# Patient Record
Sex: MALE | Race: WHITE | NOT HISPANIC OR LATINO | Employment: FULL TIME | URBAN - METROPOLITAN AREA
[De-identification: names, ages, dates, MRNs, and addresses within clinical notes are randomized per-mention and may not be internally consistent; named-entity substitution may affect disease eponyms.]

---

## 2020-09-17 ENCOUNTER — TELEPHONE (OUTPATIENT)
Dept: INTERNAL MEDICINE | Facility: CLINIC | Age: 50
End: 2020-09-17

## 2020-09-17 DIAGNOSIS — F31.9 BIPOLAR AFFECTIVE DISORDER, REMISSION STATUS UNSPECIFIED: Primary | ICD-10-CM

## 2020-09-29 ENCOUNTER — OFFICE VISIT (OUTPATIENT)
Dept: PSYCHIATRY | Facility: CLINIC | Age: 50
End: 2020-09-29
Payer: COMMERCIAL

## 2020-09-29 DIAGNOSIS — F31.70 BIPOLAR DISORDER IN FULL REMISSION, MOST RECENT EPISODE UNSPECIFIED TYPE: Primary | ICD-10-CM

## 2020-09-29 PROCEDURE — 90792 PR PSYCHIATRIC DIAGNOSTIC EVALUATION W/MEDICAL SERVICES: ICD-10-PCS | Mod: 95,,, | Performed by: PSYCHIATRY & NEUROLOGY

## 2020-09-29 PROCEDURE — 90792 PSYCH DIAG EVAL W/MED SRVCS: CPT | Mod: 95,,, | Performed by: PSYCHIATRY & NEUROLOGY
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
## 2020-09-30 ENCOUNTER — OFFICE VISIT (OUTPATIENT)
Dept: INTERNAL MEDICINE | Facility: CLINIC | Age: 50
End: 2020-09-30
Payer: COMMERCIAL

## 2020-09-30 VITALS
OXYGEN SATURATION: 97 % | DIASTOLIC BLOOD PRESSURE: 78 MMHG | TEMPERATURE: 96 F | SYSTOLIC BLOOD PRESSURE: 122 MMHG | WEIGHT: 245.56 LBS | HEART RATE: 103 BPM

## 2020-09-30 DIAGNOSIS — Z23 NEED FOR INFLUENZA VACCINATION: ICD-10-CM

## 2020-09-30 DIAGNOSIS — Z79.4 CONTROLLED TYPE 2 DIABETES MELLITUS WITH COMPLICATION, WITH LONG-TERM CURRENT USE OF INSULIN: ICD-10-CM

## 2020-09-30 DIAGNOSIS — E11.8 CONTROLLED TYPE 2 DIABETES MELLITUS WITH COMPLICATION, WITH LONG-TERM CURRENT USE OF INSULIN: ICD-10-CM

## 2020-09-30 DIAGNOSIS — Z23 NEED FOR DIPHTHERIA-TETANUS-PERTUSSIS (TDAP) VACCINE: ICD-10-CM

## 2020-09-30 DIAGNOSIS — F31.9 BIPOLAR AFFECTIVE DISORDER, REMISSION STATUS UNSPECIFIED: ICD-10-CM

## 2020-09-30 DIAGNOSIS — Z00.00 ROUTINE GENERAL MEDICAL EXAMINATION AT A HEALTH CARE FACILITY: Primary | ICD-10-CM

## 2020-09-30 DIAGNOSIS — L03.011 PARONYCHIA OF FINGER OF RIGHT HAND: ICD-10-CM

## 2020-09-30 PROCEDURE — 99213 PR OFFICE/OUTPT VISIT, EST, LEVL III, 20-29 MIN: ICD-10-PCS | Mod: 25,S$GLB,, | Performed by: INTERNAL MEDICINE

## 2020-09-30 PROCEDURE — 99999 PR PBB SHADOW E&M-EST. PATIENT-LVL V: CPT | Mod: PBBFAC,,, | Performed by: INTERNAL MEDICINE

## 2020-09-30 PROCEDURE — 90686 FLU VACCINE (QUAD) GREATER THAN OR EQUAL TO 3YO PRESERVATIVE FREE IM: ICD-10-PCS | Mod: S$GLB,,, | Performed by: INTERNAL MEDICINE

## 2020-09-30 PROCEDURE — 90686 IIV4 VACC NO PRSV 0.5 ML IM: CPT | Mod: S$GLB,,, | Performed by: INTERNAL MEDICINE

## 2020-09-30 PROCEDURE — 90472 IMMUNIZATION ADMIN EACH ADD: CPT | Mod: S$GLB,,, | Performed by: INTERNAL MEDICINE

## 2020-09-30 PROCEDURE — 90471 FLU VACCINE (QUAD) GREATER THAN OR EQUAL TO 3YO PRESERVATIVE FREE IM: ICD-10-PCS | Mod: S$GLB,,, | Performed by: INTERNAL MEDICINE

## 2020-09-30 PROCEDURE — 90715 TDAP VACCINE 7 YRS/> IM: CPT | Mod: S$GLB,,, | Performed by: INTERNAL MEDICINE

## 2020-09-30 PROCEDURE — 99386 PREV VISIT NEW AGE 40-64: CPT | Mod: 25,S$GLB,, | Performed by: INTERNAL MEDICINE

## 2020-09-30 PROCEDURE — 90472 TDAP VACCINE GREATER THAN OR EQUAL TO 7YO IM: ICD-10-PCS | Mod: S$GLB,,, | Performed by: INTERNAL MEDICINE

## 2020-09-30 PROCEDURE — 90715 TDAP VACCINE GREATER THAN OR EQUAL TO 7YO IM: ICD-10-PCS | Mod: S$GLB,,, | Performed by: INTERNAL MEDICINE

## 2020-09-30 PROCEDURE — 90471 IMMUNIZATION ADMIN: CPT | Mod: S$GLB,,, | Performed by: INTERNAL MEDICINE

## 2020-09-30 PROCEDURE — 99999 PR PBB SHADOW E&M-EST. PATIENT-LVL V: ICD-10-PCS | Mod: PBBFAC,,, | Performed by: INTERNAL MEDICINE

## 2020-09-30 PROCEDURE — 99386 PR PREVENTIVE VISIT,NEW,40-64: ICD-10-PCS | Mod: 25,S$GLB,, | Performed by: INTERNAL MEDICINE

## 2020-09-30 PROCEDURE — 99213 OFFICE O/P EST LOW 20 MIN: CPT | Mod: 25,S$GLB,, | Performed by: INTERNAL MEDICINE

## 2020-09-30 RX ORDER — VENLAFAXINE HYDROCHLORIDE 75 MG/1
CAPSULE, EXTENDED RELEASE ORAL
COMMUNITY
Start: 2020-08-24 | End: 2020-09-30

## 2020-09-30 RX ORDER — QUETIAPINE FUMARATE 100 MG/1
TABLET, FILM COATED ORAL
COMMUNITY
Start: 2014-04-01 | End: 2020-09-30 | Stop reason: SDUPTHER

## 2020-09-30 RX ORDER — INSULIN LISPRO 100 [IU]/ML
INJECTION, SOLUTION INTRAVENOUS; SUBCUTANEOUS
COMMUNITY
Start: 2020-08-24 | End: 2021-03-15 | Stop reason: SDUPTHER

## 2020-09-30 RX ORDER — INSULIN GLARGINE 100 [IU]/ML
INJECTION, SOLUTION SUBCUTANEOUS
COMMUNITY
Start: 2018-11-27 | End: 2020-09-30 | Stop reason: SDUPTHER

## 2020-09-30 RX ORDER — INSULIN LISPRO 100 [IU]/ML
INJECTION, SOLUTION INTRAVENOUS; SUBCUTANEOUS
COMMUNITY
Start: 2018-12-10 | End: 2021-03-15 | Stop reason: SDUPTHER

## 2020-09-30 RX ORDER — VENLAFAXINE HYDROCHLORIDE 75 MG/1
CAPSULE, EXTENDED RELEASE ORAL
COMMUNITY
Start: 2014-04-01 | End: 2020-10-12 | Stop reason: SDUPTHER

## 2020-09-30 RX ORDER — METFORMIN HYDROCHLORIDE 500 MG/1
TABLET, EXTENDED RELEASE ORAL
COMMUNITY
Start: 2000-01-01 | End: 2020-09-30 | Stop reason: SDUPTHER

## 2020-09-30 RX ORDER — QUETIAPINE FUMARATE 100 MG/1
TABLET, FILM COATED ORAL
COMMUNITY
Start: 2020-09-20 | End: 2020-10-12 | Stop reason: SDUPTHER

## 2020-09-30 RX ORDER — INSULIN GLARGINE 100 [IU]/ML
30 INJECTION, SOLUTION SUBCUTANEOUS NIGHTLY
Qty: 9 ML | Refills: 1 | Status: SHIPPED | OUTPATIENT
Start: 2020-09-30 | End: 2020-12-27

## 2020-09-30 RX ORDER — LAMOTRIGINE 200 MG/1
TABLET ORAL
COMMUNITY
Start: 2020-09-16 | End: 2020-10-12 | Stop reason: SDUPTHER

## 2020-09-30 RX ORDER — LITHIUM CARBONATE 300 MG/1
TABLET, FILM COATED, EXTENDED RELEASE ORAL
COMMUNITY
Start: 2020-09-08 | End: 2020-11-30

## 2020-09-30 RX ORDER — INSULIN GLARGINE 100 [IU]/ML
INJECTION, SOLUTION SUBCUTANEOUS
COMMUNITY
Start: 2020-08-24 | End: 2020-09-30

## 2020-09-30 RX ORDER — ATORVASTATIN CALCIUM 40 MG/1
TABLET, FILM COATED ORAL
COMMUNITY
Start: 2000-01-01 | End: 2020-09-30 | Stop reason: SDUPTHER

## 2020-09-30 RX ORDER — DOXYCYCLINE HYCLATE 100 MG
100 TABLET ORAL EVERY 12 HOURS
Qty: 14 TABLET | Refills: 0 | Status: SHIPPED | OUTPATIENT
Start: 2020-09-30 | End: 2020-10-07

## 2020-09-30 RX ORDER — LAMOTRIGINE 200 MG/1
TABLET ORAL
COMMUNITY
Start: 2000-01-01 | End: 2020-09-30 | Stop reason: SDUPTHER

## 2020-09-30 RX ORDER — METFORMIN HYDROCHLORIDE 500 MG/1
TABLET, EXTENDED RELEASE ORAL
COMMUNITY
Start: 2020-08-24 | End: 2021-01-14

## 2020-09-30 RX ORDER — ATORVASTATIN CALCIUM 40 MG/1
TABLET, FILM COATED ORAL
COMMUNITY
Start: 2020-08-24 | End: 2020-10-12 | Stop reason: SDUPTHER

## 2020-09-30 NOTE — PATIENT INSTRUCTIONS
Paronychia of the Finger or Toe  Paronychia is an infection near a fingernail or toenail. It usually occurs when an opening in the cuticle or an ingrown toenail lets bacteria under the skin.  The infection will need to be drained if pus is present. If the infection has been caught early, you may need only antibiotic treatment. Healing will take about 1 to 2 weeks.  Home care  Follow these guidelines when caring for yourself at home:  · Clean and soak the toe or finger. Do this 2 times a day for the first 3 days. To do so:  ¨ Soak your foot or hand in a tub of warm water for 5 minutes. Or hold your toe or finger under a faucet of warm running water for 5 minutes.  ¨ Clean any crust away with soap and water using a cotton swab.  ¨ Put antibiotic ointment on the infected area.  · Change the dressing daily or any time it gets dirty.  · If you were given antibiotics, take them as directed until they are all gone.  · If your infection is on a toe, wear comfortable shoes with a lot of toe room. You can also wear open-toed sandals while your toe heals.  · You may use over-the-counter medicine (acetaminophen or ibuprofen to help with pain, unless another medicine was prescribed. If you have chronic liver or kidney disease, talk with your healthcare provider before using these medicines. Also talk with your provider if you've had a stomach ulcer or GI (gastrointestinal) bleeding.  Prevention  The following can prevent paronychia:  · Avoid cutting or playing with your cuticles at home.  · Don't bite your nails.  · Don't suck on your thumbs or fingers.  Follow-up care  Follow up with your healthcare provider, or as advised.  When to seek medical advice  Call your healthcare provider right away if any of these occur:  · Redness, pain, or swelling of the finger or toe gets worse  · Red streaks in the skin leading away from the wound  · Pus or fluid draining from the nail area  · Fever of 100.4ºF (38ºC) or higher, or as directed  by your provider  Date Last Reviewed: 8/1/2016  © 1425-5976 [employer]. [address] [place] [address], [place] [address]. All rights reserved. This information is not intended as a substitute for professional medical care. Always follow your healthcare professional's instructions.

## 2020-09-30 NOTE — PROGRESS NOTES
Subjective:      Patient ID: Patrick Sanz is a 50 y.o. male.    Chief Complaint: Establish Care    Hand Pain   Incident onset: Three days. There was no injury mechanism. Pain location: Right 4th finger. The quality of the pain is described as aching. The pain does not radiate. The pain is moderate. The pain has been improving since the incident. Pertinent negatives include no chest pain. The symptoms are aggravated by palpation. He has tried nothing (Spontaneous draining) for the symptoms. The treatment provided moderate relief.    Physical Exam  Constitutional:       General: He is not in acute distress.     Appearance: He is well-developed.   HENT:      Head: Normocephalic and atraumatic.   Eyes:      Pupils: Pupils are equal, round, and reactive to light.   Neck:      Musculoskeletal: Neck supple.      Thyroid: No thyromegaly.   Cardiovascular:      Rate and Rhythm: Normal rate and regular rhythm.   Pulmonary:      Breath sounds: Normal breath sounds. No wheezing or rales.   Abdominal:      General: Bowel sounds are normal.      Palpations: Abdomen is soft.      Tenderness: There is no abdominal tenderness.   Musculoskeletal: Normal range of motion.        Hands:    Lymphadenopathy:      Cervical: No cervical adenopathy.   Skin:     General: Skin is warm and dry.   Neurological:      Mental Status: He is alert and oriented to person, place, and time.   Psychiatric:         Behavior: Behavior normal.       51 yo with There is no problem list on file for this patient.    Past Medical History:   Diagnosis Date    Anxiety     Bipolar disorder     Depression     Diabetes mellitus, type 2        Here today for annual prev exam.  Compliant with meds without significant side effects. Energy and appetite are good.       Has tried only metformin and insulin for dm. On insulin at least 2 years.     120 - 210 home glucose.     3 days  Of pain  To medial aspec of right 4th finger. Drained some puss and blood.     Past  Surgical History:   Procedure Laterality Date    HERNIA REPAIR       Social History     Socioeconomic History    Marital status:      Spouse name: Not on file    Number of children: Not on file    Years of education: Not on file    Highest education level: Not on file   Occupational History    Not on file   Social Needs    Financial resource strain: Not on file    Food insecurity     Worry: Not on file     Inability: Not on file    Transportation needs     Medical: Not on file     Non-medical: Not on file   Tobacco Use    Smoking status: Former Smoker    Smokeless tobacco: Former User   Substance and Sexual Activity    Alcohol use: Not Currently    Drug use: Never    Sexual activity: Yes   Lifestyle    Physical activity     Days per week: Not on file     Minutes per session: Not on file    Stress: Not on file   Relationships    Social connections     Talks on phone: Not on file     Gets together: Not on file     Attends Rastafarian service: Not on file     Active member of club or organization: Not on file     Attends meetings of clubs or organizations: Not on file     Relationship status: Not on file   Other Topics Concern    Not on file   Social History Narrative    Not on file         family history includes Diabetes in his mother; Hypertension in his father.    No f/h of colon or prostate cancer  Review of Systems   Constitutional: Negative for chills and fever.   HENT: Negative for ear pain and sore throat.    Respiratory: Negative for cough, shortness of breath and wheezing.    Cardiovascular: Negative for chest pain and palpitations.   Gastrointestinal: Negative for abdominal pain, blood in stool, diarrhea, nausea and vomiting.   Genitourinary: Negative for dysuria and hematuria.   Skin: Negative for rash.   Neurological: Negative for seizures and syncope.     Objective:   /78 (BP Location: Left arm, Patient Position: Sitting, BP Method: Large (Manual))   Pulse 103   Temp 96.4  °F (35.8 °C) (Tympanic)   Wt 111.4 kg (245 lb 9.5 oz)   SpO2 97%     Physical Exam  Constitutional:       General: He is not in acute distress.     Appearance: He is well-developed.   HENT:      Head: Normocephalic and atraumatic.   Eyes:      Pupils: Pupils are equal, round, and reactive to light.   Neck:      Musculoskeletal: Neck supple.      Thyroid: No thyromegaly.   Cardiovascular:      Rate and Rhythm: Normal rate and regular rhythm.   Pulmonary:      Breath sounds: Normal breath sounds. No wheezing or rales.   Abdominal:      General: Bowel sounds are normal.      Palpations: Abdomen is soft.      Tenderness: There is no abdominal tenderness.   Musculoskeletal: Normal range of motion.        Hands:    Lymphadenopathy:      Cervical: No cervical adenopathy.   Skin:     General: Skin is warm and dry.   Neurological:      Mental Status: He is alert and oriented to person, place, and time.   Psychiatric:         Behavior: Behavior normal.         Assessment:     1. Routine general medical examination at a health care facility    2. Bipolar affective disorder, remission status unspecified    3. Need for influenza vaccination    4. Need for diphtheria-tetanus-pertussis (Tdap) vaccine    5. Controlled type 2 diabetes mellitus with complication, with long-term current use of insulin    6. Paronychia of finger of right hand      Plan:   Routine general medical examination at a health care facility    Heart healthy diet and regular exercise.  Health maintenance reviewed with patient  -     Comprehensive metabolic panel; Future; Expected date: 09/30/2020  -     CBC auto differential; Future; Expected date: 09/30/2020  -     TSH; Future; Expected date: 09/30/2020  -     Lipid Panel; Future; Expected date: 09/30/2020  -     PSA, Screening; Future; Expected date: 09/30/2020  -     Hemoglobin A1C; Future; Expected date: 09/30/2020  -     HIV 1/2 Ag/Ab (4th Gen); Future; Expected date: 09/30/2020  -     Hepatitis C  Antibody; Future; Expected date: 09/30/2020    Bipolar affective disorder, remission status unspecified    Need for influenza vaccination  -     Influenza - Quadrivalent *Preferred* (6 months+) (PF)    Need for diphtheria-tetanus-pertussis (Tdap) vaccine  -     (In Office Administered) Tdap Vaccine    Controlled type 2 diabetes mellitus with complication, with long-term current use of insulin  -     Hemoglobin A1C; Future; Expected date: 09/30/2020  -     Microalbumin/creatinine urine ratio; Future; Expected date: 09/30/2020  -     Ambulatory referral/consult to Optometry; Future; Expected date: 10/07/2020  -     Ambulatory referral/consult to Diabetes Education; Future; Expected date: 10/07/2020  -     insulin (BASAGLAR KWIKPEN U-100 INSULIN) glargine 100 units/mL (3mL) SubQ pen; Inject 30 Units into the skin every evening.  Dispense: 9 mL; Refill: 1    Paronychia of finger of right hand  -     doxycycline (VIBRA-TABS) 100 MG tablet; Take 1 tablet (100 mg total) by mouth every 12 (twelve) hours. for 7 days  Dispense: 14 tablet; Refill: 0        Lab Frequency Next Occurrence   Ambulatory referral/consult to Psychiatry Once 09/24/2020       Problem List Items Addressed This Visit     None      Visit Diagnoses     Routine general medical examination at a health care facility    -  Primary    Relevant Orders    Comprehensive metabolic panel    CBC auto differential    TSH    Lipid Panel    PSA, Screening    Hemoglobin A1C    HIV 1/2 Ag/Ab (4th Gen)    Hepatitis C Antibody    Bipolar affective disorder, remission status unspecified        Need for influenza vaccination        Relevant Orders    Influenza - Quadrivalent *Preferred* (6 months+) (PF) (Completed)    Need for diphtheria-tetanus-pertussis (Tdap) vaccine        Relevant Orders    (In Office Administered) Tdap Vaccine (Completed)    Controlled type 2 diabetes mellitus with complication, with long-term current use of insulin        Relevant Medications     metFORMIN (GLUCOPHAGE-XR) 500 MG ER 24hr tablet    HUMALOG KWIKPEN INSULIN 100 unit/mL pen    insulin lispro (HUMALOG KWIKPEN INSULIN) 100 unit/mL pen    insulin (BASAGLAR KWIKPEN U-100 INSULIN) glargine 100 units/mL (3mL) SubQ pen    Other Relevant Orders    Hemoglobin A1C    Microalbumin/creatinine urine ratio    Ambulatory referral/consult to Optometry    Ambulatory referral/consult to Diabetes Education    Paronychia of finger of right hand        Relevant Medications    doxycycline (VIBRA-TABS) 100 MG tablet          Follow up in about 1 week (around 10/7/2020), or if symptoms worsen or fail to improve.

## 2020-10-03 ENCOUNTER — LAB VISIT (OUTPATIENT)
Dept: LAB | Facility: HOSPITAL | Age: 50
End: 2020-10-03
Attending: INTERNAL MEDICINE
Payer: COMMERCIAL

## 2020-10-03 DIAGNOSIS — Z79.4 CONTROLLED TYPE 2 DIABETES MELLITUS WITH COMPLICATION, WITH LONG-TERM CURRENT USE OF INSULIN: ICD-10-CM

## 2020-10-03 DIAGNOSIS — E11.8 CONTROLLED TYPE 2 DIABETES MELLITUS WITH COMPLICATION, WITH LONG-TERM CURRENT USE OF INSULIN: ICD-10-CM

## 2020-10-03 DIAGNOSIS — Z00.00 ROUTINE GENERAL MEDICAL EXAMINATION AT A HEALTH CARE FACILITY: ICD-10-CM

## 2020-10-03 PROBLEM — F31.9 BIPOLAR DISORDER: Status: ACTIVE | Noted: 2020-10-03

## 2020-10-03 LAB
ALBUMIN SERPL BCP-MCNC: 4.1 G/DL (ref 3.5–5.2)
ALBUMIN/CREAT UR: 11.9 UG/MG (ref 0–30)
ALP SERPL-CCNC: 112 U/L (ref 55–135)
ALT SERPL W/O P-5'-P-CCNC: 29 U/L (ref 10–44)
ANION GAP SERPL CALC-SCNC: 12 MMOL/L (ref 8–16)
AST SERPL-CCNC: 18 U/L (ref 10–40)
BASOPHILS # BLD AUTO: 0.01 K/UL (ref 0–0.2)
BASOPHILS NFR BLD: 0.1 % (ref 0–1.9)
BILIRUB SERPL-MCNC: 0.4 MG/DL (ref 0.1–1)
BUN SERPL-MCNC: 13 MG/DL (ref 6–20)
CALCIUM SERPL-MCNC: 8.7 MG/DL (ref 8.7–10.5)
CHLORIDE SERPL-SCNC: 106 MMOL/L (ref 95–110)
CHOLEST SERPL-MCNC: 125 MG/DL (ref 120–199)
CHOLEST/HDLC SERPL: 4.3 {RATIO} (ref 2–5)
CO2 SERPL-SCNC: 21 MMOL/L (ref 23–29)
COMPLEXED PSA SERPL-MCNC: 16.1 NG/ML (ref 0–4)
CREAT SERPL-MCNC: 0.9 MG/DL (ref 0.5–1.4)
CREAT UR-MCNC: 320 MG/DL (ref 23–375)
DIFFERENTIAL METHOD: ABNORMAL
EOSINOPHIL # BLD AUTO: 0.1 K/UL (ref 0–0.5)
EOSINOPHIL NFR BLD: 1 % (ref 0–8)
ERYTHROCYTE [DISTWIDTH] IN BLOOD BY AUTOMATED COUNT: 12.6 % (ref 11.5–14.5)
EST. GFR  (AFRICAN AMERICAN): >60 ML/MIN/1.73 M^2
EST. GFR  (NON AFRICAN AMERICAN): >60 ML/MIN/1.73 M^2
ESTIMATED AVG GLUCOSE: 212 MG/DL (ref 68–131)
ESTIMATED AVG GLUCOSE: 214 MG/DL (ref 68–131)
GLUCOSE SERPL-MCNC: 205 MG/DL (ref 70–110)
HBA1C MFR BLD HPLC: 9 % (ref 4–5.6)
HBA1C MFR BLD HPLC: 9.1 % (ref 4–5.6)
HCT VFR BLD AUTO: 46.7 % (ref 40–54)
HDLC SERPL-MCNC: 29 MG/DL (ref 40–75)
HDLC SERPL: 23.2 % (ref 20–50)
HGB BLD-MCNC: 14.8 G/DL (ref 14–18)
IMM GRANULOCYTES # BLD AUTO: 0.02 K/UL (ref 0–0.04)
IMM GRANULOCYTES NFR BLD AUTO: 0.2 % (ref 0–0.5)
LDLC SERPL CALC-MCNC: 61.6 MG/DL (ref 63–159)
LYMPHOCYTES # BLD AUTO: 2.4 K/UL (ref 1–4.8)
LYMPHOCYTES NFR BLD: 28.3 % (ref 18–48)
MCH RBC QN AUTO: 28.9 PG (ref 27–31)
MCHC RBC AUTO-ENTMCNC: 31.7 G/DL (ref 32–36)
MCV RBC AUTO: 91 FL (ref 82–98)
MICROALBUMIN UR DL<=1MG/L-MCNC: 38 UG/ML
MONOCYTES # BLD AUTO: 0.6 K/UL (ref 0.3–1)
MONOCYTES NFR BLD: 7.1 % (ref 4–15)
NEUTROPHILS # BLD AUTO: 5.3 K/UL (ref 1.8–7.7)
NEUTROPHILS NFR BLD: 63.3 % (ref 38–73)
NONHDLC SERPL-MCNC: 96 MG/DL
NRBC BLD-RTO: 0 /100 WBC
PLATELET # BLD AUTO: 274 K/UL (ref 150–350)
PMV BLD AUTO: 10 FL (ref 9.2–12.9)
POTASSIUM SERPL-SCNC: 4.3 MMOL/L (ref 3.5–5.1)
PROT SERPL-MCNC: 6.9 G/DL (ref 6–8.4)
RBC # BLD AUTO: 5.12 M/UL (ref 4.6–6.2)
SODIUM SERPL-SCNC: 139 MMOL/L (ref 136–145)
TRIGL SERPL-MCNC: 172 MG/DL (ref 30–150)
TSH SERPL DL<=0.005 MIU/L-ACNC: 0.94 UIU/ML (ref 0.4–4)
WBC # BLD AUTO: 8.41 K/UL (ref 3.9–12.7)

## 2020-10-03 PROCEDURE — 85025 COMPLETE CBC W/AUTO DIFF WBC: CPT

## 2020-10-03 PROCEDURE — 80053 COMPREHEN METABOLIC PANEL: CPT

## 2020-10-03 PROCEDURE — 84153 ASSAY OF PSA TOTAL: CPT

## 2020-10-03 PROCEDURE — 80061 LIPID PANEL: CPT

## 2020-10-03 PROCEDURE — 82043 UR ALBUMIN QUANTITATIVE: CPT

## 2020-10-03 PROCEDURE — 86703 HIV-1/HIV-2 1 RESULT ANTBDY: CPT

## 2020-10-03 PROCEDURE — 84443 ASSAY THYROID STIM HORMONE: CPT

## 2020-10-03 PROCEDURE — 36415 COLL VENOUS BLD VENIPUNCTURE: CPT

## 2020-10-03 PROCEDURE — 83036 HEMOGLOBIN GLYCOSYLATED A1C: CPT

## 2020-10-03 PROCEDURE — 86803 HEPATITIS C AB TEST: CPT

## 2020-10-07 ENCOUNTER — OFFICE VISIT (OUTPATIENT)
Dept: INTERNAL MEDICINE | Facility: CLINIC | Age: 50
End: 2020-10-07
Payer: COMMERCIAL

## 2020-10-07 ENCOUNTER — LAB VISIT (OUTPATIENT)
Dept: LAB | Facility: HOSPITAL | Age: 50
End: 2020-10-07
Attending: INTERNAL MEDICINE
Payer: COMMERCIAL

## 2020-10-07 VITALS
WEIGHT: 248.25 LBS | TEMPERATURE: 97 F | OXYGEN SATURATION: 96 % | DIASTOLIC BLOOD PRESSURE: 86 MMHG | HEART RATE: 91 BPM | SYSTOLIC BLOOD PRESSURE: 122 MMHG

## 2020-10-07 DIAGNOSIS — R97.20 ELEVATED PSA: ICD-10-CM

## 2020-10-07 DIAGNOSIS — E11.8 CONTROLLED TYPE 2 DIABETES MELLITUS WITH COMPLICATION, WITH LONG-TERM CURRENT USE OF INSULIN: Primary | ICD-10-CM

## 2020-10-07 DIAGNOSIS — Z79.4 CONTROLLED TYPE 2 DIABETES MELLITUS WITH COMPLICATION, WITH LONG-TERM CURRENT USE OF INSULIN: Primary | ICD-10-CM

## 2020-10-07 LAB
HCV AB SERPL QL IA: NEGATIVE
HIV 1+2 AB+HIV1 P24 AG SERPL QL IA: NEGATIVE

## 2020-10-07 PROCEDURE — 84153 ASSAY OF PSA TOTAL: CPT

## 2020-10-07 PROCEDURE — 99999 PR PBB SHADOW E&M-EST. PATIENT-LVL V: CPT | Mod: PBBFAC,,, | Performed by: INTERNAL MEDICINE

## 2020-10-07 PROCEDURE — 3052F PR MOST RECENT HEMOGLOBIN A1C LEVEL 8.0 - < 9.0%: ICD-10-PCS | Mod: CPTII,S$GLB,, | Performed by: INTERNAL MEDICINE

## 2020-10-07 PROCEDURE — 99213 OFFICE O/P EST LOW 20 MIN: CPT | Mod: S$GLB,,, | Performed by: INTERNAL MEDICINE

## 2020-10-07 PROCEDURE — 36415 COLL VENOUS BLD VENIPUNCTURE: CPT

## 2020-10-07 PROCEDURE — 3052F HG A1C>EQUAL 8.0%<EQUAL 9.0%: CPT | Mod: CPTII,S$GLB,, | Performed by: INTERNAL MEDICINE

## 2020-10-07 PROCEDURE — 99999 PR PBB SHADOW E&M-EST. PATIENT-LVL V: ICD-10-PCS | Mod: PBBFAC,,, | Performed by: INTERNAL MEDICINE

## 2020-10-07 PROCEDURE — 99213 PR OFFICE/OUTPT VISIT, EST, LEVL III, 20-29 MIN: ICD-10-PCS | Mod: S$GLB,,, | Performed by: INTERNAL MEDICINE

## 2020-10-07 NOTE — PATIENT INSTRUCTIONS
Medications similar to trulicity are victoza and ozempic.     Monitor glucose upon awakening and another time later in the day.     If any glucose below 85 then decrease basaglar to 20 units and notify Dr. Figueroa.

## 2020-10-07 NOTE — PROGRESS NOTES
Subjective:      Patient ID: Patrick Sanz is a 50 y.o. male.    Chief Complaint: Follow-up    HPI   49 yo with   Patient Active Problem List   Diagnosis    Controlled type 2 diabetes mellitus with complication, with long-term current use of insulin    Bipolar disorder     Past Medical History:   Diagnosis Date    Anxiety     Bipolar disorder     Depression     Diabetes mellitus, type 2      Here today for management of dm and elevated psa.  Compliant with meds without significant side effects.     Review of Systems   Constitutional: Negative for chills and fever.   HENT: Negative for ear pain and sore throat.    Respiratory: Negative for cough.    Cardiovascular: Negative for chest pain.   Gastrointestinal: Negative for abdominal pain and blood in stool.   Genitourinary: Negative for dysuria and hematuria.   Neurological: Negative for seizures and syncope.     Objective:   /86 (BP Location: Right arm, Patient Position: Sitting, BP Method: Large (Manual))   Pulse 91   Temp 97.3 °F (36.3 °C) (Tympanic)   Wt 112.6 kg (248 lb 3.8 oz)   SpO2 96%     Physical Exam  Constitutional:       General: He is not in acute distress.     Appearance: He is well-developed.   Cardiovascular:      Rate and Rhythm: Normal rate.   Pulmonary:      Effort: Pulmonary effort is normal.      Breath sounds: Normal breath sounds.   Skin:     General: Skin is warm and dry.   Psychiatric:         Behavior: Behavior normal.         Lab Visit on 10/07/2020   Component Date Value Ref Range Status    PSA, SCREEN 10/07/2020 15.7* 0.00 - 4.00 ng/mL Final    Comment: PSA Expected levels:  Hormonal Therapy: <0.05 ng/ml  Prostatectomy: <0.01 ng/ml  Radiation Therapy: <1.00 ng/ml     Lab Visit on 10/03/2020   Component Date Value Ref Range Status    Sodium 10/03/2020 139  136 - 145 mmol/L Final    Potassium 10/03/2020 4.3  3.5 - 5.1 mmol/L Final    Chloride 10/03/2020 106  95 - 110 mmol/L Final    CO2 10/03/2020 21* 23 - 29 mmol/L  Final    Glucose 10/03/2020 205* 70 - 110 mg/dL Final    BUN, Bld 10/03/2020 13  6 - 20 mg/dL Final    Creatinine 10/03/2020 0.9  0.5 - 1.4 mg/dL Final    Calcium 10/03/2020 8.7  8.7 - 10.5 mg/dL Final    Total Protein 10/03/2020 6.9  6.0 - 8.4 g/dL Final    Albumin 10/03/2020 4.1  3.5 - 5.2 g/dL Final    Total Bilirubin 10/03/2020 0.4  0.1 - 1.0 mg/dL Final    Comment: For infants and newborns, interpretation of results should be based  on gestational age, weight and in agreement with clinical  observations.  Premature Infant recommended reference ranges:  Up to 24 hours.............<8.0 mg/dL  Up to 48 hours............<12.0 mg/dL  3-5 days..................<15.0 mg/dL  6-29 days.................<15.0 mg/dL      Alkaline Phosphatase 10/03/2020 112  55 - 135 U/L Final    AST 10/03/2020 18  10 - 40 U/L Final    ALT 10/03/2020 29  10 - 44 U/L Final    Anion Gap 10/03/2020 12  8 - 16 mmol/L Final    eGFR if African American 10/03/2020 >60.0  >60 mL/min/1.73 m^2 Final    eGFR if non African American 10/03/2020 >60.0  >60 mL/min/1.73 m^2 Final    Comment: Calculation used to obtain the estimated glomerular filtration  rate (eGFR) is the CKD-EPI equation.       WBC 10/03/2020 8.41  3.90 - 12.70 K/uL Final    RBC 10/03/2020 5.12  4.60 - 6.20 M/uL Final    Hemoglobin 10/03/2020 14.8  14.0 - 18.0 g/dL Final    Hematocrit 10/03/2020 46.7  40.0 - 54.0 % Final    Mean Corpuscular Volume 10/03/2020 91  82 - 98 fL Final    Mean Corpuscular Hemoglobin 10/03/2020 28.9  27.0 - 31.0 pg Final    Mean Corpuscular Hemoglobin Conc 10/03/2020 31.7* 32.0 - 36.0 g/dL Final    RDW 10/03/2020 12.6  11.5 - 14.5 % Final    Platelets 10/03/2020 274  150 - 350 K/uL Final    MPV 10/03/2020 10.0  9.2 - 12.9 fL Final    Immature Granulocytes 10/03/2020 0.2  0.0 - 0.5 % Final    Gran # (ANC) 10/03/2020 5.3  1.8 - 7.7 K/uL Final    Immature Grans (Abs) 10/03/2020 0.02  0.00 - 0.04 K/uL Final    Comment: Mild elevation in  immature granulocytes is non specific and   can be seen in a variety of conditions including stress response,   acute inflammation, trauma and pregnancy. Correlation with other   laboratory and clinical findings is essential.      Lymph # 10/03/2020 2.4  1.0 - 4.8 K/uL Final    Mono # 10/03/2020 0.6  0.3 - 1.0 K/uL Final    Eos # 10/03/2020 0.1  0.0 - 0.5 K/uL Final    Baso # 10/03/2020 0.01  0.00 - 0.20 K/uL Final    nRBC 10/03/2020 0  0 /100 WBC Final    Gran% 10/03/2020 63.3  38.0 - 73.0 % Final    Lymph% 10/03/2020 28.3  18.0 - 48.0 % Final    Mono% 10/03/2020 7.1  4.0 - 15.0 % Final    Eosinophil% 10/03/2020 1.0  0.0 - 8.0 % Final    Basophil% 10/03/2020 0.1  0.0 - 1.9 % Final    Differential Method 10/03/2020 Automated   Final    TSH 10/03/2020 0.943  0.400 - 4.000 uIU/mL Final    Cholesterol 10/03/2020 125  120 - 199 mg/dL Final    Comment: The National Cholesterol Education Program (NCEP) has set the  following guidelines (reference ranges) for Cholesterol:  Optimal.....................<200 mg/dL  Borderline High.............200-239 mg/dL  High........................> or = 240 mg/dL      Triglycerides 10/03/2020 172* 30 - 150 mg/dL Final    Comment: The National Cholesterol Education Program (NCEP) has set the  following guidelines (reference values) for triglycerides:  Normal......................<150 mg/dL  Borderline High.............150-199 mg/dL  High........................200-499 mg/dL      HDL 10/03/2020 29* 40 - 75 mg/dL Final    Comment: The National Cholesterol Education Program (NCEP) has set the  following guidelines (reference values) for HDL Cholesterol:  Low...............<40 mg/dL  Optimal...........>60 mg/dL      LDL Cholesterol 10/03/2020 61.6* 63.0 - 159.0 mg/dL Final    Comment: The National Cholesterol Education Program (NCEP) has set the  following guidelines (reference values) for LDL Cholesterol:  Optimal.......................<130 mg/dL  Borderline  High...............130-159 mg/dL  High..........................160-189 mg/dL  Very High.....................>190 mg/dL      Hdl/Cholesterol Ratio 10/03/2020 23.2  20.0 - 50.0 % Final    Total Cholesterol/HDL Ratio 10/03/2020 4.3  2.0 - 5.0 Final    Non-HDL Cholesterol 10/03/2020 96  mg/dL Final    Comment: Risk category and Non-HDL cholesterol goals:  Coronary heart disease (CHD)or equivalent (10-year risk of CHD >20%):  Non-HDL cholesterol goal     <130 mg/dL  Two or more CHD risk factors and 10-year risk of CHD <= 20%:  Non-HDL cholesterol goal     <160 mg/dL  0 to 1 CHD risk factor:  Non-HDL cholesterol goal     <190 mg/dL      PSA, SCREEN 10/03/2020 16.1* 0.00 - 4.00 ng/mL Final    Comment: PSA Expected levels:  Hormonal Therapy: <0.05 ng/ml  Prostatectomy: <0.01 ng/ml  Radiation Therapy: <1.00 ng/ml      Hemoglobin A1C 10/03/2020 9.0* 4.0 - 5.6 % Final    Comment: ADA Screening Guidelines:  5.7-6.4%  Consistent with prediabetes  >or=6.5%  Consistent with diabetes  High levels of fetal hemoglobin interfere with the HbA1C  assay. Heterozygous hemoglobin variants (HbS, HgC, etc)do  not significantly interfere with this assay.   However, presence of multiple variants may affect accuracy.      Estimated Avg Glucose 10/03/2020 212* 68 - 131 mg/dL Final    HIV 1/2 Ag/Ab 10/03/2020 Negative  Negative Final    Hepatitis C Ab 10/03/2020 Negative  Negative Final    Hemoglobin A1C 10/03/2020 9.1* 4.0 - 5.6 % Final    Comment: ADA Screening Guidelines:  5.7-6.4%  Consistent with prediabetes  >or=6.5%  Consistent with diabetes  High levels of fetal hemoglobin interfere with the HbA1C  assay. Heterozygous hemoglobin variants (HbS, HgC, etc)do  not significantly interfere with this assay.   However, presence of multiple variants may affect accuracy.      Estimated Avg Glucose 10/03/2020 214* 68 - 131 mg/dL Final   Lab Visit on 10/03/2020   Component Date Value Ref Range Status    Microalbum.,U,Random 10/03/2020  38.0  ug/mL Final    Creatinine, Random Ur 10/03/2020 320.0  23.0 - 375.0 mg/dL Final    Comment: The random urine reference ranges provided were established   for 24 hour urine collections.  No reference ranges exist for  random urine specimens.  Correlate clinically.      Microalb Creat Ratio 10/03/2020 11.9  0.0 - 30.0 ug/mg Final       Assessment:     1. Controlled type 2 diabetes mellitus with complication, with long-term current use of insulin    2. Elevated PSA      Plan:   Controlled type 2 diabetes mellitus with complication, with long-term current use of insulin  Not at goal add trulicity.   -     dulaglutide (TRULICITY) 0.75 mg/0.5 mL pen injector; Inject 0.75 mg into the skin every 7 days.  Dispense: 6 mL; Refill: 1  -     Ambulatory referral/consult to Diabetes Education; Future; Expected date: 10/14/2020  -     Hemoglobin A1C; Future; Expected date: 12/07/2020    Elevated PSA  -     PSA, Screening; Future; Expected date: 10/07/2020  -     Ambulatory referral/consult to Urology; Future; Expected date: 10/14/2020        Lab Frequency Next Occurrence   Ambulatory referral/consult to Psychiatry Once 09/24/2020   Ambulatory referral/consult to Optometry Once 10/07/2020   Ambulatory referral/consult to Diabetes Education Once 10/07/2020       Problem List Items Addressed This Visit        Endocrine    Controlled type 2 diabetes mellitus with complication, with long-term current use of insulin - Primary    Relevant Medications    dulaglutide (TRULICITY) 0.75 mg/0.5 mL pen injector    Other Relevant Orders    Ambulatory referral/consult to Diabetes Education    Hemoglobin A1C      Other Visit Diagnoses     Elevated PSA        Relevant Orders    PSA, Screening (Completed)    Ambulatory referral/consult to Urology          Follow up in about 2 weeks (around 10/21/2020), or if symptoms worsen or fail to improve.

## 2020-10-08 LAB — COMPLEXED PSA SERPL-MCNC: 15.7 NG/ML (ref 0–4)

## 2020-10-11 ENCOUNTER — PATIENT MESSAGE (OUTPATIENT)
Dept: INTERNAL MEDICINE | Facility: CLINIC | Age: 50
End: 2020-10-11

## 2020-10-11 ENCOUNTER — PATIENT MESSAGE (OUTPATIENT)
Dept: PSYCHIATRY | Facility: CLINIC | Age: 50
End: 2020-10-11

## 2020-10-12 ENCOUNTER — PATIENT MESSAGE (OUTPATIENT)
Dept: PSYCHIATRY | Facility: CLINIC | Age: 50
End: 2020-10-12

## 2020-10-12 ENCOUNTER — PATIENT MESSAGE (OUTPATIENT)
Dept: INTERNAL MEDICINE | Facility: CLINIC | Age: 50
End: 2020-10-12

## 2020-10-12 DIAGNOSIS — E11.8 CONTROLLED TYPE 2 DIABETES MELLITUS WITH COMPLICATION, WITH LONG-TERM CURRENT USE OF INSULIN: ICD-10-CM

## 2020-10-12 DIAGNOSIS — Z79.4 CONTROLLED TYPE 2 DIABETES MELLITUS WITH COMPLICATION, WITH LONG-TERM CURRENT USE OF INSULIN: ICD-10-CM

## 2020-10-12 RX ORDER — VENLAFAXINE HYDROCHLORIDE 75 MG/1
75 CAPSULE, EXTENDED RELEASE ORAL DAILY
Qty: 30 CAPSULE | Refills: 2 | Status: SHIPPED | OUTPATIENT
Start: 2020-10-12 | End: 2020-11-30 | Stop reason: SDUPTHER

## 2020-10-12 RX ORDER — ATORVASTATIN CALCIUM 40 MG/1
TABLET, FILM COATED ORAL
Qty: 90 TABLET | Refills: 1 | Status: SHIPPED | OUTPATIENT
Start: 2020-10-12 | End: 2021-05-13 | Stop reason: SDUPTHER

## 2020-10-12 RX ORDER — LAMOTRIGINE 200 MG/1
300 TABLET ORAL DAILY
Qty: 45 TABLET | Refills: 2 | Status: SHIPPED | OUTPATIENT
Start: 2020-10-12 | End: 2020-11-30 | Stop reason: SDUPTHER

## 2020-10-12 RX ORDER — QUETIAPINE FUMARATE 100 MG/1
100 TABLET, FILM COATED ORAL NIGHTLY
Qty: 30 TABLET | Refills: 2 | Status: SHIPPED | OUTPATIENT
Start: 2020-10-12 | End: 2020-11-30 | Stop reason: SDUPTHER

## 2020-10-20 ENCOUNTER — TELEPHONE (OUTPATIENT)
Dept: OPHTHALMOLOGY | Facility: CLINIC | Age: 50
End: 2020-10-20

## 2020-10-20 NOTE — TELEPHONE ENCOUNTER
Attempted to call pt. to let him know his appointment was moved from 4:30 to 8AM on 10/28/20 due to  being in surgery that afternoon... no V/M option

## 2020-10-21 ENCOUNTER — OFFICE VISIT (OUTPATIENT)
Dept: INTERNAL MEDICINE | Facility: CLINIC | Age: 50
End: 2020-10-21
Payer: COMMERCIAL

## 2020-10-21 VITALS
WEIGHT: 250 LBS | TEMPERATURE: 98 F | SYSTOLIC BLOOD PRESSURE: 122 MMHG | OXYGEN SATURATION: 98 % | HEART RATE: 85 BPM | DIASTOLIC BLOOD PRESSURE: 76 MMHG

## 2020-10-21 DIAGNOSIS — S99.921A INJURY OF TOE ON RIGHT FOOT, INITIAL ENCOUNTER: ICD-10-CM

## 2020-10-21 DIAGNOSIS — E11.65 UNCONTROLLED TYPE 2 DIABETES MELLITUS WITH HYPERGLYCEMIA: ICD-10-CM

## 2020-10-21 DIAGNOSIS — E11.8 CONTROLLED TYPE 2 DIABETES MELLITUS WITH COMPLICATION, WITH LONG-TERM CURRENT USE OF INSULIN: Primary | ICD-10-CM

## 2020-10-21 DIAGNOSIS — Z79.4 CONTROLLED TYPE 2 DIABETES MELLITUS WITH COMPLICATION, WITH LONG-TERM CURRENT USE OF INSULIN: Primary | ICD-10-CM

## 2020-10-21 DIAGNOSIS — M79.674 PAIN OF TOE OF RIGHT FOOT: ICD-10-CM

## 2020-10-21 PROCEDURE — 99214 PR OFFICE/OUTPT VISIT, EST, LEVL IV, 30-39 MIN: ICD-10-PCS | Mod: S$GLB,,, | Performed by: INTERNAL MEDICINE

## 2020-10-21 PROCEDURE — 99999 PR PBB SHADOW E&M-EST. PATIENT-LVL V: ICD-10-PCS | Mod: PBBFAC,,, | Performed by: INTERNAL MEDICINE

## 2020-10-21 PROCEDURE — 99999 PR PBB SHADOW E&M-EST. PATIENT-LVL V: CPT | Mod: PBBFAC,,, | Performed by: INTERNAL MEDICINE

## 2020-10-21 PROCEDURE — 99214 OFFICE O/P EST MOD 30 MIN: CPT | Mod: S$GLB,,, | Performed by: INTERNAL MEDICINE

## 2020-10-21 PROCEDURE — 3052F HG A1C>EQUAL 8.0%<EQUAL 9.0%: CPT | Mod: CPTII,S$GLB,, | Performed by: INTERNAL MEDICINE

## 2020-10-21 PROCEDURE — 3052F PR MOST RECENT HEMOGLOBIN A1C LEVEL 8.0 - < 9.0%: ICD-10-PCS | Mod: CPTII,S$GLB,, | Performed by: INTERNAL MEDICINE

## 2020-10-21 RX ORDER — QUETIAPINE FUMARATE 100 MG/1
100 TABLET, FILM COATED ORAL NIGHTLY
Qty: 30 TABLET | Refills: 2 | OUTPATIENT
Start: 2020-10-21 | End: 2021-10-21

## 2020-10-21 RX ORDER — EMPAGLIFLOZIN 25 MG/1
25 TABLET, FILM COATED ORAL DAILY
Qty: 90 TABLET | Refills: 0 | Status: SHIPPED | OUTPATIENT
Start: 2020-10-21 | End: 2021-01-02

## 2020-10-21 NOTE — PROGRESS NOTES
Subjective:      Patient ID: Patrick Sanz is a 50 y.o. male.    Chief Complaint: Follow-up    Toe Pain   The incident occurred 2 days ago. The incident occurred at home. Injury mechanism: accidentally kicked a table. Pain location: right 2nd toe. The pain is moderate. The pain has been constant since onset. Associated symptoms include a loss of motion. Pertinent negatives include no inability to bear weight, loss of sensation or numbness. He reports no foreign bodies present. The symptoms are aggravated by palpation. He has tried nothing for the symptoms. The treatment provided no relief.        49 yo with   Patient Active Problem List   Diagnosis    Uncontrolled type 2 diabetes mellitus with hyperglycemia    Bipolar disorder     Past Medical History:   Diagnosis Date    Anxiety     Bipolar disorder     Depression     Diabetes mellitus, type 2      Here today for f/u on dm. Home glucose 167- 214. Did not get trulicity due to cost. Also c/o toe pain.     Review of Systems   Constitutional: Negative for chills and fever.   HENT: Negative for ear pain and sore throat.    Respiratory: Negative for cough, shortness of breath and wheezing.    Cardiovascular: Negative for chest pain and palpitations.   Gastrointestinal: Negative for abdominal pain and blood in stool.   Genitourinary: Negative for dysuria and hematuria.   Neurological: Negative for seizures, syncope and numbness.     Objective:   /76 (BP Location: Left arm, Patient Position: Sitting, BP Method: Large (Manual))   Pulse 85   Temp 97.9 °F (36.6 °C) (Tympanic)   Wt 113.4 kg (250 lb)   SpO2 98%     Physical Exam  Constitutional:       General: He is awake. He is not in acute distress.     Appearance: Normal appearance. He is well-developed.   HENT:      Head: Normocephalic and atraumatic.   Eyes:      Conjunctiva/sclera: Conjunctivae normal.   Neck:      Musculoskeletal: Normal range of motion.   Cardiovascular:      Rate and Rhythm: Normal rate.    Pulmonary:      Effort: Pulmonary effort is normal.      Breath sounds: Normal breath sounds.   Skin:     General: Skin is warm and dry.   Neurological:      Mental Status: He is alert and oriented to person, place, and time. Mental status is at baseline.   Psychiatric:         Mood and Affect: Mood normal.         Behavior: Behavior normal. Behavior is cooperative.         Thought Content: Thought content normal.         Judgment: Judgment normal.     bruising and ttp and abrasion to middle phalanges of right 2nd toe.  Neurovascularly intact.  Also tenderness over PIP.    Assessment:     1. Controlled type 2 diabetes mellitus with complication, with long-term current use of insulin    2. Uncontrolled type 2 diabetes mellitus with hyperglycemia    3. Pain of toe of right foot    4. Injury of toe on right foot, initial encounter      Plan:   Controlled type 2 diabetes mellitus with complication, with long-term current use of insulin  -     empagliflozin (JARDIANCE) 25 mg tablet; Take 1 tablet (25 mg total) by mouth once daily.  Dispense: 90 tablet; Refill: 0  -     Ambulatory referral/consult to Diabetic Advanced Practice Providers (Medical Management); Future; Expected date: 10/28/2020    Uncontrolled type 2 diabetes mellitus with hyperglycemia    Pain of toe of right foot  -     Cancel: X-Ray Toe 2 or More Views Right; Future; Expected date: 10/21/2020    Injury of toe on right foot, initial encounter  -     Cancel: X-Ray Toe 2 or More Views Right; Future; Expected date: 10/21/2020  -     X-Ray Toe 2 or More Views Right; Future; Expected date: 10/21/2020        Lab Frequency Next Occurrence   Ambulatory referral/consult to Psychiatry Once 09/24/2020   Ambulatory referral/consult to Optometry Once 10/07/2020   Ambulatory referral/consult to Diabetes Education Once 10/07/2020   Ambulatory referral/consult to Diabetes Education Once 10/14/2020   Hemoglobin A1C Once 12/07/2020   Ambulatory referral/consult to Urology  Once 10/14/2020       Problem List Items Addressed This Visit        Endocrine    Uncontrolled type 2 diabetes mellitus with hyperglycemia - Primary    Relevant Medications    empagliflozin (JARDIANCE) 25 mg tablet      Other Visit Diagnoses     Pain of toe of right foot        Injury of toe on right foot, initial encounter        Relevant Orders    X-Ray Toe 2 or More Views Right          Follow up in about 3 months (around 1/21/2021), or if symptoms worsen or fail to improve.

## 2020-10-21 NOTE — PATIENT INSTRUCTIONS
Tylenol 500 to 1000 mg every 8 hours as needed for pain.     Medications similar to jardiance are farxiga and invokana.      Monitor glucose upon awakening and another time later in the day.      If any glucose below 85 then decrease basaglar to 20 units and notify Dr. Figueroa.   Closed Toe Fracture  Your toe is broken (fractured). This causes local pain, swelling, and sometimes bruising. This injury usually takes about 4 to 6 weeks to heal, but can sometimes take longer. Toe injuries are often treated by taping the injured toe to the next one (buddy taping). This protects the injured toe and holds it in position.     If the toenail has been severely injured, it may fall off in 1 to 2 weeks. It takes up to 12 months for a new toenail to grow back.  Home care  Follow these guidelines when caring for yourself at home:  · You may be given a cast shoe to wear to keep your toe from moving. If not, you can use a sandal or any shoe that doesnt put pressure on the injured toe until the swelling and pain go away. If using a sandal, be careful not to strike your foot against anything. Another injury could make the fracture worse. If you were given crutches, dont put full weight on the injured foot until you can do so without pain, or as directed by your healthcare provider.  · Keep your foot elevated to reduce pain and swelling. When sleeping, put a pillow under the injured leg. When sitting, support the injured leg so it is above your waist. This is very important during the first 2 days (48 hours).  · Put an ice pack on the injured area. Do this for 20 minutes every 1 to 2 hours the first day for pain relief. You can make an ice pack by wrapping a plastic bag of ice cubes in a thin towel. As the ice melts, be careful that any cloth or paper tape doesnt get wet. Continue using the ice pack 3 to 4 times a day for the next 2 days. Then use the ice pack as needed to ease pain and swelling.  · If buddy tape was used and it  becomes wet or dirty, change it. You may replace it with paper, plastic, or cloth tape. Cloth tape and paper tapes must be kept dry.  · You may use acetaminophen or ibuprofen to control pain, unless another pain medicine was prescribed. If you have chronic liver or kidney disease, talk with your healthcare provider before using these medicines. Also talk with your provider if youve had a stomach ulcer or gastrointestinal bleeding.  · You may return to sports or physical education activities after 4 weeks when you can run without pain, or as directed by your healthcare provider.  Follow-up care  Follow up with your healthcare provider in 1 week, or as advised. This is to make sure the bone is healing the way it should.  X-rays may be taken. You will be told of any new findings that may affect your care.  When to seek medical advice  Call your healthcare provider right away if any of these occur:  · Pain or swelling gets worse  · The cast/splint cracks  · The cast and padding get wet and stays wet more than 24 hours  · Bad odor from the cast/splint or wound fluid stains the cast  · Tightness or pressure under the cast/splint gets worse  · Toe becomes cold, blue, numb, or tingly  · You cant move the toe  · Signs of infection: fever, redness, warmth, swelling, or drainage from the wound or cast  · Fever of 100.4ºF (38ºC) or higher, or as directed by your healthcare provider  Date Last Reviewed: 2/1/2017  © 0140-6456 [employer]. [address] [place] [address]. All rights reserved. This information is not intended as a substitute for professional medical care. Always follow your healthcare professional's instructions.        Closed Toe Fracture  Your toe is broken (fractured). This causes local pain, swelling, and sometimes bruising. This injury usually takes about 4 to 6 weeks to heal, but can sometimes take longer. Toe injuries are often treated by taping the injured toe to the next one  (buddy taping). This protects the injured toe and holds it in position.     If the toenail has been severely injured, it may fall off in 1 to 2 weeks. It takes up to 12 months for a new toenail to grow back.  Home care  Follow these guidelines when caring for yourself at home:  · You may be given a cast shoe to wear to keep your toe from moving. If not, you can use a sandal or any shoe that doesnt put pressure on the injured toe until the swelling and pain go away. If using a sandal, be careful not to strike your foot against anything. Another injury could make the fracture worse. If you were given crutches, dont put full weight on the injured foot until you can do so without pain, or as directed by your healthcare provider.  · Keep your foot elevated to reduce pain and swelling. When sleeping, put a pillow under the injured leg. When sitting, support the injured leg so it is above your waist. This is very important during the first 2 days (48 hours).  · Put an ice pack on the injured area. Do this for 20 minutes every 1 to 2 hours the first day for pain relief. You can make an ice pack by wrapping a plastic bag of ice cubes in a thin towel. As the ice melts, be careful that any cloth or paper tape doesnt get wet. Continue using the ice pack 3 to 4 times a day for the next 2 days. Then use the ice pack as needed to ease pain and swelling.  · If buddy tape was used and it becomes wet or dirty, change it. You may replace it with paper, plastic, or cloth tape. Cloth tape and paper tapes must be kept dry.  · You may use acetaminophen or ibuprofen to control pain, unless another pain medicine was prescribed. If you have chronic liver or kidney disease, talk with your healthcare provider before using these medicines. Also talk with your provider if youve had a stomach ulcer or gastrointestinal bleeding.  · You may return to sports or physical education activities after 4 weeks when you can run without pain, or as  directed by your healthcare provider.  Follow-up care  Follow up with your healthcare provider in 1 week, or as advised. This is to make sure the bone is healing the way it should.  X-rays may be taken. You will be told of any new findings that may affect your care.  When to seek medical advice  Call your healthcare provider right away if any of these occur:  · Pain or swelling gets worse  · The cast/splint cracks  · The cast and padding get wet and stays wet more than 24 hours  · Bad odor from the cast/splint or wound fluid stains the cast  · Tightness or pressure under the cast/splint gets worse  · Toe becomes cold, blue, numb, or tingly  · You cant move the toe  · Signs of infection: fever, redness, warmth, swelling, or drainage from the wound or cast  · Fever of 100.4ºF (38ºC) or higher, or as directed by your healthcare provider  Date Last Reviewed: 2/1/2017  © 2684-2023 [employer]. [address] [place] [address], [place] [address]. All rights reserved. This information is not intended as a substitute for professional medical care. Always follow your healthcare professional's instructions.

## 2020-10-22 ENCOUNTER — TELEPHONE (OUTPATIENT)
Dept: DIABETES | Facility: CLINIC | Age: 50
End: 2020-10-22

## 2020-10-22 ENCOUNTER — PATIENT MESSAGE (OUTPATIENT)
Dept: PSYCHIATRY | Facility: CLINIC | Age: 50
End: 2020-10-22

## 2020-10-22 NOTE — TELEPHONE ENCOUNTER
Attempted to contact pt to schedule an appt with dm management. No answer/mailbox full. Appt scheduled for pt to view via portal.

## 2020-10-23 ENCOUNTER — PATIENT OUTREACH (OUTPATIENT)
Dept: ADMINISTRATIVE | Facility: OTHER | Age: 50
End: 2020-10-23

## 2020-10-23 NOTE — PROGRESS NOTES
Health Maintenance Due   Topic Date Due    Foot Exam  03/12/1982     Chart was reviewed for overdue Proactive [facility] Encounters ([facility]) topics (CRS, Breast Cancer Screening, Eye exam)  Health Maintenance has been updated.

## 2020-10-30 ENCOUNTER — OFFICE VISIT (OUTPATIENT)
Dept: UROLOGY | Facility: CLINIC | Age: 50
End: 2020-10-30
Payer: COMMERCIAL

## 2020-10-30 VITALS
HEART RATE: 102 BPM | WEIGHT: 248.25 LBS | DIASTOLIC BLOOD PRESSURE: 72 MMHG | BODY MASS INDEX: 37.62 KG/M2 | SYSTOLIC BLOOD PRESSURE: 138 MMHG | TEMPERATURE: 97 F | HEIGHT: 68 IN

## 2020-10-30 DIAGNOSIS — R97.20 ELEVATED PSA: ICD-10-CM

## 2020-10-30 PROCEDURE — 3008F PR BODY MASS INDEX (BMI) DOCUMENTED: ICD-10-PCS | Mod: CPTII,S$GLB,, | Performed by: UROLOGY

## 2020-10-30 PROCEDURE — 99999 PR PBB SHADOW E&M-EST. PATIENT-LVL IV: CPT | Mod: PBBFAC,,, | Performed by: UROLOGY

## 2020-10-30 PROCEDURE — 99999 PR PBB SHADOW E&M-EST. PATIENT-LVL IV: ICD-10-PCS | Mod: PBBFAC,,, | Performed by: UROLOGY

## 2020-10-30 PROCEDURE — 99203 PR OFFICE/OUTPT VISIT, NEW, LEVL III, 30-44 MIN: ICD-10-PCS | Mod: S$GLB,,, | Performed by: UROLOGY

## 2020-10-30 PROCEDURE — 3008F BODY MASS INDEX DOCD: CPT | Mod: CPTII,S$GLB,, | Performed by: UROLOGY

## 2020-10-30 PROCEDURE — 99203 OFFICE O/P NEW LOW 30 MIN: CPT | Mod: S$GLB,,, | Performed by: UROLOGY

## 2020-10-30 NOTE — LETTER
October 30, 2020      Edilberto Figueroa MD  [address] [facility]  [place] [address]           [facility]  [address] [facility] [place] [address]  Phone: [phone]  Fax: [phone]          Patient: Patrick Sanz   MR Number: [id number]   YOB: 1970   Date of Visit: 10/30/2020       Dear Dr. Edilberto Figueroa:    Thank you for referring Patrick Sanz to me for evaluation. Attached you will find relevant portions of my assessment and plan of care.    If you have questions, please do not hesitate to call me. I look forward to following Patrick Sanz along with you.    Sincerely,    Obinna Hernandez MD    Enclosure  CC:  No Recipients    If you would like to receive this communication electronically, please contact [email or web address] or [phone] to request more information on [employer] Link access.    For providers and/or their staff who would like to refer a patient to [facility], please contact us through our one-stop-shop provider referral line, [facility], at [phone].    If you feel you have received this communication in error or would no longer like to receive these types of communications, please e-mail [email or web address]

## 2020-10-30 NOTE — PROGRESS NOTES
Chief Complaint:  Elevated PSA    HPI:   Patrick Sanz is a 50 y.o. male that presents today as a referral from Dr. Figueroa for elevated PSA.  Patient had a PSA drawn a as part of a a yearly physical, this resulted at 16.1.  Dr. Figueroa then obtained a repeat PSA 4 days later which resulted at 15.7.  Patient states that he has no family history of prostate cancer.  He voids with a good stream and empties his bladder well.  He has no erectile dysfunction.  Denies gross hematuria.      PMH:  Past Medical History:   Diagnosis Date    Anxiety     Bipolar disorder     Depression     Diabetes mellitus, type 2        PSH:  Past Surgical History:   Procedure Laterality Date    HERNIA REPAIR         Family History:  Family History   Problem Relation Age of Onset    Diabetes Mother     Hypertension Father        Social History:  Social History     Tobacco Use    Smoking status: Former Smoker    Smokeless tobacco: Former User   Substance Use Topics    Alcohol use: Not Currently    Drug use: Never        Review of Systems:  General: No fever, chills  Skin: No rashes  Chest:  Denies cough and sputum production  Heart: Denies chest pain  Resp: Denies dyspnea  Abdomen: Denies diarrhea, abdominal pain, hematemesis, or blood in stool.  Musculoskeletal: No joint stiffness or swelling. Denies back pain.  : see HPI  Neuro: no dizziness or weakness    Allergies:  Aripiprazole, Bupropion hcl, and Penicillins    Medications:    Current Outpatient Medications:     atorvastatin (LIPITOR) 40 MG tablet, TK 1 T PO D, Disp: 90 tablet, Rfl: 1    empagliflozin (JARDIANCE) 25 mg tablet, Take 1 tablet (25 mg total) by mouth once daily., Disp: 90 tablet, Rfl: 0    HUMALOG KWIKPEN INSULIN 100 unit/mL pen, INJETC 10 UNITS SC TID BEFORE MEALS, Disp: , Rfl:     insulin (BASAGLAR KWIKPEN U-100 INSULIN) glargine 100 units/mL (3mL) SubQ pen, Inject 30 Units into the skin every evening., Disp: 9 mL, Rfl: 1    lamoTRIgine (LAMICTAL) 200 MG  tablet, Take 1.5 tablets (300 mg total) by mouth once daily., Disp: 45 tablet, Rfl: 2    metFORMIN (GLUCOPHAGE-XR) 500 MG ER 24hr tablet, TK 2 TS PO BID WC, Disp: , Rfl:     QUEtiapine (SEROQUEL) 100 MG Tab, Take 1 tablet (100 mg total) by mouth every evening., Disp: 30 tablet, Rfl: 2    venlafaxine (EFFEXOR-XR) 75 MG 24 hr capsule, Take 1 capsule (75 mg total) by mouth once daily., Disp: 30 capsule, Rfl: 2    insulin lispro (HUMALOG KWIKPEN INSULIN) 100 unit/mL pen, , Disp: , Rfl:     lithium (LITHOBID) 300 MG CR tablet, TK 1 T PO  BID, Disp: , Rfl:     Physical Exam:  Vitals:    10/30/20 1611   BP: 138/72   Pulse: 102   Temp: 97.2 °F (36.2 °C)     General: awake, alert, cooperative  Head: NC/AT  Ears: external ears normal  Eyes: sclera normal  Lungs: normal inspiration, NAD  Heart: well-perfused  Abdomen: Soft, NT, ND  : Normal circ'd phallus, meatus normal in size and position, BL testicles palpable, no masses, nontender, no abnormalities of epididymi  KATY: Normal rectal tone, no hemorrhoids. Prostate smooth and normal, no nodules 30 gm. Perineum and anus normal.  Skin: The skin is warm and dry.  Ext: No c/c/e.    RADIOLOGY:  No recent relevant imaging available for review.    LABS:  I personally reviewed the following lab values:  Lab Results   Component Value Date    WBC 8.41 10/03/2020    HGB 14.8 10/03/2020    HCT 46.7 10/03/2020     10/03/2020     10/03/2020    K 4.3 10/03/2020     10/03/2020    CREATININE 0.9 10/03/2020    BUN 13 10/03/2020    CO2 21 (L) 10/03/2020    TSH 0.943 10/03/2020    PSA 15.7 (H) 10/07/2020    HGBA1C 9.0 (H) 10/03/2020    CHOL 125 10/03/2020    TRIG 172 (H) 10/03/2020    HDL 29 (L) 10/03/2020    ALT 29 10/03/2020    AST 18 10/03/2020       Assessment/Plan:   Patrick Sanz is a 50 y.o. male with elevated PSA. I discussed the significance and etiology of elevated PSA noting that prostate cancer is the most concerning reason for elevation of PSA.  I explained  that the only way of diagnosing prostate cancer is with a prostate biopsy.  I explained that one elevated PSA value should be confirmed with a confirmatory value, within an appropriate time frame (usually 4-6 weeks) prior to moving on to biopsy.  Patient understands and agrees.    - obtain repeat PSA in 3 weeks, f/u in 4 weeks for review    Thank you for allowing me the opportunity to participate in this patient's care.     Obinna Hernandez MD  Urology

## 2020-11-30 ENCOUNTER — OFFICE VISIT (OUTPATIENT)
Dept: PSYCHIATRY | Facility: CLINIC | Age: 50
End: 2020-11-30
Payer: COMMERCIAL

## 2020-11-30 DIAGNOSIS — F39 MOOD DISORDER: Primary | ICD-10-CM

## 2020-11-30 PROCEDURE — 99214 OFFICE O/P EST MOD 30 MIN: CPT | Mod: 95,,, | Performed by: PSYCHIATRY & NEUROLOGY

## 2020-11-30 PROCEDURE — 99214 PR OFFICE/OUTPT VISIT, EST, LEVL IV, 30-39 MIN: ICD-10-PCS | Mod: 95,,, | Performed by: PSYCHIATRY & NEUROLOGY

## 2020-11-30 RX ORDER — LAMOTRIGINE 200 MG/1
300 TABLET ORAL DAILY
Qty: 45 TABLET | Refills: 2 | Status: SHIPPED | OUTPATIENT
Start: 2020-11-30 | End: 2021-02-10 | Stop reason: SDUPTHER

## 2020-11-30 RX ORDER — QUETIAPINE FUMARATE 100 MG/1
100 TABLET, FILM COATED ORAL NIGHTLY
Qty: 30 TABLET | Refills: 1 | Status: SHIPPED | OUTPATIENT
Start: 2020-11-30 | End: 2021-01-23 | Stop reason: SDUPTHER

## 2020-11-30 RX ORDER — VENLAFAXINE HYDROCHLORIDE 75 MG/1
75 CAPSULE, EXTENDED RELEASE ORAL DAILY
Qty: 30 CAPSULE | Refills: 1 | Status: SHIPPED | OUTPATIENT
Start: 2020-11-30 | End: 2021-02-10 | Stop reason: SDUPTHER
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
## 2021-01-14 ENCOUNTER — OFFICE VISIT (OUTPATIENT)
Dept: DIABETES | Facility: CLINIC | Age: 51
End: 2021-01-14
Payer: COMMERCIAL

## 2021-01-14 ENCOUNTER — PATIENT OUTREACH (OUTPATIENT)
Dept: ADMINISTRATIVE | Facility: OTHER | Age: 51
End: 2021-01-14

## 2021-01-14 ENCOUNTER — PATIENT MESSAGE (OUTPATIENT)
Dept: DIABETES | Facility: CLINIC | Age: 51
End: 2021-01-14

## 2021-01-14 VITALS
HEART RATE: 84 BPM | DIASTOLIC BLOOD PRESSURE: 81 MMHG | WEIGHT: 247.81 LBS | BODY MASS INDEX: 37.68 KG/M2 | SYSTOLIC BLOOD PRESSURE: 122 MMHG

## 2021-01-14 DIAGNOSIS — E66.9 OBESITY (BMI 30-39.9): ICD-10-CM

## 2021-01-14 DIAGNOSIS — E11.65 UNCONTROLLED TYPE 2 DIABETES MELLITUS WITH HYPERGLYCEMIA: Primary | ICD-10-CM

## 2021-01-14 LAB — GLUCOSE SERPL-MCNC: 174 MG/DL (ref 70–110)

## 2021-01-14 PROCEDURE — 3052F PR MOST RECENT HEMOGLOBIN A1C LEVEL 8.0 - < 9.0%: ICD-10-PCS | Mod: CPTII,S$GLB,, | Performed by: PHYSICIAN ASSISTANT

## 2021-01-14 PROCEDURE — 99999 PR PBB SHADOW E&M-EST. PATIENT-LVL IV: ICD-10-PCS | Mod: PBBFAC,,, | Performed by: PHYSICIAN ASSISTANT

## 2021-01-14 PROCEDURE — 99214 PR OFFICE/OUTPT VISIT, EST, LEVL IV, 30-39 MIN: ICD-10-PCS | Mod: S$GLB,,, | Performed by: PHYSICIAN ASSISTANT

## 2021-01-14 PROCEDURE — 3052F HG A1C>EQUAL 8.0%<EQUAL 9.0%: CPT | Mod: CPTII,S$GLB,, | Performed by: PHYSICIAN ASSISTANT

## 2021-01-14 PROCEDURE — 82962 GLUCOSE BLOOD TEST: CPT | Mod: S$GLB,,, | Performed by: PHYSICIAN ASSISTANT

## 2021-01-14 PROCEDURE — 1126F PR PAIN SEVERITY QUANTIFIED, NO PAIN PRESENT: ICD-10-PCS | Mod: S$GLB,,, | Performed by: PHYSICIAN ASSISTANT

## 2021-01-14 PROCEDURE — 82962 POCT GLUCOSE, HAND-HELD DEVICE: ICD-10-PCS | Mod: S$GLB,,, | Performed by: PHYSICIAN ASSISTANT

## 2021-01-14 PROCEDURE — 1126F AMNT PAIN NOTED NONE PRSNT: CPT | Mod: S$GLB,,, | Performed by: PHYSICIAN ASSISTANT

## 2021-01-14 PROCEDURE — 99999 PR PBB SHADOW E&M-EST. PATIENT-LVL IV: CPT | Mod: PBBFAC,,, | Performed by: PHYSICIAN ASSISTANT

## 2021-01-14 PROCEDURE — 99214 OFFICE O/P EST MOD 30 MIN: CPT | Mod: S$GLB,,, | Performed by: PHYSICIAN ASSISTANT

## 2021-01-14 PROCEDURE — 3008F BODY MASS INDEX DOCD: CPT | Mod: CPTII,S$GLB,, | Performed by: PHYSICIAN ASSISTANT

## 2021-01-14 PROCEDURE — 3008F PR BODY MASS INDEX (BMI) DOCUMENTED: ICD-10-PCS | Mod: CPTII,S$GLB,, | Performed by: PHYSICIAN ASSISTANT

## 2021-01-14 RX ORDER — BLOOD-GLUCOSE SENSOR
1 EACH MISCELLANEOUS
Qty: 3 EACH | Refills: 11 | Status: SHIPPED | OUTPATIENT
Start: 2021-01-14 | End: 2022-10-28 | Stop reason: SDUPTHER

## 2021-01-14 RX ORDER — BLOOD-GLUCOSE TRANSMITTER
1 EACH MISCELLANEOUS DAILY
Qty: 1 EACH | Refills: 3 | Status: SHIPPED | OUTPATIENT
Start: 2021-01-14 | End: 2022-10-28 | Stop reason: SDUPTHER

## 2021-01-14 RX ORDER — METFORMIN HYDROCHLORIDE 1000 MG/1
1000 TABLET, FILM COATED, EXTENDED RELEASE ORAL 2 TIMES DAILY WITH MEALS
Qty: 60 TABLET | Refills: 11 | Status: SHIPPED | OUTPATIENT
Start: 2021-01-14 | End: 2021-12-08

## 2021-01-23 ENCOUNTER — PATIENT MESSAGE (OUTPATIENT)
Dept: PSYCHIATRY | Facility: CLINIC | Age: 51
End: 2021-01-23

## 2021-01-25 ENCOUNTER — TELEPHONE (OUTPATIENT)
Dept: DIABETES | Facility: CLINIC | Age: 51
End: 2021-01-25

## 2021-01-25 ENCOUNTER — PATIENT MESSAGE (OUTPATIENT)
Dept: DIABETES | Facility: CLINIC | Age: 51
End: 2021-01-25

## 2021-01-25 ENCOUNTER — CLINICAL SUPPORT (OUTPATIENT)
Dept: DIABETES | Facility: CLINIC | Age: 51
End: 2021-01-25
Payer: COMMERCIAL

## 2021-01-25 DIAGNOSIS — E11.65 UNCONTROLLED TYPE 2 DIABETES MELLITUS WITH HYPERGLYCEMIA: Primary | ICD-10-CM

## 2021-01-25 RX ORDER — QUETIAPINE FUMARATE 100 MG/1
100 TABLET, FILM COATED ORAL NIGHTLY
Qty: 30 TABLET | Refills: 1 | Status: SHIPPED | OUTPATIENT
Start: 2021-01-25 | End: 2021-03-18 | Stop reason: SDUPTHER

## 2021-01-25 RX ORDER — QUETIAPINE FUMARATE 100 MG/1
100 TABLET, FILM COATED ORAL NIGHTLY
Qty: 30 TABLET | Refills: 1 | OUTPATIENT
Start: 2021-01-25 | End: 2022-01-25

## 2021-01-26 ENCOUNTER — LAB VISIT (OUTPATIENT)
Dept: LAB | Facility: HOSPITAL | Age: 51
End: 2021-01-26
Attending: PHYSICIAN ASSISTANT
Payer: COMMERCIAL

## 2021-01-26 DIAGNOSIS — E11.65 UNCONTROLLED TYPE 2 DIABETES MELLITUS WITH HYPERGLYCEMIA: Primary | ICD-10-CM

## 2021-01-26 DIAGNOSIS — E11.65 UNCONTROLLED TYPE 2 DIABETES MELLITUS WITH HYPERGLYCEMIA: ICD-10-CM

## 2021-01-26 PROCEDURE — 36415 COLL VENOUS BLD VENIPUNCTURE: CPT

## 2021-01-26 PROCEDURE — 83036 HEMOGLOBIN GLYCOSYLATED A1C: CPT

## 2021-01-26 RX ORDER — INSULIN GLARGINE 100 [IU]/ML
30 INJECTION, SOLUTION SUBCUTANEOUS DAILY
Qty: 9 ML | Refills: 11 | Status: SHIPPED | OUTPATIENT
Start: 2021-01-26 | End: 2021-01-27 | Stop reason: SDUPTHER

## 2021-01-27 DIAGNOSIS — E11.65 UNCONTROLLED TYPE 2 DIABETES MELLITUS WITH HYPERGLYCEMIA: ICD-10-CM

## 2021-01-27 LAB
ESTIMATED AVG GLUCOSE: 192 MG/DL (ref 68–131)
HBA1C MFR BLD HPLC: 8.3 % (ref 4–5.6)

## 2021-01-27 RX ORDER — INSULIN GLARGINE 100 [IU]/ML
30 INJECTION, SOLUTION SUBCUTANEOUS DAILY
Qty: 9 ML | Refills: 11 | Status: SHIPPED | OUTPATIENT
Start: 2021-01-27 | End: 2021-11-21 | Stop reason: SDUPTHER

## 2021-01-29 ENCOUNTER — OFFICE VISIT (OUTPATIENT)
Dept: OPHTHALMOLOGY | Facility: CLINIC | Age: 51
End: 2021-01-29
Payer: COMMERCIAL

## 2021-01-29 DIAGNOSIS — E11.9 TYPE 2 DIABETES MELLITUS WITHOUT RETINOPATHY: Primary | ICD-10-CM

## 2021-01-29 DIAGNOSIS — H52.13 MYOPIA WITH ASTIGMATISM AND PRESBYOPIA, BILATERAL: ICD-10-CM

## 2021-01-29 DIAGNOSIS — H52.4 MYOPIA WITH ASTIGMATISM AND PRESBYOPIA, BILATERAL: ICD-10-CM

## 2021-01-29 DIAGNOSIS — H52.203 MYOPIA WITH ASTIGMATISM AND PRESBYOPIA, BILATERAL: ICD-10-CM

## 2021-01-29 DIAGNOSIS — D31.31 CHOROIDAL NEVUS OF RIGHT EYE: ICD-10-CM

## 2021-01-29 PROCEDURE — 92250 FUNDUS PHOTOGRAPHY W/I&R: CPT | Mod: S$GLB,,, | Performed by: OPTOMETRIST

## 2021-01-29 PROCEDURE — 92015 DETERMINE REFRACTIVE STATE: CPT | Mod: S$GLB,,, | Performed by: OPTOMETRIST

## 2021-01-29 PROCEDURE — 92250 COLOR FUNDUS PHOTOGRAPHY - OU - BOTH EYES: ICD-10-PCS | Mod: S$GLB,,, | Performed by: OPTOMETRIST

## 2021-01-29 PROCEDURE — 99999 PR PBB SHADOW E&M-EST. PATIENT-LVL III: CPT | Mod: PBBFAC,,, | Performed by: OPTOMETRIST

## 2021-01-29 PROCEDURE — 92015 PR REFRACTION: ICD-10-PCS | Mod: S$GLB,,, | Performed by: OPTOMETRIST

## 2021-01-29 PROCEDURE — 92004 COMPRE OPH EXAM NEW PT 1/>: CPT | Mod: S$GLB,,, | Performed by: OPTOMETRIST

## 2021-01-29 PROCEDURE — 92004 PR EYE EXAM, NEW PATIENT,COMPREHESV: ICD-10-PCS | Mod: S$GLB,,, | Performed by: OPTOMETRIST

## 2021-01-29 PROCEDURE — 99999 PR PBB SHADOW E&M-EST. PATIENT-LVL III: ICD-10-PCS | Mod: PBBFAC,,, | Performed by: OPTOMETRIST

## 2021-02-02 ENCOUNTER — TELEPHONE (OUTPATIENT)
Dept: DIABETES | Facility: CLINIC | Age: 51
End: 2021-02-02

## 2021-02-02 DIAGNOSIS — E11.65 UNCONTROLLED TYPE 2 DIABETES MELLITUS WITH HYPERGLYCEMIA: Primary | ICD-10-CM

## 2021-02-10 ENCOUNTER — PATIENT MESSAGE (OUTPATIENT)
Dept: PSYCHIATRY | Facility: CLINIC | Age: 51
End: 2021-02-10

## 2021-02-10 RX ORDER — LAMOTRIGINE 200 MG/1
300 TABLET ORAL DAILY
Qty: 45 TABLET | Refills: 1 | Status: SHIPPED | OUTPATIENT
Start: 2021-02-10 | End: 2021-04-11 | Stop reason: SDUPTHER

## 2021-02-10 RX ORDER — VENLAFAXINE HYDROCHLORIDE 75 MG/1
75 CAPSULE, EXTENDED RELEASE ORAL DAILY
Qty: 30 CAPSULE | Refills: 1 | Status: SHIPPED | OUTPATIENT
Start: 2021-02-10 | End: 2021-04-13 | Stop reason: SDUPTHER

## 2021-02-11 ENCOUNTER — CLINICAL SUPPORT (OUTPATIENT)
Dept: DIABETES | Facility: CLINIC | Age: 51
End: 2021-02-11
Payer: COMMERCIAL

## 2021-02-11 VITALS — HEIGHT: 68 IN | WEIGHT: 244.06 LBS | BODY MASS INDEX: 36.99 KG/M2

## 2021-02-11 DIAGNOSIS — E11.65 UNCONTROLLED TYPE 2 DIABETES MELLITUS WITH HYPERGLYCEMIA: ICD-10-CM

## 2021-02-11 PROCEDURE — G0108 PR DIAB MANAGE TRN  PER INDIV: ICD-10-PCS | Mod: S$GLB,,, | Performed by: DIETITIAN, REGISTERED

## 2021-02-11 PROCEDURE — 99999 PR PBB SHADOW E&M-EST. PATIENT-LVL III: ICD-10-PCS | Mod: PBBFAC,,, | Performed by: DIETITIAN, REGISTERED

## 2021-02-11 PROCEDURE — 99999 PR PBB SHADOW E&M-EST. PATIENT-LVL III: CPT | Mod: PBBFAC,,, | Performed by: DIETITIAN, REGISTERED

## 2021-02-11 PROCEDURE — G0108 DIAB MANAGE TRN  PER INDIV: HCPCS | Mod: S$GLB,,, | Performed by: DIETITIAN, REGISTERED

## 2021-02-25 ENCOUNTER — PATIENT MESSAGE (OUTPATIENT)
Dept: UROLOGY | Facility: CLINIC | Age: 51
End: 2021-02-25

## 2021-02-25 ENCOUNTER — PATIENT MESSAGE (OUTPATIENT)
Dept: DIABETES | Facility: CLINIC | Age: 51
End: 2021-02-25

## 2021-02-26 ENCOUNTER — TELEPHONE (OUTPATIENT)
Dept: UROLOGY | Facility: CLINIC | Age: 51
End: 2021-02-26

## 2021-02-26 DIAGNOSIS — R97.20 ELEVATED PSA: Primary | ICD-10-CM

## 2021-03-04 ENCOUNTER — CLINICAL SUPPORT (OUTPATIENT)
Dept: DIABETES | Facility: CLINIC | Age: 51
End: 2021-03-04
Payer: COMMERCIAL

## 2021-03-04 VITALS — BODY MASS INDEX: 36.36 KG/M2 | HEIGHT: 68 IN | WEIGHT: 239.88 LBS

## 2021-03-04 DIAGNOSIS — E11.65 UNCONTROLLED TYPE 2 DIABETES MELLITUS WITH HYPERGLYCEMIA: ICD-10-CM

## 2021-03-04 PROCEDURE — G0108 PR DIAB MANAGE TRN  PER INDIV: ICD-10-PCS | Mod: S$GLB,,, | Performed by: DIETITIAN, REGISTERED

## 2021-03-04 PROCEDURE — G0108 DIAB MANAGE TRN  PER INDIV: HCPCS | Mod: S$GLB,,, | Performed by: DIETITIAN, REGISTERED

## 2021-03-04 PROCEDURE — 99999 PR PBB SHADOW E&M-EST. PATIENT-LVL I: CPT | Mod: PBBFAC,,, | Performed by: DIETITIAN, REGISTERED

## 2021-03-04 PROCEDURE — 99999 PR PBB SHADOW E&M-EST. PATIENT-LVL I: ICD-10-PCS | Mod: PBBFAC,,, | Performed by: DIETITIAN, REGISTERED

## 2021-03-08 ENCOUNTER — PATIENT OUTREACH (OUTPATIENT)
Dept: ADMINISTRATIVE | Facility: HOSPITAL | Age: 51
End: 2021-03-08

## 2021-03-15 ENCOUNTER — LAB VISIT (OUTPATIENT)
Dept: LAB | Facility: HOSPITAL | Age: 51
End: 2021-03-15
Attending: UROLOGY
Payer: COMMERCIAL

## 2021-03-15 ENCOUNTER — PATIENT MESSAGE (OUTPATIENT)
Dept: DIABETES | Facility: CLINIC | Age: 51
End: 2021-03-15

## 2021-03-15 DIAGNOSIS — R97.20 ELEVATED PSA: ICD-10-CM

## 2021-03-15 PROCEDURE — 36415 COLL VENOUS BLD VENIPUNCTURE: CPT | Performed by: UROLOGY

## 2021-03-15 PROCEDURE — 84153 ASSAY OF PSA TOTAL: CPT | Performed by: UROLOGY

## 2021-03-15 RX ORDER — INSULIN LISPRO 100 [IU]/ML
12 INJECTION, SOLUTION INTRAVENOUS; SUBCUTANEOUS
Qty: 12 ML | Refills: 11 | Status: SHIPPED | OUTPATIENT
Start: 2021-03-15 | End: 2021-11-21 | Stop reason: SDUPTHER

## 2021-03-16 ENCOUNTER — TELEPHONE (OUTPATIENT)
Dept: UROLOGY | Facility: CLINIC | Age: 51
End: 2021-03-16

## 2021-03-16 ENCOUNTER — OFFICE VISIT (OUTPATIENT)
Dept: UROLOGY | Facility: CLINIC | Age: 51
End: 2021-03-16
Payer: COMMERCIAL

## 2021-03-16 VITALS
DIASTOLIC BLOOD PRESSURE: 80 MMHG | SYSTOLIC BLOOD PRESSURE: 124 MMHG | BODY MASS INDEX: 36.37 KG/M2 | WEIGHT: 239.19 LBS

## 2021-03-16 DIAGNOSIS — R97.20 ELEVATED PSA: Primary | ICD-10-CM

## 2021-03-16 LAB — COMPLEXED PSA SERPL-MCNC: 17.6 NG/ML (ref 0–4)

## 2021-03-16 PROCEDURE — 99999 PR PBB SHADOW E&M-EST. PATIENT-LVL III: ICD-10-PCS | Mod: PBBFAC,,, | Performed by: UROLOGY

## 2021-03-16 PROCEDURE — 99213 PR OFFICE/OUTPT VISIT, EST, LEVL III, 20-29 MIN: ICD-10-PCS | Mod: S$GLB,,, | Performed by: UROLOGY

## 2021-03-16 PROCEDURE — 3008F PR BODY MASS INDEX (BMI) DOCUMENTED: ICD-10-PCS | Mod: CPTII,S$GLB,, | Performed by: UROLOGY

## 2021-03-16 PROCEDURE — 3008F BODY MASS INDEX DOCD: CPT | Mod: CPTII,S$GLB,, | Performed by: UROLOGY

## 2021-03-16 PROCEDURE — 1126F PR PAIN SEVERITY QUANTIFIED, NO PAIN PRESENT: ICD-10-PCS | Mod: S$GLB,,, | Performed by: UROLOGY

## 2021-03-16 PROCEDURE — 1126F AMNT PAIN NOTED NONE PRSNT: CPT | Mod: S$GLB,,, | Performed by: UROLOGY

## 2021-03-16 PROCEDURE — 99999 PR PBB SHADOW E&M-EST. PATIENT-LVL III: CPT | Mod: PBBFAC,,, | Performed by: UROLOGY

## 2021-03-16 PROCEDURE — 99213 OFFICE O/P EST LOW 20 MIN: CPT | Mod: S$GLB,,, | Performed by: UROLOGY

## 2021-03-18 ENCOUNTER — PATIENT MESSAGE (OUTPATIENT)
Dept: DIABETES | Facility: CLINIC | Age: 51
End: 2021-03-18

## 2021-03-18 RX ORDER — QUETIAPINE FUMARATE 100 MG/1
100 TABLET, FILM COATED ORAL NIGHTLY
Qty: 30 TABLET | Refills: 1 | Status: SHIPPED | OUTPATIENT
Start: 2021-03-18 | End: 2021-05-13 | Stop reason: SDUPTHER

## 2021-03-19 ENCOUNTER — PATIENT MESSAGE (OUTPATIENT)
Dept: UROLOGY | Facility: CLINIC | Age: 51
End: 2021-03-19

## 2021-03-22 ENCOUNTER — PATIENT MESSAGE (OUTPATIENT)
Dept: DIABETES | Facility: CLINIC | Age: 51
End: 2021-03-22

## 2021-04-05 ENCOUNTER — TELEPHONE (OUTPATIENT)
Dept: UROLOGY | Facility: CLINIC | Age: 51
End: 2021-04-05

## 2021-04-05 DIAGNOSIS — R97.20 ELEVATED PSA: Primary | ICD-10-CM

## 2021-04-06 ENCOUNTER — TELEPHONE (OUTPATIENT)
Dept: UROLOGY | Facility: CLINIC | Age: 51
End: 2021-04-06

## 2021-04-07 ENCOUNTER — LAB VISIT (OUTPATIENT)
Dept: LAB | Facility: HOSPITAL | Age: 51
End: 2021-04-07
Attending: UROLOGY
Payer: COMMERCIAL

## 2021-04-07 DIAGNOSIS — R97.20 ELEVATED PSA: ICD-10-CM

## 2021-04-07 LAB
CREAT SERPL-MCNC: 1 MG/DL (ref 0.5–1.4)
EST. GFR  (AFRICAN AMERICAN): >60 ML/MIN/1.73 M^2
EST. GFR  (NON AFRICAN AMERICAN): >60 ML/MIN/1.73 M^2

## 2021-04-07 PROCEDURE — 36415 COLL VENOUS BLD VENIPUNCTURE: CPT | Performed by: UROLOGY

## 2021-04-07 PROCEDURE — 82565 ASSAY OF CREATININE: CPT | Performed by: UROLOGY

## 2021-04-09 ENCOUNTER — HOSPITAL ENCOUNTER (OUTPATIENT)
Dept: RADIOLOGY | Facility: HOSPITAL | Age: 51
Discharge: HOME | End: 2021-04-09
Attending: UROLOGY
Payer: COMMERCIAL

## 2021-04-09 DIAGNOSIS — R97.20 ELEVATED PSA: ICD-10-CM

## 2021-04-09 PROCEDURE — 72197 MRI PROSTATE W W/O CONTRAST: ICD-10-PCS | Mod: 26,,, | Performed by: RADIOLOGY

## 2021-04-09 PROCEDURE — 72197 MRI PELVIS W/O & W/DYE: CPT | Mod: TC

## 2021-04-09 PROCEDURE — 25500020 PHARM REV CODE 255: Performed by: UROLOGY

## 2021-04-09 PROCEDURE — 72197 MRI PELVIS W/O & W/DYE: CPT | Mod: 26,,, | Performed by: RADIOLOGY

## 2021-04-09 PROCEDURE — A9585 GADOBUTROL INJECTION: HCPCS | Performed by: UROLOGY

## 2021-04-09 RX ORDER — GADOBUTROL 604.72 MG/ML
10 INJECTION INTRAVENOUS
Status: COMPLETED | OUTPATIENT
Start: 2021-04-09 | End: 2021-04-09

## 2021-04-09 RX ADMIN — GADOBUTROL 10 ML: 604.72 INJECTION INTRAVENOUS at 09:04

## 2021-04-12 RX ORDER — LAMOTRIGINE 200 MG/1
300 TABLET ORAL DAILY
Qty: 45 TABLET | Refills: 1 | Status: SHIPPED | OUTPATIENT
Start: 2021-04-12 | End: 2021-06-14 | Stop reason: SDUPTHER

## 2021-04-13 ENCOUNTER — PATIENT MESSAGE (OUTPATIENT)
Dept: UROLOGY | Facility: CLINIC | Age: 51
End: 2021-04-13

## 2021-04-14 RX ORDER — VENLAFAXINE HYDROCHLORIDE 75 MG/1
75 CAPSULE, EXTENDED RELEASE ORAL DAILY
Qty: 30 CAPSULE | Refills: 1 | Status: SHIPPED | OUTPATIENT
Start: 2021-04-14 | End: 2021-06-14 | Stop reason: SDUPTHER

## 2021-04-19 ENCOUNTER — TELEPHONE (OUTPATIENT)
Dept: UROLOGY | Facility: CLINIC | Age: 51
End: 2021-04-19

## 2021-04-27 ENCOUNTER — OFFICE VISIT (OUTPATIENT)
Dept: UROLOGY | Facility: CLINIC | Age: 51
End: 2021-04-27
Payer: COMMERCIAL

## 2021-04-27 VITALS
TEMPERATURE: 98 F | SYSTOLIC BLOOD PRESSURE: 132 MMHG | HEART RATE: 93 BPM | WEIGHT: 242.75 LBS | DIASTOLIC BLOOD PRESSURE: 84 MMHG | HEIGHT: 68 IN | BODY MASS INDEX: 36.79 KG/M2

## 2021-04-27 DIAGNOSIS — N20.0 KIDNEY STONE: Primary | ICD-10-CM

## 2021-04-27 PROCEDURE — 1125F PR PAIN SEVERITY QUANTIFIED, PAIN PRESENT: ICD-10-PCS | Mod: S$GLB,,, | Performed by: UROLOGY

## 2021-04-27 PROCEDURE — 99213 PR OFFICE/OUTPT VISIT, EST, LEVL III, 20-29 MIN: ICD-10-PCS | Mod: S$GLB,,, | Performed by: UROLOGY

## 2021-04-27 PROCEDURE — 99999 PR PBB SHADOW E&M-EST. PATIENT-LVL III: CPT | Mod: PBBFAC,,, | Performed by: UROLOGY

## 2021-04-27 PROCEDURE — 3008F BODY MASS INDEX DOCD: CPT | Mod: CPTII,S$GLB,, | Performed by: UROLOGY

## 2021-04-27 PROCEDURE — 3008F PR BODY MASS INDEX (BMI) DOCUMENTED: ICD-10-PCS | Mod: CPTII,S$GLB,, | Performed by: UROLOGY

## 2021-04-27 PROCEDURE — 99999 PR PBB SHADOW E&M-EST. PATIENT-LVL III: ICD-10-PCS | Mod: PBBFAC,,, | Performed by: UROLOGY

## 2021-04-27 PROCEDURE — 99213 OFFICE O/P EST LOW 20 MIN: CPT | Mod: S$GLB,,, | Performed by: UROLOGY

## 2021-04-27 PROCEDURE — 1125F AMNT PAIN NOTED PAIN PRSNT: CPT | Mod: S$GLB,,, | Performed by: UROLOGY

## 2021-04-27 RX ORDER — TAMSULOSIN HYDROCHLORIDE 0.4 MG/1
0.4 CAPSULE ORAL
COMMUNITY
Start: 2021-04-27 | End: 2021-07-12 | Stop reason: SDUPTHER

## 2021-04-27 RX ORDER — HYDROCODONE BITARTRATE AND ACETAMINOPHEN 5; 325 MG/1; MG/1
1 TABLET ORAL EVERY 6 HOURS PRN
Status: ON HOLD | COMMUNITY
Start: 2021-04-27 | End: 2022-09-22

## 2021-05-06 ENCOUNTER — PROCEDURE VISIT (OUTPATIENT)
Dept: UROLOGY | Facility: CLINIC | Age: 51
End: 2021-05-06
Payer: COMMERCIAL

## 2021-05-06 ENCOUNTER — PATIENT MESSAGE (OUTPATIENT)
Dept: UROLOGY | Facility: CLINIC | Age: 51
End: 2021-05-06

## 2021-05-06 VITALS
WEIGHT: 241.63 LBS | TEMPERATURE: 98 F | DIASTOLIC BLOOD PRESSURE: 87 MMHG | SYSTOLIC BLOOD PRESSURE: 129 MMHG | HEIGHT: 68 IN | HEART RATE: 91 BPM | BODY MASS INDEX: 36.62 KG/M2

## 2021-05-06 DIAGNOSIS — N20.0 KIDNEY STONE: ICD-10-CM

## 2021-05-06 DIAGNOSIS — R97.20 ELEVATED PSA: Primary | ICD-10-CM

## 2021-05-06 PROCEDURE — 88305 TISSUE EXAM BY PATHOLOGIST: CPT | Mod: 26,,, | Performed by: PATHOLOGY

## 2021-05-06 PROCEDURE — 55700 PR BIOPSY OF PROSTATE,NEEDLE/PUNCH: CPT | Mod: S$GLB,,, | Performed by: UROLOGY

## 2021-05-06 PROCEDURE — 96372 PR INJECTION,THERAP/PROPH/DIAG2ST, IM OR SUBCUT: ICD-10-PCS | Mod: 59,S$GLB,, | Performed by: UROLOGY

## 2021-05-06 PROCEDURE — 76872 PR US TRANSRECTAL: ICD-10-PCS | Mod: 26,S$GLB,, | Performed by: UROLOGY

## 2021-05-06 PROCEDURE — 88305 TISSUE EXAM BY PATHOLOGIST: ICD-10-PCS | Mod: 26,,, | Performed by: PATHOLOGY

## 2021-05-06 PROCEDURE — 55700 PR BIOPSY OF PROSTATE,NEEDLE/PUNCH: ICD-10-PCS | Mod: S$GLB,,, | Performed by: UROLOGY

## 2021-05-06 PROCEDURE — 76872 US TRANSRECTAL: CPT | Mod: 26,S$GLB,, | Performed by: UROLOGY

## 2021-05-06 PROCEDURE — 88305 TISSUE EXAM BY PATHOLOGIST: CPT | Performed by: PATHOLOGY

## 2021-05-06 PROCEDURE — 96372 THER/PROPH/DIAG INJ SC/IM: CPT | Mod: 59,S$GLB,, | Performed by: UROLOGY

## 2021-05-06 RX ORDER — CEFTRIAXONE 1 G/1
1 INJECTION, POWDER, FOR SOLUTION INTRAMUSCULAR; INTRAVENOUS
Status: COMPLETED | OUTPATIENT
Start: 2021-05-06 | End: 2021-05-06

## 2021-05-06 RX ADMIN — CEFTRIAXONE 1 G: 1 INJECTION, POWDER, FOR SOLUTION INTRAMUSCULAR; INTRAVENOUS at 02:05

## 2021-05-07 RX ORDER — TRAMADOL HYDROCHLORIDE 50 MG/1
50 TABLET ORAL EVERY 4 HOURS PRN
Qty: 30 TABLET | Refills: 0 | Status: SHIPPED | OUTPATIENT
Start: 2021-05-07 | End: 2021-07-12 | Stop reason: SDUPTHER

## 2021-05-13 ENCOUNTER — PATIENT MESSAGE (OUTPATIENT)
Dept: UROLOGY | Facility: CLINIC | Age: 51
End: 2021-05-13

## 2021-05-13 LAB
FINAL PATHOLOGIC DIAGNOSIS: ABNORMAL
Lab: ABNORMAL

## 2021-05-14 ENCOUNTER — TELEPHONE (OUTPATIENT)
Dept: UROLOGY | Facility: CLINIC | Age: 51
End: 2021-05-14

## 2021-05-14 RX ORDER — QUETIAPINE FUMARATE 100 MG/1
100 TABLET, FILM COATED ORAL NIGHTLY
Qty: 30 TABLET | Refills: 1 | Status: SHIPPED | OUTPATIENT
Start: 2021-05-14 | End: 2021-07-18 | Stop reason: SDUPTHER

## 2021-05-17 ENCOUNTER — PATIENT OUTREACH (OUTPATIENT)
Dept: ADMINISTRATIVE | Facility: OTHER | Age: 51
End: 2021-05-17

## 2021-05-18 ENCOUNTER — OFFICE VISIT (OUTPATIENT)
Dept: UROLOGY | Facility: CLINIC | Age: 51
End: 2021-05-18
Payer: COMMERCIAL

## 2021-05-18 ENCOUNTER — TELEPHONE (OUTPATIENT)
Dept: UROLOGY | Facility: CLINIC | Age: 51
End: 2021-05-18

## 2021-05-18 ENCOUNTER — PATIENT MESSAGE (OUTPATIENT)
Dept: UROLOGY | Facility: CLINIC | Age: 51
End: 2021-05-18

## 2021-05-18 VITALS
WEIGHT: 222 LBS | DIASTOLIC BLOOD PRESSURE: 86 MMHG | SYSTOLIC BLOOD PRESSURE: 135 MMHG | TEMPERATURE: 98 F | BODY MASS INDEX: 33.65 KG/M2 | HEIGHT: 68 IN | HEART RATE: 96 BPM

## 2021-05-18 DIAGNOSIS — C61 MALIGNANT NEOPLASM OF PROSTATE: ICD-10-CM

## 2021-05-18 PROCEDURE — 99999 PR PBB SHADOW E&M-EST. PATIENT-LVL IV: CPT | Mod: PBBFAC,,, | Performed by: UROLOGY

## 2021-05-18 PROCEDURE — 3008F PR BODY MASS INDEX (BMI) DOCUMENTED: ICD-10-PCS | Mod: CPTII,S$GLB,, | Performed by: UROLOGY

## 2021-05-18 PROCEDURE — 3008F BODY MASS INDEX DOCD: CPT | Mod: CPTII,S$GLB,, | Performed by: UROLOGY

## 2021-05-18 PROCEDURE — 1126F AMNT PAIN NOTED NONE PRSNT: CPT | Mod: S$GLB,,, | Performed by: UROLOGY

## 2021-05-18 PROCEDURE — 99214 PR OFFICE/OUTPT VISIT, EST, LEVL IV, 30-39 MIN: ICD-10-PCS | Mod: S$GLB,,, | Performed by: UROLOGY

## 2021-05-18 PROCEDURE — 1126F PR PAIN SEVERITY QUANTIFIED, NO PAIN PRESENT: ICD-10-PCS | Mod: S$GLB,,, | Performed by: UROLOGY

## 2021-05-18 PROCEDURE — 99999 PR PBB SHADOW E&M-EST. PATIENT-LVL IV: ICD-10-PCS | Mod: PBBFAC,,, | Performed by: UROLOGY

## 2021-05-18 PROCEDURE — 99214 OFFICE O/P EST MOD 30 MIN: CPT | Mod: S$GLB,,, | Performed by: UROLOGY

## 2021-05-19 ENCOUNTER — TELEPHONE (OUTPATIENT)
Dept: RADIATION ONCOLOGY | Facility: CLINIC | Age: 51
End: 2021-05-19

## 2021-05-25 ENCOUNTER — OFFICE VISIT (OUTPATIENT)
Dept: RADIATION ONCOLOGY | Facility: CLINIC | Age: 51
End: 2021-05-25
Payer: COMMERCIAL

## 2021-05-25 VITALS
HEART RATE: 97 BPM | WEIGHT: 236.56 LBS | DIASTOLIC BLOOD PRESSURE: 92 MMHG | SYSTOLIC BLOOD PRESSURE: 142 MMHG | RESPIRATION RATE: 18 BRPM | HEIGHT: 68 IN | BODY MASS INDEX: 35.85 KG/M2 | OXYGEN SATURATION: 98 % | TEMPERATURE: 98 F

## 2021-05-25 DIAGNOSIS — C61 MALIGNANT NEOPLASM OF PROSTATE: Primary | ICD-10-CM

## 2021-05-25 PROCEDURE — 3008F PR BODY MASS INDEX (BMI) DOCUMENTED: ICD-10-PCS | Mod: CPTII,S$GLB,, | Performed by: RADIOLOGY

## 2021-05-25 PROCEDURE — 99205 OFFICE O/P NEW HI 60 MIN: CPT | Mod: S$GLB,,, | Performed by: RADIOLOGY

## 2021-05-25 PROCEDURE — 99205 PR OFFICE/OUTPT VISIT, NEW, LEVL V, 60-74 MIN: ICD-10-PCS | Mod: S$GLB,,, | Performed by: RADIOLOGY

## 2021-05-25 PROCEDURE — 1126F PR PAIN SEVERITY QUANTIFIED, NO PAIN PRESENT: ICD-10-PCS | Mod: S$GLB,,, | Performed by: RADIOLOGY

## 2021-05-25 PROCEDURE — 99999 PR PBB SHADOW E&M-EST. PATIENT-LVL IV: CPT | Mod: PBBFAC,,, | Performed by: RADIOLOGY

## 2021-05-25 PROCEDURE — 1126F AMNT PAIN NOTED NONE PRSNT: CPT | Mod: S$GLB,,, | Performed by: RADIOLOGY

## 2021-05-25 PROCEDURE — 99999 PR PBB SHADOW E&M-EST. PATIENT-LVL IV: ICD-10-PCS | Mod: PBBFAC,,, | Performed by: RADIOLOGY

## 2021-05-25 PROCEDURE — 3008F BODY MASS INDEX DOCD: CPT | Mod: CPTII,S$GLB,, | Performed by: RADIOLOGY

## 2021-06-01 ENCOUNTER — PATIENT OUTREACH (OUTPATIENT)
Dept: ADMINISTRATIVE | Facility: HOSPITAL | Age: 51
End: 2021-06-01

## 2021-06-14 RX ORDER — VENLAFAXINE HYDROCHLORIDE 75 MG/1
75 CAPSULE, EXTENDED RELEASE ORAL DAILY
Qty: 30 CAPSULE | Refills: 1 | Status: SHIPPED | OUTPATIENT
Start: 2021-06-14 | End: 2021-10-14 | Stop reason: SDUPTHER

## 2021-06-14 RX ORDER — LAMOTRIGINE 200 MG/1
300 TABLET ORAL DAILY
Qty: 45 TABLET | Refills: 1 | Status: SHIPPED | OUTPATIENT
Start: 2021-06-14 | End: 2021-08-09 | Stop reason: SDUPTHER

## 2021-06-16 ENCOUNTER — PATIENT OUTREACH (OUTPATIENT)
Dept: ADMINISTRATIVE | Facility: HOSPITAL | Age: 51
End: 2021-06-16

## 2021-06-23 ENCOUNTER — PATIENT MESSAGE (OUTPATIENT)
Dept: PSYCHIATRY | Facility: CLINIC | Age: 51
End: 2021-06-23

## 2021-06-24 RX ORDER — CLONAZEPAM 0.5 MG/1
0.5 TABLET ORAL 2 TIMES DAILY PRN
Qty: 60 TABLET | Refills: 0 | Status: SHIPPED | OUTPATIENT
Start: 2021-06-24 | End: 2021-08-09 | Stop reason: SDUPTHER

## 2021-06-25 ENCOUNTER — PATIENT MESSAGE (OUTPATIENT)
Dept: PSYCHIATRY | Facility: CLINIC | Age: 51
End: 2021-06-25

## 2021-06-29 ENCOUNTER — LAB VISIT (OUTPATIENT)
Dept: LAB | Facility: HOSPITAL | Age: 51
End: 2021-06-29
Attending: PHYSICIAN ASSISTANT
Payer: COMMERCIAL

## 2021-06-29 ENCOUNTER — OFFICE VISIT (OUTPATIENT)
Dept: UROLOGY | Facility: CLINIC | Age: 51
End: 2021-06-29
Payer: COMMERCIAL

## 2021-06-29 VITALS
HEART RATE: 93 BPM | SYSTOLIC BLOOD PRESSURE: 128 MMHG | WEIGHT: 242.31 LBS | BODY MASS INDEX: 36.72 KG/M2 | DIASTOLIC BLOOD PRESSURE: 88 MMHG | TEMPERATURE: 98 F | HEIGHT: 68 IN

## 2021-06-29 DIAGNOSIS — C61 MALIGNANT NEOPLASM OF PROSTATE: Primary | ICD-10-CM

## 2021-06-29 DIAGNOSIS — E11.65 UNCONTROLLED TYPE 2 DIABETES MELLITUS WITH HYPERGLYCEMIA: ICD-10-CM

## 2021-06-29 PROCEDURE — 99214 OFFICE O/P EST MOD 30 MIN: CPT | Mod: S$GLB,,, | Performed by: UROLOGY

## 2021-06-29 PROCEDURE — 1126F PR PAIN SEVERITY QUANTIFIED, NO PAIN PRESENT: ICD-10-PCS | Mod: S$GLB,,, | Performed by: UROLOGY

## 2021-06-29 PROCEDURE — 99214 PR OFFICE/OUTPT VISIT, EST, LEVL IV, 30-39 MIN: ICD-10-PCS | Mod: S$GLB,,, | Performed by: UROLOGY

## 2021-06-29 PROCEDURE — 99999 PR PBB SHADOW E&M-EST. PATIENT-LVL IV: ICD-10-PCS | Mod: PBBFAC,,, | Performed by: UROLOGY

## 2021-06-29 PROCEDURE — 83036 HEMOGLOBIN GLYCOSYLATED A1C: CPT | Performed by: PHYSICIAN ASSISTANT

## 2021-06-29 PROCEDURE — 3008F PR BODY MASS INDEX (BMI) DOCUMENTED: ICD-10-PCS | Mod: CPTII,S$GLB,, | Performed by: UROLOGY

## 2021-06-29 PROCEDURE — 3008F BODY MASS INDEX DOCD: CPT | Mod: CPTII,S$GLB,, | Performed by: UROLOGY

## 2021-06-29 PROCEDURE — 36415 COLL VENOUS BLD VENIPUNCTURE: CPT | Performed by: PHYSICIAN ASSISTANT

## 2021-06-29 PROCEDURE — 1126F AMNT PAIN NOTED NONE PRSNT: CPT | Mod: S$GLB,,, | Performed by: UROLOGY

## 2021-06-29 PROCEDURE — 99999 PR PBB SHADOW E&M-EST. PATIENT-LVL IV: CPT | Mod: PBBFAC,,, | Performed by: UROLOGY

## 2021-06-30 ENCOUNTER — TELEPHONE (OUTPATIENT)
Dept: UROLOGY | Facility: CLINIC | Age: 51
End: 2021-06-30

## 2021-06-30 LAB
ESTIMATED AVG GLUCOSE: 192 MG/DL (ref 68–131)
HBA1C MFR BLD: 8.3 % (ref 4–5.6)

## 2021-07-01 ENCOUNTER — TELEPHONE (OUTPATIENT)
Dept: DIABETES | Facility: CLINIC | Age: 51
End: 2021-07-01

## 2021-07-01 ENCOUNTER — PATIENT MESSAGE (OUTPATIENT)
Dept: DIABETES | Facility: CLINIC | Age: 51
End: 2021-07-01

## 2021-07-12 ENCOUNTER — TELEPHONE (OUTPATIENT)
Dept: UROLOGY | Facility: CLINIC | Age: 51
End: 2021-07-12

## 2021-07-12 DIAGNOSIS — N20.0 KIDNEY STONE: ICD-10-CM

## 2021-07-12 RX ORDER — TAMSULOSIN HYDROCHLORIDE 0.4 MG/1
0.4 CAPSULE ORAL DAILY
Qty: 30 CAPSULE | Refills: 1 | Status: SHIPPED | OUTPATIENT
Start: 2021-07-12 | End: 2021-10-25

## 2021-07-12 RX ORDER — TRAMADOL HYDROCHLORIDE 50 MG/1
50 TABLET ORAL EVERY 4 HOURS PRN
Qty: 30 TABLET | Refills: 0 | Status: SHIPPED | OUTPATIENT
Start: 2021-07-12 | End: 2022-02-03

## 2021-07-13 ENCOUNTER — TELEPHONE (OUTPATIENT)
Dept: RADIOLOGY | Facility: HOSPITAL | Age: 51
End: 2021-07-13

## 2021-07-14 ENCOUNTER — HOSPITAL ENCOUNTER (OUTPATIENT)
Dept: RADIOLOGY | Facility: HOSPITAL | Age: 51
Discharge: HOME | End: 2021-07-14
Attending: UROLOGY
Payer: COMMERCIAL

## 2021-07-14 ENCOUNTER — LAB VISIT (OUTPATIENT)
Dept: LAB | Facility: HOSPITAL | Age: 51
End: 2021-07-14
Attending: UROLOGY
Payer: COMMERCIAL

## 2021-07-14 DIAGNOSIS — C61 MALIGNANT NEOPLASM OF PROSTATE: ICD-10-CM

## 2021-07-14 PROCEDURE — 74177 CT ABDOMEN PELVIS WITH CONTRAST: ICD-10-PCS | Mod: 26,,, | Performed by: RADIOLOGY

## 2021-07-14 PROCEDURE — A9503 TC99M MEDRONATE: HCPCS

## 2021-07-14 PROCEDURE — 74177 CT ABD & PELVIS W/CONTRAST: CPT | Mod: 26,,, | Performed by: RADIOLOGY

## 2021-07-14 PROCEDURE — 78306 BONE IMAGING WHOLE BODY: CPT | Mod: 26,,, | Performed by: RADIOLOGY

## 2021-07-14 PROCEDURE — 82565 ASSAY OF CREATININE: CPT | Performed by: UROLOGY

## 2021-07-14 PROCEDURE — 78306 NM BONE SCAN WHOLE BODY: ICD-10-PCS | Mod: 26,,, | Performed by: RADIOLOGY

## 2021-07-14 PROCEDURE — 74177 CT ABD & PELVIS W/CONTRAST: CPT | Mod: TC

## 2021-07-14 PROCEDURE — 78306 BONE IMAGING WHOLE BODY: CPT | Mod: TC

## 2021-07-14 PROCEDURE — 36415 COLL VENOUS BLD VENIPUNCTURE: CPT | Performed by: UROLOGY

## 2021-07-14 PROCEDURE — 25500020 PHARM REV CODE 255: Performed by: UROLOGY

## 2021-07-14 RX ADMIN — IOHEXOL 30 ML: 350 INJECTION, SOLUTION INTRAVENOUS at 11:07

## 2021-07-14 RX ADMIN — IOHEXOL 100 ML: 350 INJECTION, SOLUTION INTRAVENOUS at 12:07

## 2021-07-15 LAB
CREAT SERPL-MCNC: 1.5 MG/DL (ref 0.5–1.4)
EST. GFR  (AFRICAN AMERICAN): >60 ML/MIN/1.73 M^2
EST. GFR  (NON AFRICAN AMERICAN): 53.9 ML/MIN/1.73 M^2

## 2021-07-19 ENCOUNTER — PATIENT MESSAGE (OUTPATIENT)
Dept: UROLOGY | Facility: CLINIC | Age: 51
End: 2021-07-19

## 2021-07-19 RX ORDER — QUETIAPINE FUMARATE 100 MG/1
100 TABLET, FILM COATED ORAL NIGHTLY
Qty: 30 TABLET | Refills: 0 | Status: SHIPPED | OUTPATIENT
Start: 2021-07-19 | End: 2021-08-09 | Stop reason: SDUPTHER

## 2021-08-05 ENCOUNTER — HOSPITAL ENCOUNTER (EMERGENCY)
Facility: HOSPITAL | Age: 51
Discharge: HOME | End: 2021-08-05
Attending: EMERGENCY MEDICINE
Payer: COMMERCIAL

## 2021-08-05 VITALS
OXYGEN SATURATION: 100 % | DIASTOLIC BLOOD PRESSURE: 81 MMHG | RESPIRATION RATE: 18 BRPM | TEMPERATURE: 99 F | SYSTOLIC BLOOD PRESSURE: 136 MMHG | HEART RATE: 91 BPM | WEIGHT: 243.75 LBS | BODY MASS INDEX: 37.06 KG/M2

## 2021-08-05 DIAGNOSIS — N20.1 URETEROLITHIASIS: Primary | ICD-10-CM

## 2021-08-05 LAB
ALBUMIN SERPL BCP-MCNC: 4.6 G/DL (ref 3.5–5.2)
ALP SERPL-CCNC: 103 U/L (ref 55–135)
ALT SERPL W/O P-5'-P-CCNC: 34 U/L (ref 10–44)
ANION GAP SERPL CALC-SCNC: 12 MMOL/L (ref 8–16)
AST SERPL-CCNC: 22 U/L (ref 10–40)
BACTERIA #/AREA URNS HPF: ABNORMAL /HPF
BASOPHILS # BLD AUTO: 0.02 K/UL (ref 0–0.2)
BASOPHILS NFR BLD: 0.2 % (ref 0–1.9)
BILIRUB SERPL-MCNC: 1 MG/DL (ref 0.1–1)
BILIRUB UR QL STRIP: NEGATIVE
BUN SERPL-MCNC: 14 MG/DL (ref 6–20)
CALCIUM SERPL-MCNC: 9.9 MG/DL (ref 8.7–10.5)
CAOX CRY URNS QL MICRO: ABNORMAL
CHLORIDE SERPL-SCNC: 104 MMOL/L (ref 95–110)
CLARITY UR: CLEAR
CO2 SERPL-SCNC: 23 MMOL/L (ref 23–29)
COLOR UR: YELLOW
CREAT SERPL-MCNC: 1.3 MG/DL (ref 0.5–1.4)
DIFFERENTIAL METHOD: ABNORMAL
EOSINOPHIL # BLD AUTO: 0 K/UL (ref 0–0.5)
EOSINOPHIL NFR BLD: 0.2 % (ref 0–8)
ERYTHROCYTE [DISTWIDTH] IN BLOOD BY AUTOMATED COUNT: 13 % (ref 11.5–14.5)
EST. GFR  (AFRICAN AMERICAN): >60 ML/MIN/1.73 M^2
EST. GFR  (NON AFRICAN AMERICAN): >60 ML/MIN/1.73 M^2
GLUCOSE SERPL-MCNC: 152 MG/DL (ref 70–110)
GLUCOSE UR QL STRIP: ABNORMAL
HCT VFR BLD AUTO: 46.6 % (ref 40–54)
HGB BLD-MCNC: 15.4 G/DL (ref 14–18)
HGB UR QL STRIP: ABNORMAL
IMM GRANULOCYTES # BLD AUTO: 0.06 K/UL (ref 0–0.04)
IMM GRANULOCYTES NFR BLD AUTO: 0.5 % (ref 0–0.5)
KETONES UR QL STRIP: NEGATIVE
LEUKOCYTE ESTERASE UR QL STRIP: NEGATIVE
LYMPHOCYTES # BLD AUTO: 1.7 K/UL (ref 1–4.8)
LYMPHOCYTES NFR BLD: 13.2 % (ref 18–48)
MCH RBC QN AUTO: 28.8 PG (ref 27–31)
MCHC RBC AUTO-ENTMCNC: 33 G/DL (ref 32–36)
MCV RBC AUTO: 87 FL (ref 82–98)
MICROSCOPIC COMMENT: ABNORMAL
MONOCYTES # BLD AUTO: 1 K/UL (ref 0.3–1)
MONOCYTES NFR BLD: 8.3 % (ref 4–15)
NEUTROPHILS # BLD AUTO: 9.7 K/UL (ref 1.8–7.7)
NEUTROPHILS NFR BLD: 77.6 % (ref 38–73)
NITRITE UR QL STRIP: NEGATIVE
NRBC BLD-RTO: 0 /100 WBC
PH UR STRIP: 6 [PH] (ref 5–8)
PLATELET # BLD AUTO: 275 K/UL (ref 150–450)
PMV BLD AUTO: 9.1 FL (ref 9.2–12.9)
POTASSIUM SERPL-SCNC: 4.4 MMOL/L (ref 3.5–5.1)
PROT SERPL-MCNC: 7.5 G/DL (ref 6–8.4)
PROT UR QL STRIP: ABNORMAL
RBC # BLD AUTO: 5.35 M/UL (ref 4.6–6.2)
RBC #/AREA URNS HPF: 55 /HPF (ref 0–4)
SODIUM SERPL-SCNC: 139 MMOL/L (ref 136–145)
SP GR UR STRIP: >=1.03 (ref 1–1.03)
URN SPEC COLLECT METH UR: ABNORMAL
UROBILINOGEN UR STRIP-ACNC: NEGATIVE EU/DL
WBC # BLD AUTO: 12.54 K/UL (ref 3.9–12.7)
WBC #/AREA URNS HPF: 10 /HPF (ref 0–5)
YEAST URNS QL MICRO: ABNORMAL

## 2021-08-05 PROCEDURE — 96374 THER/PROPH/DIAG INJ IV PUSH: CPT

## 2021-08-05 PROCEDURE — 96375 TX/PRO/DX INJ NEW DRUG ADDON: CPT

## 2021-08-05 PROCEDURE — 80053 COMPREHEN METABOLIC PANEL: CPT | Performed by: EMERGENCY MEDICINE

## 2021-08-05 PROCEDURE — 81000 URINALYSIS NONAUTO W/SCOPE: CPT | Performed by: EMERGENCY MEDICINE

## 2021-08-05 PROCEDURE — 99284 EMERGENCY DEPT VISIT MOD MDM: CPT | Mod: 25

## 2021-08-05 PROCEDURE — 85025 COMPLETE CBC W/AUTO DIFF WBC: CPT | Performed by: EMERGENCY MEDICINE

## 2021-08-05 PROCEDURE — 63600175 PHARM REV CODE 636 W HCPCS: Performed by: EMERGENCY MEDICINE

## 2021-08-05 PROCEDURE — 96361 HYDRATE IV INFUSION ADD-ON: CPT

## 2021-08-05 RX ORDER — MORPHINE SULFATE 4 MG/ML
4 INJECTION, SOLUTION INTRAMUSCULAR; INTRAVENOUS
Status: COMPLETED | OUTPATIENT
Start: 2021-08-05 | End: 2021-08-05

## 2021-08-05 RX ORDER — OXYCODONE AND ACETAMINOPHEN 5; 325 MG/1; MG/1
1 TABLET ORAL EVERY 4 HOURS PRN
Qty: 18 TABLET | Refills: 0 | Status: SHIPPED | OUTPATIENT
Start: 2021-08-05 | End: 2021-08-10

## 2021-08-05 RX ORDER — KETOROLAC TROMETHAMINE 30 MG/ML
15 INJECTION, SOLUTION INTRAMUSCULAR; INTRAVENOUS
Status: COMPLETED | OUTPATIENT
Start: 2021-08-05 | End: 2021-08-05

## 2021-08-05 RX ORDER — HYDROMORPHONE HYDROCHLORIDE 1 MG/ML
1 INJECTION, SOLUTION INTRAMUSCULAR; INTRAVENOUS; SUBCUTANEOUS
Status: COMPLETED | OUTPATIENT
Start: 2021-08-05 | End: 2021-08-05

## 2021-08-05 RX ORDER — CIPROFLOXACIN 500 MG/1
500 TABLET ORAL 2 TIMES DAILY
Qty: 20 TABLET | Refills: 0 | Status: SHIPPED | OUTPATIENT
Start: 2021-08-05 | End: 2021-08-15

## 2021-08-05 RX ORDER — ONDANSETRON 2 MG/ML
8 INJECTION INTRAMUSCULAR; INTRAVENOUS
Status: COMPLETED | OUTPATIENT
Start: 2021-08-05 | End: 2021-08-05

## 2021-08-05 RX ADMIN — HYDROMORPHONE HYDROCHLORIDE 1 MG: 1 INJECTION, SOLUTION INTRAMUSCULAR; INTRAVENOUS; SUBCUTANEOUS at 04:08

## 2021-08-05 RX ADMIN — SODIUM CHLORIDE, SODIUM LACTATE, POTASSIUM CHLORIDE, AND CALCIUM CHLORIDE 1000 ML: .6; .31; .03; .02 INJECTION, SOLUTION INTRAVENOUS at 03:08

## 2021-08-05 RX ADMIN — ONDANSETRON 8 MG: 2 INJECTION INTRAMUSCULAR; INTRAVENOUS at 04:08

## 2021-08-05 RX ADMIN — KETOROLAC TROMETHAMINE 15 MG: 30 INJECTION, SOLUTION INTRAMUSCULAR; INTRAVENOUS at 03:08

## 2021-08-05 RX ADMIN — MORPHINE SULFATE 4 MG: 4 INJECTION INTRAVENOUS at 06:08

## 2021-08-07 ENCOUNTER — PATIENT MESSAGE (OUTPATIENT)
Dept: UROLOGY | Facility: CLINIC | Age: 51
End: 2021-08-07

## 2021-08-09 ENCOUNTER — OFFICE VISIT (OUTPATIENT)
Dept: PSYCHIATRY | Facility: CLINIC | Age: 51
End: 2021-08-09
Payer: COMMERCIAL

## 2021-08-09 DIAGNOSIS — F39 MOOD DISORDER: Primary | ICD-10-CM

## 2021-08-09 PROCEDURE — 99214 OFFICE O/P EST MOD 30 MIN: CPT | Mod: 95,,, | Performed by: PSYCHIATRY & NEUROLOGY

## 2021-08-09 PROCEDURE — 3052F HG A1C>EQUAL 8.0%<EQUAL 9.0%: CPT | Mod: CPTII,95,, | Performed by: PSYCHIATRY & NEUROLOGY

## 2021-08-09 PROCEDURE — 3052F PR MOST RECENT HEMOGLOBIN A1C LEVEL 8.0 - < 9.0%: ICD-10-PCS | Mod: CPTII,95,, | Performed by: PSYCHIATRY & NEUROLOGY

## 2021-08-09 PROCEDURE — 99214 PR OFFICE/OUTPT VISIT, EST, LEVL IV, 30-39 MIN: ICD-10-PCS | Mod: 95,,, | Performed by: PSYCHIATRY & NEUROLOGY

## 2021-08-09 RX ORDER — CLONAZEPAM 0.5 MG/1
0.5 TABLET ORAL 2 TIMES DAILY PRN
Qty: 60 TABLET | Refills: 1 | Status: SHIPPED | OUTPATIENT
Start: 2021-08-09 | End: 2022-01-11 | Stop reason: SDUPTHER

## 2021-08-09 RX ORDER — LAMOTRIGINE 200 MG/1
300 TABLET ORAL DAILY
Qty: 135 TABLET | Refills: 0 | Status: SHIPPED | OUTPATIENT
Start: 2021-08-09 | End: 2021-11-09 | Stop reason: SDUPTHER

## 2021-08-09 RX ORDER — VENLAFAXINE HYDROCHLORIDE 150 MG/1
150 CAPSULE, EXTENDED RELEASE ORAL DAILY
Qty: 90 CAPSULE | Refills: 0 | Status: SHIPPED | OUTPATIENT
Start: 2021-08-09 | End: 2021-11-10

## 2021-08-09 RX ORDER — QUETIAPINE FUMARATE 100 MG/1
100 TABLET, FILM COATED ORAL NIGHTLY
Qty: 90 TABLET | Refills: 0 | Status: SHIPPED | OUTPATIENT
Start: 2021-08-09 | End: 2021-11-21 | Stop reason: SDUPTHER

## 2021-08-24 ENCOUNTER — PATIENT MESSAGE (OUTPATIENT)
Dept: UROLOGY | Facility: CLINIC | Age: 51
End: 2021-08-24

## 2021-08-24 DIAGNOSIS — N20.0 KIDNEY STONE: Primary | ICD-10-CM

## 2021-08-26 ENCOUNTER — TELEPHONE (OUTPATIENT)
Dept: UROLOGY | Facility: CLINIC | Age: 51
End: 2021-08-26

## 2021-08-26 RX ORDER — OXYCODONE HYDROCHLORIDE 5 MG/1
5 TABLET ORAL EVERY 8 HOURS PRN
Qty: 10 TABLET | Refills: 0 | Status: ON HOLD | OUTPATIENT
Start: 2021-08-26 | End: 2022-09-22

## 2021-09-14 ENCOUNTER — PATIENT MESSAGE (OUTPATIENT)
Dept: UROLOGY | Facility: CLINIC | Age: 51
End: 2021-09-14

## 2021-09-14 DIAGNOSIS — N20.0 RENAL STONE: ICD-10-CM

## 2021-09-14 DIAGNOSIS — N20.0 KIDNEY STONE: Primary | ICD-10-CM

## 2021-09-15 ENCOUNTER — PATIENT MESSAGE (OUTPATIENT)
Dept: UROLOGY | Facility: CLINIC | Age: 51
End: 2021-09-15

## 2021-09-15 DIAGNOSIS — C61 PROSTATE CANCER: Primary | ICD-10-CM

## 2021-09-16 ENCOUNTER — LAB VISIT (OUTPATIENT)
Dept: LAB | Facility: HOSPITAL | Age: 51
End: 2021-09-16
Payer: COMMERCIAL

## 2021-09-16 DIAGNOSIS — C61 PROSTATE CANCER: ICD-10-CM

## 2021-09-16 LAB
BACTERIA #/AREA URNS HPF: NORMAL /HPF
MICROSCOPIC COMMENT: NORMAL
RBC #/AREA URNS HPF: 4 /HPF (ref 0–4)
SQUAMOUS #/AREA URNS HPF: 1 /HPF
WBC #/AREA URNS HPF: 2 /HPF (ref 0–5)

## 2021-09-16 PROCEDURE — 81000 URINALYSIS NONAUTO W/SCOPE: CPT

## 2021-09-16 PROCEDURE — 87086 URINE CULTURE/COLONY COUNT: CPT

## 2021-09-17 ENCOUNTER — HOSPITAL ENCOUNTER (OUTPATIENT)
Dept: CARDIOLOGY | Facility: HOSPITAL | Age: 51
Discharge: HOME | End: 2021-09-17
Payer: COMMERCIAL

## 2021-09-17 ENCOUNTER — HOSPITAL ENCOUNTER (OUTPATIENT)
Dept: RADIOLOGY | Facility: HOSPITAL | Age: 51
Discharge: HOME | End: 2021-09-17
Payer: COMMERCIAL

## 2021-09-17 DIAGNOSIS — C61 PROSTATE CANCER: ICD-10-CM

## 2021-09-17 DIAGNOSIS — N20.0 KIDNEY STONE: ICD-10-CM

## 2021-09-17 PROCEDURE — 74018 RADEX ABDOMEN 1 VIEW: CPT | Mod: TC

## 2021-09-17 PROCEDURE — 74018 RADEX ABDOMEN 1 VIEW: CPT | Mod: 26,,, | Performed by: RADIOLOGY

## 2021-09-17 PROCEDURE — 74018 XR ABDOMEN AP 1 VIEW: ICD-10-PCS | Mod: 26,,, | Performed by: RADIOLOGY

## 2021-09-17 PROCEDURE — 93010 ELECTROCARDIOGRAM REPORT: CPT | Mod: ,,, | Performed by: INTERNAL MEDICINE

## 2021-09-17 PROCEDURE — 93010 EKG 12-LEAD: ICD-10-PCS | Mod: ,,, | Performed by: INTERNAL MEDICINE

## 2021-09-17 PROCEDURE — 93005 ELECTROCARDIOGRAM TRACING: CPT

## 2021-09-18 LAB — BACTERIA UR CULT: NORMAL

## 2021-10-01 ENCOUNTER — PATIENT MESSAGE (OUTPATIENT)
Dept: INTERNAL MEDICINE | Facility: CLINIC | Age: 51
End: 2021-10-01

## 2021-10-01 ENCOUNTER — LAB VISIT (OUTPATIENT)
Dept: LAB | Facility: HOSPITAL | Age: 51
End: 2021-10-01
Attending: INTERNAL MEDICINE
Payer: COMMERCIAL

## 2021-10-01 DIAGNOSIS — E11.65 UNCONTROLLED TYPE 2 DIABETES MELLITUS WITH HYPERGLYCEMIA: ICD-10-CM

## 2021-10-01 PROCEDURE — 36415 COLL VENOUS BLD VENIPUNCTURE: CPT | Performed by: INTERNAL MEDICINE

## 2021-10-01 PROCEDURE — 83036 HEMOGLOBIN GLYCOSYLATED A1C: CPT | Performed by: INTERNAL MEDICINE

## 2021-10-02 LAB
ESTIMATED AVG GLUCOSE: 203 MG/DL (ref 68–131)
HBA1C MFR BLD: 8.7 % (ref 4–5.6)

## 2021-10-14 RX ORDER — VENLAFAXINE HYDROCHLORIDE 75 MG/1
75 CAPSULE, EXTENDED RELEASE ORAL DAILY
Qty: 30 CAPSULE | Refills: 1 | Status: SHIPPED | OUTPATIENT
Start: 2021-10-14 | End: 2022-01-11

## 2021-11-09 ENCOUNTER — PATIENT MESSAGE (OUTPATIENT)
Dept: PSYCHIATRY | Facility: CLINIC | Age: 51
End: 2021-11-09
Payer: COMMERCIAL

## 2021-11-09 RX ORDER — LAMOTRIGINE 200 MG/1
300 TABLET ORAL DAILY
Qty: 135 TABLET | Refills: 0 | Status: SHIPPED | OUTPATIENT
Start: 2021-11-09 | End: 2021-12-21 | Stop reason: SDUPTHER

## 2021-11-21 DIAGNOSIS — E11.8 CONTROLLED TYPE 2 DIABETES MELLITUS WITH COMPLICATION, WITH LONG-TERM CURRENT USE OF INSULIN: ICD-10-CM

## 2021-11-21 DIAGNOSIS — Z79.4 CONTROLLED TYPE 2 DIABETES MELLITUS WITH COMPLICATION, WITH LONG-TERM CURRENT USE OF INSULIN: ICD-10-CM

## 2021-11-22 RX ORDER — QUETIAPINE FUMARATE 100 MG/1
100 TABLET, FILM COATED ORAL NIGHTLY
Qty: 90 TABLET | Refills: 0 | Status: SHIPPED | OUTPATIENT
Start: 2021-11-22 | End: 2021-12-21 | Stop reason: SDUPTHER

## 2021-11-22 RX ORDER — VENLAFAXINE HYDROCHLORIDE 150 MG/1
150 CAPSULE, EXTENDED RELEASE ORAL DAILY
Qty: 90 CAPSULE | Refills: 0 | Status: SHIPPED | OUTPATIENT
Start: 2021-11-22 | End: 2021-12-21 | Stop reason: SDUPTHER

## 2021-11-22 RX ORDER — EMPAGLIFLOZIN 25 MG/1
25 TABLET, FILM COATED ORAL DAILY
Qty: 90 TABLET | Refills: 0 | Status: SHIPPED | OUTPATIENT
Start: 2021-11-22 | End: 2022-03-19

## 2021-12-03 ENCOUNTER — PATIENT MESSAGE (OUTPATIENT)
Dept: UROLOGY | Facility: CLINIC | Age: 51
End: 2021-12-03
Payer: COMMERCIAL

## 2021-12-03 DIAGNOSIS — C61 PROSTATE CANCER: Primary | ICD-10-CM

## 2021-12-06 ENCOUNTER — TELEPHONE (OUTPATIENT)
Dept: UROLOGY | Facility: CLINIC | Age: 51
End: 2021-12-06
Payer: COMMERCIAL

## 2021-12-06 ENCOUNTER — PATIENT OUTREACH (OUTPATIENT)
Dept: ADMINISTRATIVE | Facility: HOSPITAL | Age: 51
End: 2021-12-06
Payer: COMMERCIAL

## 2021-12-06 DIAGNOSIS — E11.65 UNCONTROLLED TYPE 2 DIABETES MELLITUS WITH HYPERGLYCEMIA: Primary | ICD-10-CM

## 2021-12-06 DIAGNOSIS — R97.20 ELEVATED PSA: Primary | ICD-10-CM

## 2021-12-07 ENCOUNTER — TELEPHONE (OUTPATIENT)
Dept: UROLOGY | Facility: CLINIC | Age: 51
End: 2021-12-07
Payer: COMMERCIAL

## 2021-12-07 ENCOUNTER — LAB VISIT (OUTPATIENT)
Dept: LAB | Facility: HOSPITAL | Age: 51
End: 2021-12-07
Attending: UROLOGY
Payer: COMMERCIAL

## 2021-12-07 DIAGNOSIS — C61 PROSTATE CANCER: ICD-10-CM

## 2021-12-07 DIAGNOSIS — R97.20 ELEVATED PSA: ICD-10-CM

## 2021-12-07 PROCEDURE — 82565 ASSAY OF CREATININE: CPT | Performed by: UROLOGY

## 2021-12-07 PROCEDURE — 84153 ASSAY OF PSA TOTAL: CPT | Performed by: UROLOGY

## 2021-12-07 PROCEDURE — 36415 COLL VENOUS BLD VENIPUNCTURE: CPT | Performed by: UROLOGY

## 2021-12-08 ENCOUNTER — OFFICE VISIT (OUTPATIENT)
Dept: INTERNAL MEDICINE | Facility: CLINIC | Age: 51
End: 2021-12-08
Payer: COMMERCIAL

## 2021-12-08 ENCOUNTER — PATIENT MESSAGE (OUTPATIENT)
Dept: UROLOGY | Facility: CLINIC | Age: 51
End: 2021-12-08
Payer: COMMERCIAL

## 2021-12-08 VITALS
WEIGHT: 242.5 LBS | BODY MASS INDEX: 36.75 KG/M2 | HEIGHT: 68 IN | RESPIRATION RATE: 16 BRPM | SYSTOLIC BLOOD PRESSURE: 146 MMHG | DIASTOLIC BLOOD PRESSURE: 80 MMHG | HEART RATE: 88 BPM

## 2021-12-08 DIAGNOSIS — E11.65 UNCONTROLLED TYPE 2 DIABETES MELLITUS WITH HYPERGLYCEMIA: Primary | ICD-10-CM

## 2021-12-08 DIAGNOSIS — C61 MALIGNANT NEOPLASM OF PROSTATE: ICD-10-CM

## 2021-12-08 DIAGNOSIS — E66.01 SEVERE OBESITY (BMI 35.0-39.9) WITH COMORBIDITY: ICD-10-CM

## 2021-12-08 DIAGNOSIS — F31.9 BIPOLAR AFFECTIVE DISORDER, REMISSION STATUS UNSPECIFIED: ICD-10-CM

## 2021-12-08 LAB
COMPLEXED PSA SERPL-MCNC: 0.06 NG/ML (ref 0–4)
CREAT SERPL-MCNC: 1.1 MG/DL (ref 0.5–1.4)
EST. GFR  (AFRICAN AMERICAN): >60 ML/MIN/1.73 M^2
EST. GFR  (NON AFRICAN AMERICAN): >60 ML/MIN/1.73 M^2

## 2021-12-08 PROCEDURE — 99999 PR PBB SHADOW E&M-EST. PATIENT-LVL IV: ICD-10-PCS | Mod: PBBFAC,,, | Performed by: NURSE PRACTITIONER

## 2021-12-08 PROCEDURE — 99396 PREV VISIT EST AGE 40-64: CPT | Mod: S$GLB,,, | Performed by: NURSE PRACTITIONER

## 2021-12-08 PROCEDURE — 99396 PR PREVENTIVE VISIT,EST,40-64: ICD-10-PCS | Mod: S$GLB,,, | Performed by: NURSE PRACTITIONER

## 2021-12-08 PROCEDURE — 99999 PR PBB SHADOW E&M-EST. PATIENT-LVL IV: CPT | Mod: PBBFAC,,, | Performed by: NURSE PRACTITIONER

## 2021-12-08 RX ORDER — METFORMIN HYDROCHLORIDE 500 MG/1
1000 TABLET, EXTENDED RELEASE ORAL 2 TIMES DAILY
COMMUNITY
Start: 2021-10-24 | End: 2021-12-08

## 2021-12-08 RX ORDER — VENLAFAXINE HYDROCHLORIDE 75 MG/1
75 CAPSULE, EXTENDED RELEASE ORAL
COMMUNITY
Start: 2020-08-24 | End: 2022-01-11

## 2021-12-08 RX ORDER — IBUPROFEN 800 MG/1
TABLET ORAL
Status: ON HOLD | COMMUNITY
Start: 2021-12-07 | End: 2022-08-04 | Stop reason: HOSPADM

## 2021-12-08 RX ORDER — TADALAFIL 20 MG/1
TABLET ORAL
COMMUNITY
Start: 2021-11-05

## 2021-12-08 RX ORDER — GLIPIZIDE 5 MG/1
5 TABLET ORAL 2 TIMES DAILY WITH MEALS
Qty: 60 TABLET | Refills: 11 | Status: SHIPPED | OUTPATIENT
Start: 2021-12-08 | End: 2022-08-01 | Stop reason: SDUPTHER

## 2021-12-09 ENCOUNTER — TELEPHONE (OUTPATIENT)
Dept: UROLOGY | Facility: CLINIC | Age: 51
End: 2021-12-09
Payer: COMMERCIAL

## 2021-12-21 ENCOUNTER — PATIENT MESSAGE (OUTPATIENT)
Dept: INTERNAL MEDICINE | Facility: CLINIC | Age: 51
End: 2021-12-21
Payer: COMMERCIAL

## 2021-12-21 ENCOUNTER — PATIENT MESSAGE (OUTPATIENT)
Dept: PSYCHIATRY | Facility: CLINIC | Age: 51
End: 2021-12-21
Payer: COMMERCIAL

## 2021-12-21 ENCOUNTER — PATIENT MESSAGE (OUTPATIENT)
Dept: DIABETES | Facility: CLINIC | Age: 51
End: 2021-12-21
Payer: COMMERCIAL

## 2021-12-21 DIAGNOSIS — Z79.4 CONTROLLED TYPE 2 DIABETES MELLITUS WITH COMPLICATION, WITH LONG-TERM CURRENT USE OF INSULIN: ICD-10-CM

## 2021-12-21 DIAGNOSIS — E11.8 CONTROLLED TYPE 2 DIABETES MELLITUS WITH COMPLICATION, WITH LONG-TERM CURRENT USE OF INSULIN: ICD-10-CM

## 2021-12-21 DIAGNOSIS — E11.65 UNCONTROLLED TYPE 2 DIABETES MELLITUS WITH HYPERGLYCEMIA: ICD-10-CM

## 2021-12-21 RX ORDER — INSULIN LISPRO 100 [IU]/ML
12 INJECTION, SOLUTION INTRAVENOUS; SUBCUTANEOUS
Qty: 45 ML | Refills: 0 | Status: SHIPPED | OUTPATIENT
Start: 2021-12-21 | End: 2023-01-17 | Stop reason: SDUPTHER

## 2021-12-21 RX ORDER — INSULIN GLARGINE 100 [IU]/ML
30 INJECTION, SOLUTION SUBCUTANEOUS DAILY
Qty: 45 ML | Refills: 0 | Status: SHIPPED | OUTPATIENT
Start: 2021-12-21 | End: 2022-08-15 | Stop reason: SDUPTHER

## 2021-12-21 RX ORDER — EMPAGLIFLOZIN 25 MG/1
25 TABLET, FILM COATED ORAL DAILY
Qty: 90 TABLET | Refills: 0 | Status: CANCELLED | OUTPATIENT
Start: 2021-12-21

## 2021-12-21 RX ORDER — QUETIAPINE FUMARATE 100 MG/1
100 TABLET, FILM COATED ORAL NIGHTLY
Qty: 90 TABLET | Refills: 0 | Status: SHIPPED | OUTPATIENT
Start: 2021-12-21 | End: 2022-01-11 | Stop reason: SDUPTHER

## 2021-12-21 RX ORDER — LAMOTRIGINE 200 MG/1
300 TABLET ORAL DAILY
Qty: 135 TABLET | Refills: 0 | Status: SHIPPED | OUTPATIENT
Start: 2021-12-21 | End: 2022-01-11 | Stop reason: SDUPTHER

## 2021-12-21 RX ORDER — VENLAFAXINE HYDROCHLORIDE 150 MG/1
150 CAPSULE, EXTENDED RELEASE ORAL DAILY
Qty: 90 CAPSULE | Refills: 0 | Status: SHIPPED | OUTPATIENT
Start: 2021-12-21 | End: 2022-01-11 | Stop reason: SDUPTHER

## 2021-12-22 RX ORDER — ATORVASTATIN CALCIUM 40 MG/1
TABLET, FILM COATED ORAL
Qty: 90 TABLET | Refills: 0 | Status: SHIPPED | OUTPATIENT
Start: 2021-12-22 | End: 2022-02-03 | Stop reason: SDUPTHER

## 2022-01-10 ENCOUNTER — TELEPHONE (OUTPATIENT)
Dept: PSYCHIATRY | Facility: CLINIC | Age: 52
End: 2022-01-10
Payer: COMMERCIAL

## 2022-01-11 ENCOUNTER — OFFICE VISIT (OUTPATIENT)
Dept: PSYCHIATRY | Facility: CLINIC | Age: 52
End: 2022-01-11
Payer: COMMERCIAL

## 2022-01-11 DIAGNOSIS — F39 MOOD DISORDER: Primary | ICD-10-CM

## 2022-01-11 PROCEDURE — 99214 PR OFFICE/OUTPT VISIT, EST, LEVL IV, 30-39 MIN: ICD-10-PCS | Mod: 95,,, | Performed by: PSYCHIATRY & NEUROLOGY

## 2022-01-11 PROCEDURE — 99214 OFFICE O/P EST MOD 30 MIN: CPT | Mod: 95,,, | Performed by: PSYCHIATRY & NEUROLOGY

## 2022-01-11 RX ORDER — VENLAFAXINE HYDROCHLORIDE 150 MG/1
150 CAPSULE, EXTENDED RELEASE ORAL DAILY
Qty: 90 CAPSULE | Refills: 0 | Status: SHIPPED | OUTPATIENT
Start: 2022-01-11 | End: 2022-07-06 | Stop reason: SDUPTHER

## 2022-01-11 RX ORDER — LAMOTRIGINE 200 MG/1
300 TABLET ORAL DAILY
Qty: 135 TABLET | Refills: 0 | Status: SHIPPED | OUTPATIENT
Start: 2022-01-11 | End: 2022-05-05

## 2022-01-11 RX ORDER — QUETIAPINE FUMARATE 100 MG/1
100 TABLET, FILM COATED ORAL NIGHTLY
Qty: 90 TABLET | Refills: 0 | Status: SHIPPED | OUTPATIENT
Start: 2022-01-11 | End: 2022-03-19 | Stop reason: SDUPTHER

## 2022-01-11 RX ORDER — CLONAZEPAM 0.5 MG/1
0.5 TABLET ORAL 2 TIMES DAILY PRN
Qty: 60 TABLET | Refills: 2 | Status: SHIPPED | OUTPATIENT
Start: 2022-01-11 | End: 2022-11-29 | Stop reason: SDUPTHER
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
## 2022-01-12 ENCOUNTER — PATIENT OUTREACH (OUTPATIENT)
Dept: ADMINISTRATIVE | Facility: HOSPITAL | Age: 52
End: 2022-01-12
Payer: COMMERCIAL

## 2022-01-28 ENCOUNTER — PATIENT OUTREACH (OUTPATIENT)
Dept: ADMINISTRATIVE | Facility: OTHER | Age: 52
End: 2022-01-28
Payer: COMMERCIAL

## 2022-01-28 DIAGNOSIS — Z12.11 ENCOUNTER FOR FIT (FECAL IMMUNOCHEMICAL TEST) SCREENING: Primary | ICD-10-CM

## 2022-01-28 NOTE — PROGRESS NOTES
Health Maintenance Due   Topic Date Due    Colorectal Cancer Screening  Never done    Pneumococcal Vaccines (Age 0-64) (2 of 4 - PCV13) 01/27/2021    COVID-19 Vaccine (3 - Booster for Pfizer series) 09/30/2021    Diabetes Urine Screening  10/03/2021    Lipid Panel  10/03/2021    Hemoglobin A1c  01/01/2022    Eye Exam  01/29/2022     Updates were requested from care everywhere.  Chart was reviewed for overdue Proactive [facility] ([facility]) topics (CRS, Breast Cancer Screening, Eye exam)  Health Maintenance has been updated.  LINKS immunization registry triggered.  Immunizations were reconciled.

## 2022-01-29 ENCOUNTER — LAB VISIT (OUTPATIENT)
Dept: LAB | Facility: HOSPITAL | Age: 52
End: 2022-01-29
Attending: INTERNAL MEDICINE
Payer: COMMERCIAL

## 2022-01-29 DIAGNOSIS — E11.65 UNCONTROLLED TYPE 2 DIABETES MELLITUS WITH HYPERGLYCEMIA: ICD-10-CM

## 2022-01-29 LAB
ALBUMIN/CREAT UR: 16.1 UG/MG (ref 0–30)
CHOLEST SERPL-MCNC: 158 MG/DL (ref 120–199)
CHOLEST/HDLC SERPL: 4.5 {RATIO} (ref 2–5)
CREAT UR-MCNC: 118 MG/DL (ref 23–375)
ESTIMATED AVG GLUCOSE: 206 MG/DL (ref 68–131)
HBA1C MFR BLD: 8.8 % (ref 4–5.6)
HDLC SERPL-MCNC: 35 MG/DL (ref 40–75)
HDLC SERPL: 22.2 % (ref 20–50)
LDLC SERPL CALC-MCNC: 87.6 MG/DL (ref 63–159)
MICROALBUMIN UR DL<=1MG/L-MCNC: 19 UG/ML
NONHDLC SERPL-MCNC: 123 MG/DL
TRIGL SERPL-MCNC: 177 MG/DL (ref 30–150)

## 2022-01-29 PROCEDURE — 82043 UR ALBUMIN QUANTITATIVE: CPT | Performed by: INTERNAL MEDICINE

## 2022-01-29 PROCEDURE — 80061 LIPID PANEL: CPT | Performed by: INTERNAL MEDICINE

## 2022-01-29 PROCEDURE — 83036 HEMOGLOBIN GLYCOSYLATED A1C: CPT | Performed by: INTERNAL MEDICINE

## 2022-01-29 PROCEDURE — 36415 COLL VENOUS BLD VENIPUNCTURE: CPT | Performed by: INTERNAL MEDICINE

## 2022-01-29 PROCEDURE — 82570 ASSAY OF URINE CREATININE: CPT | Performed by: INTERNAL MEDICINE

## 2022-01-31 ENCOUNTER — OFFICE VISIT (OUTPATIENT)
Dept: OPHTHALMOLOGY | Facility: CLINIC | Age: 52
End: 2022-01-31
Payer: COMMERCIAL

## 2022-01-31 DIAGNOSIS — H52.203 MYOPIA WITH ASTIGMATISM AND PRESBYOPIA, BILATERAL: ICD-10-CM

## 2022-01-31 DIAGNOSIS — H52.13 MYOPIA WITH ASTIGMATISM AND PRESBYOPIA, BILATERAL: ICD-10-CM

## 2022-01-31 DIAGNOSIS — D31.31 CHOROIDAL NEVUS OF RIGHT EYE: ICD-10-CM

## 2022-01-31 DIAGNOSIS — E11.9 TYPE 2 DIABETES MELLITUS WITHOUT RETINOPATHY: Primary | ICD-10-CM

## 2022-01-31 DIAGNOSIS — H52.4 MYOPIA WITH ASTIGMATISM AND PRESBYOPIA, BILATERAL: ICD-10-CM

## 2022-01-31 PROCEDURE — 92250 FUNDUS PHOTOGRAPHY W/I&R: CPT | Mod: PBBFAC | Performed by: OPTOMETRIST

## 2022-01-31 PROCEDURE — 92014 PR EYE EXAM, EST PATIENT,COMPREHESV: ICD-10-PCS | Mod: S$PBB,,, | Performed by: OPTOMETRIST

## 2022-01-31 PROCEDURE — 92015 PR REFRACTION: ICD-10-PCS | Mod: ,,, | Performed by: OPTOMETRIST

## 2022-01-31 PROCEDURE — 99999 PR PBB SHADOW E&M-EST. PATIENT-LVL III: ICD-10-PCS | Mod: PBBFAC,,, | Performed by: OPTOMETRIST

## 2022-01-31 PROCEDURE — 92250 COLOR FUNDUS PHOTOGRAPHY - OU - BOTH EYES: ICD-10-PCS | Mod: 26,S$PBB,, | Performed by: OPTOMETRIST

## 2022-01-31 PROCEDURE — 99213 OFFICE O/P EST LOW 20 MIN: CPT | Mod: PBBFAC | Performed by: OPTOMETRIST

## 2022-01-31 PROCEDURE — 99999 PR PBB SHADOW E&M-EST. PATIENT-LVL III: CPT | Mod: PBBFAC,,, | Performed by: OPTOMETRIST

## 2022-01-31 PROCEDURE — 92014 COMPRE OPH EXAM EST PT 1/>: CPT | Mod: S$PBB,,, | Performed by: OPTOMETRIST

## 2022-01-31 PROCEDURE — 92015 DETERMINE REFRACTIVE STATE: CPT | Mod: ,,, | Performed by: OPTOMETRIST

## 2022-01-31 NOTE — PROGRESS NOTES
HPI     Diabetic Eye Exam     Comments: Diagnosed with diabetes 4 years ago.  Lab Results       Component                Value               Date                       HGBA1C                   8.8 (H)             01/29/2022                                 Comments     Last visit with DNL on 01/29/2021.  Patient states no changes with vision.  No ocular pain/discomfort.  Wear PAL glasses           Last edited by Mitzi Hoyos on 1/31/2022  4:20 PM. (History)            Assessment /Plan     For exam results, see Encounter Report.    Type 2 diabetes mellitus without retinopathy  There was no diabetic retinopathy present in either eye today.   Recommended that pt continue care with PCP and/or specialists regarding diabetes.  Follow-up dilated eye exam recommended in 12 months, sooner with any vision changes or new concerns.    Choroidal nevus of right eye  -     Color Fundus Photography - OU - Both Eyes    Stable with no changes in size or appearance compared to last visit  Monitor 12 months    Myopia with astigmatism and presbyopia, bilateral  Eyeglass Final Rx     Eyeglass Final Rx       Sphere Cylinder Axis Add    Right -2.25 +0.50 180 +2.00    Left -2.50 +1.50 170 +2.00    Expiration Date: 2/1/2023                RTC 1 yr for dilated eye exam with Optos or PRN if any problems.   Discussed above and answered questions.

## 2022-02-03 ENCOUNTER — OFFICE VISIT (OUTPATIENT)
Dept: INTERNAL MEDICINE | Facility: CLINIC | Age: 52
End: 2022-02-03
Payer: COMMERCIAL

## 2022-02-03 VITALS
DIASTOLIC BLOOD PRESSURE: 88 MMHG | SYSTOLIC BLOOD PRESSURE: 118 MMHG | WEIGHT: 240.31 LBS | HEART RATE: 85 BPM | OXYGEN SATURATION: 96 % | BODY MASS INDEX: 36.54 KG/M2 | TEMPERATURE: 97 F

## 2022-02-03 DIAGNOSIS — E66.01 SEVERE OBESITY (BMI 35.0-39.9) WITH COMORBIDITY: ICD-10-CM

## 2022-02-03 DIAGNOSIS — C61 MALIGNANT NEOPLASM OF PROSTATE: ICD-10-CM

## 2022-02-03 DIAGNOSIS — Z00.00 ROUTINE GENERAL MEDICAL EXAMINATION AT A HEALTH CARE FACILITY: Primary | ICD-10-CM

## 2022-02-03 DIAGNOSIS — E11.65 UNCONTROLLED TYPE 2 DIABETES MELLITUS WITH HYPERGLYCEMIA: ICD-10-CM

## 2022-02-03 DIAGNOSIS — Z12.11 COLON CANCER SCREENING: ICD-10-CM

## 2022-02-03 DIAGNOSIS — E78.5 HYPERLIPIDEMIA ASSOCIATED WITH TYPE 2 DIABETES MELLITUS: ICD-10-CM

## 2022-02-03 DIAGNOSIS — E11.69 HYPERLIPIDEMIA ASSOCIATED WITH TYPE 2 DIABETES MELLITUS: ICD-10-CM

## 2022-02-03 DIAGNOSIS — Z23 NEED FOR PNEUMOCOCCAL VACCINATION: ICD-10-CM

## 2022-02-03 PROBLEM — R97.20 ELEVATED PSA: Status: RESOLVED | Noted: 2021-03-16 | Resolved: 2022-02-03

## 2022-02-03 PROCEDURE — 90471 PNEUMOCOCCAL POLYSACCHARIDE VACCINE 23-VALENT =>2YO SQ IM: ICD-10-PCS | Mod: S$GLB,,, | Performed by: INTERNAL MEDICINE

## 2022-02-03 PROCEDURE — 90471 IMMUNIZATION ADMIN: CPT | Mod: S$GLB,,, | Performed by: INTERNAL MEDICINE

## 2022-02-03 PROCEDURE — 90732 PPSV23 VACC 2 YRS+ SUBQ/IM: CPT | Mod: S$GLB,,, | Performed by: INTERNAL MEDICINE

## 2022-02-03 PROCEDURE — 90732 PNEUMOCOCCAL POLYSACCHARIDE VACCINE 23-VALENT =>2YO SQ IM: ICD-10-PCS | Mod: S$GLB,,, | Performed by: INTERNAL MEDICINE

## 2022-02-03 PROCEDURE — 99396 PR PREVENTIVE VISIT,EST,40-64: ICD-10-PCS | Mod: 25,S$GLB,, | Performed by: INTERNAL MEDICINE

## 2022-02-03 PROCEDURE — 99999 PR PBB SHADOW E&M-EST. PATIENT-LVL IV: ICD-10-PCS | Mod: PBBFAC,,, | Performed by: INTERNAL MEDICINE

## 2022-02-03 PROCEDURE — 99396 PREV VISIT EST AGE 40-64: CPT | Mod: 25,S$GLB,, | Performed by: INTERNAL MEDICINE

## 2022-02-03 PROCEDURE — 99999 PR PBB SHADOW E&M-EST. PATIENT-LVL IV: CPT | Mod: PBBFAC,,, | Performed by: INTERNAL MEDICINE

## 2022-02-03 RX ORDER — ATORVASTATIN CALCIUM 40 MG/1
TABLET, FILM COATED ORAL
Qty: 90 TABLET | Refills: 0 | Status: SHIPPED | OUTPATIENT
Start: 2022-02-03 | End: 2022-02-03 | Stop reason: SDUPTHER

## 2022-02-03 RX ORDER — ATORVASTATIN CALCIUM 40 MG/1
TABLET, FILM COATED ORAL
Qty: 90 TABLET | Refills: 1 | Status: SHIPPED | OUTPATIENT
Start: 2022-02-03 | End: 2023-03-09 | Stop reason: SDUPTHER

## 2022-02-03 NOTE — PROGRESS NOTES
Subjective:      Patient ID: Patrick Sanz is a 51 y.o. male.    Chief Complaint: Follow-up    HPI     50 yo with   Patient Active Problem List   Diagnosis    Uncontrolled type 2 diabetes mellitus with hyperglycemia    Bipolar disorder    Kidney stone    Malignant neoplasm of prostate    Severe obesity (BMI 35.0-39.9) with comorbidity     Past Medical History:   Diagnosis Date    Anxiety     Bipolar disorder     Depression     Diabetes mellitus, type 2     Elevated PSA 3/16/2021     Here today for annual prev exam.  Compliant with meds without significant side effects. Energy and appetite are good.    Home glucose 200 fasting and non fasting.   Having trouble with diet and exercise compliance.   Review of Systems   Constitutional: Negative for chills and fever.   HENT: Negative for ear pain and sore throat.    Respiratory: Negative for cough.    Cardiovascular: Negative for chest pain.   Gastrointestinal: Negative for abdominal pain and blood in stool.   Genitourinary: Negative for dysuria and hematuria.   Neurological: Negative for seizures and syncope.     Objective:   /88 (BP Location: Right arm, Patient Position: Sitting, BP Method: Large (Manual))   Pulse 85   Temp 96.5 °F (35.8 °C) (Tympanic)   Wt 109 kg (240 lb 4.8 oz)   SpO2 96%   BMI 36.54 kg/m²     Physical Exam  Constitutional:       General: He is not in acute distress.     Appearance: He is well-developed and well-nourished.   HENT:      Head: Normocephalic and atraumatic.      Mouth/Throat:      Mouth: Oropharynx is clear and moist.   Eyes:      Extraocular Movements: EOM normal.      Pupils: Pupils are equal, round, and reactive to light.   Neck:      Thyroid: No thyromegaly.   Cardiovascular:      Rate and Rhythm: Normal rate and regular rhythm.   Pulmonary:      Breath sounds: Normal breath sounds. No wheezing or rales.   Abdominal:      General: Bowel sounds are normal.      Palpations: Abdomen is soft.      Tenderness: There is  no abdominal tenderness.   Musculoskeletal:      Cervical back: Neck supple.   Lymphadenopathy:      Cervical: No cervical adenopathy.   Skin:     General: Skin is warm and dry.   Neurological:      Mental Status: He is alert and oriented to person, place, and time.   Psychiatric:         Mood and Affect: Mood and affect normal.         Behavior: Behavior normal.         Assessment:     1. Routine general medical examination at a health care facility    2. Uncontrolled type 2 diabetes mellitus with hyperglycemia    3. Need for pneumococcal vaccination    4. Colon cancer screening    5. Hyperlipidemia associated with type 2 diabetes mellitus    6. Severe obesity (BMI 35.0-39.9) with comorbidity    7. Malignant neoplasm of prostate      Plan:   Routine general medical examination at a health care facility  Heart healthy diet and reg exercise   reviewed  Uncontrolled type 2 diabetes mellitus with hyperglycemia  Improving. Cont current med and f/u with dm clinic.   Need for pneumococcal vaccination  -     (In Office Administered) Pneumococcal Polysaccharide Vaccine (23 Valent) (SQ/IM)    Colon cancer screening  -     Case Request Endoscopy: COLONOSCOPY    Hyperlipidemia associated with type 2 diabetes mellitus  -     Discontinue: atorvastatin (LIPITOR) 40 MG tablet; TK 1 T PO D  Dispense: 90 tablet; Refill: 0  -     atorvastatin (LIPITOR) 40 MG tablet; TK 1 T PO D  Dispense: 90 tablet; Refill: 1    Severe obesity (BMI 35.0-39.9) with comorbidity    Malignant neoplasm of prostate        Lab Frequency Next Occurrence   Ambulatory referral/consult to Optometry Once 01/21/2021   CT Urogram Abd Pelvis W WO Once 12/13/2021   Fecal Immunochemical Test (iFOBT) Once 01/28/2022       Problem List Items Addressed This Visit     Uncontrolled type 2 diabetes mellitus with hyperglycemia    Malignant neoplasm of prostate    Current Assessment & Plan     Stable. Up to date with heme/onc         Severe obesity (BMI 35.0-39.9) with  comorbidity    Current Assessment & Plan     Diet and exercise.            Other Visit Diagnoses     Routine general medical examination at a health care facility    -  Primary    Need for pneumococcal vaccination        Relevant Orders    (In Office Administered) Pneumococcal Polysaccharide Vaccine (23 Valent) (SQ/IM) (Completed)    Colon cancer screening        Relevant Orders    Case Request Endoscopy: COLONOSCOPY (Completed)    Hyperlipidemia associated with type 2 diabetes mellitus        Relevant Medications    atorvastatin (LIPITOR) 40 MG tablet          Follow up in about 6 months (around 8/3/2022), or if symptoms worsen or fail to improve.

## 2022-03-15 DIAGNOSIS — Z79.4 CONTROLLED TYPE 2 DIABETES MELLITUS WITH COMPLICATION, WITH LONG-TERM CURRENT USE OF INSULIN: ICD-10-CM

## 2022-03-15 DIAGNOSIS — E11.8 CONTROLLED TYPE 2 DIABETES MELLITUS WITH COMPLICATION, WITH LONG-TERM CURRENT USE OF INSULIN: ICD-10-CM

## 2022-03-15 NOTE — TELEPHONE ENCOUNTER
No new care gaps identified.  Powered by [employer]. Reference number: [id number].   3/15/2022 4:55:48 PM CDT

## 2022-03-19 RX ORDER — EMPAGLIFLOZIN 25 MG/1
TABLET, FILM COATED ORAL
Qty: 90 TABLET | Refills: 0 | Status: SHIPPED | OUTPATIENT
Start: 2022-03-19 | End: 2023-01-17 | Stop reason: SDUPTHER

## 2022-03-19 NOTE — TELEPHONE ENCOUNTER
Refill Authorization Note   Patrick Sanz  is requesting a refill authorization.  Brief Assessment and Rationale for Refill:  Approve     Medication Therapy Plan:       Medication Reconciliation Completed: No   Comments:   --->Care Gap information included below if applicable.       Requested Prescriptions   Pending Prescriptions Disp Refills    JARDIANCE 25 mg tablet [Pharmacy Med Name: Jardiance 25 MG Oral Tablet] 90 tablet 0     Sig: Take 1 tablet by mouth once daily       Endocrinology:  Diabetes - SGLT2 Inhibitors Passed - 3/19/2022 11:17 AM        Passed - Patient is at least 18 years old        Passed - BP > 90/50 mmH     BP Readings from Last 3 Encounters:   02/03/22 118/88   12/08/21 (!) 146/80   08/05/21 136/81               Passed - Valid encounter within last 15 months     Recent Visits  Date Type Provider Dept   02/03/22 Office Visit Edilberto Figueroa MD [facility] Internal Medicine   10/21/20 Office Visit Edilberto Figueroa MD [facility]   10/07/20 Office Visit Edilberto Figueroa MD [facility]   09/30/20 Office Visit Edilberto Figueroa MD [facility] Medicine   Showing recent visits within past 720 days and meeting all other requirements  Future Appointments  No visits were found meeting these conditions.  Showing future appointments within next 150 days and meeting all other requirements      Future Appointments              In 4 months Edilberto Figueroa MD [facility] Internal Med [facility] [place]                Passed - Cr is 1.39 or below and within 360 days     Lab Results   Component Value Date    CREATININE 1.1 12/07/2021    CREATININE 1.1 09/17/2021    CREATININE 1.3 08/05/2021              Passed - HBA1C within 180 days     Lab Results   Component Value Date    HGBA1C 8.8 (H) 01/29/2022    HGBA1C 8.7 (H) 10/01/2021    HGBA1C 8.3 (H) 06/29/2021              Passed - eGFR is 45 or above and within 360 days     Lab Results   Component Value Date    EGFRNONAA >60.0 12/07/2021     EGFRNONAA >60.0 09/17/2021    EGFRNONAA >60 08/05/2021                    Appointments  past 12m or future 3m with PCP    Date Provider   Last Visit   2/3/2022 Edilberto Figueroa MD   Next Visit   8/4/2022 Edilberto Figueroa MD   ED visits in past 90 days: 0     Note composed:11:19 AM 03/19/2022

## 2022-03-21 RX ORDER — QUETIAPINE FUMARATE 100 MG/1
100 TABLET, FILM COATED ORAL NIGHTLY
Qty: 90 TABLET | Refills: 0 | Status: SHIPPED | OUTPATIENT
Start: 2022-03-21 | End: 2022-06-21

## 2022-04-08 ENCOUNTER — TELEPHONE (OUTPATIENT)
Dept: ADMINISTRATIVE | Facility: HOSPITAL | Age: 52
End: 2022-04-08
Payer: COMMERCIAL

## 2022-04-08 DIAGNOSIS — Z12.11 COLON CANCER SCREENING: Primary | ICD-10-CM

## 2022-05-09 ENCOUNTER — PATIENT MESSAGE (OUTPATIENT)
Dept: INTERNAL MEDICINE | Facility: CLINIC | Age: 52
End: 2022-05-09
Payer: COMMERCIAL

## 2022-05-12 ENCOUNTER — TELEPHONE (OUTPATIENT)
Dept: PREADMISSION TESTING | Facility: HOSPITAL | Age: 52
End: 2022-05-12
Payer: COMMERCIAL

## 2022-05-17 ENCOUNTER — PATIENT OUTREACH (OUTPATIENT)
Dept: ADMINISTRATIVE | Facility: HOSPITAL | Age: 52
End: 2022-05-17
Payer: COMMERCIAL

## 2022-06-03 ENCOUNTER — HOSPITAL ENCOUNTER (OUTPATIENT)
Dept: PREADMISSION TESTING | Facility: HOSPITAL | Age: 52
Discharge: HOME | End: 2022-06-03
Attending: INTERNAL MEDICINE
Payer: COMMERCIAL

## 2022-06-03 DIAGNOSIS — Z12.11 COLON CANCER SCREENING: ICD-10-CM

## 2022-06-10 ENCOUNTER — PATIENT MESSAGE (OUTPATIENT)
Dept: INTERNAL MEDICINE | Facility: CLINIC | Age: 52
End: 2022-06-10
Payer: COMMERCIAL

## 2022-06-16 ENCOUNTER — TELEPHONE (OUTPATIENT)
Dept: PREADMISSION TESTING | Facility: HOSPITAL | Age: 52
End: 2022-06-16
Payer: COMMERCIAL

## 2022-06-22 ENCOUNTER — TELEPHONE (OUTPATIENT)
Dept: PREADMISSION TESTING | Facility: HOSPITAL | Age: 52
End: 2022-06-22
Payer: COMMERCIAL

## 2022-06-22 DIAGNOSIS — Z12.11 COLON CANCER SCREENING: Primary | ICD-10-CM

## 2022-06-22 RX ORDER — POLYETHYLENE GLYCOL 3350, SODIUM SULFATE ANHYDROUS, SODIUM BICARBONATE, SODIUM CHLORIDE, POTASSIUM CHLORIDE 236; 22.74; 6.74; 5.86; 2.97 G/4L; G/4L; G/4L; G/4L; G/4L
4 POWDER, FOR SOLUTION ORAL ONCE
Qty: 4000 ML | Refills: 0 | Status: SHIPPED | OUTPATIENT
Start: 2022-06-22 | End: 2022-06-22

## 2022-07-10 ENCOUNTER — PATIENT MESSAGE (OUTPATIENT)
Dept: INTERNAL MEDICINE | Facility: CLINIC | Age: 52
End: 2022-07-10
Payer: COMMERCIAL

## 2022-07-10 DIAGNOSIS — E11.65 UNCONTROLLED TYPE 2 DIABETES MELLITUS WITH HYPERGLYCEMIA: ICD-10-CM

## 2022-07-10 DIAGNOSIS — L03.019 PARONYCHIA OF FINGER, UNSPECIFIED LATERALITY: Primary | ICD-10-CM

## 2022-07-12 ENCOUNTER — OFFICE VISIT (OUTPATIENT)
Dept: DERMATOLOGY | Facility: CLINIC | Age: 52
End: 2022-07-12
Payer: COMMERCIAL

## 2022-07-12 ENCOUNTER — PATIENT MESSAGE (OUTPATIENT)
Dept: DERMATOLOGY | Facility: CLINIC | Age: 52
End: 2022-07-12

## 2022-07-12 DIAGNOSIS — L03.019 PARONYCHIA OF FINGER, UNSPECIFIED LATERALITY: ICD-10-CM

## 2022-07-12 DIAGNOSIS — L03.011 PARONYCHIA OF FINGER, RIGHT: Primary | ICD-10-CM

## 2022-07-12 PROCEDURE — 99203 PR OFFICE/OUTPT VISIT, NEW, LEVL III, 30-44 MIN: ICD-10-PCS | Mod: 25,S$GLB,, | Performed by: STUDENT IN AN ORGANIZED HEALTH CARE EDUCATION/TRAINING PROGRAM

## 2022-07-12 PROCEDURE — 87070 CULTURE OTHR SPECIMN AEROBIC: CPT | Performed by: STUDENT IN AN ORGANIZED HEALTH CARE EDUCATION/TRAINING PROGRAM

## 2022-07-12 PROCEDURE — 99999 PR PBB SHADOW E&M-EST. PATIENT-LVL III: CPT | Mod: PBBFAC,,, | Performed by: STUDENT IN AN ORGANIZED HEALTH CARE EDUCATION/TRAINING PROGRAM

## 2022-07-12 PROCEDURE — 99999 PR PBB SHADOW E&M-EST. PATIENT-LVL III: ICD-10-PCS | Mod: PBBFAC,,, | Performed by: STUDENT IN AN ORGANIZED HEALTH CARE EDUCATION/TRAINING PROGRAM

## 2022-07-12 PROCEDURE — 10060 I&D ABSCESS SIMPLE/SINGLE: CPT | Mod: S$GLB,,, | Performed by: STUDENT IN AN ORGANIZED HEALTH CARE EDUCATION/TRAINING PROGRAM

## 2022-07-12 PROCEDURE — 99203 OFFICE O/P NEW LOW 30 MIN: CPT | Mod: 25,S$GLB,, | Performed by: STUDENT IN AN ORGANIZED HEALTH CARE EDUCATION/TRAINING PROGRAM

## 2022-07-12 PROCEDURE — 10060 PR DRAIN SKIN ABSCESS SIMPLE: ICD-10-PCS | Mod: S$GLB,,, | Performed by: STUDENT IN AN ORGANIZED HEALTH CARE EDUCATION/TRAINING PROGRAM

## 2022-07-12 RX ORDER — FLUCONAZOLE 200 MG/1
200 TABLET ORAL DAILY
Qty: 1 TABLET | Refills: 0 | Status: SHIPPED | OUTPATIENT
Start: 2022-07-12 | End: 2022-07-13

## 2022-07-12 RX ORDER — TRAMADOL HYDROCHLORIDE 50 MG/1
50 TABLET ORAL EVERY 12 HOURS PRN
Qty: 10 TABLET | Refills: 0 | Status: SHIPPED | OUTPATIENT
Start: 2022-07-12 | End: 2022-07-17

## 2022-07-12 RX ORDER — DOXYCYCLINE HYCLATE 100 MG
100 TABLET ORAL EVERY 12 HOURS
Qty: 14 TABLET | Refills: 0 | Status: SHIPPED | OUTPATIENT
Start: 2022-07-12 | End: 2022-07-19

## 2022-07-12 NOTE — LETTER
July 12, 2022      [facility] Dermatology  [address]  [place] [address]  Phone: [phone]  Fax: [phone]       Patient: Patrick Sanz   YOB: 1970  Date of Visit: 07/12/2022    To Whom It May Concern:    Saba Sanz  was at [facility] on 07/12/2022. The patient may return to work on 7/14/2022 with no restrictions. Please excuse him from any work missed. If you have any questions or concerns, or if I can be of further assistance, please do not hesitate to contact me.    Sincerely,    Ramonita Carrasco

## 2022-07-12 NOTE — LETTER
July 12, 2022    Patrick Sanz  [address]  [address]  [place] [address]             [facility]  Dermatology  [address]  [place] [address]  Phone: [phone]  Fax: [phone]   July 12, 2022     Patient: Patrick Sanz   YOB: 1970   Date of Visit: 7/12/2022       To Whom it May Concern:    Patrick Sanz was seen in my clinic on 7/12/2022. He may return to work on 7/17/2022.    Please excuse him from any classes or work missed.    If you have any questions or concerns, please don't hesitate to call.    Sincerely,         Ramonita Machado MD

## 2022-07-13 ENCOUNTER — PATIENT MESSAGE (OUTPATIENT)
Dept: DERMATOLOGY | Facility: CLINIC | Age: 52
End: 2022-07-13
Payer: COMMERCIAL

## 2022-07-13 NOTE — PROGRESS NOTES
Patient Information  Name: Patrick Sanz  : 1970  MRN: [id number]     Referring Physician:  Dr. Mayers   Primary Care Physician:  Dr. Edilberto Figueroa MD   Date of Visit: 2022      Subjective:       Patrick Sanz is a 52 y.o. male who presents for   Chief Complaint   Patient presents with    Spot     On the 3rd right finger for a week.      HPI  Patient with new complaint of lesion(s)  Location: right 3rd digit  Duration: 1 week  Symptoms: painful  Relieving factors/Previous treatments: none    Patient was last seen:Visit date not found     Prior notes by myself reviewed.   Clinical documentation obtained by nursing staff reviewed.    Review of Systems   Skin: Negative for itching and rash.        Objective:    Physical Exam   Constitutional: He appears well-developed and well-nourished. No distress.   Neurological: He is alert and oriented to person, place, and time. He is not disoriented.   Psychiatric: He has a normal mood and affect.   Skin:   Areas Examined (abnormalities noted in diagram):   RUE Inspected             Diagram Legend     Erythematous scaling macule/papule c/w actinic keratosis       Vascular papule c/w angioma      Pigmented verrucoid papule/plaque c/w seborrheic keratosis      Yellow umbilicated papule c/w sebaceous hyperplasia      Irregularly shaped tan macule c/w lentigo     1-2 mm smooth white papules consistent with Milia      Movable subcutaneous cyst with punctum c/w epidermal inclusion cyst      Subcutaneous movable cyst c/w pilar cyst      Firm pink to brown papule c/w dermatofibroma      Pedunculated fleshy papule(s) c/w skin tag(s)      Evenly pigmented macule c/w junctional nevus     Mildly variegated pigmented, slightly irregular-bordered macule c/w mildly atypical nevus      Flesh colored to evenly pigmented papule c/w intradermal nevus       Pink pearly papule/plaque c/w basal cell carcinoma      Erythematous hyperkeratotic cursted plaque c/w SCC      Surgical scar with  no sign of skin cancer recurrence      Open and closed comedones      Inflammatory papules and pustules      Verrucoid papule consistent consistent with wart     Erythematous eczematous patches and plaques     Dystrophic onycholytic nail with subungual debris c/w onychomycosis     Umbilicated papule    Erythematous-base heme-crusted tan verrucoid plaque consistent with inflamed seborrheic keratosis     Erythematous Silvery Scaling Plaque c/w Psoriasis     See annotation      No images are attached to the encounter or orders placed in the encounter.    [] Data reviewed  [] Independent review of test  [] Management discussed with another provider    Assessment / Plan:        Paronychia of finger, right  -     fluconazole (DIFLUCAN) 200 MG Tab; Take 1 tablet (200 mg total) by mouth once daily. for 1 day  Dispense: 1 tablet; Refill: 0  -     doxycycline (VIBRA-TABS) 100 MG tablet; Take 1 tablet (100 mg total) by mouth every 12 (twelve) hours. for 7 days  Dispense: 14 tablet; Refill: 0  -     Aerobic culture  -     traMADoL (ULTRAM) 50 mg tablet; Take 1 tablet (50 mg total) by mouth every 12 (twelve) hours as needed for Pain.  Dispense: 10 tablet; Refill: 0  Lesion incised with #11 blade and drained on today's date. Bacterial culture performed.  Patient instructed to perform warm compresses 2 - 3 times/daily.             LOS NUMBER AND COMPLEXITY OF PROBLEMS    COMPLEXITY OF DATA RISK TOTAL TIME (m)   93387  94463 [] 1 self-limited or minor problem [x] Minimal to none [] No treatment recommended or patient to monitor 15-29  10-19   53477  82296 Low  [] 2 or > self limited or minor problems  [] 1 stable chronic illness  [x] 1 acute, uncomplicated illness or injury Limited (2)  [] Prior external notes from each unique source  [] Review result of each unique test  [] Order each unique test []  Low  OTC medications, minor skin biopsy 30-44  20-29   28671  60404 Moderate  []  1 or > chronic illness with progression,  exacerbation or SE of treatment  []  2 or more stable chronic illnesses  []  1 acute illness with systemic symptoms  []  1 acute complicated injury  []  1 undiagnosed new problem with uncertain prognosis Moderate (1/3 below)  []  3 or more data items        *Now includes assessment requiring independent historian  []  Independent interpretation of a test  []  Discuss management/test with another provider Moderate  [x]  Prescription drug mgmt  []  Minor surgery with risk discussed  []  Mgmt limited by social determinates 45-59  30-39   55409  76466 High  []  1 or more chronic illness with severe exacerbation, progression or SE of treatment  []  1 acute or chronic illness/injury that poses a threat to life or bodily function Extensive (2/3 below)  []  3 or more data items        *Now includes assessment requiring independent historian.  []  Independent interpretation of a test  []  Discuss management/test with another provider High  []  Major surgery with risk discussed  []  Drug therapy requiring intensive monitoring for toxicity  []  Hospitalization  []  Decision for DNR 60-74  40-54      No follow-ups on file.    Ramonita Machado MD, FAAD  [facility]

## 2022-07-15 LAB — BACTERIA SPEC AEROBE CULT: NORMAL

## 2022-07-30 ENCOUNTER — PATIENT MESSAGE (OUTPATIENT)
Dept: INTERNAL MEDICINE | Facility: CLINIC | Age: 52
End: 2022-07-30
Payer: COMMERCIAL

## 2022-08-01 DIAGNOSIS — E11.65 UNCONTROLLED TYPE 2 DIABETES MELLITUS WITH HYPERGLYCEMIA: ICD-10-CM

## 2022-08-01 RX ORDER — INSULIN GLARGINE 100 [IU]/ML
30 INJECTION, SOLUTION SUBCUTANEOUS DAILY
Qty: 45 ML | Refills: 0 | OUTPATIENT
Start: 2022-08-01

## 2022-08-01 NOTE — TELEPHONE ENCOUNTER
Care Due:                  Date            Visit Type   Department     Provider  --------------------------------------------------------------------------------                                EP -                              PRIMARY      [facility] INTERNAL  Last Visit: 02-      CARE ([facility])   ANGELES Figueroa                              EP -                              PRIMARY      [facility] INTERNAL  Next Visit: 08-      CARE ([facility])   ANGELES Figueroa                                                            Last  Test          Frequency    Reason                     Performed    Due Date  --------------------------------------------------------------------------------    CMP.........  12 months..  atorvastatin.............  08- 07-    HBA1C.......  6 months...  JARDIANCE................  01- 07-    [facility] Embedded Care Gaps. Reference number: [id number]. 8/01/2022   1:54:44 PM CDT

## 2022-08-01 NOTE — TELEPHONE ENCOUNTER
Jeffrey, a patient of yours that was sent to me - doesn't look like he has seen you since 2021 though.

## 2022-08-04 ENCOUNTER — HOSPITAL ENCOUNTER (OUTPATIENT)
Facility: HOSPITAL | Age: 52
Discharge: HOME | End: 2022-08-04
Attending: INTERNAL MEDICINE | Admitting: INTERNAL MEDICINE
Payer: COMMERCIAL

## 2022-08-04 ENCOUNTER — ANESTHESIA EVENT (OUTPATIENT)
Dept: ENDOSCOPY | Facility: HOSPITAL | Age: 52
End: 2022-08-04
Payer: COMMERCIAL

## 2022-08-04 ENCOUNTER — ANESTHESIA (OUTPATIENT)
Dept: ENDOSCOPY | Facility: HOSPITAL | Age: 52
End: 2022-08-04
Payer: COMMERCIAL

## 2022-08-04 ENCOUNTER — TELEPHONE (OUTPATIENT)
Dept: SURGERY | Facility: CLINIC | Age: 52
End: 2022-08-04
Payer: COMMERCIAL

## 2022-08-04 VITALS
TEMPERATURE: 98 F | OXYGEN SATURATION: 98 % | WEIGHT: 240 LBS | BODY MASS INDEX: 36.37 KG/M2 | DIASTOLIC BLOOD PRESSURE: 88 MMHG | HEIGHT: 68 IN | SYSTOLIC BLOOD PRESSURE: 141 MMHG | RESPIRATION RATE: 18 BRPM | HEART RATE: 77 BPM

## 2022-08-04 DIAGNOSIS — Z12.11 COLON CANCER SCREENING: Primary | ICD-10-CM

## 2022-08-04 LAB — POCT GLUCOSE: 98 MG/DL (ref 70–110)

## 2022-08-04 PROCEDURE — 37000008 HC ANESTHESIA 1ST 15 MINUTES: Performed by: INTERNAL MEDICINE

## 2022-08-04 PROCEDURE — 27201028 HC NEEDLE, SCLERO: Performed by: INTERNAL MEDICINE

## 2022-08-04 PROCEDURE — 45381 COLONOSCOPY SUBMUCOUS NJX: CPT | Performed by: INTERNAL MEDICINE

## 2022-08-04 PROCEDURE — 27201042 HC RETRIEVAL NET: Performed by: INTERNAL MEDICINE

## 2022-08-04 PROCEDURE — 27201012 HC FORCEPS, HOT/COLD, DISP: Performed by: INTERNAL MEDICINE

## 2022-08-04 PROCEDURE — 88305 TISSUE EXAM BY PATHOLOGIST: ICD-10-PCS | Mod: 26,,, | Performed by: PATHOLOGY

## 2022-08-04 PROCEDURE — 45385 PR COLONOSCOPY,REMV LESN,SNARE: ICD-10-PCS | Mod: 33,,, | Performed by: INTERNAL MEDICINE

## 2022-08-04 PROCEDURE — 27201089 HC SNARE, DISP (ANY): Performed by: INTERNAL MEDICINE

## 2022-08-04 PROCEDURE — 63600175 PHARM REV CODE 636 W HCPCS: Performed by: NURSE ANESTHETIST, CERTIFIED REGISTERED

## 2022-08-04 PROCEDURE — 25000003 PHARM REV CODE 250: Performed by: NURSE ANESTHETIST, CERTIFIED REGISTERED

## 2022-08-04 PROCEDURE — 63600175 PHARM REV CODE 636 W HCPCS: Performed by: INTERNAL MEDICINE

## 2022-08-04 PROCEDURE — 37000009 HC ANESTHESIA EA ADD 15 MINS: Performed by: INTERNAL MEDICINE

## 2022-08-04 PROCEDURE — 45385 COLONOSCOPY W/LESION REMOVAL: CPT | Mod: 33,,, | Performed by: INTERNAL MEDICINE

## 2022-08-04 PROCEDURE — 45380 PR COLONOSCOPY,BIOPSY: ICD-10-PCS | Mod: 59,,, | Performed by: INTERNAL MEDICINE

## 2022-08-04 PROCEDURE — 45380 COLONOSCOPY AND BIOPSY: CPT | Performed by: INTERNAL MEDICINE

## 2022-08-04 PROCEDURE — 25000003 PHARM REV CODE 250: Performed by: INTERNAL MEDICINE

## 2022-08-04 PROCEDURE — 88305 TISSUE EXAM BY PATHOLOGIST: CPT | Mod: 26,,, | Performed by: PATHOLOGY

## 2022-08-04 PROCEDURE — 82962 GLUCOSE BLOOD TEST: CPT | Performed by: INTERNAL MEDICINE

## 2022-08-04 PROCEDURE — 88305 TISSUE EXAM BY PATHOLOGIST: CPT | Performed by: PATHOLOGY

## 2022-08-04 PROCEDURE — 45380 COLONOSCOPY AND BIOPSY: CPT | Mod: 59,,, | Performed by: INTERNAL MEDICINE

## 2022-08-04 PROCEDURE — 45385 COLONOSCOPY W/LESION REMOVAL: CPT | Performed by: INTERNAL MEDICINE

## 2022-08-04 PROCEDURE — 27200997: Performed by: INTERNAL MEDICINE

## 2022-08-04 PROCEDURE — 45381 COLONOSCOPY SUBMUCOUS NJX: CPT | Mod: 51,,, | Performed by: INTERNAL MEDICINE

## 2022-08-04 PROCEDURE — 45381 PR COLONOSCPY,FLEX,W/DIR SUBMUC INJECT: ICD-10-PCS | Mod: 51,,, | Performed by: INTERNAL MEDICINE

## 2022-08-04 RX ORDER — LIDOCAINE HYDROCHLORIDE 10 MG/ML
INJECTION, SOLUTION EPIDURAL; INFILTRATION; INTRACAUDAL; PERINEURAL
Status: DISCONTINUED | OUTPATIENT
Start: 2022-08-04 | End: 2022-08-04

## 2022-08-04 RX ORDER — EPINEPHRINE 0.1 MG/ML
INJECTION INTRAVENOUS
Status: COMPLETED | OUTPATIENT
Start: 2022-08-04 | End: 2022-08-04

## 2022-08-04 RX ORDER — PROPOFOL 10 MG/ML
VIAL (ML) INTRAVENOUS
Status: DISCONTINUED | OUTPATIENT
Start: 2022-08-04 | End: 2022-08-04

## 2022-08-04 RX ORDER — SODIUM CHLORIDE, SODIUM LACTATE, POTASSIUM CHLORIDE, CALCIUM CHLORIDE 600; 310; 30; 20 MG/100ML; MG/100ML; MG/100ML; MG/100ML
INJECTION, SOLUTION INTRAVENOUS CONTINUOUS PRN
Status: DISCONTINUED | OUTPATIENT
Start: 2022-08-04 | End: 2022-08-04

## 2022-08-04 RX ORDER — DEXTROMETHORPHAN/PSEUDOEPHED 2.5-7.5/.8
DROPS ORAL
Status: COMPLETED | OUTPATIENT
Start: 2022-08-04 | End: 2022-08-04

## 2022-08-04 RX ADMIN — SODIUM CHLORIDE, SODIUM LACTATE, POTASSIUM CHLORIDE, AND CALCIUM CHLORIDE: 600; 310; 30; 20 INJECTION, SOLUTION INTRAVENOUS at 02:08

## 2022-08-04 RX ADMIN — SIMETHICONE 200 MG: 20 SUSPENSION/ DROPS ORAL at 02:08

## 2022-08-04 RX ADMIN — PROPOFOL 80 MG: 10 INJECTION, EMULSION INTRAVENOUS at 02:08

## 2022-08-04 RX ADMIN — PROPOFOL 30 MG: 10 INJECTION, EMULSION INTRAVENOUS at 02:08

## 2022-08-04 RX ADMIN — PROPOFOL 40 MG: 10 INJECTION, EMULSION INTRAVENOUS at 02:08

## 2022-08-04 RX ADMIN — PROPOFOL 50 MG: 10 INJECTION, EMULSION INTRAVENOUS at 02:08

## 2022-08-04 RX ADMIN — EPINEPHRINE 1 MG: 0.1 INJECTION, SOLUTION ENDOTRACHEAL; INTRACARDIAC; INTRAVENOUS at 02:08

## 2022-08-04 RX ADMIN — LIDOCAINE HYDROCHLORIDE 50 MG: 10 INJECTION, SOLUTION EPIDURAL; INFILTRATION; INTRACAUDAL; PERINEURAL at 02:08

## 2022-08-04 RX ADMIN — PROPOFOL 60 MG: 10 INJECTION, EMULSION INTRAVENOUS at 02:08

## 2022-08-04 NOTE — [employer] PATIENT INSTRUCTIONS
Discharge Summary/Instructions after an Endoscopic Procedure  Patient Name: Patrick Sanz  Patient MRN: [id number]  Patient YOB: 1970  Thursday, August 4, 2022 Michelle Flores MD  Dear patient,  As a result of recent federal legislation (The Federal Cures Act), you may   receive lab or pathology results from your procedure in your [facility]   account before your physician is able to contact you. Your physician or   their representative will relay the results to you with their   recommendations at their soonest availability.  Thank you,  RESTRICTIONS:  During your procedure today, you received medications for sedation.  These   medications may affect your judgment, balance and coordination.  Therefore,   for 24 hours, you have the following restrictions:   - DO NOT drive a car, operate machinery, make legal/financial decisions,   sign important papers or drink alcohol.    ACTIVITY:  Today: no heavy lifting, straining or running due to procedural   sedation/anesthesia.  The following day: return to full activity including work.  DIET:  Eat and drink normally unless instructed otherwise.     TREATMENT FOR COMMON SIDE EFFECTS:  - Mild abdominal pain, nausea, belching, bloating or excessive gas:  rest,   eat lightly and use a heating pad.  - Sore Throat: treat with throat lozenges and/or gargle with warm salt   water.  - Because air was used during the procedure, expelling large amounts of air   from your rectum or belching is normal.  - If a bowel prep was taken, you may not have a bowel movement for 1-3 days.    This is normal.  SYMPTOMS TO WATCH FOR AND REPORT TO YOUR PHYSICIAN:  1. Abdominal pain or bloating, other than gas cramps.  2. Chest pain.  3. Back pain.  4. Signs of infection such as: chills or fever occurring within 24 hours   after the procedure.  5. Rectal bleeding, which would show as bright red, maroon, or black stools.   (A tablespoon of blood from the rectum is not serious, especially if    hemorrhoids are present.)  6. Vomiting.  7. Weakness or dizziness.  GO DIRECTLY TO THE NEAREST EMERGENCY ROOM IF YOU HAVE ANY OF THE FOLLOWING:      Difficulty breathing              Chills and/or fever over 101 F   Persistent vomiting and/or vomiting blood   Severe abdominal pain   Severe chest pain   Black, tarry stools   Bleeding- more than one tablespoon   Any other symptom or condition that you feel may need urgent attention  Your doctor recommends these additional instructions:  If any biopsies were taken, your doctors clinic will contact you in 1 to 2   weeks with any results.  - Patient has a contact number available for emergencies.  The signs and   symptoms of potential delayed complications were discussed with the   patient.  Return to normal activities tomorrow.  Written discharge   instructions were provided to the patient.   - Resume previous diet.   - Discharge patient to home (ambulatory).   - Continue present medications.   - Repeat colonoscopy in 1 year for surveillance.   - Refer to a colo-rectal surgeon with Dr. Quinn.  - The findings and recommendations were discussed with the patient.  For questions, problems or results please call your physician Michelle Flores MD at Work:  [phone]  If you have any questions about the above instructions, call the GI   department at [phone] or call the endoscopy unit at [phone]   from 7am until 3 pm.  [facility], EMERGENCY ROOM PHONE NUMBER:   [phone]  IF A COMPLICATION OR EMERGENCY SITUATION ARISES AND YOU ARE UNABLE TO REACH   YOUR PHYSICIAN - GO DIRECTLY TO THE EMERGENCY ROOM.  I have read or have had read to me these discharge instructions for my   procedure and have received a written copy.  I understand these   instructions and will follow-up with my physician if I have any questions.     __________________________________       _____________________________________  Nurse Signature                                           Patient/Designated   Responsible Party Signature  MD Michelle Jacome MD  8/4/2022 3:26:22 PM  This report has been verified and signed electronically.  Dear patient,  As a result of recent federal legislation (The Federal Cures Act), you may   receive lab or pathology results from your procedure in your [facility]   account before your physician is able to contact you. Your physician or   their representative will relay the results to you with their   recommendations at their soonest availability.  Thank you,  [employer]ATION

## 2022-08-04 NOTE — H&P
PRE PROCEDURE H&P    Patient Name: Patrick Sanz  MRN: [id number]  : 1970  Date of Procedure:  2022  Referring Physician: Rosenda Villalba LPN  Primary Physician: Edilberto Figueroa MD  Procedure Physician: Michelle Flores MD       Planned Procedure: Colonoscopy  Diagnosis: screening for colon cancer     Chief Complaint: Same as above    HPI: Patient is an 52 y.o. male is here for the above. No previous colonoscopy. No FHx of CRC, no blood thinners.     Last colonoscopy: none  Family history: none  Anticoagulation: none    Past Medical History:   Past Medical History:   Diagnosis Date    Anxiety     Bipolar disorder     Depression     Diabetes mellitus, type 2     Elevated PSA 3/16/2021        Past Surgical History:  Past Surgical History:   Procedure Laterality Date    HERNIA REPAIR          Home Medications:  Prior to Admission medications    Medication Sig Start Date End Date Taking? Authorizing Provider   atorvastatin (LIPITOR) 40 MG tablet TK 1 T PO D 2/3/22  Yes Edilberto Figueroa MD   clonazePAM (KLONOPIN) 0.5 MG tablet Take 1 tablet (0.5 mg total) by mouth 2 (two) times daily as needed for Anxiety. 22 Yes Pedro Amin MD   glipiZIDE (GLUCOTROL) 5 MG tablet Take 1 tablet (5 mg total) by mouth 2 (two) times daily with meals. 21 Yes Daisy Gaona NP   ibuprofen (ADVIL,MOTRIN) 800 MG tablet  21  Yes Historical Provider   insulin (LANTUS SOLOSTAR U-100 INSULIN) glargine 100 units/mL (3mL) SubQ pen Inject 30 Units into the skin once daily. ADMINISTER 30 UNITS UNDER THE SKIN EVERY EVENING 21  Yes Annmarie Salgado NP   insulin lispro (HUMALOG KWIKPEN INSULIN) 100 unit/mL pen Inject 12 Units into the skin 3 (three) times daily with meals. 21  Yes Annmarie Salgado NP   JARDIANCE 25 mg tablet Take 1 tablet by mouth once daily 3/19/22  Yes Edilberto Figueroa MD   lamoTRIgine (LAMICTAL) 200 MG tablet TAKE 1 AND 1/2 TABLETS(300 MG) BY MOUTH EVERY DAY  "22  Yes Pedro Amin MD   QUEtiapine (SEROQUEL) 100 MG Tab TAKE 1 TABLET(100 MG) BY MOUTH EVERY EVENING 22  Yes Pedro Amin MD   venlafaxine (EFFEXOR-XR) 150 MG Cp24 Take 1 capsule (150 mg total) by mouth once daily. 22  Yes Pedro Amin MD   blood-glucose sensor (DEXCOM G6 SENSOR) Miriam 1 each by Misc.(Non-Drug; Combo Route) route every 14 (fourteen) days. 21  Jeffrey Aguilar PA-C   blood-glucose transmitter (DEXCOM G6 TRANSMITTER) Miriam 1 each by Misc.(Non-Drug; Combo Route) route once daily. 21  Jeffrey Aguilar PA-C   HYDROcodone-acetaminophen (NORCO) 5-325 mg per tablet Take 1 tablet by mouth every 6 (six) hours as needed. 21   Historical Provider   oxyCODONE (ROXICODONE) 5 MG immediate release tablet Take 1 tablet (5 mg total) by mouth every 8 (eight) hours as needed for Pain. 21   Obinna Hernandez MD   tadalafiL (CIALIS) 20 MG Tab  21   Historical Provider        Allergies:  Review of patient's allergies indicates:   Allergen Reactions    Aripiprazole Other (See Comments)    Sildenafil Other (See Comments)    Bupropion hcl Anxiety    Penicillins Hives and Rash        Social History:   Social History     Socioeconomic History    Marital status: Significant Other   Tobacco Use    Smoking status: Former Smoker     Quit date: 2014     Years since quittin.5    Smokeless tobacco: Former User   Substance and Sexual Activity    Alcohol use: Not Currently    Drug use: Never    Sexual activity: Yes       Family History:  Family History   Problem Relation Age of Onset    Diabetes Mother     Hypertension Father        ROS: No acute cardiac events, no acute respiratory complaints.     Physical Exam (all patients):    BP (!) 133/57 (BP Location: Left arm, Patient Position: Sitting)   Pulse 86   Temp 97.7 °F (36.5 °C) (Temporal)   Resp 18   Ht 5' 8" (1.727 m)   Wt 108.9 kg (240 lb)   SpO2 98%   " BMI 36.49 kg/m²   Lungs: Clear to auscultation bilaterally, respirations unlabored  Heart: Regular rate and rhythm, S1 and S2 normal, no obvious murmurs  Abdomen:         Soft, non-tender, bowel sounds normal, no masses, no organomegaly    Lab Results   Component Value Date    WBC 7.75 09/17/2021    MCV 88 09/17/2021    RDW 12.7 09/17/2021     09/17/2021     (H) 08/05/2021    HGBA1C 8.8 (H) 01/29/2022    BUN 14 08/05/2021     08/05/2021    K 4.4 08/05/2021     08/05/2021        SEDATION PLAN: per anesthesia      History reviewed, vital signs satisfactory, cardiopulmonary status satisfactory, sedation options, risks and plans have been discussed with the patient  All their questions were answered and the patient agrees to the sedation procedures as planned and the patient is deemed an appropriate candidate for the sedation as planned.    The risks, benefits and alternatives of the procedure were discussed with the patient in detail. This discussion was had in the presence of  endoscopy staff. The risks include, risks of adverse reaction to sedation requiring the use of reversal agents, bleeding requiring blood transfusion, perforation requiring surgical intervention and technical failure. Other risks include aspiration leading to respiratory distress and respiratory failure resulting in endotracheal intubation and mechanical ventilation including death. If anesthesia is being utilized for this procedure, it is up to the anesthesiologist to determine airway safety including elective endotracheal intubation. Questions were answered, they agree to proceed. There was no language barriers.      Procedure explained to patient, informed consent obtained and placed in chart.    Michelle Flores  8/4/2022  2:08 PM

## 2022-08-04 NOTE — TELEPHONE ENCOUNTER
Spoke with patient letting him know that I would speak with Dr Quinn and see if he wants any further testing or for pathology to result before he sees him in clinic. Confirmed that I would give him a call tomorrow to follow up. Patient denied any further questions at this time.

## 2022-08-04 NOTE — ANESTHESIA PREPROCEDURE EVALUATION
08/04/2022  Patrick Sanz is a 52 y.o., male.      Pre-op Assessment    I have reviewed the Patient Summary Reports.    I have reviewed the NPO Status.   I have reviewed the Medications.     Review of Systems  Anesthesia Hx:  No problems with previous Anesthesia    Social:  Non-Smoker, Social Alcohol Use    Hematology/Oncology:  Hematology Normal       -- Cancer in past history:  Other (see Oncology comments) surgery  Oncology Comments: Surgery      EENT/Dental:EENT/Dental Normal   Cardiovascular:   hyperlipidemia    Pulmonary:   Sleep Apnea, CPAP    Education provided regarding risk of obstructive sleep apnea     Renal/:   renal calculi    Hepatic/GI:  Hepatic/GI Normal Bowel Prep.    Musculoskeletal:  Musculoskeletal Normal    Neurological:  Neurology Normal    Endocrine:   Diabetes, poorly controlled    Dermatological:  Skin Normal    Psych:   anxiety depression bipolar         Physical Exam  General: Well nourished, Cooperative, Alert and Oriented    Airway:  Mallampati: II   Mouth Opening: Normal  TM Distance: Normal  Tongue: Normal  Neck ROM: Normal ROM    Dental:  Intact        Anesthesia Plan  Type of Anesthesia, risks & benefits discussed:    Anesthesia Type: MAC  Intra-op Monitoring Plan: Standard ASA Monitors  Post Op Pain Control Plan: multimodal analgesia  Informed Consent: Informed consent signed with the Patient and all parties understand the risks and agree with anesthesia plan.  All questions answered.   ASA Score: 2  Day of Surgery Review of History & Physical: H&P Update referred to the surgeon/provider.    Ready For Surgery From Anesthesia Perspective.     .

## 2022-08-04 NOTE — TRANSFER OF CARE
"Anesthesia Transfer of Care Note    Patient: Patrick Sanz    Procedure(s) Performed: Procedure(s) (LRB):  COLONOSCOPY (N/A)    Patient location: GI    Anesthesia Type: MAC    Transport from OR: Transported from OR on room air with adequate spontaneous ventilation    Post pain: adequate analgesia    Post assessment: no apparent anesthetic complications and tolerated procedure well    Post vital signs: stable    Level of consciousness: awake, alert and oriented    Nausea/Vomiting: no nausea/vomiting    Complications: none    Transfer of care protocol was followed      Last vitals:   Visit Vitals  BP (!) 133/57 (BP Location: Left arm, Patient Position: Sitting)   Pulse 86   Temp 36.5 °C (97.7 °F) (Temporal)   Resp 18   Ht 5' 8" (1.727 m)   Wt 108.9 kg (240 lb)   SpO2 98%   BMI 36.49 kg/m²     "

## 2022-08-05 ENCOUNTER — TELEPHONE (OUTPATIENT)
Dept: SURGERY | Facility: CLINIC | Age: 52
End: 2022-08-05
Payer: COMMERCIAL

## 2022-08-05 NOTE — TELEPHONE ENCOUNTER
Spoke with patient. Scheduled appt for Monday 8/15 at 2:20 pm. Location directions given, patient verbalized understanding.

## 2022-08-05 NOTE — ANESTHESIA POSTPROCEDURE EVALUATION
Anesthesia Post Evaluation    Patient: Patrick Sanz    Procedure(s) Performed: Procedure(s) (LRB):  COLONOSCOPY (N/A)    Final Anesthesia Type: MAC      Patient location during evaluation: PACU  Patient participation: Yes- Able to Participate  Level of consciousness: awake and alert  Post-procedure vital signs: reviewed and stable  Pain management: adequate  Airway patency: patent    PONV status at discharge: No PONV  Anesthetic complications: no      Cardiovascular status: blood pressure returned to baseline  Respiratory status: unassisted  Hydration status: euvolemic  Follow-up not needed.          Vitals Value Taken Time   /88 08/04/22 1527   Temp 36.6 °C (97.9 °F) 08/04/22 1507   Pulse 77 08/04/22 1527   Resp 18 08/04/22 1527   SpO2 98 % 08/04/22 1527         Event Time   Out of Recovery 15:35:14         Pain/Wendy Score: Wendy Score: 10 (8/4/2022  3:27 PM)

## 2022-08-06 ENCOUNTER — LAB VISIT (OUTPATIENT)
Dept: LAB | Facility: HOSPITAL | Age: 52
End: 2022-08-06
Attending: INTERNAL MEDICINE
Payer: COMMERCIAL

## 2022-08-06 DIAGNOSIS — E11.65 UNCONTROLLED TYPE 2 DIABETES MELLITUS WITH HYPERGLYCEMIA: ICD-10-CM

## 2022-08-06 LAB
ESTIMATED AVG GLUCOSE: 203 MG/DL (ref 68–131)
HBA1C MFR BLD: 8.7 % (ref 4–5.6)

## 2022-08-06 PROCEDURE — 36415 COLL VENOUS BLD VENIPUNCTURE: CPT | Performed by: INTERNAL MEDICINE

## 2022-08-06 PROCEDURE — 83036 HEMOGLOBIN GLYCOSYLATED A1C: CPT | Performed by: INTERNAL MEDICINE

## 2022-08-10 ENCOUNTER — HOSPITAL ENCOUNTER (OUTPATIENT)
Dept: RADIOLOGY | Facility: HOSPITAL | Age: 52
Discharge: HOME | End: 2022-08-10
Attending: UROLOGY
Payer: COMMERCIAL

## 2022-08-10 DIAGNOSIS — C61 PROSTATE CANCER: ICD-10-CM

## 2022-08-10 DIAGNOSIS — R97.21 RISING PSA FOLLOWING TREATMENT FOR MALIGNANT NEOPLASM OF PROSTATE: ICD-10-CM

## 2022-08-10 PROCEDURE — 78815 PET IMAGE W/CT SKULL-THIGH: CPT | Mod: TC

## 2022-08-10 PROCEDURE — 78815 PET IMAGE W/CT SKULL-THIGH: CPT | Mod: 26,PI,, | Performed by: RADIOLOGY

## 2022-08-10 PROCEDURE — 78815 NM PET CT F 18 PYL PSMA, MIDTHIGH TO VERTEX: ICD-10-PCS | Mod: 26,PI,, | Performed by: RADIOLOGY

## 2022-08-14 ENCOUNTER — PATIENT MESSAGE (OUTPATIENT)
Dept: DIABETES | Facility: CLINIC | Age: 52
End: 2022-08-14
Payer: COMMERCIAL

## 2022-08-15 ENCOUNTER — LAB VISIT (OUTPATIENT)
Dept: LAB | Facility: HOSPITAL | Age: 52
End: 2022-08-15
Attending: COLON & RECTAL SURGERY
Payer: COMMERCIAL

## 2022-08-15 ENCOUNTER — OFFICE VISIT (OUTPATIENT)
Dept: SURGERY | Facility: CLINIC | Age: 52
End: 2022-08-15
Payer: COMMERCIAL

## 2022-08-15 VITALS
SYSTOLIC BLOOD PRESSURE: 146 MMHG | BODY MASS INDEX: 34.67 KG/M2 | HEART RATE: 102 BPM | WEIGHT: 228 LBS | DIASTOLIC BLOOD PRESSURE: 83 MMHG

## 2022-08-15 DIAGNOSIS — E11.65 UNCONTROLLED TYPE 2 DIABETES MELLITUS WITH HYPERGLYCEMIA: ICD-10-CM

## 2022-08-15 DIAGNOSIS — C19 ADENOCARCINOMA OF RECTOSIGMOID JUNCTION: ICD-10-CM

## 2022-08-15 DIAGNOSIS — C19 ADENOCARCINOMA OF RECTOSIGMOID JUNCTION: Primary | ICD-10-CM

## 2022-08-15 DIAGNOSIS — Z12.11 COLON CANCER SCREENING: ICD-10-CM

## 2022-08-15 LAB
COMMENT: NORMAL
FINAL PATHOLOGIC DIAGNOSIS: NORMAL
GROSS: NORMAL
Lab: NORMAL
MICROSCOPIC EXAM: NORMAL

## 2022-08-15 PROCEDURE — 99244 OFF/OP CNSLTJ NEW/EST MOD 40: CPT | Mod: S$GLB,,, | Performed by: COLON & RECTAL SURGERY

## 2022-08-15 PROCEDURE — 99999 PR PBB SHADOW E&M-EST. PATIENT-LVL V: ICD-10-PCS | Mod: PBBFAC,,, | Performed by: COLON & RECTAL SURGERY

## 2022-08-15 PROCEDURE — 99244 PR OFFICE CONSULTATION,LEVEL IV: ICD-10-PCS | Mod: S$GLB,,, | Performed by: COLON & RECTAL SURGERY

## 2022-08-15 PROCEDURE — 82378 CARCINOEMBRYONIC ANTIGEN: CPT | Performed by: COLON & RECTAL SURGERY

## 2022-08-15 PROCEDURE — 36415 COLL VENOUS BLD VENIPUNCTURE: CPT | Performed by: COLON & RECTAL SURGERY

## 2022-08-15 PROCEDURE — 99999 PR PBB SHADOW E&M-EST. PATIENT-LVL V: CPT | Mod: PBBFAC,,, | Performed by: COLON & RECTAL SURGERY

## 2022-08-15 RX ORDER — SODIUM CHLORIDE, SODIUM LACTATE, POTASSIUM CHLORIDE, CALCIUM CHLORIDE 600; 310; 30; 20 MG/100ML; MG/100ML; MG/100ML; MG/100ML
INJECTION, SOLUTION INTRAVENOUS CONTINUOUS
Status: CANCELLED | OUTPATIENT
Start: 2022-08-15

## 2022-08-15 RX ORDER — GABAPENTIN 100 MG/1
800 CAPSULE ORAL
Status: CANCELLED | OUTPATIENT
Start: 2022-08-15 | End: 2022-08-15

## 2022-08-15 RX ORDER — ONDANSETRON 2 MG/ML
4 INJECTION INTRAMUSCULAR; INTRAVENOUS EVERY 12 HOURS PRN
Status: CANCELLED | OUTPATIENT
Start: 2022-08-15

## 2022-08-15 RX ORDER — HEPARIN SODIUM 5000 [USP'U]/ML
5000 INJECTION, SOLUTION INTRAVENOUS; SUBCUTANEOUS
Status: CANCELLED | OUTPATIENT
Start: 2022-08-15 | End: 2022-08-16

## 2022-08-15 RX ORDER — CELECOXIB 100 MG/1
400 CAPSULE ORAL
Status: CANCELLED | OUTPATIENT
Start: 2022-08-15 | End: 2022-08-15

## 2022-08-15 RX ORDER — ACETAMINOPHEN 325 MG/1
1000 TABLET ORAL ONCE
Status: CANCELLED | OUTPATIENT
Start: 2022-08-15 | End: 2022-08-15

## 2022-08-15 RX ORDER — METRONIDAZOLE 500 MG/100ML
500 INJECTION, SOLUTION INTRAVENOUS
Status: CANCELLED | OUTPATIENT
Start: 2022-08-15

## 2022-08-15 RX ORDER — INSULIN GLARGINE 100 [IU]/ML
30 INJECTION, SOLUTION SUBCUTANEOUS DAILY
Qty: 45 ML | Refills: 0 | Status: SHIPPED | OUTPATIENT
Start: 2022-08-15 | End: 2022-08-22 | Stop reason: SDUPTHER

## 2022-08-16 ENCOUNTER — TELEPHONE (OUTPATIENT)
Dept: SURGERY | Facility: CLINIC | Age: 52
End: 2022-08-16
Payer: COMMERCIAL

## 2022-08-16 LAB — CEA SERPL-MCNC: 60.3 NG/ML (ref 0–5)

## 2022-08-16 NOTE — PROGRESS NOTES
History & Physical    SUBJECTIVE:     CC: Rectosigmoid adenocarcinoma  Ref: Michelle Flores MD    History of Present Illness:  Patient is a 52 y.o. 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  Patient was undergoing his 1st ever colonoscopy on 08/04/2022 where a malignant-appearing mass was found in the rectosigmoid area.  This was biopsied and confirmed to be adenocarcinoma.  Patient also had multiple other adenomas removed.  He states that prior to the colonoscopy in since that time, he has been completely asymptomatic.  He denies any fever, chills, nausea, vomiting, diarrhea, constipation, hematochezia or melena.  He denies any family history of colorectal cancer or IBD.  He does have a past surgical history significant for robotic prostatectomy for prostate cancer.    Review of patient's allergies indicates:   Allergen Reactions    Aripiprazole Other (See Comments)    Sildenafil Other (See Comments)    Bupropion hcl Anxiety    Penicillins Hives and Rash       Current Outpatient Medications   Medication Sig Dispense Refill    atorvastatin (LIPITOR) 40 MG tablet TK 1 T PO D 90 tablet 1    clonazePAM (KLONOPIN) 0.5 MG tablet Take 1 tablet (0.5 mg total) by mouth 2 (two) times daily as needed for Anxiety. 60 tablet 2    glipiZIDE (GLUCOTROL) 5 MG tablet Take 1 tablet (5 mg total) by mouth 2 (two) times daily with meals. 60 tablet 0    HYDROcodone-acetaminophen (NORCO) 5-325 mg per tablet Take 1 tablet by mouth every 6 (six) hours as needed.      insulin lispro (HUMALOG KWIKPEN INSULIN) 100 unit/mL pen Inject 12 Units into the skin 3 (three) times daily with meals. 45 mL 0    JARDIANCE 25 mg tablet Take 1 tablet by mouth once daily 90 tablet 0    lamoTRIgine (LAMICTAL) 200 MG tablet TAKE 1 AND 1/2 TABLETS(300 MG) BY MOUTH EVERY DAY 45 tablet 1    QUEtiapine (SEROQUEL) 100 MG Tab TAKE 1 TABLET(100 MG) BY MOUTH EVERY EVENING 30 tablet 1    tadalafiL (CIALIS) 20 MG Tab       venlafaxine  (EFFEXOR-XR) 150 MG Cp24 Take 1 capsule (150 mg total) by mouth once daily. 90 capsule 0    blood-glucose sensor (DEXCOM G6 SENSOR) Miriam 1 each by Misc.(Non-Drug; Combo Route) route every 14 (fourteen) days. 3 each 11    blood-glucose transmitter (DEXCOM G6 TRANSMITTER) Miriam 1 each by Misc.(Non-Drug; Combo Route) route once daily. 1 each 3    insulin (LANTUS SOLOSTAR U-100 INSULIN) glargine 100 units/mL SubQ pen Inject 30 Units into the skin once daily. ADMINISTER 30 UNITS UNDER THE SKIN EVERY EVENING 45 mL 0    oxyCODONE (ROXICODONE) 5 MG immediate release tablet Take 1 tablet (5 mg total) by mouth every 8 (eight) hours as needed for Pain. 10 tablet 0     No current facility-administered medications for this visit.       Past Medical History:   Diagnosis Date    Anxiety     Bipolar disorder     Depression     Diabetes mellitus, type 2     Elevated PSA 3/16/2021     Past Surgical History:   Procedure Laterality Date    COLONOSCOPY N/A 2022    Procedure: COLONOSCOPY;  Surgeon: Michelle Flores MD;  Location: [facility];  Service: Endoscopy;  Laterality: N/A;    HERNIA REPAIR       Family History   Problem Relation Age of Onset    Diabetes Mother     Hypertension Father      Social History     Tobacco Use    Smoking status: Former Smoker     Quit date: 2014     Years since quittin.6    Smokeless tobacco: Former User   Substance Use Topics    Alcohol use: Not Currently    Drug use: Never        Review of Systems:  Review of Systems   Constitutional: Negative for activity change, appetite change, chills, fatigue, fever and unexpected weight change.   HENT: Negative for congestion, ear pain, sore throat and trouble swallowing.    Eyes: Negative for pain, redness and itching.   Respiratory: Negative for cough, shortness of breath and wheezing.    Cardiovascular: Negative for chest pain, palpitations and leg swelling.   Gastrointestinal: Negative for abdominal distention, abdominal pain, anal  bleeding, blood in stool, constipation, diarrhea, nausea, rectal pain and vomiting.   Endocrine: Negative for cold intolerance, heat intolerance and polyuria.   Genitourinary: Negative for dysuria, flank pain, frequency and hematuria.   Musculoskeletal: Negative for gait problem, joint swelling and neck pain.   Skin: Negative for color change, rash and wound.   Allergic/Immunologic: Negative for environmental allergies and immunocompromised state.   Neurological: Negative for dizziness, speech difficulty, weakness and numbness.   Psychiatric/Behavioral: Negative for agitation, confusion and hallucinations.       OBJECTIVE:     Vital Signs (Most Recent)  Pulse: 102 (08/15/22 1425)  BP: (!) 146/83 (08/15/22 1425)     103.4 kg (228 lb)     Physical Exam:  Physical Exam  Constitutional:       Appearance: He is well-developed.   HENT:      Head: Normocephalic and atraumatic.   Eyes:      Conjunctiva/sclera: Conjunctivae normal.   Neck:      Thyroid: No thyromegaly.   Cardiovascular:      Rate and Rhythm: Normal rate and regular rhythm.   Pulmonary:      Effort: Pulmonary effort is normal. No respiratory distress.   Abdominal:      General: There is no distension.      Palpations: Abdomen is soft. There is no mass.      Tenderness: There is no abdominal tenderness.      Comments: Well-healed port sites   Musculoskeletal:         General: No tenderness. Normal range of motion.      Cervical back: Normal range of motion.   Skin:     General: Skin is warm and dry.      Capillary Refill: Capillary refill takes less than 2 seconds.      Findings: No rash.   Neurological:      Mental Status: He is alert and oriented to person, place, and time.         Laboratory  Lab Results   Component Value Date    WBC 7.75 09/17/2021    HGB 14.7 09/17/2021    HCT 45.4 09/17/2021     09/17/2021    CHOL 158 01/29/2022    TRIG 177 (H) 01/29/2022    HDL 35 (L) 01/29/2022    ALT 34 08/05/2021    AST 22 08/05/2021     08/05/2021    K  4.4 08/05/2021     08/05/2021    CREATININE 1.1 12/07/2021    BUN 14 08/05/2021    CO2 23 08/05/2021    TSH 0.943 10/03/2020    PSA 17.6 (H) 03/15/2021    HGBA1C 8.7 (H) 08/06/2022       Component Ref Range & Units 1 d ago    CEA 0.0 - 5.0 ng/mL 60.3 High         Diagnostic Results:  Colonoscopy: reviewed      ASSESSMENT/PLAN:     53yo M with rectosigmoid adenocarcinoma    - needs to complete staging  - CT C/A/P ordered  - MRI rectal cancer protocol ordered  - CEA level noted  - RTC 2 weeks after staging completed to discuss likely surgical intervention    Cj Quinn MD  Colon and Rectal Surgery  [facility]

## 2022-08-16 NOTE — TELEPHONE ENCOUNTER
Spoke with Vanessa in Radiology at [facility], stated that she was putting the patient on the schedule for the very next available for 9/22/22 at 3:30pm but will write down the MRN so that he can take another patient's spot sooner if an appointment becomes available.

## 2022-08-17 ENCOUNTER — TELEPHONE (OUTPATIENT)
Dept: SURGERY | Facility: CLINIC | Age: 52
End: 2022-08-17
Payer: COMMERCIAL

## 2022-08-17 ENCOUNTER — HOSPITAL ENCOUNTER (OUTPATIENT)
Dept: RADIOLOGY | Facility: HOSPITAL | Age: 52
Discharge: HOME | End: 2022-08-17
Attending: COLON & RECTAL SURGERY
Payer: COMMERCIAL

## 2022-08-17 ENCOUNTER — PATIENT MESSAGE (OUTPATIENT)
Dept: SURGERY | Facility: CLINIC | Age: 52
End: 2022-08-17
Payer: COMMERCIAL

## 2022-08-17 DIAGNOSIS — C19 ADENOCARCINOMA OF RECTOSIGMOID JUNCTION: ICD-10-CM

## 2022-08-17 PROCEDURE — A9698 NON-RAD CONTRAST MATERIALNOC: HCPCS | Performed by: COLON & RECTAL SURGERY

## 2022-08-17 PROCEDURE — 72195 MRI RECTAL CANCER WITHOUT CONTRAST: ICD-10-PCS | Mod: 26,,, | Performed by: RADIOLOGY

## 2022-08-17 PROCEDURE — 74177 CT ABD & PELVIS W/CONTRAST: CPT | Mod: 26,,, | Performed by: RADIOLOGY

## 2022-08-17 PROCEDURE — 25500020 PHARM REV CODE 255: Performed by: COLON & RECTAL SURGERY

## 2022-08-17 PROCEDURE — 71260 CT CHEST ABDOMEN PELVIS WITH CONTRAST (XPD): ICD-10-PCS | Mod: 26,,, | Performed by: RADIOLOGY

## 2022-08-17 PROCEDURE — 72195 MRI PELVIS W/O DYE: CPT | Mod: TC

## 2022-08-17 PROCEDURE — 71260 CT THORAX DX C+: CPT | Mod: 26,,, | Performed by: RADIOLOGY

## 2022-08-17 PROCEDURE — 71260 CT THORAX DX C+: CPT | Mod: TC

## 2022-08-17 PROCEDURE — 72195 MRI PELVIS W/O DYE: CPT | Mod: 26,,, | Performed by: RADIOLOGY

## 2022-08-17 PROCEDURE — 74177 CT ABD & PELVIS W/CONTRAST: CPT | Mod: TC

## 2022-08-17 PROCEDURE — 74177 CT CHEST ABDOMEN PELVIS WITH CONTRAST (XPD): ICD-10-PCS | Mod: 26,,, | Performed by: RADIOLOGY

## 2022-08-17 RX ADMIN — IOHEXOL 1000 ML: 12 SOLUTION ORAL at 12:08

## 2022-08-17 RX ADMIN — IOHEXOL 100 ML: 350 INJECTION, SOLUTION INTRAVENOUS at 01:08

## 2022-08-17 NOTE — TELEPHONE ENCOUNTER
Scheduled CT with contrast for today along with the MRI previously scheduled. MD aware. Spoke with patient to ensure that he was fasting for 4 hours prior to 2:50 pm. Patient agreed and confirmed details of appointments today.     ----- Message from Cj Quinn MD sent at 8/16/2022  4:07 PM CDT -----  His note is now done. However, I ordered him an additional CT C/A/P that he needs. Can you please get this scheduled for him this week as well? Thought he may not need it based upon his recent PET prostate, but radiology says we need a separate one to evaluate.    Thanks,  MDG

## 2022-08-22 ENCOUNTER — PATIENT MESSAGE (OUTPATIENT)
Dept: DIABETES | Facility: CLINIC | Age: 52
End: 2022-08-22
Payer: COMMERCIAL

## 2022-08-22 DIAGNOSIS — E11.65 UNCONTROLLED TYPE 2 DIABETES MELLITUS WITH HYPERGLYCEMIA: ICD-10-CM

## 2022-08-22 RX ORDER — INSULIN GLARGINE 100 [IU]/ML
30 INJECTION, SOLUTION SUBCUTANEOUS DAILY
Qty: 45 ML | Refills: 0 | Status: SHIPPED | OUTPATIENT
Start: 2022-08-22 | End: 2022-08-24 | Stop reason: SDUPTHER

## 2022-08-22 RX ORDER — INSULIN GLARGINE 100 [IU]/ML
30 INJECTION, SOLUTION SUBCUTANEOUS DAILY
Qty: 45 ML | Refills: 0 | OUTPATIENT
Start: 2022-08-22

## 2022-08-24 ENCOUNTER — PATIENT MESSAGE (OUTPATIENT)
Dept: DIABETES | Facility: CLINIC | Age: 52
End: 2022-08-24
Payer: COMMERCIAL

## 2022-08-24 ENCOUNTER — PATIENT MESSAGE (OUTPATIENT)
Dept: SURGERY | Facility: CLINIC | Age: 52
End: 2022-08-24
Payer: COMMERCIAL

## 2022-08-24 RX ORDER — INSULIN GLARGINE 100 [IU]/ML
30 INJECTION, SOLUTION SUBCUTANEOUS DAILY
Qty: 45 ML | Refills: 0 | Status: SHIPPED | OUTPATIENT
Start: 2022-08-24 | End: 2023-01-17 | Stop reason: SDUPTHER

## 2022-08-29 ENCOUNTER — OFFICE VISIT (OUTPATIENT)
Dept: SURGERY | Facility: CLINIC | Age: 52
End: 2022-08-29
Payer: COMMERCIAL

## 2022-08-29 VITALS
TEMPERATURE: 97 F | WEIGHT: 231.13 LBS | HEART RATE: 75 BPM | BODY MASS INDEX: 35.15 KG/M2 | SYSTOLIC BLOOD PRESSURE: 142 MMHG | DIASTOLIC BLOOD PRESSURE: 93 MMHG

## 2022-08-29 DIAGNOSIS — C19 ADENOCARCINOMA OF RECTOSIGMOID JUNCTION: Primary | ICD-10-CM

## 2022-08-29 DIAGNOSIS — K76.9 LIVER LESION: ICD-10-CM

## 2022-08-29 PROCEDURE — 99999 PR PBB SHADOW E&M-EST. PATIENT-LVL III: ICD-10-PCS | Mod: PBBFAC,,, | Performed by: COLON & RECTAL SURGERY

## 2022-08-29 PROCEDURE — 99215 OFFICE O/P EST HI 40 MIN: CPT | Mod: S$GLB,,, | Performed by: COLON & RECTAL SURGERY

## 2022-08-29 PROCEDURE — 99999 PR PBB SHADOW E&M-EST. PATIENT-LVL III: CPT | Mod: PBBFAC,,, | Performed by: COLON & RECTAL SURGERY

## 2022-08-29 PROCEDURE — 99215 PR OFFICE/OUTPT VISIT, EST, LEVL V, 40-54 MIN: ICD-10-PCS | Mod: S$GLB,,, | Performed by: COLON & RECTAL SURGERY

## 2022-08-29 RX ORDER — METRONIDAZOLE 500 MG/1
500 TABLET ORAL 3 TIMES DAILY
Qty: 3 TABLET | Refills: 0 | Status: ON HOLD | OUTPATIENT
Start: 2022-08-29 | End: 2022-09-22

## 2022-08-29 RX ORDER — POLYETHYLENE GLYCOL 3350 17 G/17G
238 POWDER, FOR SOLUTION ORAL DAILY
Qty: 1 EACH | Refills: 0 | Status: ON HOLD | OUTPATIENT
Start: 2022-08-29 | End: 2022-09-22

## 2022-08-29 RX ORDER — NEOMYCIN SULFATE 500 MG/1
1000 TABLET ORAL 3 TIMES DAILY
Qty: 6 TABLET | Refills: 0 | Status: ON HOLD | OUTPATIENT
Start: 2022-08-29 | End: 2022-09-22

## 2022-08-31 ENCOUNTER — TELEPHONE (OUTPATIENT)
Dept: SURGERY | Facility: CLINIC | Age: 52
End: 2022-08-31
Payer: COMMERCIAL

## 2022-08-31 NOTE — H&P (VIEW-ONLY)
History & Physical    SUBJECTIVE:     CC: Rectosigmoid adenocarcinoma  Ref: Michelle Flores MD    History of Present Illness:  Patient is a 52 y.o. 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  Patient was undergoing his 1st ever colonoscopy on 08/04/2022 where a malignant-appearing mass was found in the rectosigmoid area.  This was biopsied and confirmed to be adenocarcinoma.  Patient also had multiple other adenomas removed.  He states that prior to the colonoscopy in since that time, he has been completely asymptomatic.  He denies any fever, chills, nausea, vomiting, diarrhea, constipation, hematochezia or melena.  He denies any family history of colorectal cancer or IBD.  He does have a past surgical history significant for robotic prostatectomy for prostate cancer.    Interval history:  Since last clinic visit, patient has undergone CT C/A/P and MRI rectal cancer protocol as below to complete staging to confirm no definitive metastatic disease and a rectosigmoid/upper rectal tumor. Still denies fever, chill, nausea, vomiting, diarrhea or constipation.       Review of patient's allergies indicates:   Allergen Reactions    Aripiprazole Other (See Comments)    Sildenafil Other (See Comments)    Bupropion hcl Anxiety    Penicillins Hives and Rash       Current Outpatient Medications   Medication Sig Dispense Refill    atorvastatin (LIPITOR) 40 MG tablet TK 1 T PO D 90 tablet 1    clonazePAM (KLONOPIN) 0.5 MG tablet Take 1 tablet (0.5 mg total) by mouth 2 (two) times daily as needed for Anxiety. 60 tablet 2    glipiZIDE (GLUCOTROL) 5 MG tablet Take 1 tablet (5 mg total) by mouth 2 (two) times daily with meals. 60 tablet 0    HYDROcodone-acetaminophen (NORCO) 5-325 mg per tablet Take 1 tablet by mouth every 6 (six) hours as needed.      insulin (LANTUS SOLOSTAR U-100 INSULIN) glargine 100 units/mL SubQ pen Inject 30 Units into the skin once daily. 45 mL 0    insulin lispro (HUMALOG KWIKPEN INSULIN)  100 unit/mL pen Inject 12 Units into the skin 3 (three) times daily with meals. 45 mL 0    JARDIANCE 25 mg tablet Take 1 tablet by mouth once daily 90 tablet 0    lamoTRIgine (LAMICTAL) 200 MG tablet TAKE 1 AND 1/2 TABLETS(300 MG) BY MOUTH EVERY DAY 45 tablet 1    QUEtiapine (SEROQUEL) 100 MG Tab TAKE 1 TABLET(100 MG) BY MOUTH EVERY EVENING 30 tablet 1    tadalafiL (CIALIS) 20 MG Tab       venlafaxine (EFFEXOR-XR) 150 MG Cp24 Take 1 capsule (150 mg total) by mouth once daily. 90 capsule 0    blood-glucose sensor (DEXCOM G6 SENSOR) Miriam 1 each by Misc.(Non-Drug; Combo Route) route every 14 (fourteen) days. 3 each 11    blood-glucose transmitter (DEXCOM G6 TRANSMITTER) Miriam 1 each by Misc.(Non-Drug; Combo Route) route once daily. 1 each 3    metroNIDAZOLE (FLAGYL) 500 MG tablet Take 1 tablet (500 mg total) by mouth 3 (three) times daily. Take initial dose@2pm, second dose@3pm and final dose@10pm day before surgery 3 tablet 0    neomycin (MYCIFRADIN) 500 mg Tab Take 2 tablets (1,000 mg total) by mouth 3 (three) times daily. Take 1st dose@2pm,take 2nd dose@3pm, take last dose@10pm day before surgery 6 tablet 0    oxyCODONE (ROXICODONE) 5 MG immediate release tablet Take 1 tablet (5 mg total) by mouth every 8 (eight) hours as needed for Pain. 10 tablet 0    polyethylene glycol (GLYCOLAX) 17 gram/dose powder Take 238 g by mouth once daily. Mix with 2L of gatorade, drink all mixed solution starting@12pm day before surgery, finish by 10pm 1 each 0     No current facility-administered medications for this visit.       Past Medical History:   Diagnosis Date    Anxiety     Bipolar disorder     Depression     Diabetes mellitus, type 2     Elevated PSA 3/16/2021     Past Surgical History:   Procedure Laterality Date    COLONOSCOPY N/A 8/4/2022    Procedure: COLONOSCOPY;  Surgeon: Michelle Flores MD;  Location: [facility];  Service: Endoscopy;  Laterality: N/A;    HERNIA REPAIR       Family History   Problem Relation Age of  Onset    Diabetes Mother     Hypertension Father      Social History     Tobacco Use    Smoking status: Former     Types: Cigarettes     Quit date: 2014     Years since quittin.6    Smokeless tobacco: Former   Substance Use Topics    Alcohol use: Not Currently    Drug use: Never        Review of Systems:  Review of Systems   Constitutional:  Negative for activity change, appetite change, chills, fatigue, fever and unexpected weight change.   HENT:  Negative for congestion, ear pain, sore throat and trouble swallowing.    Eyes:  Negative for pain, redness and itching.   Respiratory:  Negative for cough, shortness of breath and wheezing.    Cardiovascular:  Negative for chest pain, palpitations and leg swelling.   Gastrointestinal:  Negative for abdominal distention, abdominal pain, anal bleeding, blood in stool, constipation, diarrhea, nausea, rectal pain and vomiting.   Endocrine: Negative for cold intolerance, heat intolerance and polyuria.   Genitourinary:  Negative for dysuria, flank pain, frequency and hematuria.   Musculoskeletal:  Negative for gait problem, joint swelling and neck pain.   Skin:  Negative for color change, rash and wound.   Allergic/Immunologic: Negative for environmental allergies and immunocompromised state.   Neurological:  Negative for dizziness, speech difficulty, weakness and numbness.   Psychiatric/Behavioral:  Negative for agitation, confusion and hallucinations.      OBJECTIVE:     Vital Signs (Most Recent)  Temp: 97.1 °F (36.2 °C) (22 1618)  Pulse: 75 (22 1618)  BP: (!) 142/93 (22 1618)     104.9 kg (231 lb 2.4 oz)     Physical Exam:  Physical Exam  Constitutional:       Appearance: He is well-developed.   HENT:      Head: Normocephalic and atraumatic.   Eyes:      Conjunctiva/sclera: Conjunctivae normal.   Neck:      Thyroid: No thyromegaly.   Cardiovascular:      Rate and Rhythm: Normal rate and regular rhythm.   Pulmonary:      Effort: Pulmonary effort is  normal. No respiratory distress.   Abdominal:      General: There is no distension.      Palpations: Abdomen is soft. There is no mass.      Tenderness: There is no abdominal tenderness.      Comments: Well-healed port sites   Musculoskeletal:         General: No tenderness. Normal range of motion.      Cervical back: Normal range of motion.   Skin:     General: Skin is warm and dry.      Capillary Refill: Capillary refill takes less than 2 seconds.      Findings: No rash.   Neurological:      Mental Status: He is alert and oriented to person, place, and time.       Laboratory  Lab Results   Component Value Date    WBC 7.75 09/17/2021    HGB 14.7 09/17/2021    HCT 45.4 09/17/2021     09/17/2021    CHOL 158 01/29/2022    TRIG 177 (H) 01/29/2022    HDL 35 (L) 01/29/2022    ALT 34 08/05/2021    AST 22 08/05/2021     08/05/2021    K 4.4 08/05/2021     08/05/2021    CREATININE 1.1 08/17/2022    BUN 14 08/05/2021    CO2 23 08/05/2021    TSH 0.943 10/03/2020    PSA 17.6 (H) 03/15/2021    HGBA1C 8.7 (H) 08/06/2022       Component Ref Range & Units 1 d ago    CEA 0.0 - 5.0 ng/mL 60.3 High         Diagnostic Results:  Colonoscopy: reviewed  CT and MRI Reviewed      CT C/A/P:  FINDINGS:  Chest:     Heart and great vessels: Within normal limits.     Adenopathy: None demonstrated.     Lungs: Clear bilaterally.     Abdomen:     Liver: There are 2 similar appearing hypoattenuating lesions seen in the posterior right hepatic lobe measuring to 2.8 cmr on series 3, image 45 and 3.2 cm more inferiorly on series 3, image 72.  These nodules appear to demonstrate peripheral interrupted nodular enhancement and do not appear significantly changed from previous CT, most likely representing hemangiomas.  Multiple additional scattered subcentimeter hypoattenuating foci seen throughout the liver are too small to reliably characterize but also appears stable.     Gallbladder and biliary: Within normal limits.     Spleen:  Within normal limits.     Pancreas: Within normal limits.     Adrenals: Within normal limits.     Kidneys: There is a 13 mm cyst at the upper pole of the right kidney which is not appear significantly changed.  No hydronephrosis or hydroureter.     Bowel: Focal bowel wall thickening is noted near the rectosigmoid junction, in keeping with known malignancy.     Peritoneum: No ascites or pneumoperitoneum.     Abdominal Adenopathy: None.     Vasculature: Within normal limits.     Pelvis:     Urinary bladder: Unremarkable.     Prostate and seminal vesicles: Within normal limits.     Pelvis adenopathy: There are multiple prominent inferior mesenteric/superior rectal lymph nodes present adjacent to the known mass at the rectosigmoid junction.     Bones: No acute findings.     Miscellaneous: None.     Impression:     1. Focal bowel wall thickening seen near the rectosigmoid junction, in keeping with known malignancy.  No evidence of associated obstruction.  2. Unchanged appearance of multiple hypoattenuating lesions in the liver, at least 2 of which most likely represent hemangiomas.  This can be confirmed with MRI.  Otherwise no evidence of distant metastatic disease.  3. Multiple prominent inferior mesenteric/superior rectal lymph node seen adjacent to the known malignancy in the rectosigmoid colon (see report for rectal MRI performed same day).  No evidence of non locoregional adenopathy.        MRI Rectal:  FINDINGS:  Chest:     Heart and great vessels: Within normal limits.     Adenopathy: None demonstrated.     Lungs: Clear bilaterally.     Abdomen:     Liver: There are 2 similar appearing hypoattenuating lesions seen in the posterior right hepatic lobe measuring to 2.8 cmr on series 3, image 45 and 3.2 cm more inferiorly on series 3, image 72.  These nodules appear to demonstrate peripheral interrupted nodular enhancement and do not appear significantly changed from previous CT, most likely representing hemangiomas.   Multiple additional scattered subcentimeter hypoattenuating foci seen throughout the liver are too small to reliably characterize but also appears stable.     Gallbladder and biliary: Within normal limits.     Spleen: Within normal limits.     Pancreas: Within normal limits.     Adrenals: Within normal limits.     Kidneys: There is a 13 mm cyst at the upper pole of the right kidney which is not appear significantly changed.  No hydronephrosis or hydroureter.     Bowel: Focal bowel wall thickening is noted near the rectosigmoid junction, in keeping with known malignancy.     Peritoneum: No ascites or pneumoperitoneum.     Abdominal Adenopathy: None.     Vasculature: Within normal limits.     Pelvis:     Urinary bladder: Unremarkable.     Prostate and seminal vesicles: Within normal limits.     Pelvis adenopathy: There are multiple prominent inferior mesenteric/superior rectal lymph nodes present adjacent to the known mass at the rectosigmoid junction.     Bones: No acute findings.     Miscellaneous: None.     Impression:     1. Focal bowel wall thickening seen near the rectosigmoid junction, in keeping with known malignancy.  No evidence of associated obstruction.  2. Unchanged appearance of multiple hypoattenuating lesions in the liver, at least 2 of which most likely represent hemangiomas.  This can be confirmed with MRI.  Otherwise no evidence of distant metastatic disease.  3. Multiple prominent inferior mesenteric/superior rectal lymph node seen adjacent to the known malignancy in the rectosigmoid colon (see report for rectal MRI performed same day).  No evidence of non locoregional adenopathy.    ASSESSMENT/PLAN:     53yo M with rectosigmoid/upper rectal adenocarcinoma    - Will send for MRI liver/abdomen to rule out liver metastatic disease  - If no liver metastatic disease, will proceed with surgical intervention given rectosigmoid/upper rectal tumor with robotic/lap vs open LAR  - All risks, benefits and  alternatives fully explained to patient. Risks include, but are not limited to, bleeding, infection, anastomotic leak, damage to ureter, damage to other intra-abdominal organs such as colon, rectum, small bowel, stomach, liver, bladder, reproductive organs, sexual dysfunction, urinary dysfunction, postoperative abscess, conversion to open operation, perioperative MI, CVA and death.  All questions field and appropriately answered to patient's satisfaction.  Consent signed and placed on chart.  - mechanical and oral abx bowel prep  - ERAS protocol  - RTC postop    Cj Quinn MD  Colon and Rectal Surgery  [facility] [place] [facility]

## 2022-08-31 NOTE — TELEPHONE ENCOUNTER
Spoke with [facility] pharmacy, states Neomycin has been on back order for months, cannot get it in. I stated that previously I had [facility] fill this, she said she would call [facility] and see if they have it. If they do she said she would transfer the medication to the [facility] location which is near them. Attempted to contact patient, left voicemail updating him of medication status.     ----- Message from Brock Velasquez sent at 8/31/2022 10:02 AM CDT -----  Contact: Nikkie with [facility] pharmacy  Nikkie with [facility] pharmacy would like to consult with a nurse in regards to a prescription request that was sent over for the patient . please call back at [phone]. Thanks r/s

## 2022-08-31 NOTE — PROGRESS NOTES
History & Physical    SUBJECTIVE:     CC: Rectosigmoid adenocarcinoma  Ref: Michelle Flores MD    History of Present Illness:  Patient is a 52 y.o. 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  Patient was undergoing his 1st ever colonoscopy on 08/04/2022 where a malignant-appearing mass was found in the rectosigmoid area.  This was biopsied and confirmed to be adenocarcinoma.  Patient also had multiple other adenomas removed.  He states that prior to the colonoscopy in since that time, he has been completely asymptomatic.  He denies any fever, chills, nausea, vomiting, diarrhea, constipation, hematochezia or melena.  He denies any family history of colorectal cancer or IBD.  He does have a past surgical history significant for robotic prostatectomy for prostate cancer.    Interval history:  Since last clinic visit, patient has undergone CT C/A/P and MRI rectal cancer protocol as below to complete staging to confirm no definitive metastatic disease and a rectosigmoid/upper rectal tumor. Still denies fever, chill, nausea, vomiting, diarrhea or constipation.       Review of patient's allergies indicates:   Allergen Reactions    Aripiprazole Other (See Comments)    Sildenafil Other (See Comments)    Bupropion hcl Anxiety    Penicillins Hives and Rash       Current Outpatient Medications   Medication Sig Dispense Refill    atorvastatin (LIPITOR) 40 MG tablet TK 1 T PO D 90 tablet 1    clonazePAM (KLONOPIN) 0.5 MG tablet Take 1 tablet (0.5 mg total) by mouth 2 (two) times daily as needed for Anxiety. 60 tablet 2    glipiZIDE (GLUCOTROL) 5 MG tablet Take 1 tablet (5 mg total) by mouth 2 (two) times daily with meals. 60 tablet 0    HYDROcodone-acetaminophen (NORCO) 5-325 mg per tablet Take 1 tablet by mouth every 6 (six) hours as needed.      insulin (LANTUS SOLOSTAR U-100 INSULIN) glargine 100 units/mL SubQ pen Inject 30 Units into the skin once daily. 45 mL 0    insulin lispro (HUMALOG KWIKPEN INSULIN)  100 unit/mL pen Inject 12 Units into the skin 3 (three) times daily with meals. 45 mL 0    JARDIANCE 25 mg tablet Take 1 tablet by mouth once daily 90 tablet 0    lamoTRIgine (LAMICTAL) 200 MG tablet TAKE 1 AND 1/2 TABLETS(300 MG) BY MOUTH EVERY DAY 45 tablet 1    QUEtiapine (SEROQUEL) 100 MG Tab TAKE 1 TABLET(100 MG) BY MOUTH EVERY EVENING 30 tablet 1    tadalafiL (CIALIS) 20 MG Tab       venlafaxine (EFFEXOR-XR) 150 MG Cp24 Take 1 capsule (150 mg total) by mouth once daily. 90 capsule 0    blood-glucose sensor (DEXCOM G6 SENSOR) Miriam 1 each by Misc.(Non-Drug; Combo Route) route every 14 (fourteen) days. 3 each 11    blood-glucose transmitter (DEXCOM G6 TRANSMITTER) Miriam 1 each by Misc.(Non-Drug; Combo Route) route once daily. 1 each 3    metroNIDAZOLE (FLAGYL) 500 MG tablet Take 1 tablet (500 mg total) by mouth 3 (three) times daily. Take initial dose@2pm, second dose@3pm and final dose@10pm day before surgery 3 tablet 0    neomycin (MYCIFRADIN) 500 mg Tab Take 2 tablets (1,000 mg total) by mouth 3 (three) times daily. Take 1st dose@2pm,take 2nd dose@3pm, take last dose@10pm day before surgery 6 tablet 0    oxyCODONE (ROXICODONE) 5 MG immediate release tablet Take 1 tablet (5 mg total) by mouth every 8 (eight) hours as needed for Pain. 10 tablet 0    polyethylene glycol (GLYCOLAX) 17 gram/dose powder Take 238 g by mouth once daily. Mix with 2L of gatorade, drink all mixed solution starting@12pm day before surgery, finish by 10pm 1 each 0     No current facility-administered medications for this visit.       Past Medical History:   Diagnosis Date    Anxiety     Bipolar disorder     Depression     Diabetes mellitus, type 2     Elevated PSA 3/16/2021     Past Surgical History:   Procedure Laterality Date    COLONOSCOPY N/A 8/4/2022    Procedure: COLONOSCOPY;  Surgeon: Michelle Flores MD;  Location: [facility];  Service: Endoscopy;  Laterality: N/A;    HERNIA REPAIR       Family History   Problem Relation Age of  Onset    Diabetes Mother     Hypertension Father      Social History     Tobacco Use    Smoking status: Former     Types: Cigarettes     Quit date: 2014     Years since quittin.6    Smokeless tobacco: Former   Substance Use Topics    Alcohol use: Not Currently    Drug use: Never        Review of Systems:  Review of Systems   Constitutional:  Negative for activity change, appetite change, chills, fatigue, fever and unexpected weight change.   HENT:  Negative for congestion, ear pain, sore throat and trouble swallowing.    Eyes:  Negative for pain, redness and itching.   Respiratory:  Negative for cough, shortness of breath and wheezing.    Cardiovascular:  Negative for chest pain, palpitations and leg swelling.   Gastrointestinal:  Negative for abdominal distention, abdominal pain, anal bleeding, blood in stool, constipation, diarrhea, nausea, rectal pain and vomiting.   Endocrine: Negative for cold intolerance, heat intolerance and polyuria.   Genitourinary:  Negative for dysuria, flank pain, frequency and hematuria.   Musculoskeletal:  Negative for gait problem, joint swelling and neck pain.   Skin:  Negative for color change, rash and wound.   Allergic/Immunologic: Negative for environmental allergies and immunocompromised state.   Neurological:  Negative for dizziness, speech difficulty, weakness and numbness.   Psychiatric/Behavioral:  Negative for agitation, confusion and hallucinations.      OBJECTIVE:     Vital Signs (Most Recent)  Temp: 97.1 °F (36.2 °C) (22 1618)  Pulse: 75 (22 1618)  BP: (!) 142/93 (22 1618)     104.9 kg (231 lb 2.4 oz)     Physical Exam:  Physical Exam  Constitutional:       Appearance: He is well-developed.   HENT:      Head: Normocephalic and atraumatic.   Eyes:      Conjunctiva/sclera: Conjunctivae normal.   Neck:      Thyroid: No thyromegaly.   Cardiovascular:      Rate and Rhythm: Normal rate and regular rhythm.   Pulmonary:      Effort: Pulmonary effort is  normal. No respiratory distress.   Abdominal:      General: There is no distension.      Palpations: Abdomen is soft. There is no mass.      Tenderness: There is no abdominal tenderness.      Comments: Well-healed port sites   Musculoskeletal:         General: No tenderness. Normal range of motion.      Cervical back: Normal range of motion.   Skin:     General: Skin is warm and dry.      Capillary Refill: Capillary refill takes less than 2 seconds.      Findings: No rash.   Neurological:      Mental Status: He is alert and oriented to person, place, and time.       Laboratory  Lab Results   Component Value Date    WBC 7.75 09/17/2021    HGB 14.7 09/17/2021    HCT 45.4 09/17/2021     09/17/2021    CHOL 158 01/29/2022    TRIG 177 (H) 01/29/2022    HDL 35 (L) 01/29/2022    ALT 34 08/05/2021    AST 22 08/05/2021     08/05/2021    K 4.4 08/05/2021     08/05/2021    CREATININE 1.1 08/17/2022    BUN 14 08/05/2021    CO2 23 08/05/2021    TSH 0.943 10/03/2020    PSA 17.6 (H) 03/15/2021    HGBA1C 8.7 (H) 08/06/2022       Component Ref Range & Units 1 d ago    CEA 0.0 - 5.0 ng/mL 60.3 High         Diagnostic Results:  Colonoscopy: reviewed  CT and MRI Reviewed      CT C/A/P:  FINDINGS:  Chest:     Heart and great vessels: Within normal limits.     Adenopathy: None demonstrated.     Lungs: Clear bilaterally.     Abdomen:     Liver: There are 2 similar appearing hypoattenuating lesions seen in the posterior right hepatic lobe measuring to 2.8 cmr on series 3, image 45 and 3.2 cm more inferiorly on series 3, image 72.  These nodules appear to demonstrate peripheral interrupted nodular enhancement and do not appear significantly changed from previous CT, most likely representing hemangiomas.  Multiple additional scattered subcentimeter hypoattenuating foci seen throughout the liver are too small to reliably characterize but also appears stable.     Gallbladder and biliary: Within normal limits.     Spleen:  Within normal limits.     Pancreas: Within normal limits.     Adrenals: Within normal limits.     Kidneys: There is a 13 mm cyst at the upper pole of the right kidney which is not appear significantly changed.  No hydronephrosis or hydroureter.     Bowel: Focal bowel wall thickening is noted near the rectosigmoid junction, in keeping with known malignancy.     Peritoneum: No ascites or pneumoperitoneum.     Abdominal Adenopathy: None.     Vasculature: Within normal limits.     Pelvis:     Urinary bladder: Unremarkable.     Prostate and seminal vesicles: Within normal limits.     Pelvis adenopathy: There are multiple prominent inferior mesenteric/superior rectal lymph nodes present adjacent to the known mass at the rectosigmoid junction.     Bones: No acute findings.     Miscellaneous: None.     Impression:     1. Focal bowel wall thickening seen near the rectosigmoid junction, in keeping with known malignancy.  No evidence of associated obstruction.  2. Unchanged appearance of multiple hypoattenuating lesions in the liver, at least 2 of which most likely represent hemangiomas.  This can be confirmed with MRI.  Otherwise no evidence of distant metastatic disease.  3. Multiple prominent inferior mesenteric/superior rectal lymph node seen adjacent to the known malignancy in the rectosigmoid colon (see report for rectal MRI performed same day).  No evidence of non locoregional adenopathy.        MRI Rectal:  FINDINGS:  Chest:     Heart and great vessels: Within normal limits.     Adenopathy: None demonstrated.     Lungs: Clear bilaterally.     Abdomen:     Liver: There are 2 similar appearing hypoattenuating lesions seen in the posterior right hepatic lobe measuring to 2.8 cmr on series 3, image 45 and 3.2 cm more inferiorly on series 3, image 72.  These nodules appear to demonstrate peripheral interrupted nodular enhancement and do not appear significantly changed from previous CT, most likely representing hemangiomas.   Multiple additional scattered subcentimeter hypoattenuating foci seen throughout the liver are too small to reliably characterize but also appears stable.     Gallbladder and biliary: Within normal limits.     Spleen: Within normal limits.     Pancreas: Within normal limits.     Adrenals: Within normal limits.     Kidneys: There is a 13 mm cyst at the upper pole of the right kidney which is not appear significantly changed.  No hydronephrosis or hydroureter.     Bowel: Focal bowel wall thickening is noted near the rectosigmoid junction, in keeping with known malignancy.     Peritoneum: No ascites or pneumoperitoneum.     Abdominal Adenopathy: None.     Vasculature: Within normal limits.     Pelvis:     Urinary bladder: Unremarkable.     Prostate and seminal vesicles: Within normal limits.     Pelvis adenopathy: There are multiple prominent inferior mesenteric/superior rectal lymph nodes present adjacent to the known mass at the rectosigmoid junction.     Bones: No acute findings.     Miscellaneous: None.     Impression:     1. Focal bowel wall thickening seen near the rectosigmoid junction, in keeping with known malignancy.  No evidence of associated obstruction.  2. Unchanged appearance of multiple hypoattenuating lesions in the liver, at least 2 of which most likely represent hemangiomas.  This can be confirmed with MRI.  Otherwise no evidence of distant metastatic disease.  3. Multiple prominent inferior mesenteric/superior rectal lymph node seen adjacent to the known malignancy in the rectosigmoid colon (see report for rectal MRI performed same day).  No evidence of non locoregional adenopathy.    ASSESSMENT/PLAN:     53yo M with rectosigmoid/upper rectal adenocarcinoma    - Will send for MRI liver/abdomen to rule out liver metastatic disease  - If no liver metastatic disease, will proceed with surgical intervention given rectosigmoid/upper rectal tumor with robotic/lap vs open LAR  - All risks, benefits and  alternatives fully explained to patient. Risks include, but are not limited to, bleeding, infection, anastomotic leak, damage to ureter, damage to other intra-abdominal organs such as colon, rectum, small bowel, stomach, liver, bladder, reproductive organs, sexual dysfunction, urinary dysfunction, postoperative abscess, conversion to open operation, perioperative MI, CVA and death.  All questions field and appropriately answered to patient's satisfaction.  Consent signed and placed on chart.  - mechanical and oral abx bowel prep  - ERAS protocol  - RTC postop    Cj Quinn MD  Colon and Rectal Surgery  [facility]

## 2022-09-02 ENCOUNTER — HOSPITAL ENCOUNTER (OUTPATIENT)
Dept: RADIOLOGY | Facility: HOSPITAL | Age: 52
Discharge: HOME | End: 2022-09-02
Attending: COLON & RECTAL SURGERY
Payer: COMMERCIAL

## 2022-09-02 DIAGNOSIS — K76.9 LIVER LESION: ICD-10-CM

## 2022-09-02 DIAGNOSIS — C19 ADENOCARCINOMA OF RECTOSIGMOID JUNCTION: ICD-10-CM

## 2022-09-02 PROCEDURE — 25500020 PHARM REV CODE 255: Performed by: COLON & RECTAL SURGERY

## 2022-09-02 PROCEDURE — A9585 GADOBUTROL INJECTION: HCPCS | Performed by: COLON & RECTAL SURGERY

## 2022-09-02 PROCEDURE — 74183 MRI ABD W/O CNTR FLWD CNTR: CPT | Mod: TC

## 2022-09-02 RX ORDER — GADOBUTROL 604.72 MG/ML
10 INJECTION INTRAVENOUS
Status: COMPLETED | OUTPATIENT
Start: 2022-09-02 | End: 2022-09-02

## 2022-09-02 RX ADMIN — GADOBUTROL 10 ML: 604.72 INJECTION INTRAVENOUS at 05:09

## 2022-09-08 ENCOUNTER — TELEPHONE (OUTPATIENT)
Dept: PREADMISSION TESTING | Facility: HOSPITAL | Age: 52
End: 2022-09-08
Payer: COMMERCIAL

## 2022-09-08 NOTE — TELEPHONE ENCOUNTER
----- Message from Elizabet Machado RN sent at 9/8/2022  1:09 PM CDT -----  Regarding: RE: surgery 9/22  He is scheduled to complete all of this 9/19 at [facility].     ----- Message -----  From: Princess Olivera RN  Sent: 9/8/2022  11:50 AM CDT  To: [facility] Staff  Subject: surgery 9/22                                     Hello, Pre-Admit Testing Department is inquiring about this surgery patient's labs & EKG for their procedure on 9/22/22. Please let us know if it will be available in Epic, or will be done day of surgery.     #Also, pt is first case at 7 am, please be advised that waiting until day of to perform testing could delay start time.      Thank you,  -[facility] Nurse  Office # [phone]  Mon-Fri 7:30 am - 4 pm

## 2022-09-15 ENCOUNTER — TELEPHONE (OUTPATIENT)
Dept: PREADMISSION TESTING | Facility: HOSPITAL | Age: 52
End: 2022-09-15
Payer: COMMERCIAL

## 2022-09-15 NOTE — TELEPHONE ENCOUNTER
Pre op instructions reviewed with pt per phone: Spoke about the following; verbalized understanding.    To confirm, Your Surgery is scheduled on 9/22/22. We will call you the business day (after 2:30 pm) prior to surgery to confirm arrival time as surgery schedule is subject to change due to cancellations/ emergencies.    >>>Address: [address] [place], [place].<<<  Please report to [facility] (1st Floor) located off of [facility] (2nd building on the left, in front of the flag pole).    INSTRUCTIONS IMPORTANT!!!  Nothing By Mouth after 12 midnight! NO WATER after midnight! OK to brush teeth, no gum, candy or mints!    May have light breakfast day before surgery, but ONLY CLEAR LIQUIDS starting at 12 Noon. NO SOLID FOOD AFTER STARTING BOWEL PREP!  Take antibiotics day before surgery. Start bowel prep day before surgery at 12pm, finish by 10pm.      metronidazole 500 mg Oral 3 times daily, Take initial dose@2pm, second dose@3pm and final dose@10pm day before surgery    neomycin sulfate 1,000 mg Oral 3 times daily, Take 1st dose@2pm,take 2nd dose@3pm, take last dose@10pm day before surgery    polyethylene glycol 3350 238 g Oral Daily, Mix with 2L of Gatorade, drink all mixed solution starting@12pm day before surgery, finish by 10pm         *Take only these medicines with a small swallow of water-morning of surgery.  Atorvastatin  Klonopin      ____  NO Acrylic/fake nails worn day of surgery (hand/arm surgeries)  ____  NO powder, lotions, deodorants or creams to surgical area.  ____  Please remove all jewelry, including piercings and leave at home.  ____  Dentures, Hearing Aids and Contact Lens will need to be removed prior to the start of surgery.  ____  Please bring photo ID and insurance information to hospital (Leave Valuables at Home)  ____  If going home the same day, arrange for a ride home. You will not be able to             drive for 24 hours after anesthesia.  ____  Wear clean, loose  fitting clothing. Allow for dressings, bandages.  ____  Stop all Aspirin/ Nsaid products, Ibuprofen, Motrin, Advil and Aleve at least 7 days before surgery, unless otherwise instructed by your doctor, or the nurse.   ____ Blood Thinners are stopped based on your Provider's recommendation; Patients need to call their Surgeon regarding when to stop/hold.  ____ Stop taking any Fish Oil /Supplements/ Vitamins at least 7 days prior to surgery, unless instructed otherwise by your Doctor.    Diabetic Patients: If you take Diabetic medication, do NOT take morning of surgery unless instructed by Doctor. Metformin to be stopped 24 hrs prior to surgery time. DO NOT take long-acting insulin the evening before surgery. Blood sugars will be checked in pre-op morning of.          Bathing Instructions:    -Do not shave your face or body the day before or the day of surgery!   -Shower & Rinse your body as usual with anti-bacterial Soap (Dial, Lever 2000, or Hibiclens) the night before surgery and the morning of surgery.               -Do not use Hibiclens on your face (Females should avoid genital area as well).   -Do not wash with anti-bacterial soap after you use the Hibiclens.   -Rinse & dry your body thoroughly. Apply clean clothes after shower.    Discharge Instructions: will be given to you by the Nurse discharging you home. Please call your Surgeon's office with any post-surgery questions/concerns/medications @ [phone].    [facility] Visitor/Ride Policy:  Only 2 adults allowed (over the age of 18) to accompany you into Surgery Dept. All other visitors will need to wait in waiting room. Must have a ride home from a responsible adult that you know and trust. Medical Transport, Uber or Lyft can only be used if patient has a responsible adult to accompany them during ride home.      >Call Surgeon office/on-call Surgeon if you experience any of these signs & symptoms of infection @ [phone].    *If you are running late for  surgery, please call Surgery dept at [phone].  *Insurance/Financial Questions, please call [phone]    Thank you,  -[facility] Pre [facility]mit Testing Nurse  [phone] or [phone]  Office Hours: M-F 7:30 am - 4 pm

## 2022-09-16 ENCOUNTER — PATIENT MESSAGE (OUTPATIENT)
Dept: PREADMISSION TESTING | Facility: HOSPITAL | Age: 52
End: 2022-09-16
Payer: COMMERCIAL

## 2022-09-17 ENCOUNTER — PATIENT MESSAGE (OUTPATIENT)
Dept: SURGERY | Facility: CLINIC | Age: 52
End: 2022-09-17
Payer: COMMERCIAL

## 2022-09-19 ENCOUNTER — HOSPITAL ENCOUNTER (OUTPATIENT)
Dept: CARDIOLOGY | Facility: HOSPITAL | Age: 52
Discharge: HOME | End: 2022-09-19
Attending: COLON & RECTAL SURGERY
Payer: COMMERCIAL

## 2022-09-19 DIAGNOSIS — C19 ADENOCARCINOMA OF RECTOSIGMOID JUNCTION: ICD-10-CM

## 2022-09-19 PROCEDURE — 93010 EKG 12-LEAD: ICD-10-PCS | Mod: ,,, | Performed by: INTERNAL MEDICINE

## 2022-09-19 PROCEDURE — 93010 ELECTROCARDIOGRAM REPORT: CPT | Mod: ,,, | Performed by: INTERNAL MEDICINE

## 2022-09-19 PROCEDURE — 93005 ELECTROCARDIOGRAM TRACING: CPT

## 2022-09-19 NOTE — TELEPHONE ENCOUNTER
Spoke with patient about message sent, will consult with provider about plan going forward as far as surgery scheduled Thursday. MD notified and states wants patient to possibly have a rapid test the morning of surgery if he is experiencing any symptoms. Patient notified.

## 2022-09-21 ENCOUNTER — ANESTHESIA EVENT (OUTPATIENT)
Dept: SURGERY | Facility: HOSPITAL | Age: 52
DRG: 331 | End: 2022-09-21
Payer: COMMERCIAL

## 2022-09-21 ENCOUNTER — TELEPHONE (OUTPATIENT)
Dept: PREADMISSION TESTING | Facility: HOSPITAL | Age: 52
End: 2022-09-21
Payer: COMMERCIAL

## 2022-09-21 NOTE — ANESTHESIA PREPROCEDURE EVALUATION
09/21/2022  Patrick Sanz is a 52 y.o., male.    Patient is a 52 y.o. 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  Patient was undergoing his 1st ever colonoscopy on 08/04/2022 where a malignant-appearing mass was found in the rectosigmoid area.  This was biopsied and confirmed to be adenocarcinoma.  Patient also had multiple other adenomas removed.  He states that prior to the colonoscopy in since that time, he has been completely asymptomatic.  He denies any fever, chills, nausea, vomiting, diarrhea, constipation, hematochezia or melena.  He denies any family history of colorectal cancer or IBD.  He does have a past surgical history significant for robotic prostatectomy for prostate cancer.       Patient Active Problem List   Diagnosis    Uncontrolled type 2 diabetes mellitus with hyperglycemia    Bipolar disorder    Kidney stone    Malignant neoplasm of prostate    Severe obesity (BMI 35.0-39.9) with comorbidity    Colon cancer screening     Pre-op Assessment    I have reviewed the Patient Summary Reports.    I have reviewed the NPO Status.   I have reviewed the Medications.     Review of Systems  Social:  Former Smoker    Hematology/Oncology:  Hematology Normal   Oncology Normal     EENT/Dental:EENT/Dental Normal   Cardiovascular:  Cardiovascular Normal  ECG has been reviewed.    Pulmonary:   Sleep Apnea    Renal/:   renal calculi    Hepatic/GI:   Colorectal carcinoma   Musculoskeletal:  Musculoskeletal Normal    Neurological:  Neurology Normal    Endocrine:   Diabetes    Dermatological:  Skin Normal    Psych:   Psychiatric History          Physical Exam  General: Well nourished    Airway:  Mallampati: II   Mouth Opening: Normal  TM Distance: Normal  Neck ROM: Normal ROM    Dental:  Intact        Anesthesia Plan  Type of Anesthesia, risks & benefits discussed:    Anesthesia Type:  Gen ETT  Intra-op Monitoring Plan: Standard ASA Monitors  Post Op Pain Control Plan: multimodal analgesia  Induction:  IV  Airway Plan: , Post-Induction  Informed Consent: Informed consent signed with the Patient and all parties understand the risks and agree with anesthesia plan.  All questions answered.   ASA Score: 2    Ready For Surgery From Anesthesia Perspective.     .      Chemistry        Component Value Date/Time     09/19/2022 1617    K 4.3 09/19/2022 1617     09/19/2022 1617    CO2 24 09/19/2022 1617    BUN 12 09/19/2022 1617    CREATININE 1.1 09/19/2022 1617     (H) 09/19/2022 1617        Component Value Date/Time    CALCIUM 9.4 09/19/2022 1617    ALKPHOS 133 09/19/2022 1617    AST 21 09/19/2022 1617    ALT 48 (H) 09/19/2022 1617    BILITOT 0.6 09/19/2022 1617    ESTGFRAFRICA >60.0 12/07/2021 1625    EGFRNONAA >60.0 12/07/2021 1625        Lab Results   Component Value Date    WBC 8.92 09/19/2022    HGB 16.4 09/19/2022    HCT 51.1 09/19/2022    MCV 88 09/19/2022     09/19/2022

## 2022-09-21 NOTE — TELEPHONE ENCOUNTER
Called and spoke with pt about the following:     Please arrive to [facility] ([facility] main lobby at 0530 am on 9/22/22 for your scheduled procedure. NOTHING to eat or drink after midnight unless instructed otherwise by your Surgeon. Take only the medications instructed by your Pre Admit Nurse with small sip of water.    Thank you,  -Pre Admit Testing Dept.  Mon-Fri 8 am - 4 pm [phone]

## 2022-09-22 ENCOUNTER — ANESTHESIA (OUTPATIENT)
Dept: SURGERY | Facility: HOSPITAL | Age: 52
DRG: 331 | End: 2022-09-22
Payer: COMMERCIAL

## 2022-09-22 ENCOUNTER — HOSPITAL ENCOUNTER (INPATIENT)
Facility: HOSPITAL | Age: 52
LOS: 4 days | Discharge: HOME | DRG: 331 | End: 2022-09-26
Attending: COLON & RECTAL SURGERY | Admitting: COLON & RECTAL SURGERY
Payer: COMMERCIAL

## 2022-09-22 DIAGNOSIS — C19 ADENOCARCINOMA OF RECTOSIGMOID JUNCTION: Primary | ICD-10-CM

## 2022-09-22 LAB
POCT GLUCOSE: 165 MG/DL (ref 70–110)
POCT GLUCOSE: 169 MG/DL (ref 70–110)

## 2022-09-22 PROCEDURE — 71000039 HC RECOVERY, EACH ADD'L HOUR: Performed by: COLON & RECTAL SURGERY

## 2022-09-22 PROCEDURE — 88342 CHG IMMUNOCYTOCHEMISTRY: ICD-10-PCS | Mod: 26,,, | Performed by: PATHOLOGY

## 2022-09-22 PROCEDURE — 37000008 HC ANESTHESIA 1ST 15 MINUTES: Performed by: COLON & RECTAL SURGERY

## 2022-09-22 PROCEDURE — S0030 INJECTION, METRONIDAZOLE: HCPCS | Performed by: COLON & RECTAL SURGERY

## 2022-09-22 PROCEDURE — 44139 PR MOBILIZE SPLENIC FLEX: ICD-10-PCS | Mod: ,,, | Performed by: COLON & RECTAL SURGERY

## 2022-09-22 PROCEDURE — 63600175 PHARM REV CODE 636 W HCPCS: Performed by: COLON & RECTAL SURGERY

## 2022-09-22 PROCEDURE — 25000003 PHARM REV CODE 250: Performed by: COLON & RECTAL SURGERY

## 2022-09-22 PROCEDURE — 27201423 OPTIME MED/SURG SUP & DEVICES STERILE SUPPLY: Performed by: COLON & RECTAL SURGERY

## 2022-09-22 PROCEDURE — 25000003 PHARM REV CODE 250: Performed by: FAMILY MEDICINE

## 2022-09-22 PROCEDURE — 11000001 HC ACUTE MED/SURG PRIVATE ROOM

## 2022-09-22 PROCEDURE — 63600175 PHARM REV CODE 636 W HCPCS: Performed by: FAMILY MEDICINE

## 2022-09-22 PROCEDURE — 36000712 HC OR TIME LEV V 1ST 15 MIN: Performed by: COLON & RECTAL SURGERY

## 2022-09-22 PROCEDURE — 94799 UNLISTED PULMONARY SVC/PX: CPT

## 2022-09-22 PROCEDURE — 44139 MOBILIZATION OF COLON: CPT | Mod: ,,, | Performed by: COLON & RECTAL SURGERY

## 2022-09-22 PROCEDURE — 88341 PR IHC OR ICC EACH ADD'L SINGLE ANTIBODY  STAINPR: ICD-10-PCS | Mod: 26,,, | Performed by: PATHOLOGY

## 2022-09-22 PROCEDURE — 71000033 HC RECOVERY, INTIAL HOUR: Performed by: COLON & RECTAL SURGERY

## 2022-09-22 PROCEDURE — 88307 TISSUE EXAM BY PATHOLOGIST: CPT | Mod: 26,,, | Performed by: PATHOLOGY

## 2022-09-22 PROCEDURE — 88341 IMHCHEM/IMCYTCHM EA ADD ANTB: CPT | Mod: 26,,, | Performed by: PATHOLOGY

## 2022-09-22 PROCEDURE — 88341 IMHCHEM/IMCYTCHM EA ADD ANTB: CPT | Performed by: PATHOLOGY

## 2022-09-22 PROCEDURE — 88307 PR  SURG PATH,LEVEL V: ICD-10-PCS | Mod: 26,,, | Performed by: PATHOLOGY

## 2022-09-22 PROCEDURE — 36000713 HC OR TIME LEV V EA ADD 15 MIN: Performed by: COLON & RECTAL SURGERY

## 2022-09-22 PROCEDURE — 88342 IMHCHEM/IMCYTCHM 1ST ANTB: CPT | Performed by: PATHOLOGY

## 2022-09-22 PROCEDURE — 88342 IMHCHEM/IMCYTCHM 1ST ANTB: CPT | Mod: 26,,, | Performed by: PATHOLOGY

## 2022-09-22 PROCEDURE — 82962 GLUCOSE BLOOD TEST: CPT | Performed by: COLON & RECTAL SURGERY

## 2022-09-22 PROCEDURE — 88305 TISSUE EXAM BY PATHOLOGIST: ICD-10-PCS | Mod: 26,,, | Performed by: PATHOLOGY

## 2022-09-22 PROCEDURE — 88307 TISSUE EXAM BY PATHOLOGIST: CPT | Mod: 59 | Performed by: PATHOLOGY

## 2022-09-22 PROCEDURE — 44145 PARTIAL REMOVAL OF COLON: CPT | Mod: 22,,, | Performed by: COLON & RECTAL SURGERY

## 2022-09-22 PROCEDURE — 37000009 HC ANESTHESIA EA ADD 15 MINS: Performed by: COLON & RECTAL SURGERY

## 2022-09-22 PROCEDURE — 44145 PR PART REMOVAL COLON W COLOPROCTOSTOMY: ICD-10-PCS | Mod: 22,,, | Performed by: COLON & RECTAL SURGERY

## 2022-09-22 PROCEDURE — 88305 TISSUE EXAM BY PATHOLOGIST: CPT | Mod: 26,,, | Performed by: PATHOLOGY

## 2022-09-22 PROCEDURE — C9290 INJ, BUPIVACAINE LIPOSOME: HCPCS | Performed by: COLON & RECTAL SURGERY

## 2022-09-22 PROCEDURE — 88305 TISSUE EXAM BY PATHOLOGIST: CPT | Performed by: PATHOLOGY

## 2022-09-22 RX ORDER — CHLORHEXIDINE GLUCONATE ORAL RINSE 1.2 MG/ML
10 SOLUTION DENTAL 2 TIMES DAILY
Status: DISCONTINUED | OUTPATIENT
Start: 2022-09-22 | End: 2022-09-26 | Stop reason: HOSPADM

## 2022-09-22 RX ORDER — HYDROMORPHONE HYDROCHLORIDE 2 MG/ML
0.2 INJECTION, SOLUTION INTRAMUSCULAR; INTRAVENOUS; SUBCUTANEOUS EVERY 5 MIN PRN
Status: DISCONTINUED | OUTPATIENT
Start: 2022-09-22 | End: 2022-09-22

## 2022-09-22 RX ORDER — SODIUM CHLORIDE, SODIUM LACTATE, POTASSIUM CHLORIDE, CALCIUM CHLORIDE 600; 310; 30; 20 MG/100ML; MG/100ML; MG/100ML; MG/100ML
INJECTION, SOLUTION INTRAVENOUS CONTINUOUS
Status: DISCONTINUED | OUTPATIENT
Start: 2022-09-22 | End: 2022-09-24

## 2022-09-22 RX ORDER — ONDANSETRON 2 MG/ML
4 INJECTION INTRAMUSCULAR; INTRAVENOUS EVERY 6 HOURS PRN
Status: DISCONTINUED | OUTPATIENT
Start: 2022-09-22 | End: 2022-09-26 | Stop reason: HOSPADM

## 2022-09-22 RX ORDER — OXYCODONE HYDROCHLORIDE 5 MG/1
10 TABLET ORAL EVERY 4 HOURS PRN
Status: DISCONTINUED | OUTPATIENT
Start: 2022-09-22 | End: 2022-09-26 | Stop reason: HOSPADM

## 2022-09-22 RX ORDER — HYDRALAZINE HYDROCHLORIDE 20 MG/ML
10 INJECTION INTRAMUSCULAR; INTRAVENOUS EVERY 4 HOURS PRN
Status: DISCONTINUED | OUTPATIENT
Start: 2022-09-22 | End: 2022-09-26 | Stop reason: HOSPADM

## 2022-09-22 RX ORDER — AMOXICILLIN 250 MG
1 CAPSULE ORAL 2 TIMES DAILY
Status: DISCONTINUED | OUTPATIENT
Start: 2022-09-22 | End: 2022-09-26 | Stop reason: HOSPADM

## 2022-09-22 RX ORDER — ONDANSETRON 2 MG/ML
4 INJECTION INTRAMUSCULAR; INTRAVENOUS DAILY PRN
Status: DISCONTINUED | OUTPATIENT
Start: 2022-09-22 | End: 2022-09-22

## 2022-09-22 RX ORDER — OXYCODONE HYDROCHLORIDE 5 MG/1
5 TABLET ORAL EVERY 4 HOURS PRN
Status: DISCONTINUED | OUTPATIENT
Start: 2022-09-22 | End: 2022-09-26 | Stop reason: HOSPADM

## 2022-09-22 RX ORDER — KETAMINE HYDROCHLORIDE 50 MG/ML
INJECTION, SOLUTION INTRAMUSCULAR; INTRAVENOUS
Status: DISCONTINUED | OUTPATIENT
Start: 2022-09-22 | End: 2022-09-22

## 2022-09-22 RX ORDER — SODIUM CHLORIDE, SODIUM LACTATE, POTASSIUM CHLORIDE, CALCIUM CHLORIDE 600; 310; 30; 20 MG/100ML; MG/100ML; MG/100ML; MG/100ML
INJECTION, SOLUTION INTRAVENOUS CONTINUOUS
Status: DISCONTINUED | OUTPATIENT
Start: 2022-09-22 | End: 2022-09-22

## 2022-09-22 RX ORDER — OXYCODONE AND ACETAMINOPHEN 5; 325 MG/1; MG/1
1 TABLET ORAL
Status: DISCONTINUED | OUTPATIENT
Start: 2022-09-22 | End: 2022-09-22

## 2022-09-22 RX ORDER — INSULIN ASPART 100 [IU]/ML
0-5 INJECTION, SOLUTION INTRAVENOUS; SUBCUTANEOUS EVERY 6 HOURS PRN
Status: DISCONTINUED | OUTPATIENT
Start: 2022-09-22 | End: 2022-09-26 | Stop reason: HOSPADM

## 2022-09-22 RX ORDER — ROCURONIUM BROMIDE 10 MG/ML
INJECTION, SOLUTION INTRAVENOUS
Status: DISCONTINUED | OUTPATIENT
Start: 2022-09-22 | End: 2022-09-22

## 2022-09-22 RX ORDER — ACETAMINOPHEN 10 MG/ML
1000 INJECTION, SOLUTION INTRAVENOUS EVERY 8 HOURS
Status: COMPLETED | OUTPATIENT
Start: 2022-09-22 | End: 2022-09-23

## 2022-09-22 RX ORDER — ONDANSETRON 2 MG/ML
INJECTION INTRAMUSCULAR; INTRAVENOUS
Status: DISCONTINUED | OUTPATIENT
Start: 2022-09-22 | End: 2022-09-22

## 2022-09-22 RX ORDER — GABAPENTIN 400 MG/1
800 CAPSULE ORAL
Status: COMPLETED | OUTPATIENT
Start: 2022-09-22 | End: 2022-09-22

## 2022-09-22 RX ORDER — ONDANSETRON 2 MG/ML
4 INJECTION INTRAMUSCULAR; INTRAVENOUS EVERY 12 HOURS PRN
Status: DISCONTINUED | OUTPATIENT
Start: 2022-09-22 | End: 2022-09-22

## 2022-09-22 RX ORDER — LIDOCAINE HYDROCHLORIDE 20 MG/ML
INJECTION, SOLUTION EPIDURAL; INFILTRATION; INTRACAUDAL; PERINEURAL
Status: DISCONTINUED | OUTPATIENT
Start: 2022-09-22 | End: 2022-09-22

## 2022-09-22 RX ORDER — CIPROFLOXACIN 2 MG/ML
400 INJECTION, SOLUTION INTRAVENOUS
Status: COMPLETED | OUTPATIENT
Start: 2022-09-22 | End: 2022-09-22

## 2022-09-22 RX ORDER — DIPHENHYDRAMINE HYDROCHLORIDE 50 MG/ML
12.5 INJECTION INTRAMUSCULAR; INTRAVENOUS EVERY 6 HOURS PRN
Status: DISCONTINUED | OUTPATIENT
Start: 2022-09-22 | End: 2022-09-26 | Stop reason: HOSPADM

## 2022-09-22 RX ORDER — BUPIVACAINE HYDROCHLORIDE 2.5 MG/ML
INJECTION, SOLUTION EPIDURAL; INFILTRATION; INTRACAUDAL
Status: DISCONTINUED | OUTPATIENT
Start: 2022-09-22 | End: 2022-09-22 | Stop reason: HOSPADM

## 2022-09-22 RX ORDER — HYDROMORPHONE HYDROCHLORIDE 2 MG/ML
INJECTION, SOLUTION INTRAMUSCULAR; INTRAVENOUS; SUBCUTANEOUS
Status: DISCONTINUED | OUTPATIENT
Start: 2022-09-22 | End: 2022-09-22

## 2022-09-22 RX ORDER — QUETIAPINE FUMARATE 100 MG/1
100 TABLET, FILM COATED ORAL NIGHTLY
Status: DISCONTINUED | OUTPATIENT
Start: 2022-09-22 | End: 2022-09-26 | Stop reason: HOSPADM

## 2022-09-22 RX ORDER — GLUCAGON 1 MG
1 KIT INJECTION
Status: DISCONTINUED | OUTPATIENT
Start: 2022-09-22 | End: 2022-09-26 | Stop reason: HOSPADM

## 2022-09-22 RX ORDER — HEPARIN SODIUM 5000 [USP'U]/ML
5000 INJECTION, SOLUTION INTRAVENOUS; SUBCUTANEOUS
Status: COMPLETED | OUTPATIENT
Start: 2022-09-22 | End: 2022-09-22

## 2022-09-22 RX ORDER — METRONIDAZOLE 500 MG/100ML
500 INJECTION, SOLUTION INTRAVENOUS
Status: COMPLETED | OUTPATIENT
Start: 2022-09-22 | End: 2022-09-22

## 2022-09-22 RX ORDER — FAMOTIDINE 20 MG/1
20 TABLET, FILM COATED ORAL 2 TIMES DAILY
Status: DISCONTINUED | OUTPATIENT
Start: 2022-09-22 | End: 2022-09-26 | Stop reason: HOSPADM

## 2022-09-22 RX ORDER — LAMOTRIGINE 100 MG/1
200 TABLET ORAL NIGHTLY
Status: DISCONTINUED | OUTPATIENT
Start: 2022-09-22 | End: 2022-09-26 | Stop reason: HOSPADM

## 2022-09-22 RX ORDER — CELECOXIB 100 MG/1
400 CAPSULE ORAL
Status: COMPLETED | OUTPATIENT
Start: 2022-09-22 | End: 2022-09-22

## 2022-09-22 RX ORDER — PROPOFOL 10 MG/ML
VIAL (ML) INTRAVENOUS
Status: DISCONTINUED | OUTPATIENT
Start: 2022-09-22 | End: 2022-09-22

## 2022-09-22 RX ORDER — ACETAMINOPHEN 500 MG
1000 TABLET ORAL ONCE
Status: COMPLETED | OUTPATIENT
Start: 2022-09-22 | End: 2022-09-22

## 2022-09-22 RX ORDER — MORPHINE SULFATE 4 MG/ML
2 INJECTION, SOLUTION INTRAMUSCULAR; INTRAVENOUS
Status: DISCONTINUED | OUTPATIENT
Start: 2022-09-22 | End: 2022-09-26 | Stop reason: HOSPADM

## 2022-09-22 RX ORDER — KETOROLAC TROMETHAMINE 30 MG/ML
15 INJECTION, SOLUTION INTRAMUSCULAR; INTRAVENOUS EVERY 8 HOURS PRN
Status: DISCONTINUED | OUTPATIENT
Start: 2022-09-22 | End: 2022-09-22

## 2022-09-22 RX ORDER — FENTANYL CITRATE 50 UG/ML
INJECTION, SOLUTION INTRAMUSCULAR; INTRAVENOUS
Status: DISCONTINUED | OUTPATIENT
Start: 2022-09-22 | End: 2022-09-22

## 2022-09-22 RX ORDER — ATORVASTATIN CALCIUM 40 MG/1
80 TABLET, FILM COATED ORAL NIGHTLY
Status: DISCONTINUED | OUTPATIENT
Start: 2022-09-22 | End: 2022-09-26 | Stop reason: HOSPADM

## 2022-09-22 RX ORDER — ENOXAPARIN SODIUM 100 MG/ML
40 INJECTION SUBCUTANEOUS EVERY 24 HOURS
Status: DISCONTINUED | OUTPATIENT
Start: 2022-09-23 | End: 2022-09-26 | Stop reason: HOSPADM

## 2022-09-22 RX ORDER — DEXAMETHASONE SODIUM PHOSPHATE 4 MG/ML
INJECTION, SOLUTION INTRA-ARTICULAR; INTRALESIONAL; INTRAMUSCULAR; INTRAVENOUS; SOFT TISSUE
Status: DISCONTINUED | OUTPATIENT
Start: 2022-09-22 | End: 2022-09-22

## 2022-09-22 RX ADMIN — OXYCODONE HYDROCHLORIDE 10 MG: 5 TABLET ORAL at 09:09

## 2022-09-22 RX ADMIN — QUETIAPINE FUMARATE 100 MG: 100 TABLET ORAL at 09:09

## 2022-09-22 RX ADMIN — LIDOCAINE HYDROCHLORIDE 50 MG: 20 INJECTION, SOLUTION EPIDURAL; INFILTRATION; INTRACAUDAL; PERINEURAL at 08:09

## 2022-09-22 RX ADMIN — LAMOTRIGINE 200 MG: 100 TABLET ORAL at 09:09

## 2022-09-22 RX ADMIN — FENTANYL CITRATE 100 MCG: 50 INJECTION, SOLUTION INTRAMUSCULAR; INTRAVENOUS at 10:09

## 2022-09-22 RX ADMIN — CHLORHEXIDINE GLUCONATE 0.12% ORAL RINSE 10 ML: 1.2 LIQUID ORAL at 09:09

## 2022-09-22 RX ADMIN — FENTANYL CITRATE 100 MCG: 50 INJECTION, SOLUTION INTRAMUSCULAR; INTRAVENOUS at 08:09

## 2022-09-22 RX ADMIN — CELECOXIB 400 MG: 100 CAPSULE ORAL at 06:09

## 2022-09-22 RX ADMIN — CIPROFLOXACIN 400 MG: 2 INJECTION INTRAVENOUS at 08:09

## 2022-09-22 RX ADMIN — ROCURONIUM BROMIDE 50 MG: 10 INJECTION, SOLUTION INTRAVENOUS at 11:09

## 2022-09-22 RX ADMIN — GABAPENTIN 800 MG: 400 CAPSULE ORAL at 06:09

## 2022-09-22 RX ADMIN — ATORVASTATIN CALCIUM 80 MG: 40 TABLET, FILM COATED ORAL at 09:09

## 2022-09-22 RX ADMIN — ACETAMINOPHEN 1000 MG: 10 INJECTION INTRAVENOUS at 09:09

## 2022-09-22 RX ADMIN — ONDANSETRON 4 MG: 2 INJECTION, SOLUTION INTRAMUSCULAR; INTRAVENOUS at 02:09

## 2022-09-22 RX ADMIN — MORPHINE SULFATE 2 MG: 4 INJECTION INTRAVENOUS at 05:09

## 2022-09-22 RX ADMIN — SUGAMMADEX 200 MG: 100 INJECTION, SOLUTION INTRAVENOUS at 02:09

## 2022-09-22 RX ADMIN — FAMOTIDINE 20 MG: 20 TABLET ORAL at 09:09

## 2022-09-22 RX ADMIN — DEXAMETHASONE SODIUM PHOSPHATE 4 MG: 4 INJECTION, SOLUTION INTRAMUSCULAR; INTRAVENOUS at 02:09

## 2022-09-22 RX ADMIN — SENNOSIDES AND DOCUSATE SODIUM 1 TABLET: 50; 8.6 TABLET ORAL at 09:09

## 2022-09-22 RX ADMIN — METRONIDAZOLE 500 MG: 500 SOLUTION INTRAVENOUS at 08:09

## 2022-09-22 RX ADMIN — SODIUM CHLORIDE, SODIUM LACTATE, POTASSIUM CHLORIDE, AND CALCIUM CHLORIDE: .6; .31; .03; .02 INJECTION, SOLUTION INTRAVENOUS at 08:09

## 2022-09-22 RX ADMIN — SODIUM CHLORIDE, SODIUM LACTATE, POTASSIUM CHLORIDE, AND CALCIUM CHLORIDE: .6; .31; .03; .02 INJECTION, SOLUTION INTRAVENOUS at 04:09

## 2022-09-22 RX ADMIN — ROCURONIUM BROMIDE 50 MG: 10 INJECTION, SOLUTION INTRAVENOUS at 08:09

## 2022-09-22 RX ADMIN — ACETAMINOPHEN 1000 MG: 500 TABLET ORAL at 06:09

## 2022-09-22 RX ADMIN — MORPHINE SULFATE 2 MG: 4 INJECTION INTRAVENOUS at 08:09

## 2022-09-22 RX ADMIN — PROPOFOL 200 MG: 10 INJECTION, EMULSION INTRAVENOUS at 08:09

## 2022-09-22 RX ADMIN — HYDROMORPHONE HYDROCHLORIDE 2 MG: 2 INJECTION INTRAMUSCULAR; INTRAVENOUS; SUBCUTANEOUS at 12:09

## 2022-09-22 RX ADMIN — ROCURONIUM BROMIDE 50 MG: 10 INJECTION, SOLUTION INTRAVENOUS at 12:09

## 2022-09-22 RX ADMIN — OXYCODONE HYDROCHLORIDE 5 MG: 5 TABLET ORAL at 05:09

## 2022-09-22 RX ADMIN — HEPARIN SODIUM 5000 UNITS: 5000 INJECTION INTRAVENOUS; SUBCUTANEOUS at 06:09

## 2022-09-22 RX ADMIN — ROCURONIUM BROMIDE 50 MG: 10 INJECTION, SOLUTION INTRAVENOUS at 09:09

## 2022-09-22 RX ADMIN — KETAMINE HYDROCHLORIDE 50 MG: 50 INJECTION, SOLUTION, CONCENTRATE INTRAMUSCULAR; INTRAVENOUS at 10:09

## 2022-09-22 NOTE — SUBJECTIVE & OBJECTIVE
Review of Systems   Constitutional:  Negative for activity change, appetite change, chills, fatigue and fever.   HENT: Negative.     Eyes: Negative.    Respiratory:  Negative for chest tightness, shortness of breath and wheezing.    Cardiovascular:  Negative for chest pain and leg swelling.   Gastrointestinal:  Positive for abdominal pain. Negative for abdominal distention, diarrhea, nausea and vomiting.   Endocrine: Negative.  Negative for polydipsia and polyuria.   Genitourinary:  Negative for flank pain and hematuria.   Musculoskeletal:  Negative for back pain, joint swelling and myalgias.   Skin: Negative.    Neurological: Negative.  Negative for syncope, weakness and light-headedness.   Hematological: Negative.    Psychiatric/Behavioral:  Negative for confusion.    Objective:     Vital Signs (Most Recent):  Temp: 98.5 °F (36.9 °C) (09/22/22 1603)  Pulse: 97 (09/22/22 1603)  Resp: 17 (09/22/22 1714)  BP: (!) 143/85 (09/22/22 1603)  SpO2: 95 % (09/22/22 1603)   Vital Signs (24h Range):  Temp:  [97.7 °F (36.5 °C)-98.8 °F (37.1 °C)] 98.5 °F (36.9 °C)  Pulse:  [] 97  Resp:  [13-26] 17  SpO2:  [93 %-98 %] 95 %  BP: (139-177)/(65-92) 143/85     Weight: 102.4 kg (225 lb 12 oz)  Body mass index is 34.33 kg/m².    Intake/Output Summary (Last 24 hours) at 9/22/2022 1748  Last data filed at 9/22/2022 1554  Gross per 24 hour   Intake 2300 ml   Output 700 ml   Net 1600 ml      Physical Exam  Constitutional:       General: He is not in acute distress.     Appearance: Normal appearance. He is not ill-appearing.   HENT:      Head: Normocephalic and atraumatic.   Cardiovascular:      Rate and Rhythm: Regular rhythm. Tachycardia present.   Pulmonary:      Effort: Pulmonary effort is normal.      Breath sounds: Normal breath sounds.   Abdominal:      General: Abdomen is flat. Bowel sounds are normal. There is no distension.      Palpations: Abdomen is soft.      Tenderness: There is abdominal tenderness. There is no  guarding.      Comments: Abdominal binder in place   Genitourinary:     Comments: Danielson draining clear yellow urine  Musculoskeletal:         General: Normal range of motion.      Right lower leg: No edema.      Left lower leg: No edema.   Skin:     General: Skin is warm and dry.   Neurological:      General: No focal deficit present.      Mental Status: He is alert and oriented to person, place, and time.       Significant Labs: All pertinent labs within the past 24 hours have been reviewed.    Significant Imaging: I have reviewed all pertinent imaging results/findings within the past 24 hours.

## 2022-09-22 NOTE — OP NOTE
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  Large tumor in the upper and mid rectum near the peritoneal reflection requiring dissection below the peritoneal reflection for adequate resection margins    Description of the Procedure:   *modifier 22 should be used for this case due to the complex nature of dissection for the large amount of adipose tissue and thickened bulkiness of the tumor making pelvic dissection difficult with previous dissection from his prostate surgery and a narrow pelvis*    Patient was brought to the operating room placed supine on table.  General endotracheal anesthesia was then induced.  Danielson catheter was sterilely placed.  The patient was moved to lithotomy position.  A rectal washout was performed in usual fashion.  The abdomen was then prepped and draped in the usual sterile fashion.  A preoperative surgical time-out performed confirm the correct patient, procedure and preop medications given.  A left upper quadrant 8 mm  incision was made beneath the subcostal margin and the Veress needle was inserted into abdominal cavity and confirmed good position with aspiration and saline drop test.  The abdomen was then insufflated to a pressure of 15 mmHg.  An 8 mm robotic trocar was inserted this defect using Optiview technique.  The camera was introduced and there was no signs of injury upon entry.  There was also no signs of metastatic disease.  There was some small adhesions of the omentum to the anterior abdominal wall to the umbilicus at the site of the previous hernia repair with a mesh was located as well as to the lateral abdominal sidewall.  A transversus abdominis plane block was then performed using a mixture of 20 cc of Exparel, 30 cc of 0.25% bupivacaine plain and 10 cc of injectable saline.  12.5 cc was injected into the transversus abdominis plane in all 4 quadrants for a total of 50 cc given for the block.  The remaining 10 cc was used at the end of the case for local infiltration.  A right lower quadrant 12 mm port was then placed.  Two additional 8 mm robotic trocars were then placed between these 2 in linear fashion.  A right upper quadrant 5 mm AirSeal port was placed.    The robot was then docked in usual fashion.  The attachments of the omentum to the anterior lateral abdominal sidewalls were then taken down via sharp dissection to expose the abdominal cavity fully.  The tumor was easily seen in the upper rectum which transition down to near the peritoneal reflection.  There was some small scarring of the omentum and the small intestine in the pelvis due to the previous prostate surgery and these adhesions were sharply lysed to expose the pelvis.  Given the nature of the tumor and its location, incision was made to perform a splenic flexure mobilization to allow for adequate reach without tension for the future anastomosis.  Attention was then turned to the left upper quadrant.  The lesser sac was entered between the  omentum and the transverse colon.  There was a large amount of adipose tissue within the omentum as well as the mesentery and the retroperitoneum making this dissection extremely difficult.  Once the lesser sac was entered, the splenic flexure was mobilized by dissecting off the omentum and by taking down the gastrocolic, splenocolic and phrenocolic ligaments in usual fashion.  Once this was completed the remaining flimsy attachments of the mesocolon to the retroperitoneal structures were taken down and the dissection was carried around the white line of Toldt on the descending colon.  Care was taken to protect the spleen, pancreas and kidney during this process.    Once the dissection was completed around the splenic flexure to fully mobilize it, dissection was carried along the white line of Toldt along the descending colon to level the sigmoid colon.  There was again a large amount of adipose tissue on the epiploic appendages as well as in the retroperitoneum making dissection of the descending colon sigmoid colon extremely difficult.  The SEDRICK pedicle was grasped and a mesenteric window was made medial to this.  The left ureter was identified through this defect.  However given the large amount of adipose tissue in this area, the entire circumference of the SEDRICK pedicle could not be safely  away from the surrounding structures to allow for adequate visualization as the small intestine cap being pushed into this area and could not be adequately retracted out of the way for safe taking.  At this point decision was made to convert to an open operation.    The robot was then de docked.  The abdomen was then desufflated the ports were removed.  A midline laparotomy was then made from just above the umbilicus to the pubis.  A wound protector was placed.  The sigmoid colon was then evaluated and the left ureter was again reidentified.  The SEDRICK was then exposed and circumferentially dissected and taken via high  ligation using the EnSeal device with care taken to protect the surrounding structures.  After taking it the stump was completely hemostatic.  The IMV was taken in a similar fashion.  The dissection of the SEDRICK was then taken up to level of the descending sigmoid junction.  A mesenteric window was made beneath this location and the descending colon was stapled using a [place] 75 blue load stapler.  The remaining mesentery was then taken using the EnSeal device after confirming pulsatile blood flow.    Attention was then turned to the rectosigmoid junction.  The tumor was just below the rectosigmoid junction the upper rectum going down to the peritoneal reflection area but was not at the peritoneal reflection.  There was bulky mesentery associated with this.  The retrorectal plane was then entered posteriorly.  Dissection was carried down below the level of the tumor.  Dissection was then carried this laterally along the side of the mesorectum in usual fashion.  The anterior peritoneal reflection was then sharply incised using electrocautery dissection was carried a few cm below the peritoneal reflection.  Once this was circumferentially dissected below the peritoneal reflection, a mesenteric window was made beneath the rectum well below the area of the tumor.  The mesorectum was then taken at this location using the EnSeal device.  Once this was accomplished the rectum was then stapled using a TA 60 blue load stapler in usual fashion.  After taking the stapler, the rectum and sigmoid colon were then passed off the field for final pathology after it was confirmed there was at least a 3 cm distal margin from the open end of the rectum.    The 29 mm EEA stapler was then brought onto the field.  The anvil was sewn into the descending colon end after excising the staple line using a 2-0 Prolene suture in a pursestring fashion.  This excised staple line was sent for final pathology as the proximal margin.  This reached easily  to the rectal stump for future anastomosis without tension.  EEA sizers were then brought through the anus after 2 fingerbreadth dilation and the 29 mm EEA stapler was brought to the rectal staple line.  The spike was brought out just anterior to the staple line.  The spike and anvil were then  together.  The stapler was then closed and fired with care taken to not incorporate any surrounding structures.  After firing the stapler was withdrawn and there were 2 intact anastomotic donuts which were sent for final pathology.  The colon was checked and found to be straight, no twisting of the mesentery and there was no tension on the colon reaching the anastomosis.  The proximal descending colon was then manually clamped and the pelvis was filled with irrigation.  A flexible sigmoidoscope was inserted to the anus and the anastomosis was widely patent and hemostatic upon endoscopic viewing.  There was a possible air leak that was very faint that was discovered.  Given the area of where this was possibly coming from on the anterior surface, decision was made to over-sew this area directly.  The pelvis was suction of irrigation and multiple 3-0 Vicryl sutures were used to over-sew the anterior staple line especially on the left side of the anastomosis.  Once this was completed, the anastomosis was recent emerged under an irrigation.  The flexible sigmoidoscope was then inserted back into the anus and the colon rectum were insufflated.  There was a negative air leak test at this point.  The anastomosis still appeared to be widely patent and hemostatic.  The colon and rectum were then desufflated.  The flexible sigmoidoscope was withdrawn.  The pelvis was suction of irrigation all quadrants were checked and found to be hemostatic.    The omentum was placed over the abdominal contents and into the pelvis over top of the anastomosis.  The wound protector was removed.  The 12 mm port site fascia was closed using a 0  Vicryl suture with the Candelario-Anai device under direct visualization in a figure-of-eight fashion.  The local infiltration was completed.  The fascia was then closed of the midline incision using #1 looped PDS suture in usual fashion.  All sites were then copiously irrigated made hemostatic.  The skin was then loosely stapled shut all sites including the midline incision.  In the midline incision in between the staples, Betadine-soaked Telfa shelly were placed to prevent primary closure and to allow for adequate drainage.  Surgical dressings were then applied.  The patient was moved back in the supine position.  He was woken from general endotracheal anesthesia and taken to the postanesthesia care in stable condition.  He tolerated procedure well.  Sponge, needle and instrument counts were correct at the end of the case.    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 Anastamotic Donut- PERM2. Proximal Margin- PERM3. Sigmoid Colon and Rectum with Tumor - PERM     References:    Click here for ordering Quick Tip   Question Answer Comment   Procedure Type: General Surgery    Specimen Class: Routine/Screening    Which provider would you like to cc? JEM PAREDES    Release to patient Immediate        09/22/22 1419                            Condition: Good    Disposition: PACU - hemodynamically stable.    Attestation: I performed the procedure.

## 2022-09-22 NOTE — HPI
52 y.o. male PMH DM, HTN presents for evaluation of rectosigmoid adenocarcinoma.  Patient was undergoing his 1st ever colonoscopy on 08/04/2022 where a malignant-appearing mass was found in the rectosigmoid area. This was biopsied and confirmed to be adenocarcinoma. Patient also had multiple other adenomas removed. Asymptomatic prior to colonoscopy. Denies any fever, chills, nausea, vomiting, diarrhea, constipation, hematochezia or melena. Maternal grandfather has history of colon cancer. Past surgical history significant for robotic prostatectomy for prostate cancer. HM consulted for medical management postop rectosigmoid junction adenocarcinoma removal. No complaints other than incision site pain at the time of my evaluation.

## 2022-09-22 NOTE — CONSULTS
[facility] Medicine  Consult Note    Patient Name: Patrick Sanz  MRN: [id number]  Admission Date: 9/22/2022  Hospital Length of Stay: 0 days  Attending Physician: Melania Verdin MD  Primary Care Provider: Edilberto Figueroa MD           Patient information was obtained from patient, relative(s), past medical records and ER records.     Consults  Subjective:     Principal Problem: Adenocarcinoma of rectosigmoid junction    Chief Complaint: No chief complaint on file.       HPI: 52 y.o. male PMH DM, HTN presents for evaluation of rectosigmoid adenocarcinoma.  Patient was undergoing his 1st ever colonoscopy on 08/04/2022 where a malignant-appearing mass was found in the rectosigmoid area. This was biopsied and confirmed to be adenocarcinoma. Patient also had multiple other adenomas removed. Asymptomatic prior to colonoscopy. Denies any fever, chills, nausea, vomiting, diarrhea, constipation, hematochezia or melena. Maternal grandfather has history of colon cancer. Past surgical history significant for robotic prostatectomy for prostate cancer. HM consulted for medical management postop rectosigmoid junction adenocarcinoma removal. No complaints other than incision site pain at the time of my evaluation.     Review of patient's allergies indicates:   Allergen Reactions    Aripiprazole Other (See Comments)    Sildenafil Other (See Comments)    Bupropion hcl Anxiety    Penicillins Hives and Rash         Current Medications          Current Outpatient Medications   Medication Sig Dispense Refill    atorvastatin (LIPITOR) 40 MG tablet TK 1 T PO D 90 tablet 1    clonazePAM (KLONOPIN) 0.5 MG tablet Take 1 tablet (0.5 mg total) by mouth 2 (two) times daily as needed for Anxiety. 60 tablet 2    glipiZIDE (GLUCOTROL) 5 MG tablet Take 1 tablet (5 mg total) by mouth 2 (two) times daily with meals. 60 tablet 0    HYDROcodone-acetaminophen (NORCO) 5-325 mg per tablet Take 1 tablet by mouth every 6 (six) hours  as needed.        insulin (LANTUS SOLOSTAR U-100 INSULIN) glargine 100 units/mL SubQ pen Inject 30 Units into the skin once daily. 45 mL 0    insulin lispro (HUMALOG KWIKPEN INSULIN) 100 unit/mL pen Inject 12 Units into the skin 3 (three) times daily with meals. 45 mL 0    JARDIANCE 25 mg tablet Take 1 tablet by mouth once daily 90 tablet 0    lamoTRIgine (LAMICTAL) 200 MG tablet TAKE 1 AND 1/2 TABLETS(300 MG) BY MOUTH EVERY DAY 45 tablet 1    QUEtiapine (SEROQUEL) 100 MG Tab TAKE 1 TABLET(100 MG) BY MOUTH EVERY EVENING 30 tablet 1    tadalafiL (CIALIS) 20 MG Tab          venlafaxine (EFFEXOR-XR) 150 MG Cp24 Take 1 capsule (150 mg total) by mouth once daily. 90 capsule 0    blood-glucose sensor (DEXCOM G6 SENSOR) Miriam 1 each by Misc.(Non-Drug; Combo Route) route every 14 (fourteen) days. 3 each 11    blood-glucose transmitter (DEXCOM G6 TRANSMITTER) Miriam 1 each by Misc.(Non-Drug; Combo Route) route once daily. 1 each 3    metroNIDAZOLE (FLAGYL) 500 MG tablet Take 1 tablet (500 mg total) by mouth 3 (three) times daily. Take initial dose@2pm, second dose@3pm and final dose@10pm day before surgery 3 tablet 0    neomycin (MYCIFRADIN) 500 mg Tab Take 2 tablets (1,000 mg total) by mouth 3 (three) times daily. Take 1st dose@2pm,take 2nd dose@3pm, take last dose@10pm day before surgery 6 tablet 0    oxyCODONE (ROXICODONE) 5 MG immediate release tablet Take 1 tablet (5 mg total) by mouth every 8 (eight) hours as needed for Pain. 10 tablet 0    polyethylene glycol (GLYCOLAX) 17 gram/dose powder Take 238 g by mouth once daily. Mix with 2L of gatorade, drink all mixed solution starting@12pm day before surgery, finish by 10pm 1 each 0      No current facility-administered medications for this visit.                 Past Medical History:   Diagnosis Date    Anxiety      Bipolar disorder      Depression      Diabetes mellitus, type 2      Elevated PSA 3/16/2021            Past Surgical History:   Procedure Laterality Date     COLONOSCOPY N/A 2022     Procedure: COLONOSCOPY;  Surgeon: Michelle Flores MD;  Location: [facility];  Service: Endoscopy;  Laterality: N/A;    HERNIA REPAIR                Family History   Problem Relation Age of Onset    Diabetes Mother      Hypertension Father        Social History            Tobacco Use    Smoking status: Former       Types: Cigarettes       Quit date: 2014       Years since quittin.6    Smokeless tobacco: Former   Substance Use Topics    Alcohol use: Not Currently    Drug use: Never       Review of Systems   Constitutional:  Negative for activity change, appetite change, chills, fatigue and fever.   HENT: Negative.     Eyes: Negative.    Respiratory:  Negative for chest tightness, shortness of breath and wheezing.    Cardiovascular:  Negative for chest pain and leg swelling.   Gastrointestinal:  Positive for abdominal pain. Negative for abdominal distention, diarrhea, nausea and vomiting.   Endocrine: Negative.  Negative for polydipsia and polyuria.   Genitourinary:  Negative for flank pain and hematuria.   Musculoskeletal:  Negative for back pain, joint swelling and myalgias.   Skin: Negative.    Neurological: Negative.  Negative for syncope, weakness and light-headedness.   Hematological: Negative.    Psychiatric/Behavioral:  Negative for confusion.    Objective:     Vital Signs (Most Recent):  Temp: 98.5 °F (36.9 °C) (22 1603)  Pulse: 97 (22 1603)  Resp: 17 (22 1714)  BP: (!) 143/85 (22 1603)  SpO2: 95 % (22 1603)   Vital Signs (24h Range):  Temp:  [97.7 °F (36.5 °C)-98.8 °F (37.1 °C)] 98.5 °F (36.9 °C)  Pulse:  [] 97  Resp:  [13-26] 17  SpO2:  [93 %-98 %] 95 %  BP: (139-177)/(65-92) 143/85     Weight: 102.4 kg (225 lb 12 oz)  Body mass index is 34.33 kg/m².    Intake/Output Summary (Last 24 hours) at 2022 1748  Last data filed at 2022 1554  Gross per 24 hour   Intake 2300 ml   Output 700 ml   Net 1600 ml      Physical  Exam  Constitutional:       General: He is not in acute distress.     Appearance: Normal appearance. He is not ill-appearing.   HENT:      Head: Normocephalic and atraumatic.   Cardiovascular:      Rate and Rhythm: Regular rhythm. Tachycardia present.   Pulmonary:      Effort: Pulmonary effort is normal.      Breath sounds: Normal breath sounds.   Abdominal:      General: Abdomen is flat. Bowel sounds are normal. There is no distension.      Palpations: Abdomen is soft.      Tenderness: There is abdominal tenderness. There is no guarding.      Comments: Abdominal binder in place   Genitourinary:     Comments: Danielson draining clear yellow urine  Musculoskeletal:         General: Normal range of motion.      Right lower leg: No edema.      Left lower leg: No edema.   Skin:     General: Skin is warm and dry.   Neurological:      General: No focal deficit present.      Mental Status: He is alert and oriented to person, place, and time.       Significant Labs: All pertinent labs within the past 24 hours have been reviewed.    Significant Imaging: I have reviewed all pertinent imaging results/findings within the past 24 hours.    Assessment/Plan:     * Adenocarcinoma of rectosigmoid junction  POD0 surgical resection  Pain control  Advance diet per surgery as tolerated      Malignant neoplasm of prostate  Prior resection  Danielson catheter in place postop      Uncontrolled type 2 diabetes mellitus with hyperglycemia  Patient's FSGs are uncontrolled due to hyperglycemia on current medication regimen.  Last A1c reviewed-   Lab Results   Component Value Date    HGBA1C 8.7 (H) 08/06/2022     Most recent fingerstick glucose reviewed-   Recent Labs   Lab 09/22/22  0610 09/22/22  1443   POCTGLUCOSE 169* 165*     Current correctional scale  Low  Maintain anti-hyperglycemic dose as follows-   Antihyperglycemics (From admission, onward)      Start     Stop Route Frequency Ordered    09/22/22 1902  insulin aspart U-100 pen 0-5 Units          -- SubQ Every 6 hours PRN 09/22/22 1802          Hold Oral hypoglycemics while patient is in the hospital.    VTE Risk Mitigation (From admission, onward)           Ordered     enoxaparin injection 40 mg  Daily         09/22/22 1548     IP VTE HIGH RISK PATIENT  Once         09/22/22 1548     Place sequential compression device  Until discontinued         09/22/22 1548                        Thank you for your consult. I will follow-up with patient. Please contact us if you have any additional questions.    Melania Verdin MD  Department of Hospital Medicine   [facility] - Med Surg

## 2022-09-22 NOTE — TRANSFER OF CARE
"Anesthesia Transfer of Care Note    Patient: Patrick Sanz    Procedure(s) Performed: Procedure(s) (LRB):  XI ROBOTIC RESECTION, COLON, LOW ANTERIOR (N/A)  BLOCK, TRANSVERSUS ABDOMINIS PLANE (N/A)  MOBILIZATION, SPLENIC FLEXURE (N/A)    Patient location: PACU    Anesthesia Type: general    Transport from OR: Transported from OR on room air with adequate spontaneous ventilation    Post pain: adequate analgesia    Post assessment: no apparent anesthetic complications    Post vital signs: stable    Level of consciousness: awake and responds to stimulation    Nausea/Vomiting: no nausea/vomiting    Complications: none    Transfer of care protocol was followed      Last vitals:   Visit Vitals  /65   Pulse 90   Temp 36.5 °C (97.7 °F) (Temporal)   Resp 18   Ht 5' 8" (1.727 m)   Wt 101.9 kg (224 lb 10.4 oz)   SpO2 98%   BMI 34.16 kg/m²     "

## 2022-09-22 NOTE — NURSING TRANSFER
Nursing Transfer Note      9/22/2022     Received bedside report from Cristal RN, pacu.    Reason patient is being transferred: POST OP    Transfer From: PACU    Transfer via bed    Transfer with O2,     Transported by RN PACU    Medicines sent: N/A    Any special needs or follow-up needed: POST OP INTX    Chart send with patient: Yes    Notified: spouse, sister @ bedside    Upon arrival to floor: patient oriented to room, call bell in reach, and bed in lowest position, vitals maintained, o2 nasal cannula maintained, IV fluids initiated.

## 2022-09-22 NOTE — ANESTHESIA POSTPROCEDURE EVALUATION
Anesthesia Post Evaluation    Patient: Patrick Sanz    Procedure(s) Performed: Procedure(s) (LRB):  XI ROBOTIC RESECTION, COLON, LOW ANTERIOR (N/A)  BLOCK, TRANSVERSUS ABDOMINIS PLANE (N/A)  MOBILIZATION, SPLENIC FLEXURE (N/A)    Final Anesthesia Type: general      Patient location during evaluation: PACU  Patient participation: Yes- Able to Participate  Level of consciousness: awake and alert  Post-procedure vital signs: reviewed and stable  Pain management: adequate  Airway patency: patent  BRAYAN mitigation strategies: Verification of full reversal of neuromuscular block  PONV status at discharge: No PONV  Anesthetic complications: no      Cardiovascular status: hemodynamically stable  Respiratory status: spontaneous ventilation  Hydration status: euvolemic  Follow-up not needed.          Vitals Value Taken Time   /85 09/22/22 1603   Temp 36.9 °C (98.5 °F) 09/22/22 1603   Pulse 97 09/22/22 1603   Resp 17 09/22/22 1714   SpO2 95 % 09/22/22 1603         Event Time   Out of Recovery 15:55:00         Pain/Wendy Score: Pain Rating Prior to Med Admin: 8 (9/22/2022  5:26 PM)  Wendy Score: 8 (9/22/2022  3:46 PM)

## 2022-09-22 NOTE — ANESTHESIA PROCEDURE NOTES
Intubation    Date/Time: 9/22/2022 8:43 AM  Performed by: Matt Mchugh CRNA  Authorized by: Carlos Valdez II, MD     Intubation:     Induction:  Intravenous    Intubated:  Postinduction    Mask Ventilation:  Easy mask    Attempts:  2    Attempted By:  CRNA    Method of Intubation:  Direct    Blade:  Hicks 2    Laryngeal View Grade: Grade IIb - only the arytenoids and epiglottis seen      Attempted By (2nd Attempt):  CRNA    Method of Intubation (2nd Attempt):  Video laryngoscopy    Blade (2nd Attempt):  Mcdonald 3    Laryngeal View Grade (2nd Attempt): Grade I - full view of cords      Difficult Airway Encountered?: No      Complications:  None    Airway Device:  Oral endotracheal tube    Airway Device Size:  7.5    Style/Cuff Inflation:  Cuffed (inflated to minimal occlusive pressure)    Tube secured:  22    Secured at:  The lips    Placement Verified By:  Capnometry    Complicating Factors:  Small mouth and poor neck/head extension    Findings Post-Intubation:  BS equal bilateral

## 2022-09-22 NOTE — ASSESSMENT & PLAN NOTE
Patient's FSGs are uncontrolled due to hyperglycemia on current medication regimen.  Last A1c reviewed-   Lab Results   Component Value Date    HGBA1C 8.7 (H) 08/06/2022     Most recent fingerstick glucose reviewed-   Recent Labs   Lab 09/22/22  0610 09/22/22  1443   POCTGLUCOSE 169* 165*     Current correctional scale  Low  Maintain anti-hyperglycemic dose as follows-   Antihyperglycemics (From admission, onward)    Start     Stop Route Frequency Ordered    09/22/22 1902  insulin aspart U-100 pen 0-5 Units         -- SubQ Every 6 hours PRN 09/22/22 1802        Hold Oral hypoglycemics while patient is in the hospital.

## 2022-09-22 NOTE — PLAN OF CARE
Pt resting on stretcher s/p low ant colectomy, mobilization of splenic flexure with TAP block performed under general anesthesia by Dr. Quinn. Respirations even and unlabored on 100% NRB mask with O2 sats of 97%. VSS. Will cont to monitor. See flow sheet for detailed assessment.

## 2022-09-23 LAB
ANION GAP SERPL CALC-SCNC: 13 MMOL/L (ref 8–16)
BASOPHILS # BLD AUTO: 0.02 K/UL (ref 0–0.2)
BASOPHILS NFR BLD: 0.2 % (ref 0–1.9)
BUN SERPL-MCNC: 9 MG/DL (ref 6–20)
CALCIUM SERPL-MCNC: 7.9 MG/DL (ref 8.7–10.5)
CHLORIDE SERPL-SCNC: 105 MMOL/L (ref 95–110)
CO2 SERPL-SCNC: 19 MMOL/L (ref 23–29)
CREAT SERPL-MCNC: 0.7 MG/DL (ref 0.5–1.4)
DIFFERENTIAL METHOD: ABNORMAL
EOSINOPHIL # BLD AUTO: 0 K/UL (ref 0–0.5)
EOSINOPHIL NFR BLD: 0.2 % (ref 0–8)
ERYTHROCYTE [DISTWIDTH] IN BLOOD BY AUTOMATED COUNT: 13.8 % (ref 11.5–14.5)
EST. GFR  (NO RACE VARIABLE): >60 ML/MIN/1.73 M^2
GLUCOSE SERPL-MCNC: 101 MG/DL (ref 70–110)
HCT VFR BLD AUTO: 42.3 % (ref 40–54)
HGB BLD-MCNC: 13.2 G/DL (ref 14–18)
IMM GRANULOCYTES # BLD AUTO: 0.03 K/UL (ref 0–0.04)
IMM GRANULOCYTES NFR BLD AUTO: 0.3 % (ref 0–0.5)
LYMPHOCYTES # BLD AUTO: 2.2 K/UL (ref 1–4.8)
LYMPHOCYTES NFR BLD: 21.9 % (ref 18–48)
MAGNESIUM SERPL-MCNC: 1.6 MG/DL (ref 1.6–2.6)
MCH RBC QN AUTO: 27.4 PG (ref 27–31)
MCHC RBC AUTO-ENTMCNC: 31.2 G/DL (ref 32–36)
MCV RBC AUTO: 88 FL (ref 82–98)
MONOCYTES # BLD AUTO: 1.4 K/UL (ref 0.3–1)
MONOCYTES NFR BLD: 13.8 % (ref 4–15)
NEUTROPHILS # BLD AUTO: 6.2 K/UL (ref 1.8–7.7)
NEUTROPHILS NFR BLD: 63.6 % (ref 38–73)
NRBC BLD-RTO: 0 /100 WBC
PHOSPHATE SERPL-MCNC: 3.2 MG/DL (ref 2.7–4.5)
PLATELET # BLD AUTO: 298 K/UL (ref 150–450)
PMV BLD AUTO: 9.3 FL (ref 9.2–12.9)
POCT GLUCOSE: 108 MG/DL (ref 70–110)
POCT GLUCOSE: 113 MG/DL (ref 70–110)
POCT GLUCOSE: 117 MG/DL (ref 70–110)
POCT GLUCOSE: 137 MG/DL (ref 70–110)
POTASSIUM SERPL-SCNC: 4.7 MMOL/L (ref 3.5–5.1)
RBC # BLD AUTO: 4.82 M/UL (ref 4.6–6.2)
SODIUM SERPL-SCNC: 137 MMOL/L (ref 136–145)
WBC # BLD AUTO: 9.8 K/UL (ref 3.9–12.7)

## 2022-09-23 PROCEDURE — 83735 ASSAY OF MAGNESIUM: CPT | Performed by: COLON & RECTAL SURGERY

## 2022-09-23 PROCEDURE — 25000003 PHARM REV CODE 250: Performed by: COLON & RECTAL SURGERY

## 2022-09-23 PROCEDURE — 36415 COLL VENOUS BLD VENIPUNCTURE: CPT | Performed by: COLON & RECTAL SURGERY

## 2022-09-23 PROCEDURE — 80048 BASIC METABOLIC PNL TOTAL CA: CPT | Performed by: COLON & RECTAL SURGERY

## 2022-09-23 PROCEDURE — 63600175 PHARM REV CODE 636 W HCPCS: Performed by: NURSE PRACTITIONER

## 2022-09-23 PROCEDURE — 85025 COMPLETE CBC W/AUTO DIFF WBC: CPT | Performed by: COLON & RECTAL SURGERY

## 2022-09-23 PROCEDURE — 11000001 HC ACUTE MED/SURG PRIVATE ROOM

## 2022-09-23 PROCEDURE — 84100 ASSAY OF PHOSPHORUS: CPT | Performed by: COLON & RECTAL SURGERY

## 2022-09-23 PROCEDURE — 63600175 PHARM REV CODE 636 W HCPCS: Performed by: COLON & RECTAL SURGERY

## 2022-09-23 PROCEDURE — 94799 UNLISTED PULMONARY SVC/PX: CPT

## 2022-09-23 PROCEDURE — 99900035 HC TECH TIME PER 15 MIN (STAT)

## 2022-09-23 RX ADMIN — ACETAMINOPHEN 1000 MG: 10 INJECTION INTRAVENOUS at 02:09

## 2022-09-23 RX ADMIN — HYDRALAZINE HYDROCHLORIDE 10 MG: 20 INJECTION, SOLUTION INTRAMUSCULAR; INTRAVENOUS at 04:09

## 2022-09-23 RX ADMIN — OXYCODONE HYDROCHLORIDE 10 MG: 5 TABLET ORAL at 03:09

## 2022-09-23 RX ADMIN — OXYCODONE HYDROCHLORIDE 10 MG: 5 TABLET ORAL at 08:09

## 2022-09-23 RX ADMIN — ACETAMINOPHEN 1000 MG: 10 INJECTION INTRAVENOUS at 05:09

## 2022-09-23 RX ADMIN — FAMOTIDINE 20 MG: 20 TABLET ORAL at 08:09

## 2022-09-23 RX ADMIN — ENOXAPARIN SODIUM 40 MG: 100 INJECTION SUBCUTANEOUS at 05:09

## 2022-09-23 RX ADMIN — OXYCODONE HYDROCHLORIDE 5 MG: 5 TABLET ORAL at 03:09

## 2022-09-23 RX ADMIN — MORPHINE SULFATE 2 MG: 4 INJECTION INTRAVENOUS at 06:09

## 2022-09-23 RX ADMIN — MORPHINE SULFATE 2 MG: 4 INJECTION INTRAVENOUS at 07:09

## 2022-09-23 RX ADMIN — CHLORHEXIDINE GLUCONATE 0.12% ORAL RINSE 10 ML: 1.2 LIQUID ORAL at 09:09

## 2022-09-23 RX ADMIN — LAMOTRIGINE 200 MG: 100 TABLET ORAL at 08:09

## 2022-09-23 RX ADMIN — FAMOTIDINE 20 MG: 20 TABLET ORAL at 09:09

## 2022-09-23 RX ADMIN — SENNOSIDES AND DOCUSATE SODIUM 1 TABLET: 50; 8.6 TABLET ORAL at 08:09

## 2022-09-23 RX ADMIN — CHLORHEXIDINE GLUCONATE 0.12% ORAL RINSE 10 ML: 1.2 LIQUID ORAL at 08:09

## 2022-09-23 RX ADMIN — ATORVASTATIN CALCIUM 80 MG: 40 TABLET, FILM COATED ORAL at 08:09

## 2022-09-23 RX ADMIN — QUETIAPINE FUMARATE 100 MG: 100 TABLET ORAL at 08:09

## 2022-09-23 RX ADMIN — SENNOSIDES AND DOCUSATE SODIUM 1 TABLET: 50; 8.6 TABLET ORAL at 09:09

## 2022-09-23 RX ADMIN — SODIUM CHLORIDE, SODIUM LACTATE, POTASSIUM CHLORIDE, AND CALCIUM CHLORIDE: .6; .31; .03; .02 INJECTION, SOLUTION INTRAVENOUS at 07:09

## 2022-09-23 RX ADMIN — OXYCODONE HYDROCHLORIDE 10 MG: 5 TABLET ORAL at 09:09

## 2022-09-23 NOTE — ASSESSMENT & PLAN NOTE
Patient's FSGs are uncontrolled due to hyperglycemia on current medication regimen.  Last A1c reviewed-   Lab Results   Component Value Date    HGBA1C 8.7 (H) 08/06/2022     Most recent fingerstick glucose reviewed-   Recent Labs   Lab 09/22/22  1443 09/23/22  0047 09/23/22  0535   POCTGLUCOSE 165* 113* 108     Current correctional scale  Low  Maintain anti-hyperglycemic dose as follows-   Antihyperglycemics (From admission, onward)    Start     Stop Route Frequency Ordered    09/22/22 1902  insulin aspart U-100 pen 0-5 Units         -- SubQ Every 6 hours PRN 09/22/22 1802        Hold Oral hypoglycemics while patient is in the hospital.    9/23:  Glucose controlled

## 2022-09-23 NOTE — HPI
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

## 2022-09-23 NOTE — ASSESSMENT & PLAN NOTE
POD0 surgical resection  Pain control  Advance diet per surgery as tolerated    9/23:  POD 1 surgical resection  Pain controlled  Tolerating CLD  Advance diet per surgery

## 2022-09-23 NOTE — SUBJECTIVE & OBJECTIVE
Interval History: Postop day 1, resection adenocarcinoma of rectosigmoid junction.  Tolerated procedure well.  Pain control.  Currently tolerating clear liquid diet.  Advance diet per surgery.    Review of Systems   Constitutional:  Negative for fatigue and fever.   HENT:  Negative for sinus pressure.    Eyes:  Negative for visual disturbance.   Respiratory:  Negative for shortness of breath.    Cardiovascular:  Negative for chest pain.   Gastrointestinal:  Negative for nausea and vomiting.   Genitourinary:  Negative for difficulty urinating.   Musculoskeletal:  Negative for back pain.   Skin:  Negative for rash.   Neurological:  Negative for headaches.   Psychiatric/Behavioral:  Negative for confusion.    Objective:     Vital Signs (Most Recent):  Temp: 98.4 °F (36.9 °C) (09/23/22 0807)  Pulse: 105 (09/23/22 0807)  Resp: 18 (09/23/22 0947)  BP: (!) 140/73 (09/23/22 0807)  SpO2: 99 % (09/23/22 0807)   Vital Signs (24h Range):  Temp:  [97.4 °F (36.3 °C)-98.8 °F (37.1 °C)] 98.4 °F (36.9 °C)  Pulse:  [] 105  Resp:  [13-26] 18  SpO2:  [93 %-100 %] 99 %  BP: (124-183)/(73-94) 140/73     Weight: 105.1 kg (231 lb 11.3 oz)  Body mass index is 35.23 kg/m².    Intake/Output Summary (Last 24 hours) at 9/23/2022 1048  Last data filed at 9/23/2022 0642  Gross per 24 hour   Intake 3235.03 ml   Output 1900 ml   Net 1335.03 ml      Physical Exam  Constitutional:       General: He is not in acute distress.     Appearance: He is well-developed. He is not diaphoretic.   HENT:      Head: Normocephalic and atraumatic.   Eyes:      Pupils: Pupils are equal, round, and reactive to light.   Cardiovascular:      Rate and Rhythm: Normal rate and regular rhythm.      Heart sounds: Normal heart sounds. No murmur heard.    No friction rub. No gallop.   Pulmonary:      Effort: Pulmonary effort is normal. No respiratory distress.      Breath sounds: Normal breath sounds. No stridor. No wheezing or rales.   Abdominal:      General: Bowel  sounds are normal. There is no distension.      Palpations: Abdomen is soft. There is no mass.      Tenderness: There is abdominal tenderness (mild). There is no guarding.   Musculoskeletal:      Right lower leg: No edema.      Left lower leg: No edema.   Skin:     General: Skin is warm.      Findings: No erythema.   Neurological:      Mental Status: He is alert and oriented to person, place, and time.       Significant Labs: All pertinent labs within the past 24 hours have been reviewed.    Significant Imaging: I have reviewed all pertinent imaging results/findings within the past 24 hours.

## 2022-09-23 NOTE — PROGRESS NOTES
[facility] Medicine  Progress Note    Patient Name: Patrick Sanz  MRN: [id number]  Patient Class: IP- Surgery Admit   Admission Date: 9/22/2022  Length of Stay: 1 days  Attending Physician: Cj Quinn MD  Primary Care Provider: Edilberto Figueroa MD        Subjective:     Principal Problem:Adenocarcinoma of rectosigmoid junction        HPI:  52 y.o. male PMH DM, HTN presents for evaluation of rectosigmoid adenocarcinoma.  Patient was undergoing his 1st ever colonoscopy on 08/04/2022 where a malignant-appearing mass was found in the rectosigmoid area. This was biopsied and confirmed to be adenocarcinoma. Patient also had multiple other adenomas removed. Asymptomatic prior to colonoscopy. Denies any fever, chills, nausea, vomiting, diarrhea, constipation, hematochezia or melena. Maternal grandfather has history of colon cancer. Past surgical history significant for robotic prostatectomy for prostate cancer. HM consulted for medical management postop rectosigmoid junction adenocarcinoma removal. No complaints other than incision site pain at the time of my evaluation.       Overview/Hospital Course:   9/23:  Postop day 1, resection adenocarcinoma of rectosigmoid junction.  Tolerated procedure well.  Pain control.  Currently tolerating clear liquid diet.  Advance diet per surgery.      Interval History: Postop day 1, resection adenocarcinoma of rectosigmoid junction.  Tolerated procedure well.  Pain control.  Currently tolerating clear liquid diet.  Advance diet per surgery.    Review of Systems   Constitutional:  Negative for fatigue and fever.   HENT:  Negative for sinus pressure.    Eyes:  Negative for visual disturbance.   Respiratory:  Negative for shortness of breath.    Cardiovascular:  Negative for chest pain.   Gastrointestinal:  Negative for nausea and vomiting.   Genitourinary:  Negative for difficulty urinating.   Musculoskeletal:  Negative for back pain.   Skin:  Negative for  rash.   Neurological:  Negative for headaches.   Psychiatric/Behavioral:  Negative for confusion.    Objective:     Vital Signs (Most Recent):  Temp: 98.4 °F (36.9 °C) (09/23/22 0807)  Pulse: 105 (09/23/22 0807)  Resp: 18 (09/23/22 0947)  BP: (!) 140/73 (09/23/22 0807)  SpO2: 99 % (09/23/22 0807)   Vital Signs (24h Range):  Temp:  [97.4 °F (36.3 °C)-98.8 °F (37.1 °C)] 98.4 °F (36.9 °C)  Pulse:  [] 105  Resp:  [13-26] 18  SpO2:  [93 %-100 %] 99 %  BP: (124-183)/(73-94) 140/73     Weight: 105.1 kg (231 lb 11.3 oz)  Body mass index is 35.23 kg/m².    Intake/Output Summary (Last 24 hours) at 9/23/2022 1048  Last data filed at 9/23/2022 0642  Gross per 24 hour   Intake 3235.03 ml   Output 1900 ml   Net 1335.03 ml      Physical Exam  Constitutional:       General: He is not in acute distress.     Appearance: He is well-developed. He is not diaphoretic.   HENT:      Head: Normocephalic and atraumatic.   Eyes:      Pupils: Pupils are equal, round, and reactive to light.   Cardiovascular:      Rate and Rhythm: Normal rate and regular rhythm.      Heart sounds: Normal heart sounds. No murmur heard.    No friction rub. No gallop.   Pulmonary:      Effort: Pulmonary effort is normal. No respiratory distress.      Breath sounds: Normal breath sounds. No stridor. No wheezing or rales.   Abdominal:      General: Bowel sounds are normal. There is no distension.      Palpations: Abdomen is soft. There is no mass.      Tenderness: There is abdominal tenderness (mild). There is no guarding.   Musculoskeletal:      Right lower leg: No edema.      Left lower leg: No edema.   Skin:     General: Skin is warm.      Findings: No erythema.   Neurological:      Mental Status: He is alert and oriented to person, place, and time.       Significant Labs: All pertinent labs within the past 24 hours have been reviewed.    Significant Imaging: I have reviewed all pertinent imaging results/findings within the past 24  hours.          Assessment/Plan:      * Adenocarcinoma of rectosigmoid junction  POD0 surgical resection  Pain control  Advance diet per surgery as tolerated    9/23:  POD 1 surgical resection  Pain controlled  Tolerating CLD  Advance diet per surgery      Malignant neoplasm of prostate  Prior resection  Danielson catheter in place postop      Uncontrolled type 2 diabetes mellitus with hyperglycemia  Patient's FSGs are uncontrolled due to hyperglycemia on current medication regimen.  Last A1c reviewed-   Lab Results   Component Value Date    HGBA1C 8.7 (H) 08/06/2022     Most recent fingerstick glucose reviewed-   Recent Labs   Lab 09/22/22  1443 09/23/22  0047 09/23/22  0535   POCTGLUCOSE 165* 113* 108     Current correctional scale  Low  Maintain anti-hyperglycemic dose as follows-   Antihyperglycemics (From admission, onward)    Start     Stop Route Frequency Ordered    09/22/22 1902  insulin aspart U-100 pen 0-5 Units         -- SubQ Every 6 hours PRN 09/22/22 1802        Hold Oral hypoglycemics while patient is in the hospital.    9/23:  Glucose controlled       VTE Risk Mitigation (From admission, onward)         Ordered     enoxaparin injection 40 mg  Daily         09/22/22 1548     IP VTE HIGH RISK PATIENT  Once         09/22/22 1548     Place sequential compression device  Until discontinued         09/22/22 1548                Discharge Planning   MANJIT:      Code Status: Prior   Is the patient medically ready for discharge?:     Reason for patient still in hospital (select all that apply): Patient trending condition                     Charly Frazier MD  Department of Hospital Medicine   [facility] - Med Surg

## 2022-09-23 NOTE — SUBJECTIVE & OBJECTIVE
Interval History: POD 1. Tolerating clears. Denies nausea and vomiting. Voided after miller removal without issue. No BM/flatus yet. Minimal ambulation so far. Pain controlled at this time.    Medications:  Continuous Infusions:   lactated ringers 75 mL/hr at 09/23/22 0642     Scheduled Meds:   acetaminophen  1,000 mg Intravenous Q8H    atorvastatin  80 mg Oral QHS    chlorhexidine  10 mL Mouth/Throat BID    enoxaparin  40 mg Subcutaneous Daily    famotidine  20 mg Oral BID    lamoTRIgine  200 mg Oral QHS    QUEtiapine  100 mg Oral QHS    senna-docusate 8.6-50 mg  1 tablet Oral BID     PRN Meds:diphenhydrAMINE, glucagon (human recombinant), hydrALAZINE, insulin aspart U-100, morphine, ondansetron, oxyCODONE, oxyCODONE     Review of patient's allergies indicates:   Allergen Reactions    Aripiprazole Other (See Comments)    Sildenafil Other (See Comments)    Bupropion hcl Anxiety    Penicillins Hives and Rash     Objective:     Vital Signs (Most Recent):  Temp: 98.4 °F (36.9 °C) (09/23/22 0807)  Pulse: 105 (09/23/22 0807)  Resp: 18 (09/23/22 0947)  BP: (!) 140/73 (09/23/22 0807)  SpO2: 99 % (09/23/22 0807)   Vital Signs (24h Range):  Temp:  [97.4 °F (36.3 °C)-98.8 °F (37.1 °C)] 98.4 °F (36.9 °C)  Pulse:  [] 105  Resp:  [13-26] 18  SpO2:  [93 %-100 %] 99 %  BP: (124-183)/(73-94) 140/73     Weight: 105.1 kg (231 lb 11.3 oz)  Body mass index is 35.23 kg/m².    Intake/Output - Last 3 Shifts         09/21 0700 09/22 0659 09/22 0700 09/23 0659 09/23 0700 09/24 0659    I.V. (mL/kg)  1041 (9.9)     IV Piggyback  2494     Total Intake(mL/kg)  3535 (33.6)     Urine (mL/kg/hr)  1700 (0.7)     Blood  200     Total Output  1900     Net  +1635                    Physical Exam  Vitals and nursing note reviewed.   Constitutional:       General: He is not in acute distress.     Appearance: He is well-developed. He is not ill-appearing.   HENT:      Head: Normocephalic and atraumatic.      Right Ear: External ear normal.       Left Ear: External ear normal.   Eyes:      Extraocular Movements: Extraocular movements intact.      Conjunctiva/sclera: Conjunctivae normal.   Cardiovascular:      Rate and Rhythm: Normal rate.   Pulmonary:      Effort: Pulmonary effort is normal. No respiratory distress.   Abdominal:      Comments: Abdomen with minimal distention and expected TTP. Surgical dressings remain in place, clean and dry.    Musculoskeletal:      Cervical back: Normal range of motion and neck supple.   Skin:     General: Skin is warm and dry.   Neurological:      Mental Status: He is alert and oriented to person, place, and time.   Psychiatric:         Behavior: Behavior normal.       Significant Labs:  I have reviewed all pertinent lab results within the past 24 hours.  CBC:   Recent Labs   Lab 09/23/22  0534   WBC 9.80   RBC 4.82   HGB 13.2*   HCT 42.3      MCV 88   MCH 27.4   MCHC 31.2*     CMP:   Recent Labs   Lab 09/19/22  1617 09/23/22  0534   * 101   CALCIUM 9.4 7.9*   ALBUMIN 4.5  --    PROT 7.3  --     137   K 4.3 4.7   CO2 24 19*    105   BUN 12 9   CREATININE 1.1 0.7   ALKPHOS 133  --    ALT 48*  --    AST 21  --    BILITOT 0.6  --        Significant Diagnostics:  I have reviewed all pertinent imaging results/findings within the past 24 hours.  No new pertinent imaging.

## 2022-09-23 NOTE — HOSPITAL COURSE
09/23/2022: POD 1. Tolerating clears. Denies nausea and vomiting. Voided after miller removal without issue. No BM/flatus yet. Minimal ambulation so far. Pain controlled at this time.    09/24/2022 POD 2 passing flatus and a few bowel movements, pain moderately controlled, ambulating    09/25/2022 postop day 3 remains slightly tachycardic, tolerating diet but with some bloating/nausea, passing bowel movements    09/26/2022: POD 4. Feeling much better. Tolerating diet. Having nonbloody bowel movements and passing flatus. Pain well controlled. No further nausea or vomiting. Incisions remain c/d/I. Ready for discharge.

## 2022-09-23 NOTE — HOSPITAL COURSE
9/25: Postop day 3, resection adenocarcinoma of rectosigmoid junction.  Tolerated procedure well.  Pain control.  Advanced to regular diet and reporting complains of abdominal bloating and nausea, passing bowel movements

## 2022-09-23 NOTE — PLAN OF CARE
-call button within reach - pain controlled per MD order  Problem: Adult Inpatient Plan of Care  Goal: Plan of Care Review  Outcome: Ongoing, Progressing  Goal: Patient-Specific Goal (Individualized)  Outcome: Ongoing, Progressing  Goal: Absence of Hospital-Acquired Illness or Injury  Outcome: Ongoing, Progressing  Goal: Optimal Comfort and Wellbeing  Outcome: Ongoing, Progressing  Goal: Readiness for Transition of Care  Outcome: Ongoing, Progressing     Problem: Diabetes Comorbidity  Goal: Blood Glucose Level Within Targeted Range  Outcome: Ongoing, Progressing     Problem: Infection  Goal: Absence of Infection Signs and Symptoms  Outcome: Ongoing, Progressing     Problem: Fall Injury Risk  Goal: Absence of Fall and Fall-Related Injury  Outcome: Ongoing, Progressing     Problem: Skin Injury Risk Increased  Goal: Skin Health and Integrity  Outcome: Ongoing, Progressing

## 2022-09-23 NOTE — PLAN OF CARE
[facility]  Initial Discharge Assessment       Primary Care Provider: Edilberto Figueroa MD    Admission Diagnosis: Adenocarcinoma of rectosigmoid junction [C19]    Admission Date: 9/22/2022  Expected Discharge Date:     Discharge Barriers Identified: None    Payor: [employer] / Plan: [employer] / Product Type: Commercial /     Extended Emergency Contact Information  Primary Emergency Contact: Missy Al  Mobile Phone: [phone]  Relation: Sister  Preferred language: English   needed? No  Secondary Emergency Contact: Shwetha Sanz  Mobile Phone: [phone]  Relation: Daughter  Preferred language: English   needed? No    Discharge Plan A: Home  Discharge Plan B: Home      [facility] (New Address) - [address] AT Previously: [address]  [facility] [address]  Phone: [phone] Fax: [phone]    [facility] [place] - [address]  [address] [address] [place] [address]  Phone: [phone] Fax: [phone]    [id number]  [place] - [address] [facility] [address]  [address] [place] [address]  Phone: [phone] Fax: [phone]      Initial Assessment (most recent)       Adult Discharge Assessment - 09/23/22 1139          Discharge Assessment    Assessment Type Discharge Planning Assessment     Confirmed/corrected address, phone number and insurance Yes     Confirmed Demographics Correct on Facesheet     Source of Information patient     Communicated MANJIT with patient/caregiver Date not available/Unable to determine     Reason For Admission adenocarcinoma of rectosigmoid junction     Lives With alone     Facility Arrived From: home     Do you expect to return to your current living situation? Yes     Do you have help at home or someone to help you manage  your care at home? No     Prior to hospitilization cognitive status: Alert/Oriented     Current cognitive status: Alert/Oriented     Walking or Climbing Stairs Difficulty none     Dressing/Bathing Difficulty none     Equipment Currently Used at Home CPAP     Readmission within 30 days? No     Patient currently being followed by outpatient case management? No     Do you currently have service(s) that help you manage your care at home? No     Do you take prescription medications? Yes     Do you have prescription coverage? Yes     Coverage [facility]     Do you have any problems affording any of your prescribed medications? No     Is the patient taking medications as prescribed? yes     Who is going to help you get home at discharge? sister     How do you get to doctors appointments? car, drives self;family or friend will provide     Are you on dialysis? No     Do you take coumadin? No     Discharge Plan A Home     Discharge Plan B Home     DME Needed Upon Discharge  none     Discharge Plan discussed with: Patient     Discharge Barriers Identified None        Physical Activity    On average, how many days per week do you engage in moderate to strenuous exercise (like a brisk walk)? 0 days     On average, how many minutes do you engage in exercise at this level? 0 min        Financial Resource Strain    How hard is it for you to pay for the very basics like food, housing, medical care, and heating? Somewhat hard        Housing Stability    In the last 12 months, was there a time when you were not able to pay the mortgage or rent on time? No     In the last 12 months, how many places have you lived? 1     In the last 12 months, was there a time when you did not have a steady place to sleep or slept in a shelter (including now)? No        Transportation Needs    In the past 12 months, has lack of transportation kept you from medical appointments or from getting medications? No     In the past 12 months, has lack of  transportation kept you from meetings, work, or from getting things needed for daily living? No        Food Insecurity    Within the past 12 months, you worried that your food would run out before you got the money to buy more. Never true     Within the past 12 months, the food you bought just didn't last and you didn't have money to get more. Never true        Stress    Do you feel stress - tense, restless, nervous, or anxious, or unable to sleep at night because your mind is troubled all the time - these days? Only a little        Social Connections    In a typical week, how many times do you talk on the phone with family, friends, or neighbors? Twice a week     How often do you get together with friends or relatives? Never     How often do you attend Episcopalian or Jehovah's witness services? Never     Do you belong to any clubs or organizations such as Episcopalian groups, unions, fraternal or athletic groups, or school groups? No     How often do you attend meetings of the clubs or organizations you belong to? Never     Are you , , , , never , or living with a partner?         Alcohol Use    Q1: How often do you have a drink containing alcohol? Monthly or less     Q2: How many drinks containing alcohol do you have on a typical day when you are drinking? 1 or 2     Q3: How often do you have six or more drinks on one occasion? Never                   Initial assessment completed with patient. Patient reports independence with ADL's  prior to hospitalization. Patient uses a CPAP at home. Patient currently has no cm needs upon DC. CM will continue following to assist with other needs.   CM provided a transitional care folder, information on advanced directives, information on pharmacy bedside delivery, and discharge planning begins on admission with contact information for any needs/questions.

## 2022-09-23 NOTE — PROGRESS NOTES
[facility]  Colorectal Surgery  Progress Note    Subjective:     History of Present Illness:  53 y/o male with adenocarcinoma of the rectosigmoid junction who presents for surgical management via LAR.      Post-Op Info:  Procedure(s) (LRB):  XI ROBOTIC RESECTION, COLON, LOW ANTERIOR (N/A)  BLOCK, TRANSVERSUS ABDOMINIS PLANE (N/A)  MOBILIZATION, SPLENIC FLEXURE (N/A)   1 Day Post-Op     Interval History: POD 1. Tolerating clears. Denies nausea and vomiting. Voided after miller removal without issue. No BM/flatus yet. Minimal ambulation so far. Pain controlled at this time.    Medications:  Continuous Infusions:   lactated ringers 75 mL/hr at 09/23/22 0642     Scheduled Meds:   acetaminophen  1,000 mg Intravenous Q8H    atorvastatin  80 mg Oral QHS    chlorhexidine  10 mL Mouth/Throat BID    enoxaparin  40 mg Subcutaneous Daily    famotidine  20 mg Oral BID    lamoTRIgine  200 mg Oral QHS    QUEtiapine  100 mg Oral QHS    senna-docusate 8.6-50 mg  1 tablet Oral BID     PRN Meds:diphenhydrAMINE, glucagon (human recombinant), hydrALAZINE, insulin aspart U-100, morphine, ondansetron, oxyCODONE, oxyCODONE     Review of patient's allergies indicates:   Allergen Reactions    Aripiprazole Other (See Comments)    Sildenafil Other (See Comments)    Bupropion hcl Anxiety    Penicillins Hives and Rash     Objective:     Vital Signs (Most Recent):  Temp: 98.4 °F (36.9 °C) (09/23/22 0807)  Pulse: 105 (09/23/22 0807)  Resp: 18 (09/23/22 0947)  BP: (!) 140/73 (09/23/22 0807)  SpO2: 99 % (09/23/22 0807)   Vital Signs (24h Range):  Temp:  [97.4 °F (36.3 °C)-98.8 °F (37.1 °C)] 98.4 °F (36.9 °C)  Pulse:  [] 105  Resp:  [13-26] 18  SpO2:  [93 %-100 %] 99 %  BP: (124-183)/(73-94) 140/73     Weight: 105.1 kg (231 lb 11.3 oz)  Body mass index is 35.23 kg/m².    Intake/Output - Last 3 Shifts         09/21 0700 09/22 0659 09/22 0700 09/23 0659 09/23 0700 09/24 0659    I.V. (mL/kg)  1041 (9.9)     IV Piggyback  4564      Total Intake(mL/kg)  3535 (33.6)     Urine (mL/kg/hr)  1700 (0.7)     Blood  200     Total Output  1900     Net  +1635                    Physical Exam  Vitals and nursing note reviewed.   Constitutional:       General: He is not in acute distress.     Appearance: He is well-developed. He is not ill-appearing.   HENT:      Head: Normocephalic and atraumatic.      Right Ear: External ear normal.      Left Ear: External ear normal.   Eyes:      Extraocular Movements: Extraocular movements intact.      Conjunctiva/sclera: Conjunctivae normal.   Cardiovascular:      Rate and Rhythm: Normal rate.   Pulmonary:      Effort: Pulmonary effort is normal. No respiratory distress.   Abdominal:      Comments: Abdomen with minimal distention and expected TTP. Surgical dressings remain in place, clean and dry.    Musculoskeletal:      Cervical back: Normal range of motion and neck supple.   Skin:     General: Skin is warm and dry.   Neurological:      Mental Status: He is alert and oriented to person, place, and time.   Psychiatric:         Behavior: Behavior normal.       Significant Labs:  I have reviewed all pertinent lab results within the past 24 hours.  CBC:   Recent Labs   Lab 09/23/22  0534   WBC 9.80   RBC 4.82   HGB 13.2*   HCT 42.3      MCV 88   MCH 27.4   MCHC 31.2*     CMP:   Recent Labs   Lab 09/19/22  1617 09/23/22  0534   * 101   CALCIUM 9.4 7.9*   ALBUMIN 4.5  --    PROT 7.3  --     137   K 4.3 4.7   CO2 24 19*    105   BUN 12 9   CREATININE 1.1 0.7   ALKPHOS 133  --    ALT 48*  --    AST 21  --    BILITOT 0.6  --        Significant Diagnostics:  I have reviewed all pertinent imaging results/findings within the past 24 hours.  No new pertinent imaging.    Assessment/Plan:     * Adenocarcinoma of rectosigmoid junction  POD 1 robotic converted to open LAR    - Continue clear liquids while awaiting return of bowel function  - Removed surgical dressings and redress tomorrow  -Encouraged  OOB, ambulation  - Incentive spirometry    Malignant neoplasm of prostate  S/p prostatectomy     Uncontrolled type 2 diabetes mellitus with hyperglycemia  Management per medicine, appreciate yanique Angeles PA-C  Colorectal Surgery  [facility]

## 2022-09-23 NOTE — ASSESSMENT & PLAN NOTE
POD 1 robotic converted to open LAR    - Continue clear liquids while awaiting return of bowel function  - Removed surgical dressings and redress tomorrow  -Encouraged OOB, ambulation  - Incentive spirometry

## 2022-09-24 LAB
ANION GAP SERPL CALC-SCNC: 15 MMOL/L (ref 8–16)
BASOPHILS # BLD AUTO: 0.02 K/UL (ref 0–0.2)
BASOPHILS NFR BLD: 0.2 % (ref 0–1.9)
BUN SERPL-MCNC: 8 MG/DL (ref 6–20)
CALCIUM SERPL-MCNC: 8.1 MG/DL (ref 8.7–10.5)
CHLORIDE SERPL-SCNC: 107 MMOL/L (ref 95–110)
CO2 SERPL-SCNC: 13 MMOL/L (ref 23–29)
CREAT SERPL-MCNC: 0.8 MG/DL (ref 0.5–1.4)
DIFFERENTIAL METHOD: ABNORMAL
EOSINOPHIL # BLD AUTO: 0 K/UL (ref 0–0.5)
EOSINOPHIL NFR BLD: 0.1 % (ref 0–8)
ERYTHROCYTE [DISTWIDTH] IN BLOOD BY AUTOMATED COUNT: 13.7 % (ref 11.5–14.5)
EST. GFR  (NO RACE VARIABLE): >60 ML/MIN/1.73 M^2
GLUCOSE SERPL-MCNC: 180 MG/DL (ref 70–110)
HCT VFR BLD AUTO: 41.8 % (ref 40–54)
HGB BLD-MCNC: 13.7 G/DL (ref 14–18)
IMM GRANULOCYTES # BLD AUTO: 0.05 K/UL (ref 0–0.04)
IMM GRANULOCYTES NFR BLD AUTO: 0.5 % (ref 0–0.5)
LYMPHOCYTES # BLD AUTO: 1.3 K/UL (ref 1–4.8)
LYMPHOCYTES NFR BLD: 12.3 % (ref 18–48)
MAGNESIUM SERPL-MCNC: 1.7 MG/DL (ref 1.6–2.6)
MCH RBC QN AUTO: 28.1 PG (ref 27–31)
MCHC RBC AUTO-ENTMCNC: 32.8 G/DL (ref 32–36)
MCV RBC AUTO: 86 FL (ref 82–98)
MONOCYTES # BLD AUTO: 0.8 K/UL (ref 0.3–1)
MONOCYTES NFR BLD: 7.6 % (ref 4–15)
NEUTROPHILS # BLD AUTO: 8.2 K/UL (ref 1.8–7.7)
NEUTROPHILS NFR BLD: 79.3 % (ref 38–73)
NRBC BLD-RTO: 0 /100 WBC
PHOSPHATE SERPL-MCNC: 2.4 MG/DL (ref 2.7–4.5)
PLATELET # BLD AUTO: 268 K/UL (ref 150–450)
PMV BLD AUTO: 9.1 FL (ref 9.2–12.9)
POCT GLUCOSE: 143 MG/DL (ref 70–110)
POCT GLUCOSE: 151 MG/DL (ref 70–110)
POCT GLUCOSE: 224 MG/DL (ref 70–110)
POTASSIUM SERPL-SCNC: 4.2 MMOL/L (ref 3.5–5.1)
RBC # BLD AUTO: 4.87 M/UL (ref 4.6–6.2)
SODIUM SERPL-SCNC: 135 MMOL/L (ref 136–145)
WBC # BLD AUTO: 10.39 K/UL (ref 3.9–12.7)

## 2022-09-24 PROCEDURE — 99900035 HC TECH TIME PER 15 MIN (STAT)

## 2022-09-24 PROCEDURE — 25000003 PHARM REV CODE 250: Performed by: STUDENT IN AN ORGANIZED HEALTH CARE EDUCATION/TRAINING PROGRAM

## 2022-09-24 PROCEDURE — 83735 ASSAY OF MAGNESIUM: CPT | Performed by: COLON & RECTAL SURGERY

## 2022-09-24 PROCEDURE — 85025 COMPLETE CBC W/AUTO DIFF WBC: CPT | Performed by: COLON & RECTAL SURGERY

## 2022-09-24 PROCEDURE — 80048 BASIC METABOLIC PNL TOTAL CA: CPT | Performed by: COLON & RECTAL SURGERY

## 2022-09-24 PROCEDURE — 84100 ASSAY OF PHOSPHORUS: CPT | Performed by: COLON & RECTAL SURGERY

## 2022-09-24 PROCEDURE — 63600175 PHARM REV CODE 636 W HCPCS: Performed by: NURSE PRACTITIONER

## 2022-09-24 PROCEDURE — 36415 COLL VENOUS BLD VENIPUNCTURE: CPT | Performed by: COLON & RECTAL SURGERY

## 2022-09-24 PROCEDURE — 11000001 HC ACUTE MED/SURG PRIVATE ROOM

## 2022-09-24 PROCEDURE — 63600175 PHARM REV CODE 636 W HCPCS: Performed by: STUDENT IN AN ORGANIZED HEALTH CARE EDUCATION/TRAINING PROGRAM

## 2022-09-24 PROCEDURE — 25000003 PHARM REV CODE 250: Performed by: COLON & RECTAL SURGERY

## 2022-09-24 PROCEDURE — 94799 UNLISTED PULMONARY SVC/PX: CPT

## 2022-09-24 PROCEDURE — 63600175 PHARM REV CODE 636 W HCPCS: Performed by: COLON & RECTAL SURGERY

## 2022-09-24 PROCEDURE — 25000003 PHARM REV CODE 250: Performed by: SURGERY

## 2022-09-24 RX ORDER — LANOLIN ALCOHOL/MO/W.PET/CERES
800 CREAM (GRAM) TOPICAL ONCE
Status: COMPLETED | OUTPATIENT
Start: 2022-09-24 | End: 2022-09-24

## 2022-09-24 RX ORDER — VENLAFAXINE 37.5 MG/1
150 TABLET ORAL NIGHTLY
Status: DISCONTINUED | OUTPATIENT
Start: 2022-09-24 | End: 2022-09-26 | Stop reason: HOSPADM

## 2022-09-24 RX ORDER — SODIUM,POTASSIUM PHOSPHATES 280-250MG
2 POWDER IN PACKET (EA) ORAL ONCE
Status: COMPLETED | OUTPATIENT
Start: 2022-09-24 | End: 2022-09-24

## 2022-09-24 RX ADMIN — ONDANSETRON 4 MG: 2 INJECTION INTRAMUSCULAR; INTRAVENOUS at 01:09

## 2022-09-24 RX ADMIN — OXYCODONE HYDROCHLORIDE 5 MG: 5 TABLET ORAL at 05:09

## 2022-09-24 RX ADMIN — HYDRALAZINE HYDROCHLORIDE 10 MG: 20 INJECTION, SOLUTION INTRAMUSCULAR; INTRAVENOUS at 12:09

## 2022-09-24 RX ADMIN — VENLAFAXINE 150 MG: 37.5 TABLET ORAL at 10:09

## 2022-09-24 RX ADMIN — ONDANSETRON 4 MG: 2 INJECTION INTRAMUSCULAR; INTRAVENOUS at 09:09

## 2022-09-24 RX ADMIN — INSULIN ASPART 2 UNITS: 100 INJECTION, SOLUTION INTRAVENOUS; SUBCUTANEOUS at 10:09

## 2022-09-24 RX ADMIN — CHLORHEXIDINE GLUCONATE 0.12% ORAL RINSE 10 ML: 1.2 LIQUID ORAL at 09:09

## 2022-09-24 RX ADMIN — ENOXAPARIN SODIUM 40 MG: 100 INJECTION SUBCUTANEOUS at 05:09

## 2022-09-24 RX ADMIN — SENNOSIDES AND DOCUSATE SODIUM 1 TABLET: 50; 8.6 TABLET ORAL at 09:09

## 2022-09-24 RX ADMIN — ATORVASTATIN CALCIUM 80 MG: 40 TABLET, FILM COATED ORAL at 08:09

## 2022-09-24 RX ADMIN — FAMOTIDINE 20 MG: 20 TABLET ORAL at 09:09

## 2022-09-24 RX ADMIN — LAMOTRIGINE 200 MG: 100 TABLET ORAL at 08:09

## 2022-09-24 RX ADMIN — OXYCODONE HYDROCHLORIDE 10 MG: 5 TABLET ORAL at 11:09

## 2022-09-24 RX ADMIN — MAGNESIUM OXIDE TAB 400 MG (241.3 MG ELEMENTAL MG) 800 MG: 400 (241.3 MG) TAB at 11:09

## 2022-09-24 RX ADMIN — SENNOSIDES AND DOCUSATE SODIUM 1 TABLET: 50; 8.6 TABLET ORAL at 08:09

## 2022-09-24 RX ADMIN — OXYCODONE HYDROCHLORIDE 10 MG: 5 TABLET ORAL at 06:09

## 2022-09-24 RX ADMIN — QUETIAPINE FUMARATE 100 MG: 100 TABLET ORAL at 08:09

## 2022-09-24 RX ADMIN — ONDANSETRON 4 MG: 2 INJECTION INTRAMUSCULAR; INTRAVENOUS at 04:09

## 2022-09-24 RX ADMIN — Medication 2 PACKET: at 11:09

## 2022-09-24 RX ADMIN — FAMOTIDINE 20 MG: 20 TABLET ORAL at 08:09

## 2022-09-24 RX ADMIN — OXYCODONE HYDROCHLORIDE 10 MG: 5 TABLET ORAL at 10:09

## 2022-09-24 RX ADMIN — CHLORHEXIDINE GLUCONATE 0.12% ORAL RINSE 10 ML: 1.2 LIQUID ORAL at 08:09

## 2022-09-24 RX ADMIN — OXYCODONE HYDROCHLORIDE 10 MG: 5 TABLET ORAL at 02:09

## 2022-09-24 NOTE — PROGRESS NOTES
[facility]  General Surgery  Progress Note    Subjective:     History of Present Illness:  53 y/o male with adenocarcinoma of the rectosigmoid junction who presents for surgical management via LAR.      Post-Op Info:  Procedure(s) (LRB):  XI ROBOTIC RESECTION, COLON, LOW ANTERIOR (N/A)  BLOCK, TRANSVERSUS ABDOMINIS PLANE (N/A)  MOBILIZATION, SPLENIC FLEXURE (N/A)   2 Days Post-Op     Interval History: passing flatus and a few bowel movements, pain moderately controlled, ambulating    Medications:  Continuous Infusions:      Scheduled Meds:   atorvastatin  80 mg Oral QHS    chlorhexidine  10 mL Mouth/Throat BID    enoxaparin  40 mg Subcutaneous Daily    famotidine  20 mg Oral BID    lamoTRIgine  200 mg Oral QHS    QUEtiapine  100 mg Oral QHS    senna-docusate 8.6-50 mg  1 tablet Oral BID    venlafaxine  150 mg Oral QHS     PRN Meds:diphenhydrAMINE, glucagon (human recombinant), hydrALAZINE, influenza, insulin aspart U-100, morphine, ondansetron, oxyCODONE, oxyCODONE     Review of patient's allergies indicates:   Allergen Reactions    Aripiprazole Other (See Comments)    Sildenafil Other (See Comments)    Bupropion hcl Anxiety    Penicillins Hives and Rash     Objective:     Vital Signs (Most Recent):  Temp: 99.4 °F (37.4 °C) (09/24/22 1154)  Pulse: 99 (09/24/22 1154)  Resp: 16 (09/24/22 1154)  BP: (!) 145/75 (09/24/22 1154)  SpO2: 98 % (09/24/22 1154)   Vital Signs (24h Range):  Temp:  [97.8 °F (36.6 °C)-99.4 °F (37.4 °C)] 99.4 °F (37.4 °C)  Pulse:  [] 99  Resp:  [16-18] 16  SpO2:  [93 %-98 %] 98 %  BP: (138-176)/(75-95) 145/75     Weight: 105.1 kg (231 lb 11.3 oz)  Body mass index is 35.23 kg/m².    Intake/Output - Last 3 Shifts         09/22 0700  09/23 0659 09/23 0700 09/24 0659 09/24 0700 09/25 0659    P.O.   240    I.V. (mL/kg) 1041 (9.9) 852.9 (8.1)     IV Piggyback 2494 99.5     Total Intake(mL/kg) 3535 (33.6) 952.4 (9.1) 240 (2.3)    Urine (mL/kg/hr) 1700 (0.7)      Blood 200       Total Output 1900      Net +1635 +952.4 +240           Urine Occurrence   2 x    Stool Occurrence  1 x 3 x            Physical Exam  Vitals and nursing note reviewed.   Constitutional:       General: He is not in acute distress.     Appearance: He is well-developed. He is not ill-appearing.   HENT:      Head: Normocephalic and atraumatic.      Right Ear: External ear normal.      Left Ear: External ear normal.   Eyes:      Extraocular Movements: Extraocular movements intact.      Conjunctiva/sclera: Conjunctivae normal.   Cardiovascular:      Rate and Rhythm: Normal rate.   Pulmonary:      Effort: Pulmonary effort is normal. No respiratory distress.   Abdominal:      Comments: Abdomen with minimal distention and expected TTP. Surgical dressings remain in place, clean and dry.    Musculoskeletal:      Cervical back: Normal range of motion and neck supple.   Skin:     General: Skin is warm and dry.   Neurological:      Mental Status: He is alert and oriented to person, place, and time.   Psychiatric:         Behavior: Behavior normal.       Significant Labs:  I have reviewed all pertinent lab results within the past 24 hours.  CBC:   Recent Labs   Lab 09/24/22  0505   WBC 10.39   RBC 4.87   HGB 13.7*   HCT 41.8      MCV 86   MCH 28.1   MCHC 32.8       CMP:   Recent Labs   Lab 09/19/22  1617 09/23/22  0534 09/24/22  0505   *   < > 180*   CALCIUM 9.4   < > 8.1*   ALBUMIN 4.5  --   --    PROT 7.3  --   --       < > 135*   K 4.3   < > 4.2   CO2 24   < > 13*      < > 107   BUN 12   < > 8   CREATININE 1.1   < > 0.8   ALKPHOS 133  --   --    ALT 48*  --   --    AST 21  --   --    BILITOT 0.6  --   --     < > = values in this interval not displayed.         Significant Diagnostics:  I have reviewed all pertinent imaging results/findings within the past 24 hours.  No new pertinent imaging.    Assessment/Plan:     * Adenocarcinoma of rectosigmoid junction  POD 2 robotic converted to open LAR    -  advance diet as tolerated  - works removed today  - Encouraged OOB, ambulation  - Incentive spirometry  - pain control    Malignant neoplasm of prostate  S/p prostatectomy     Uncontrolled type 2 diabetes mellitus with hyperglycemia  Management per medicine, appreciate recs        Madhu Gil MD  General Surgery  [facility]

## 2022-09-24 NOTE — SUBJECTIVE & OBJECTIVE
Interval History: passing flatus and a few bowel movements, pain moderately controlled, ambulating    Medications:  Continuous Infusions:      Scheduled Meds:   atorvastatin  80 mg Oral QHS    chlorhexidine  10 mL Mouth/Throat BID    enoxaparin  40 mg Subcutaneous Daily    famotidine  20 mg Oral BID    lamoTRIgine  200 mg Oral QHS    QUEtiapine  100 mg Oral QHS    senna-docusate 8.6-50 mg  1 tablet Oral BID    venlafaxine  150 mg Oral QHS     PRN Meds:diphenhydrAMINE, glucagon (human recombinant), hydrALAZINE, influenza, insulin aspart U-100, morphine, ondansetron, oxyCODONE, oxyCODONE     Review of patient's allergies indicates:   Allergen Reactions    Aripiprazole Other (See Comments)    Sildenafil Other (See Comments)    Bupropion hcl Anxiety    Penicillins Hives and Rash     Objective:     Vital Signs (Most Recent):  Temp: 99.4 °F (37.4 °C) (09/24/22 1154)  Pulse: 99 (09/24/22 1154)  Resp: 16 (09/24/22 1154)  BP: (!) 145/75 (09/24/22 1154)  SpO2: 98 % (09/24/22 1154)   Vital Signs (24h Range):  Temp:  [97.8 °F (36.6 °C)-99.4 °F (37.4 °C)] 99.4 °F (37.4 °C)  Pulse:  [] 99  Resp:  [16-18] 16  SpO2:  [93 %-98 %] 98 %  BP: (138-176)/(75-95) 145/75     Weight: 105.1 kg (231 lb 11.3 oz)  Body mass index is 35.23 kg/m².    Intake/Output - Last 3 Shifts         09/22 0700 09/23 0659 09/23 0700 09/24 0659 09/24 0700 09/25 0659    P.O.   240    I.V. (mL/kg) 1041 (9.9) 852.9 (8.1)     IV Piggyback 2494 99.5     Total Intake(mL/kg) 3535 (33.6) 952.4 (9.1) 240 (2.3)    Urine (mL/kg/hr) 1700 (0.7)      Blood 200      Total Output 1900      Net +1635 +952.4 +240           Urine Occurrence   2 x    Stool Occurrence  1 x 3 x            Physical Exam  Vitals and nursing note reviewed.   Constitutional:       General: He is not in acute distress.     Appearance: He is well-developed. He is not ill-appearing.   HENT:      Head: Normocephalic and atraumatic.      Right Ear: External ear normal.      Left Ear: External ear  normal.   Eyes:      Extraocular Movements: Extraocular movements intact.      Conjunctiva/sclera: Conjunctivae normal.   Cardiovascular:      Rate and Rhythm: Normal rate.   Pulmonary:      Effort: Pulmonary effort is normal. No respiratory distress.   Abdominal:      Comments: Abdomen with minimal distention and expected TTP. Surgical dressings remain in place, clean and dry.    Musculoskeletal:      Cervical back: Normal range of motion and neck supple.   Skin:     General: Skin is warm and dry.   Neurological:      Mental Status: He is alert and oriented to person, place, and time.   Psychiatric:         Behavior: Behavior normal.       Significant Labs:  I have reviewed all pertinent lab results within the past 24 hours.  CBC:   Recent Labs   Lab 09/24/22  0505   WBC 10.39   RBC 4.87   HGB 13.7*   HCT 41.8      MCV 86   MCH 28.1   MCHC 32.8       CMP:   Recent Labs   Lab 09/19/22  1617 09/23/22  0534 09/24/22  0505   *   < > 180*   CALCIUM 9.4   < > 8.1*   ALBUMIN 4.5  --   --    PROT 7.3  --   --       < > 135*   K 4.3   < > 4.2   CO2 24   < > 13*      < > 107   BUN 12   < > 8   CREATININE 1.1   < > 0.8   ALKPHOS 133  --   --    ALT 48*  --   --    AST 21  --   --    BILITOT 0.6  --   --     < > = values in this interval not displayed.         Significant Diagnostics:  I have reviewed all pertinent imaging results/findings within the past 24 hours.  No new pertinent imaging.

## 2022-09-24 NOTE — PROGRESS NOTES
[facility]  Progress Note    Patient Name: Patrick Sanz  MRN: [id number]  Patient Class: IP- Surgery Admit   Admission Date: 9/22/2022  Length of Stay: 2 days  Attending Physician: Cj Quinn MD  Primary Care Provider: Edilberto Figueroa MD        Subjective:     Principal Problem:Adenocarcinoma of rectosigmoid junction        HPI:  52 y.o. male PMH DM, HTN presents for evaluation of rectosigmoid adenocarcinoma.  Patient was undergoing his 1st ever colonoscopy on 08/04/2022 where a malignant-appearing mass was found in the rectosigmoid area. This was biopsied and confirmed to be adenocarcinoma. Patient also had multiple other adenomas removed. Asymptomatic prior to colonoscopy. Denies any fever, chills, nausea, vomiting, diarrhea, constipation, hematochezia or melena. Maternal grandfather has history of colon cancer. Past surgical history significant for robotic prostatectomy for prostate cancer.  consulted for medical management postop rectosigmoid junction adenocarcinoma removal. No complaints other than incision site pain at the time of my evaluation.       Overview/Hospital Course:  9/24:  Postop day 2, resection adenocarcinoma of rectosigmoid junction.  Tolerated procedure well.  Pain control.  Currently tolerating clear liquid diet.  Advance to regular diet per surgery.      Review of Systems   Constitutional:  Negative for activity change, appetite change, chills, fatigue and fever.   HENT: Negative.     Eyes: Negative.    Respiratory:  Negative for chest tightness, shortness of breath and wheezing.    Cardiovascular:  Negative for chest pain and leg swelling.   Gastrointestinal:  Positive for abdominal pain. Negative for abdominal distention, diarrhea, nausea and vomiting.   Endocrine: Negative.  Negative for polydipsia and polyuria.   Genitourinary:  Negative for flank pain and hematuria.   Musculoskeletal:  Negative for back pain, joint swelling and myalgias.   Skin:  Negative.    Neurological: Negative.  Negative for syncope, weakness and light-headedness.   Hematological: Negative.    Psychiatric/Behavioral:  Negative for confusion.    Objective:     Vital Signs (Most Recent):  Temp: 99.4 °F (37.4 °C) (09/24/22 1154)  Pulse: 99 (09/24/22 1154)  Resp: 16 (09/24/22 1154)  BP: (!) 145/75 (09/24/22 1154)  SpO2: 98 % (09/24/22 1154)   Vital Signs (24h Range):  Temp:  [97.8 °F (36.6 °C)-99.4 °F (37.4 °C)] 99.4 °F (37.4 °C)  Pulse:  [] 99  Resp:  [16-18] 16  SpO2:  [93 %-98 %] 98 %  BP: (138-176)/(75-95) 145/75     Weight: 105.1 kg (231 lb 11.3 oz)  Body mass index is 35.23 kg/m².    Intake/Output Summary (Last 24 hours) at 9/24/2022 1154  Last data filed at 9/23/2022 1821  Gross per 24 hour   Intake 952.4 ml   Output --   Net 952.4 ml        Physical Exam  Constitutional:       General: He is not in acute distress.     Appearance: Normal appearance. He is not ill-appearing.   HENT:      Head: Normocephalic and atraumatic.   Cardiovascular:      Rate and Rhythm: Regular rhythm. Tachycardia present.   Pulmonary:      Effort: Pulmonary effort is normal.      Breath sounds: Normal breath sounds.   Abdominal:      General: Abdomen is flat. Bowel sounds are normal. There is no distension.      Palpations: Abdomen is soft.      Tenderness: There is abdominal tenderness. There is no guarding.      Comments:  Abdomen with minimal distention and expected TTP. Surgical dressings remain in place, clean and dry   Musculoskeletal:         General: Normal range of motion.      Right lower leg: No edema.      Left lower leg: No edema.   Skin:     General: Skin is warm and dry.   Neurological:      General: No focal deficit present.      Mental Status: He is alert and oriented to person, place, and time.       Significant Labs: All pertinent labs within the past 24 hours have been reviewed.    Significant Imaging: I have reviewed all pertinent imaging results/findings within the past 24  hours.      Assessment/Plan:      * Adenocarcinoma of rectosigmoid junction  POD0 surgical resection  Pain control  Advance diet per surgery as tolerated    9/23:  POD 1 surgical resection  Pain controlled  Tolerating CLD  Advance diet per surgery      Malignant neoplasm of prostate  Prior resection  Danielson catheter in place postop      Bipolar disorder  Restart home effexor qhs      Uncontrolled type 2 diabetes mellitus with hyperglycemia  Patient's FSGs are uncontrolled due to hyperglycemia on current medication regimen.  Last A1c reviewed-   Lab Results   Component Value Date    HGBA1C 8.7 (H) 08/06/2022     Most recent fingerstick glucose reviewed-   Recent Labs   Lab 09/22/22  1443 09/23/22  0047 09/23/22  0535   POCTGLUCOSE 165* 113* 108     Current correctional scale  Low  Maintain anti-hyperglycemic dose as follows-   Antihyperglycemics (From admission, onward)    Start     Stop Route Frequency Ordered    09/22/22 1902  insulin aspart U-100 pen 0-5 Units         -- SubQ Every 6 hours PRN 09/22/22 1802        Hold Oral hypoglycemics while patient is in the hospital.    9/23:  Glucose controlled     VTE Risk Mitigation (From admission, onward)         Ordered     enoxaparin injection 40 mg  Daily         09/22/22 1548     IP VTE HIGH RISK PATIENT  Once         09/22/22 1548     Place sequential compression device  Until discontinued         09/22/22 1548                Discharge Planning   MANJIT:      Code Status: Prior   Is the patient medically ready for discharge?:     Reason for patient still in hospital (select all that apply): Patient trending condition, Treatment and Consult recommendations  Discharge Plan A: Home                  Melania Verdin MD  Department of Hospital Medicine   [facility]

## 2022-09-24 NOTE — SUBJECTIVE & OBJECTIVE
Review of Systems   Constitutional:  Negative for activity change, appetite change, chills, fatigue and fever.   HENT: Negative.     Eyes: Negative.    Respiratory:  Negative for chest tightness, shortness of breath and wheezing.    Cardiovascular:  Negative for chest pain and leg swelling.   Gastrointestinal:  Positive for abdominal pain. Negative for abdominal distention, diarrhea, nausea and vomiting.   Endocrine: Negative.  Negative for polydipsia and polyuria.   Genitourinary:  Negative for flank pain and hematuria.   Musculoskeletal:  Negative for back pain, joint swelling and myalgias.   Skin: Negative.    Neurological: Negative.  Negative for syncope, weakness and light-headedness.   Hematological: Negative.    Psychiatric/Behavioral:  Negative for confusion.    Objective:     Vital Signs (Most Recent):  Temp: 99.4 °F (37.4 °C) (09/24/22 1154)  Pulse: 99 (09/24/22 1154)  Resp: 16 (09/24/22 1154)  BP: (!) 145/75 (09/24/22 1154)  SpO2: 98 % (09/24/22 1154)   Vital Signs (24h Range):  Temp:  [97.8 °F (36.6 °C)-99.4 °F (37.4 °C)] 99.4 °F (37.4 °C)  Pulse:  [] 99  Resp:  [16-18] 16  SpO2:  [93 %-98 %] 98 %  BP: (138-176)/(75-95) 145/75     Weight: 105.1 kg (231 lb 11.3 oz)  Body mass index is 35.23 kg/m².    Intake/Output Summary (Last 24 hours) at 9/24/2022 1154  Last data filed at 9/23/2022 1821  Gross per 24 hour   Intake 952.4 ml   Output --   Net 952.4 ml        Physical Exam  Constitutional:       General: He is not in acute distress.     Appearance: Normal appearance. He is not ill-appearing.   HENT:      Head: Normocephalic and atraumatic.   Cardiovascular:      Rate and Rhythm: Regular rhythm. Tachycardia present.   Pulmonary:      Effort: Pulmonary effort is normal.      Breath sounds: Normal breath sounds.   Abdominal:      General: Abdomen is flat. Bowel sounds are normal. There is no distension.      Palpations: Abdomen is soft.      Tenderness: There is abdominal tenderness. There is no  guarding.      Comments:  Abdomen with minimal distention and expected TTP. Surgical dressings remain in place, clean and dry   Musculoskeletal:         General: Normal range of motion.      Right lower leg: No edema.      Left lower leg: No edema.   Skin:     General: Skin is warm and dry.   Neurological:      General: No focal deficit present.      Mental Status: He is alert and oriented to person, place, and time.       Significant Labs: All pertinent labs within the past 24 hours have been reviewed.    Significant Imaging: I have reviewed all pertinent imaging results/findings within the past 24 hours.

## 2022-09-24 NOTE — ASSESSMENT & PLAN NOTE
POD 2 robotic converted to open LAR    - advance diet as tolerated  - works removed today  - Encouraged OOB, ambulation  - Incentive spirometry  - pain control

## 2022-09-25 LAB
ANION GAP SERPL CALC-SCNC: 13 MMOL/L (ref 8–16)
BASOPHILS # BLD AUTO: 0.03 K/UL (ref 0–0.2)
BASOPHILS NFR BLD: 0.3 % (ref 0–1.9)
BUN SERPL-MCNC: 6 MG/DL (ref 6–20)
CALCIUM SERPL-MCNC: 8.6 MG/DL (ref 8.7–10.5)
CHLORIDE SERPL-SCNC: 104 MMOL/L (ref 95–110)
CO2 SERPL-SCNC: 18 MMOL/L (ref 23–29)
CREAT SERPL-MCNC: 0.8 MG/DL (ref 0.5–1.4)
DIFFERENTIAL METHOD: ABNORMAL
EOSINOPHIL # BLD AUTO: 0.2 K/UL (ref 0–0.5)
EOSINOPHIL NFR BLD: 1.5 % (ref 0–8)
ERYTHROCYTE [DISTWIDTH] IN BLOOD BY AUTOMATED COUNT: 13.5 % (ref 11.5–14.5)
EST. GFR  (NO RACE VARIABLE): >60 ML/MIN/1.73 M^2
GLUCOSE SERPL-MCNC: 177 MG/DL (ref 70–110)
HCT VFR BLD AUTO: 43 % (ref 40–54)
HGB BLD-MCNC: 14.4 G/DL (ref 14–18)
IMM GRANULOCYTES # BLD AUTO: 0.03 K/UL (ref 0–0.04)
IMM GRANULOCYTES NFR BLD AUTO: 0.3 % (ref 0–0.5)
LYMPHOCYTES # BLD AUTO: 1.6 K/UL (ref 1–4.8)
LYMPHOCYTES NFR BLD: 16.1 % (ref 18–48)
MAGNESIUM SERPL-MCNC: 1.9 MG/DL (ref 1.6–2.6)
MCH RBC QN AUTO: 27.8 PG (ref 27–31)
MCHC RBC AUTO-ENTMCNC: 33.5 G/DL (ref 32–36)
MCV RBC AUTO: 83 FL (ref 82–98)
MONOCYTES # BLD AUTO: 1.1 K/UL (ref 0.3–1)
MONOCYTES NFR BLD: 10.8 % (ref 4–15)
NEUTROPHILS # BLD AUTO: 7.2 K/UL (ref 1.8–7.7)
NEUTROPHILS NFR BLD: 71 % (ref 38–73)
NRBC BLD-RTO: 0 /100 WBC
PHOSPHATE SERPL-MCNC: 2.1 MG/DL (ref 2.7–4.5)
PLATELET # BLD AUTO: 320 K/UL (ref 150–450)
PMV BLD AUTO: 9 FL (ref 9.2–12.9)
POCT GLUCOSE: 147 MG/DL (ref 70–110)
POCT GLUCOSE: 151 MG/DL (ref 70–110)
POCT GLUCOSE: 174 MG/DL (ref 70–110)
POCT GLUCOSE: 179 MG/DL (ref 70–110)
POTASSIUM SERPL-SCNC: 3.6 MMOL/L (ref 3.5–5.1)
RBC # BLD AUTO: 5.18 M/UL (ref 4.6–6.2)
SODIUM SERPL-SCNC: 135 MMOL/L (ref 136–145)
WBC # BLD AUTO: 10.1 K/UL (ref 3.9–12.7)

## 2022-09-25 PROCEDURE — 25000003 PHARM REV CODE 250: Performed by: SURGERY

## 2022-09-25 PROCEDURE — 94799 UNLISTED PULMONARY SVC/PX: CPT

## 2022-09-25 PROCEDURE — 99900035 HC TECH TIME PER 15 MIN (STAT)

## 2022-09-25 PROCEDURE — 25000003 PHARM REV CODE 250: Performed by: COLON & RECTAL SURGERY

## 2022-09-25 PROCEDURE — 83735 ASSAY OF MAGNESIUM: CPT | Performed by: COLON & RECTAL SURGERY

## 2022-09-25 PROCEDURE — 63600175 PHARM REV CODE 636 W HCPCS: Performed by: NURSE PRACTITIONER

## 2022-09-25 PROCEDURE — 25000003 PHARM REV CODE 250: Performed by: STUDENT IN AN ORGANIZED HEALTH CARE EDUCATION/TRAINING PROGRAM

## 2022-09-25 PROCEDURE — 85025 COMPLETE CBC W/AUTO DIFF WBC: CPT | Performed by: COLON & RECTAL SURGERY

## 2022-09-25 PROCEDURE — 63600175 PHARM REV CODE 636 W HCPCS: Performed by: COLON & RECTAL SURGERY

## 2022-09-25 PROCEDURE — 80048 BASIC METABOLIC PNL TOTAL CA: CPT | Performed by: COLON & RECTAL SURGERY

## 2022-09-25 PROCEDURE — 11000001 HC ACUTE MED/SURG PRIVATE ROOM

## 2022-09-25 PROCEDURE — 84100 ASSAY OF PHOSPHORUS: CPT | Performed by: COLON & RECTAL SURGERY

## 2022-09-25 PROCEDURE — 36415 COLL VENOUS BLD VENIPUNCTURE: CPT | Performed by: COLON & RECTAL SURGERY

## 2022-09-25 RX ORDER — SIMETHICONE 80 MG
1 TABLET,CHEWABLE ORAL 3 TIMES DAILY PRN
Status: DISCONTINUED | OUTPATIENT
Start: 2022-09-25 | End: 2022-09-26 | Stop reason: HOSPADM

## 2022-09-25 RX ORDER — SODIUM,POTASSIUM PHOSPHATES 280-250MG
2 POWDER IN PACKET (EA) ORAL 2 TIMES DAILY
Status: DISPENSED | OUTPATIENT
Start: 2022-09-25 | End: 2022-09-26

## 2022-09-25 RX ORDER — LANOLIN ALCOHOL/MO/W.PET/CERES
800 CREAM (GRAM) TOPICAL ONCE
Status: COMPLETED | OUTPATIENT
Start: 2022-09-25 | End: 2022-09-25

## 2022-09-25 RX ORDER — ACETAMINOPHEN 325 MG/1
650 TABLET ORAL EVERY 6 HOURS PRN
Status: DISCONTINUED | OUTPATIENT
Start: 2022-09-25 | End: 2022-09-26 | Stop reason: HOSPADM

## 2022-09-25 RX ORDER — SIMETHICONE 80 MG
1 TABLET,CHEWABLE ORAL ONCE
Status: COMPLETED | OUTPATIENT
Start: 2022-09-25 | End: 2022-09-25

## 2022-09-25 RX ADMIN — ATORVASTATIN CALCIUM 80 MG: 40 TABLET, FILM COATED ORAL at 09:09

## 2022-09-25 RX ADMIN — OXYCODONE HYDROCHLORIDE 10 MG: 5 TABLET ORAL at 09:09

## 2022-09-25 RX ADMIN — ONDANSETRON 4 MG: 2 INJECTION INTRAMUSCULAR; INTRAVENOUS at 05:09

## 2022-09-25 RX ADMIN — VENLAFAXINE 150 MG: 37.5 TABLET ORAL at 09:09

## 2022-09-25 RX ADMIN — SENNOSIDES AND DOCUSATE SODIUM 1 TABLET: 50; 8.6 TABLET ORAL at 09:09

## 2022-09-25 RX ADMIN — ACETAMINOPHEN 650 MG: 325 TABLET ORAL at 10:09

## 2022-09-25 RX ADMIN — OXYCODONE HYDROCHLORIDE 10 MG: 5 TABLET ORAL at 04:09

## 2022-09-25 RX ADMIN — QUETIAPINE FUMARATE 100 MG: 100 TABLET ORAL at 09:09

## 2022-09-25 RX ADMIN — FAMOTIDINE 20 MG: 20 TABLET ORAL at 09:09

## 2022-09-25 RX ADMIN — CHLORHEXIDINE GLUCONATE 0.12% ORAL RINSE 10 ML: 1.2 LIQUID ORAL at 09:09

## 2022-09-25 RX ADMIN — ENOXAPARIN SODIUM 40 MG: 100 INJECTION SUBCUTANEOUS at 04:09

## 2022-09-25 RX ADMIN — HYDRALAZINE HYDROCHLORIDE 10 MG: 20 INJECTION, SOLUTION INTRAMUSCULAR; INTRAVENOUS at 04:09

## 2022-09-25 RX ADMIN — SIMETHICONE 80 MG: 80 TABLET, CHEWABLE ORAL at 12:09

## 2022-09-25 RX ADMIN — ONDANSETRON 4 MG: 2 INJECTION INTRAMUSCULAR; INTRAVENOUS at 06:09

## 2022-09-25 RX ADMIN — LAMOTRIGINE 200 MG: 100 TABLET ORAL at 09:09

## 2022-09-25 RX ADMIN — Medication 800 MG: at 09:09

## 2022-09-25 RX ADMIN — SIMETHICONE 80 MG: 80 TABLET, CHEWABLE ORAL at 10:09

## 2022-09-25 RX ADMIN — OXYCODONE HYDROCHLORIDE 10 MG: 5 TABLET ORAL at 10:09

## 2022-09-25 RX ADMIN — OXYCODONE HYDROCHLORIDE 10 MG: 5 TABLET ORAL at 06:09

## 2022-09-25 NOTE — PLAN OF CARE
Bed locked, in lowest position. Call bell in reach. Purposeful rounding done every two hours. Blood glucose monitoring. Pain and nausea relieved by PRN meds. . Chart check complete. Will continue with plan of care.

## 2022-09-25 NOTE — SUBJECTIVE & OBJECTIVE
Review of Systems   Constitutional:  Negative for activity change, appetite change, chills, fatigue and fever.   HENT: Negative.     Eyes: Negative.    Respiratory:  Negative for chest tightness, shortness of breath and wheezing.    Cardiovascular:  Negative for chest pain and leg swelling.   Gastrointestinal:  Positive for abdominal pain. Negative for abdominal distention, diarrhea, nausea and vomiting.   Endocrine: Negative.  Negative for polydipsia and polyuria.   Genitourinary:  Negative for flank pain and hematuria.   Musculoskeletal:  Negative for back pain, joint swelling and myalgias.   Skin: Negative.    Neurological: Negative.  Negative for syncope, weakness and light-headedness.   Hematological: Negative.    Psychiatric/Behavioral:  Negative for confusion.    Objective:     Vital Signs (Most Recent):  Temp: 98.2 °F (36.8 °C) (09/25/22 1119)  Pulse: 98 (09/25/22 1119)  Resp: 16 (09/25/22 1119)  BP: (!) 142/86 (09/25/22 1119)  SpO2: 99 % (09/25/22 1119)   Vital Signs (24h Range):  Temp:  [98 °F (36.7 °C)-99.4 °F (37.4 °C)] 98.2 °F (36.8 °C)  Pulse:  [] 98  Resp:  [16-20] 16  SpO2:  [95 %-99 %] 99 %  BP: (142-169)/(85-96) 142/86     Weight: 105.1 kg (231 lb 11.3 oz)  Body mass index is 35.23 kg/m².    Intake/Output Summary (Last 24 hours) at 9/25/2022 1234  Last data filed at 9/24/2022 1945  Gross per 24 hour   Intake 1383.25 ml   Output --   Net 1383.25 ml        Physical Exam  Constitutional:       General: He is not in acute distress.     Appearance: Normal appearance. He is not ill-appearing.   HENT:      Head: Normocephalic and atraumatic.   Cardiovascular:      Rate and Rhythm: Regular rhythm. Tachycardia present.   Pulmonary:      Effort: Pulmonary effort is normal.      Breath sounds: Normal breath sounds.   Abdominal:      General: Abdomen is flat. Bowel sounds are normal. There is no distension.      Palpations: Abdomen is soft.      Tenderness: There is abdominal tenderness. There is no  guarding.      Comments:  Abdomen with minimal distention and expected TTP. Surgical dressings remain in place, clean and dry   Musculoskeletal:         General: Normal range of motion.      Right lower leg: No edema.      Left lower leg: No edema.   Skin:     General: Skin is warm and dry.   Neurological:      General: No focal deficit present.      Mental Status: He is alert and oriented to person, place, and time.       Significant Labs: All pertinent labs within the past 24 hours have been reviewed.    Significant Imaging: I have reviewed all pertinent imaging results/findings within the past 24 hours.

## 2022-09-25 NOTE — ASSESSMENT & PLAN NOTE
Patient's FSGs are uncontrolled due to hyperglycemia on current medication regimen.  Last A1c reviewed-   Lab Results   Component Value Date    HGBA1C 8.7 (H) 08/06/2022     Most recent fingerstick glucose reviewed-   Recent Labs   Lab 09/24/22  2102 09/25/22  0054 09/25/22  0552 09/25/22  1116   POCTGLUCOSE 224* 147* 174* 179*     Current correctional scale  Low  Maintain anti-hyperglycemic dose as follows-   Antihyperglycemics (From admission, onward)    Start     Stop Route Frequency Ordered    09/22/22 1902  insulin aspart U-100 pen 0-5 Units         -- SubQ Every 6 hours PRN 09/22/22 1802        Hold Oral hypoglycemics while patient is in the hospital.    9/23:  Glucose controlled

## 2022-09-25 NOTE — SUBJECTIVE & OBJECTIVE
Interval History:  tolerating diet but with some bloating/nausea, passing bowel movements    Medications:  Continuous Infusions:      Scheduled Meds:   atorvastatin  80 mg Oral QHS    chlorhexidine  10 mL Mouth/Throat BID    enoxaparin  40 mg Subcutaneous Daily    famotidine  20 mg Oral BID    lamoTRIgine  200 mg Oral QHS    potassium, sodium phosphates  2 packet Oral BID    QUEtiapine  100 mg Oral QHS    senna-docusate 8.6-50 mg  1 tablet Oral BID    venlafaxine  150 mg Oral QHS     PRN Meds:acetaminophen, diphenhydrAMINE, glucagon (human recombinant), hydrALAZINE, influenza, insulin aspart U-100, morphine, ondansetron, oxyCODONE, oxyCODONE     Review of patient's allergies indicates:   Allergen Reactions    Aripiprazole Other (See Comments)    Sildenafil Other (See Comments)    Bupropion hcl Anxiety    Penicillins Hives and Rash     Objective:     Vital Signs (Most Recent):  Temp: 98.2 °F (36.8 °C) (09/25/22 1119)  Pulse: 98 (09/25/22 1119)  Resp: 16 (09/25/22 1119)  BP: (!) 142/86 (09/25/22 1119)  SpO2: 99 % (09/25/22 1119)   Vital Signs (24h Range):  Temp:  [98 °F (36.7 °C)-99.4 °F (37.4 °C)] 98.2 °F (36.8 °C)  Pulse:  [] 98  Resp:  [16-20] 16  SpO2:  [95 %-99 %] 99 %  BP: (142-169)/(75-96) 142/86     Weight: 105.1 kg (231 lb 11.3 oz)  Body mass index is 35.23 kg/m².    Intake/Output - Last 3 Shifts         09/23 0700 09/24 0659 09/24 0700 09/25 0659 09/25 0700 09/26 0659    P.O.  420     I.V. (mL/kg) 852.9 (8.1) 1203.3 (11.4)     IV Piggyback 99.5      Total Intake(mL/kg) 952.4 (9.1) 1623.3 (15.4)     Urine (mL/kg/hr)       Blood       Total Output       Net +952.4 +1623.3            Urine Occurrence  3 x     Stool Occurrence 1 x 4 x 1 x            Physical Exam  Vitals and nursing note reviewed.   Constitutional:       General: He is not in acute distress.     Appearance: He is well-developed. He is obese. He is not ill-appearing.   HENT:      Head: Normocephalic and atraumatic.      Right Ear:  External ear normal.      Left Ear: External ear normal.   Eyes:      Extraocular Movements: Extraocular movements intact.      Conjunctiva/sclera: Conjunctivae normal.   Cardiovascular:      Rate and Rhythm: Normal rate.   Pulmonary:      Effort: Pulmonary effort is normal. No respiratory distress.   Abdominal:      General: There is distension (mild distention and tympanic).      Tenderness: There is abdominal tenderness (incisional).      Comments: Incisions clean dry and intact     Musculoskeletal:      Cervical back: Normal range of motion and neck supple.   Skin:     General: Skin is warm and dry.   Neurological:      Mental Status: He is alert and oriented to person, place, and time.   Psychiatric:         Behavior: Behavior normal.       Significant Labs:  I have reviewed all pertinent lab results within the past 24 hours.  CBC:   Recent Labs   Lab 09/25/22  0544   WBC 10.10   RBC 5.18   HGB 14.4   HCT 43.0      MCV 83   MCH 27.8   MCHC 33.5       CMP:   Recent Labs   Lab 09/19/22  1617 09/23/22  0534 09/25/22  0544   *   < > 177*   CALCIUM 9.4   < > 8.6*   ALBUMIN 4.5  --   --    PROT 7.3  --   --       < > 135*   K 4.3   < > 3.6   CO2 24   < > 18*      < > 104   BUN 12   < > 6   CREATININE 1.1   < > 0.8   ALKPHOS 133  --   --    ALT 48*  --   --    AST 21  --   --    BILITOT 0.6  --   --     < > = values in this interval not displayed.         Significant Diagnostics:  I have reviewed all pertinent imaging results/findings within the past 24 hours.  No new pertinent imaging.

## 2022-09-25 NOTE — PROGRESS NOTES
[facility]  General Surgery  Progress Note    Subjective:     History of Present Illness:  51 y/o male with adenocarcinoma of the rectosigmoid junction who presents for surgical management via LAR.      Post-Op Info:  Procedure(s) (LRB):  XI ROBOTIC RESECTION, COLON, LOW ANTERIOR (N/A)  BLOCK, TRANSVERSUS ABDOMINIS PLANE (N/A)  MOBILIZATION, SPLENIC FLEXURE (N/A)   3 Days Post-Op     Interval History:  tolerating diet but with some bloating/nausea, passing bowel movements    Medications:  Continuous Infusions:      Scheduled Meds:   atorvastatin  80 mg Oral QHS    chlorhexidine  10 mL Mouth/Throat BID    enoxaparin  40 mg Subcutaneous Daily    famotidine  20 mg Oral BID    lamoTRIgine  200 mg Oral QHS    potassium, sodium phosphates  2 packet Oral BID    QUEtiapine  100 mg Oral QHS    senna-docusate 8.6-50 mg  1 tablet Oral BID    venlafaxine  150 mg Oral QHS     PRN Meds:acetaminophen, diphenhydrAMINE, glucagon (human recombinant), hydrALAZINE, influenza, insulin aspart U-100, morphine, ondansetron, oxyCODONE, oxyCODONE     Review of patient's allergies indicates:   Allergen Reactions    Aripiprazole Other (See Comments)    Sildenafil Other (See Comments)    Bupropion hcl Anxiety    Penicillins Hives and Rash     Objective:     Vital Signs (Most Recent):  Temp: 98.2 °F (36.8 °C) (09/25/22 1119)  Pulse: 98 (09/25/22 1119)  Resp: 16 (09/25/22 1119)  BP: (!) 142/86 (09/25/22 1119)  SpO2: 99 % (09/25/22 1119)   Vital Signs (24h Range):  Temp:  [98 °F (36.7 °C)-99.4 °F (37.4 °C)] 98.2 °F (36.8 °C)  Pulse:  [] 98  Resp:  [16-20] 16  SpO2:  [95 %-99 %] 99 %  BP: (142-169)/(75-96) 142/86     Weight: 105.1 kg (231 lb 11.3 oz)  Body mass index is 35.23 kg/m².    Intake/Output - Last 3 Shifts         09/23 0700  09/24 0659 09/24 0700 09/25 0659 09/25 0700 09/26 0659    P.O.  420     I.V. (mL/kg) 852.9 (8.1) 1203.3 (11.4)     IV Piggyback 99.5      Total Intake(mL/kg) 952.4 (9.1) 1623.3 (15.4)      Urine (mL/kg/hr)       Blood       Total Output       Net +952.4 +1623.3            Urine Occurrence  3 x     Stool Occurrence 1 x 4 x 1 x            Physical Exam  Vitals and nursing note reviewed.   Constitutional:       General: He is not in acute distress.     Appearance: He is well-developed. He is obese. He is not ill-appearing.   HENT:      Head: Normocephalic and atraumatic.      Right Ear: External ear normal.      Left Ear: External ear normal.   Eyes:      Extraocular Movements: Extraocular movements intact.      Conjunctiva/sclera: Conjunctivae normal.   Cardiovascular:      Rate and Rhythm: Normal rate.   Pulmonary:      Effort: Pulmonary effort is normal. No respiratory distress.   Abdominal:      General: There is distension (mild distention and tympanic).      Tenderness: There is abdominal tenderness (incisional).      Comments: Incisions clean dry and intact     Musculoskeletal:      Cervical back: Normal range of motion and neck supple.   Skin:     General: Skin is warm and dry.   Neurological:      Mental Status: He is alert and oriented to person, place, and time.   Psychiatric:         Behavior: Behavior normal.       Significant Labs:  I have reviewed all pertinent lab results within the past 24 hours.  CBC:   Recent Labs   Lab 09/25/22  0544   WBC 10.10   RBC 5.18   HGB 14.4   HCT 43.0      MCV 83   MCH 27.8   MCHC 33.5       CMP:   Recent Labs   Lab 09/19/22  1617 09/23/22  0534 09/25/22  0544   *   < > 177*   CALCIUM 9.4   < > 8.6*   ALBUMIN 4.5  --   --    PROT 7.3  --   --       < > 135*   K 4.3   < > 3.6   CO2 24   < > 18*      < > 104   BUN 12   < > 6   CREATININE 1.1   < > 0.8   ALKPHOS 133  --   --    ALT 48*  --   --    AST 21  --   --    BILITOT 0.6  --   --     < > = values in this interval not displayed.         Significant Diagnostics:  I have reviewed all pertinent imaging results/findings within the past 24 hours.  No new pertinent  imaging.    Assessment/Plan:     * Adenocarcinoma of rectosigmoid junction  Status post robotic converted to open LAR    - regular diet as tolerated  - Encouraged OOB, ambulation  - Incentive spirometry  - pain control  - slightly tachycardic not significantly changed in stable since surgery if persists further evaluation  - having bowel function but remains mildly distended with bloating discharge once improved    Malignant neoplasm of prostate  S/p prostatectomy     Uncontrolled type 2 diabetes mellitus with hyperglycemia  Management per medicine, appreciate recs        Madhu Gil MD  General Surgery  [facility]

## 2022-09-25 NOTE — PLAN OF CARE
Problem: Adult Inpatient Plan of Care  Goal: Optimal Comfort and Wellbeing  Outcome: Ongoing, Progressing     Problem: Diabetes Comorbidity  Goal: Blood Glucose Level Within Targeted Range  Outcome: Ongoing, Progressing     Problem: Infection  Goal: Absence of Infection Signs and Symptoms  Outcome: Ongoing, Progressing     Problem: Fall Injury Risk  Goal: Absence of Fall and Fall-Related Injury  Outcome: Ongoing, Progressing     Problem: Skin Injury Risk Increased  Goal: Skin Health and Integrity  Outcome: Ongoing, Progressing

## 2022-09-25 NOTE — ASSESSMENT & PLAN NOTE
POD3 surgical resection  Pain control  Tolerating regular diet with nausea and bloating  Added simethicone PRN  F/u surgery recs

## 2022-09-25 NOTE — ASSESSMENT & PLAN NOTE
Status post robotic converted to open LAR    - regular diet as tolerated  - Encouraged OOB, ambulation  - Incentive spirometry  - pain control  - slightly tachycardic not significantly changed in stable since surgery if persists further evaluation  - having bowel function but remains mildly distended with bloating discharge once improved

## 2022-09-25 NOTE — PROGRESS NOTES
[facility] Medicine  Progress Note    Patient Name: Patrick Sanz  MRN: [id number]  Patient Class: IP- Surgery Admit   Admission Date: 9/22/2022  Length of Stay: 3 days  Attending Physician: Cj Quinn MD  Primary Care Provider: Edilberto Figueroa MD        Subjective:     Principal Problem:Adenocarcinoma of rectosigmoid junction        HPI:  52 y.o. male PMH DM, HTN presents for evaluation of rectosigmoid adenocarcinoma.  Patient was undergoing his 1st ever colonoscopy on 08/04/2022 where a malignant-appearing mass was found in the rectosigmoid area. This was biopsied and confirmed to be adenocarcinoma. Patient also had multiple other adenomas removed. Asymptomatic prior to colonoscopy. Denies any fever, chills, nausea, vomiting, diarrhea, constipation, hematochezia or melena. Maternal grandfather has history of colon cancer. Past surgical history significant for robotic prostatectomy for prostate cancer. HM consulted for medical management postop rectosigmoid junction adenocarcinoma removal. No complaints other than incision site pain at the time of my evaluation.       Overview/Hospital Course:  9/25: Postop day 3, resection adenocarcinoma of rectosigmoid junction.  Tolerated procedure well.  Pain control.  Advanced to regular diet and reporting complains of abdominal bloating and nausea, passing bowel movements      Review of Systems   Constitutional:  Negative for activity change, appetite change, chills, fatigue and fever.   HENT: Negative.     Eyes: Negative.    Respiratory:  Negative for chest tightness, shortness of breath and wheezing.    Cardiovascular:  Negative for chest pain and leg swelling.   Gastrointestinal:  Positive for abdominal pain. Negative for abdominal distention, diarrhea, nausea and vomiting.   Endocrine: Negative.  Negative for polydipsia and polyuria.   Genitourinary:  Negative for flank pain and hematuria.   Musculoskeletal:  Negative for back pain, joint  swelling and myalgias.   Skin: Negative.    Neurological: Negative.  Negative for syncope, weakness and light-headedness.   Hematological: Negative.    Psychiatric/Behavioral:  Negative for confusion.    Objective:     Vital Signs (Most Recent):  Temp: 98.2 °F (36.8 °C) (09/25/22 1119)  Pulse: 98 (09/25/22 1119)  Resp: 16 (09/25/22 1119)  BP: (!) 142/86 (09/25/22 1119)  SpO2: 99 % (09/25/22 1119)   Vital Signs (24h Range):  Temp:  [98 °F (36.7 °C)-99.4 °F (37.4 °C)] 98.2 °F (36.8 °C)  Pulse:  [] 98  Resp:  [16-20] 16  SpO2:  [95 %-99 %] 99 %  BP: (142-169)/(85-96) 142/86     Weight: 105.1 kg (231 lb 11.3 oz)  Body mass index is 35.23 kg/m².    Intake/Output Summary (Last 24 hours) at 9/25/2022 1234  Last data filed at 9/24/2022 1945  Gross per 24 hour   Intake 1383.25 ml   Output --   Net 1383.25 ml        Physical Exam  Constitutional:       General: He is not in acute distress.     Appearance: Normal appearance. He is not ill-appearing.   HENT:      Head: Normocephalic and atraumatic.   Cardiovascular:      Rate and Rhythm: Regular rhythm. Tachycardia present.   Pulmonary:      Effort: Pulmonary effort is normal.      Breath sounds: Normal breath sounds.   Abdominal:      General: Abdomen is flat. Bowel sounds are normal. There is no distension.      Palpations: Abdomen is soft.      Tenderness: There is abdominal tenderness. There is no guarding.      Comments:  Abdomen with minimal distention and expected TTP. Surgical dressings remain in place, clean and dry   Musculoskeletal:         General: Normal range of motion.      Right lower leg: No edema.      Left lower leg: No edema.   Skin:     General: Skin is warm and dry.   Neurological:      General: No focal deficit present.      Mental Status: He is alert and oriented to person, place, and time.       Significant Labs: All pertinent labs within the past 24 hours have been reviewed.    Significant Imaging: I have reviewed all pertinent imaging  results/findings within the past 24 hours.      Assessment/Plan:      * Adenocarcinoma of rectosigmoid junction  POD3 surgical resection  Pain control  Tolerating regular diet with nausea and bloating  Added simethicone PRN  F/u surgery recs      Malignant neoplasm of prostate  Prior resection        Bipolar disorder  Restart home effexor qhs      Uncontrolled type 2 diabetes mellitus with hyperglycemia  Patient's FSGs are uncontrolled due to hyperglycemia on current medication regimen.  Last A1c reviewed-   Lab Results   Component Value Date    HGBA1C 8.7 (H) 08/06/2022     Most recent fingerstick glucose reviewed-   Recent Labs   Lab 09/24/22  2102 09/25/22  0054 09/25/22  0552 09/25/22  1116   POCTGLUCOSE 224* 147* 174* 179*     Current correctional scale  Low  Maintain anti-hyperglycemic dose as follows-   Antihyperglycemics (From admission, onward)    Start     Stop Route Frequency Ordered    09/22/22 1902  insulin aspart U-100 pen 0-5 Units         -- SubQ Every 6 hours PRN 09/22/22 1802        Hold Oral hypoglycemics while patient is in the hospital.    9/23:  Glucose controlled     VTE Risk Mitigation (From admission, onward)         Ordered     enoxaparin injection 40 mg  Daily         09/22/22 1548     IP VTE HIGH RISK PATIENT  Once         09/22/22 1548     Place sequential compression device  Until discontinued         09/22/22 1548                Discharge Planning   MANJIT:      Code Status: Prior   Is the patient medically ready for discharge?:     Reason for patient still in hospital (select all that apply): Patient trending condition, Treatment, Consult recommendations and Pending disposition  Discharge Plan A: Home                  Melania Verdin MD  Department of Hospital Medicine   [facility]

## 2022-09-26 ENCOUNTER — TELEPHONE (OUTPATIENT)
Dept: SURGERY | Facility: CLINIC | Age: 52
End: 2022-09-26

## 2022-09-26 ENCOUNTER — PATIENT MESSAGE (OUTPATIENT)
Dept: SURGERY | Facility: CLINIC | Age: 52
End: 2022-09-26
Payer: COMMERCIAL

## 2022-09-26 VITALS
BODY MASS INDEX: 35.11 KG/M2 | DIASTOLIC BLOOD PRESSURE: 85 MMHG | HEART RATE: 114 BPM | HEIGHT: 68 IN | RESPIRATION RATE: 15 BRPM | WEIGHT: 231.69 LBS | OXYGEN SATURATION: 100 % | SYSTOLIC BLOOD PRESSURE: 156 MMHG | TEMPERATURE: 98 F

## 2022-09-26 LAB
ANION GAP SERPL CALC-SCNC: 13 MMOL/L (ref 8–16)
BASOPHILS # BLD AUTO: 0.02 K/UL (ref 0–0.2)
BASOPHILS NFR BLD: 0.3 % (ref 0–1.9)
BUN SERPL-MCNC: 7 MG/DL (ref 6–20)
CALCIUM SERPL-MCNC: 8.3 MG/DL (ref 8.7–10.5)
CHLORIDE SERPL-SCNC: 101 MMOL/L (ref 95–110)
CO2 SERPL-SCNC: 21 MMOL/L (ref 23–29)
CREAT SERPL-MCNC: 0.7 MG/DL (ref 0.5–1.4)
DIFFERENTIAL METHOD: ABNORMAL
EOSINOPHIL # BLD AUTO: 0.1 K/UL (ref 0–0.5)
EOSINOPHIL NFR BLD: 1.8 % (ref 0–8)
ERYTHROCYTE [DISTWIDTH] IN BLOOD BY AUTOMATED COUNT: 13.4 % (ref 11.5–14.5)
EST. GFR  (NO RACE VARIABLE): >60 ML/MIN/1.73 M^2
GLUCOSE SERPL-MCNC: 164 MG/DL (ref 70–110)
HCT VFR BLD AUTO: 40.1 % (ref 40–54)
HGB BLD-MCNC: 13.7 G/DL (ref 14–18)
IMM GRANULOCYTES # BLD AUTO: 0.03 K/UL (ref 0–0.04)
IMM GRANULOCYTES NFR BLD AUTO: 0.4 % (ref 0–0.5)
LYMPHOCYTES # BLD AUTO: 0.8 K/UL (ref 1–4.8)
LYMPHOCYTES NFR BLD: 10.7 % (ref 18–48)
MAGNESIUM SERPL-MCNC: 1.9 MG/DL (ref 1.6–2.6)
MCH RBC QN AUTO: 28.1 PG (ref 27–31)
MCHC RBC AUTO-ENTMCNC: 34.2 G/DL (ref 32–36)
MCV RBC AUTO: 82 FL (ref 82–98)
MONOCYTES # BLD AUTO: 1 K/UL (ref 0.3–1)
MONOCYTES NFR BLD: 13.6 % (ref 4–15)
NEUTROPHILS # BLD AUTO: 5.2 K/UL (ref 1.8–7.7)
NEUTROPHILS NFR BLD: 73.2 % (ref 38–73)
NRBC BLD-RTO: 0 /100 WBC
PHOSPHATE SERPL-MCNC: 2.1 MG/DL (ref 2.7–4.5)
PLATELET # BLD AUTO: 293 K/UL (ref 150–450)
PMV BLD AUTO: 8.8 FL (ref 9.2–12.9)
POCT GLUCOSE: 150 MG/DL (ref 70–110)
POCT GLUCOSE: 159 MG/DL (ref 70–110)
POTASSIUM SERPL-SCNC: 3.1 MMOL/L (ref 3.5–5.1)
RBC # BLD AUTO: 4.88 M/UL (ref 4.6–6.2)
SODIUM SERPL-SCNC: 135 MMOL/L (ref 136–145)
WBC # BLD AUTO: 7.07 K/UL (ref 3.9–12.7)

## 2022-09-26 PROCEDURE — 99024 POSTOP FOLLOW-UP VISIT: CPT | Mod: ,,, | Performed by: COLON & RECTAL SURGERY

## 2022-09-26 PROCEDURE — 84100 ASSAY OF PHOSPHORUS: CPT | Performed by: COLON & RECTAL SURGERY

## 2022-09-26 PROCEDURE — 63600175 PHARM REV CODE 636 W HCPCS: Performed by: NURSE PRACTITIONER

## 2022-09-26 PROCEDURE — 80048 BASIC METABOLIC PNL TOTAL CA: CPT | Performed by: COLON & RECTAL SURGERY

## 2022-09-26 PROCEDURE — 83735 ASSAY OF MAGNESIUM: CPT | Performed by: COLON & RECTAL SURGERY

## 2022-09-26 PROCEDURE — 85025 COMPLETE CBC W/AUTO DIFF WBC: CPT | Performed by: COLON & RECTAL SURGERY

## 2022-09-26 PROCEDURE — 36415 COLL VENOUS BLD VENIPUNCTURE: CPT | Performed by: COLON & RECTAL SURGERY

## 2022-09-26 PROCEDURE — 99024 PR POST-OP FOLLOW-UP VISIT: ICD-10-PCS | Mod: ,,, | Performed by: COLON & RECTAL SURGERY

## 2022-09-26 PROCEDURE — 25000003 PHARM REV CODE 250: Performed by: COLON & RECTAL SURGERY

## 2022-09-26 PROCEDURE — 25000003 PHARM REV CODE 250: Performed by: STUDENT IN AN ORGANIZED HEALTH CARE EDUCATION/TRAINING PROGRAM

## 2022-09-26 RX ORDER — OXYCODONE HYDROCHLORIDE 5 MG/1
5 TABLET ORAL EVERY 6 HOURS PRN
Qty: 20 TABLET | Refills: 0 | Status: SHIPPED | OUTPATIENT
Start: 2022-09-26 | End: 2022-10-10 | Stop reason: ALTCHOICE

## 2022-09-26 RX ORDER — DOCUSATE SODIUM 100 MG/1
100 CAPSULE, LIQUID FILLED ORAL 2 TIMES DAILY PRN
Qty: 20 CAPSULE | Refills: 0 | Status: SHIPPED | OUTPATIENT
Start: 2022-09-26 | End: 2023-11-13

## 2022-09-26 RX ADMIN — OXYCODONE HYDROCHLORIDE 10 MG: 5 TABLET ORAL at 09:09

## 2022-09-26 RX ADMIN — SIMETHICONE 80 MG: 80 TABLET, CHEWABLE ORAL at 02:09

## 2022-09-26 RX ADMIN — SENNOSIDES AND DOCUSATE SODIUM 1 TABLET: 50; 8.6 TABLET ORAL at 09:09

## 2022-09-26 RX ADMIN — HYDRALAZINE HYDROCHLORIDE 10 MG: 20 INJECTION, SOLUTION INTRAMUSCULAR; INTRAVENOUS at 05:09

## 2022-09-26 RX ADMIN — FAMOTIDINE 20 MG: 20 TABLET ORAL at 09:09

## 2022-09-26 RX ADMIN — OXYCODONE HYDROCHLORIDE 10 MG: 5 TABLET ORAL at 04:09

## 2022-09-26 RX ADMIN — OXYCODONE HYDROCHLORIDE 10 MG: 5 TABLET ORAL at 12:09

## 2022-09-26 RX ADMIN — CHLORHEXIDINE GLUCONATE 0.12% ORAL RINSE 10 ML: 1.2 LIQUID ORAL at 09:09

## 2022-09-26 NOTE — NURSING
Pt being discharged home in stable condition. IV removed, catheter intact. Discharge instructions given to pt, pt verbalized understanding. Prescriptions delivered to pt bedside and follow up appointments set.

## 2022-09-26 NOTE — TELEPHONE ENCOUNTER
Spoke with patient to schedule appointment, confirmed details. Sent transportation services info to patient through [employer].     ----- Message from Tavo Hanna RN sent at 9/26/2022 10:37 AM CDT -----  Regarding: Post Op  Please assist pt with 2 week post op appointment. Thanks.

## 2022-09-26 NOTE — DISCHARGE SUMMARY
[facility] S[facility]  Colorectal Surgery  Discharge Summary      Patient Name: Patrick Sanz  MRN: [id number]  Admission Date: 9/22/2022  Hospital Length of Stay: 4 days  Discharge Date and Time:  09/26/2022 8:47 AM  Attending Physician: Cj Paredes MD   Discharging Provider: Cj Paredes MD  Primary Care Provider: Edilberto Figueroa MD    HPI:   53 y/o male with adenocarcinoma of the rectosigmoid junction who presents for surgical management via LAR.      Procedure(s) (LRB):  XI ROBOTIC RESECTION, COLON, LOW ANTERIOR (N/A)  BLOCK, TRANSVERSUS ABDOMINIS PLANE (N/A)  MOBILIZATION, SPLENIC FLEXURE (N/A)      Indwelling Lines/Drains at time of discharge:   Lines/Drains/Airways     None               Hospital Course: 09/23/2022: POD 1. Tolerating clears. Denies nausea and vomiting. Voided after miller removal without issue. No BM/flatus yet. Minimal ambulation so far. Pain controlled at this time.    09/24/2022 POD 2 passing flatus and a few bowel movements, pain moderately controlled, ambulating    09/25/2022 postop day 3 remains slightly tachycardic, tolerating diet but with some bloating/nausea, passing bowel movements    09/26/2022: POD 4. Feeling much better. Tolerating diet. Having nonbloody bowel movements and passing flatus. Pain well controlled. No further nausea or vomiting. Incisions remain c/d/I. Ready for discharge.        Goals of Care Treatment Preferences:  Code Status: Full Code      Consults:   Consults (From admission, onward)        Status Ordering Provider     Inpatient consult to Hospitalist  Once        Provider:  (Not yet assigned)    Acknowledged CJ PAREDES          Significant Diagnostic Studies: Labs:   BMP:   Recent Labs   Lab 09/25/22  0544 09/26/22  0607   * 164*   * 135*   K 3.6 3.1*    101   CO2 18* 21*   BUN 6 7   CREATININE 0.8 0.7   CALCIUM 8.6* 8.3*   MG 1.9 1.9    and CBC   Recent Labs   Lab 09/25/22  0544 09/26/22  0607   WBC 10.10 7.07   HGB  14.4 13.7*   HCT 43.0 40.1    293       Pending Diagnostic Studies:     Procedure Component Value Units Date/Time    Specimen to Pathology, Surgery General Surgery [[id number]] Collected: 09/22/22 1419    Order Status: Sent Lab Status: In process Updated: 09/23/22 0903    Specimen: Tissue         Final Active Diagnoses:    Diagnosis Date Noted POA    PRINCIPAL PROBLEM:  Adenocarcinoma of rectosigmoid junction [C19] 09/22/2022 Yes    Malignant neoplasm of prostate [C61] 05/18/2021 Yes    Uncontrolled type 2 diabetes mellitus with hyperglycemia [E11.65] 10/03/2020 Yes    Bipolar disorder [F31.9] 10/03/2020 Yes      Problems Resolved During this Admission:      Discharged Condition: good    Disposition: Home or Self Care    Follow Up:   Follow-up Information     Cj Quinn MD Follow up in 2 week(s).    Specialties: Colon and Rectal Surgery, General Surgery  Why: Post-op LAR  Contact information:  [address] [place] [address] DR Sheryl MOORE [address] [phone]                       Patient Instructions:      Diet Adult Regular     Lifting restrictions   Order Comments: No lifting over 10 pounds for 6 weeks.     No driving until:   Order Comments: No longer taking narcotic pain medications.     Notify your health care provider if you experience any of the following:  increased confusion or weakness     Notify your health care provider if you experience any of the following:  persistent dizziness, light-headedness, or visual disturbances     Notify your health care provider if you experience any of the following:  worsening rash     Notify your health care provider if you experience any of the following:  severe persistent headache     Notify your health care provider if you experience any of the following:  difficulty breathing or increased cough     Notify your health care provider if you experience any of the following:  redness, tenderness, or signs of infection (pain, swelling, redness, odor or  green/yellow discharge around incision site)     Notify your health care provider if you experience any of the following:  severe uncontrolled pain     Notify your health care provider if you experience any of the following:  persistent nausea and vomiting or diarrhea     Notify your health care provider if you experience any of the following:  temperature >100.4     Shower on day dressing removed (No bath)   Order Comments: Okay to shower but do not submerge incisions in water.     Medications:  Reconciled Home Medications:      Medication List      START taking these medications    docusate sodium 100 MG capsule  Commonly known as: COLACE  Take 1 capsule (100 mg total) by mouth 2 (two) times daily as needed.        CHANGE how you take these medications    lamoTRIgine 200 MG tablet  Commonly known as: LAMICTAL  TAKE 1 AND 1/2 TABLETS(300 MG) BY MOUTH EVERY DAY  What changed: See the new instructions.     oxyCODONE 5 MG immediate release tablet  Commonly known as: ROXICODONE  Take 1 tablet (5 mg total) by mouth every 6 (six) hours as needed for Pain.  What changed: when to take this     venlafaxine 150 MG Cp24  Commonly known as: EFFEXOR-XR  Take 1 capsule (150 mg total) by mouth once daily.  What changed: when to take this        CONTINUE taking these medications    atorvastatin 40 MG tablet  Commonly known as: LIPITOR  TK 1 T PO D     clonazePAM 0.5 MG tablet  Commonly known as: KlonoPIN  Take 1 tablet (0.5 mg total) by mouth 2 (two) times daily as needed for Anxiety.     DEXCOM G6 SENSOR Miriam  Generic drug: blood-glucose sensor  1 each by Misc.(Non-Drug; Combo Route) route every 14 (fourteen) days.     DEXCOM G6 TRANSMITTER Miriam  Generic drug: blood-glucose transmitter  1 each by Misc.(Non-Drug; Combo Route) route once daily.     glipiZIDE 5 MG tablet  Commonly known as: GLUCOTROL  Take 1 tablet (5 mg total) by mouth 2 (two) times daily with meals.     insulin lispro 100 unit/mL pen  Commonly known as: HumaLOG  KwikPen Insulin  Inject 12 Units into the skin 3 (three) times daily with meals.     JARDIANCE 25 mg tablet  Generic drug: empagliflozin  Take 1 tablet by mouth once daily     QUEtiapine 100 MG Tab  Commonly known as: SEROQUEL  TAKE 1 TABLET(100 MG) BY MOUTH EVERY EVENING     SEMGLEE(INSULIN GLARG-YFGN) unit/mL (3 mL) Inpn  Generic drug: insulin glargine-yfgn  INJECT 30 UNITS SUBCUTANEOUSLY ONCE DAILY IN THE EVENING     tadalafiL 20 MG Tab  Commonly known as: CIALIS        STOP taking these medications    HYDROcodone-acetaminophen 5-325 mg per tablet  Commonly known as: NORCO     metroNIDAZOLE 500 MG tablet  Commonly known as: FLAGYL     neomycin 500 mg Tab  Commonly known as: MYCIFRADIN     polyethylene glycol 17 gram/dose powder  Commonly known as: GLYCOLAX        ASK your doctor about these medications    insulin glargine 100 units/mL SubQ pen  Commonly known as: LANTUS SOLOSTAR U-100 INSULIN  Inject 30 Units into the skin once daily.          Time spent on the discharge of patient: 35 minutes    Cj Quinn MD  Colorectal Surgery  [facility]

## 2022-09-26 NOTE — PLAN OF CARE
Pt resting  with the HOB elevated, Side rails up X2, Abdominal binder in place. Abdomen clean, dry and intact. Pain is controlled. Pt ambulating to restroom. Refuses bed alarm. Will continue to monitor  Problem: Adult Inpatient Plan of Care  Goal: Plan of Care Review  Outcome: Ongoing, Progressing  Goal: Patient-Specific Goal (Individualized)  Outcome: Ongoing, Progressing  Goal: Absence of Hospital-Acquired Illness or Injury  Outcome: Ongoing, Progressing  Goal: Optimal Comfort and Wellbeing  Outcome: Ongoing, Progressing  Goal: Readiness for Transition of Care  Outcome: Ongoing, Progressing     Problem: Diabetes Comorbidity  Goal: Blood Glucose Level Within Targeted Range  Outcome: Ongoing, Progressing     Problem: Infection  Goal: Absence of Infection Signs and Symptoms  Outcome: Ongoing, Progressing     Problem: Fall Injury Risk  Goal: Absence of Fall and Fall-Related Injury  Outcome: Ongoing, Progressing     Problem: Skin Injury Risk Increased  Goal: Skin Health and Integrity  Outcome: Ongoing, Progressing

## 2022-09-26 NOTE — PLAN OF CARE
[facility] - Med Surg  Discharge Final Note    Primary Care Provider: Edilberto Figueroa MD    Expected Discharge Date: 9/26/2022    Final Discharge Note (most recent)       Final Note - 09/26/22 1049          Final Note    Assessment Type Final Discharge Note     Anticipated Discharge Disposition Home or Self Care     Hospital Resources/Appts/Education Provided Provided patient/caregiver with written discharge plan information;Appointments scheduled and added to AVS        Post-Acute Status    Discharge Delays None known at this time                     Important Message from Medicare             Contact Info       Cj Quinn MD   Specialty: Colon and Rectal Surgery, General Surgery    [address] [place] [address] [address] [address]   Phone: [phone]       Next Steps: Follow up in 2 week(s)    Instructions: Post-op LAR          Patient to dc home with self care. Fu appt to be arranged by surgeon office. No other cm needs.

## 2022-09-26 NOTE — PLAN OF CARE
Bed locked, in lowest position. Call bell in reach. Purposeful rounding done every two hours. Blood glucose monitoring. Pain meds given. Chart check complete. Will continue with plan of care.

## 2022-09-27 ENCOUNTER — PATIENT OUTREACH (OUTPATIENT)
Dept: ADMINISTRATIVE | Facility: CLINIC | Age: 52
End: 2022-09-27
Payer: COMMERCIAL

## 2022-09-27 DIAGNOSIS — Z90.49 S/P COLON RESECTION: Primary | ICD-10-CM

## 2022-09-27 NOTE — PROGRESS NOTES
2nd Attempt made to reach patient for TCC call. Left voicemail please call [phone] leave first name, last name, and  for Dulce I will return your call.

## 2022-09-27 NOTE — PROGRESS NOTES
C3 nurse attempted to contact Patrick Sanz  for a TCC post hospital discharge follow up call. No answer. Left voicemail with callback information. The patient does not have a scheduled HOSFU appointment. Message sent to PCP staff for assistance with scheduling visit with patient.

## 2022-09-28 ENCOUNTER — PATIENT MESSAGE (OUTPATIENT)
Dept: SURGERY | Facility: CLINIC | Age: 52
End: 2022-09-28
Payer: COMMERCIAL

## 2022-09-28 NOTE — PROGRESS NOTES
C3 nurse spoke with Patrick Sanz  for a TCC post hospital discharge follow up call. The patient does not have a scheduled HOSFU appointment with Edilberto Figueroa MD  within 5-7 days post hospital discharge date 9/26/22. C3 nurse was unable to schedule HOSFU appointment in [facility].    Message sent to PCP staff requesting they contact patient and schedule follow up appointment.

## 2022-09-29 ENCOUNTER — TELEPHONE (OUTPATIENT)
Dept: SURGERY | Facility: CLINIC | Age: 52
End: 2022-09-29
Payer: COMMERCIAL

## 2022-09-29 NOTE — TELEPHONE ENCOUNTER
Spoke with patient, states he began having thicker mucus that was hard to cough up yesterday and was experiencing shortness of breath. Patient denies shortness of breath at the moment but still has a cough, asking for medication recommendation. Educated patient that after surgery being sedentary can exacerbate congestion, etc. Due to lack of movement. Encouraged patient to ambulate and change positions throughout the day, use pillow over incision site when he needs to cough, and continue using incentive spirometer. Told patient it is OK to take Mucinex to help break up the thick mucus he complains of, being around the steam of a warm shower or humidifier can help. Discouraged use of cough suppressants as the goal is to get mucus up and out if possible without hurting his incision/abdominal site. Patient verbalized understanding. Patient agrees to message me with updates on his progress and to go to Urgent Care/ER if he becomes short of breath again. Patient scheduled for post operative appointment with Dr. Quinn Monday 10/3 at 9:20 am.     ----- Message from Kalie Guaman MA sent at 9/28/2022  4:57 PM CDT -----  Type:  Patient Returning Call    Who Called: pt  Does the patient know what this is regarding?: yes  Would the patient rather a call back or a response via [facility]?  Call back   Best Call Back Number:[phone]    Additional Information:  wheezing and mucus in lungs after surgery and pt is requesting a call back

## 2022-09-30 ENCOUNTER — PATIENT MESSAGE (OUTPATIENT)
Dept: SURGERY | Facility: CLINIC | Age: 52
End: 2022-09-30
Payer: COMMERCIAL

## 2022-09-30 LAB
FINAL PATHOLOGIC DIAGNOSIS: NORMAL
GROSS: NORMAL
Lab: NORMAL
SUPPLEMENTAL DIAGNOSIS: NORMAL

## 2022-10-03 ENCOUNTER — OFFICE VISIT (OUTPATIENT)
Dept: SURGERY | Facility: CLINIC | Age: 52
End: 2022-10-03
Payer: COMMERCIAL

## 2022-10-03 VITALS
DIASTOLIC BLOOD PRESSURE: 84 MMHG | SYSTOLIC BLOOD PRESSURE: 143 MMHG | TEMPERATURE: 97 F | HEART RATE: 103 BPM | WEIGHT: 216.94 LBS | BODY MASS INDEX: 32.98 KG/M2

## 2022-10-03 DIAGNOSIS — C19 ADENOCARCINOMA OF RECTOSIGMOID JUNCTION: Primary | ICD-10-CM

## 2022-10-03 PROCEDURE — 99999 PR PBB SHADOW E&M-EST. PATIENT-LVL IV: CPT | Mod: PBBFAC,,, | Performed by: COLON & RECTAL SURGERY

## 2022-10-03 PROCEDURE — 99999 PR PBB SHADOW E&M-EST. PATIENT-LVL IV: ICD-10-PCS | Mod: PBBFAC,,, | Performed by: COLON & RECTAL SURGERY

## 2022-10-03 PROCEDURE — 99024 POSTOP FOLLOW-UP VISIT: CPT | Mod: S$GLB,,, | Performed by: COLON & RECTAL SURGERY

## 2022-10-03 PROCEDURE — 99024 PR POST-OP FOLLOW-UP VISIT: ICD-10-PCS | Mod: S$GLB,,, | Performed by: COLON & RECTAL SURGERY

## 2022-10-03 NOTE — PROGRESS NOTES
History & Physical    SUBJECTIVE:     CC: Rectosigmoid adenocarcinoma  Ref: Michelle Flores MD    History of Present Illness:  Patient is a 52 y.o. 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  Patient was undergoing his 1st ever colonoscopy on 08/04/2022 where a malignant-appearing mass was found in the rectosigmoid area.  This was biopsied and confirmed to be adenocarcinoma.  Patient also had multiple other adenomas removed.  He states that prior to the colonoscopy in since that time, he has been completely asymptomatic.  He denies any fever, chills, nausea, vomiting, diarrhea, constipation, hematochezia or melena.  He denies any family history of colorectal cancer or IBD.  He does have a past surgical history significant for robotic prostatectomy for prostate cancer.    09/22/2022: Robotic converted to open LAR, final pathology T2N0    Interval history:  Since last clinic visit, patient underwent robotic converted to open LAR. He is getting stronger every day but still feels fatigue and has myalgias. He has the sensation that he has to have a bowel movement every time he sits/ puts pressure on his tailbone. He is having loose bowel movements. He denies fevers, chills, nausea, and vomiting. No hematochezia or melena.       Review of patient's allergies indicates:   Allergen Reactions    Aripiprazole Other (See Comments)    Sildenafil Other (See Comments)    Bupropion hcl Anxiety    Penicillins Hives and Rash       Current Outpatient Medications   Medication Sig Dispense Refill    atorvastatin (LIPITOR) 40 MG tablet TK 1 T PO D 90 tablet 1    clonazePAM (KLONOPIN) 0.5 MG tablet Take 1 tablet (0.5 mg total) by mouth 2 (two) times daily as needed for Anxiety. 60 tablet 2    docusate sodium (COLACE) 100 MG capsule Take 1 capsule (100 mg total) by mouth 2 (two) times daily as needed. 20 capsule 0    glipiZIDE (GLUCOTROL) 5 MG tablet Take 1 tablet (5 mg total) by mouth 2 (two) times daily with meals. 60  tablet 0    insulin lispro (HUMALOG KWIKPEN INSULIN) 100 unit/mL pen Inject 12 Units into the skin 3 (three) times daily with meals. 45 mL 0    JARDIANCE 25 mg tablet Take 1 tablet by mouth once daily 90 tablet 0    lamoTRIgine (LAMICTAL) 200 MG tablet TAKE 1 AND 1/2 TABLETS(300 MG) BY MOUTH EVERY DAY (Patient taking differently: every evening.) 45 tablet 1    oxyCODONE (ROXICODONE) 5 MG immediate release tablet Take 1 tablet (5 mg total) by mouth every 6 (six) hours as needed for Pain. 20 tablet 0    QUEtiapine (SEROQUEL) 100 MG Tab TAKE 1 TABLET(100 MG) BY MOUTH EVERY EVENING 30 tablet 1    SEMGLEE,INSULIN GLARG-YFGN, unit/mL (3 mL) InPn INJECT 30 UNITS SUBCUTANEOUSLY ONCE DAILY IN THE EVENING 15 mL 0    tadalafiL (CIALIS) 20 MG Tab       venlafaxine (EFFEXOR-XR) 150 MG Cp24 Take 1 capsule (150 mg total) by mouth once daily. 90 capsule 0    blood-glucose sensor (DEXCOM G6 SENSOR) Miriam 1 each by Misc.(Non-Drug; Combo Route) route every 14 (fourteen) days. (Patient not taking: Reported on 9/15/2022) 3 each 11    blood-glucose transmitter (DEXCOM G6 TRANSMITTER) Miriam 1 each by Misc.(Non-Drug; Combo Route) route once daily. (Patient not taking: Reported on 9/15/2022) 1 each 3    insulin (LANTUS SOLOSTAR U-100 INSULIN) glargine 100 units/mL SubQ pen Inject 30 Units into the skin once daily. (Patient not taking: Reported on 9/28/2022) 45 mL 0     No current facility-administered medications for this visit.       Past Medical History:   Diagnosis Date    Anxiety     Bipolar disorder     Depression     Diabetes mellitus, type 2     Elevated PSA 03/16/2021    Kidney stones     Sleep apnea      Past Surgical History:   Procedure Laterality Date    COLONOSCOPY N/A 8/4/2022    Procedure: COLONOSCOPY;  Surgeon: Michelle Flores MD;  Location: [facility];  Service: Endoscopy;  Laterality: N/A;    HERNIA REPAIR      INJECTION OF ANESTHETIC AGENT INTO TISSUE PLANE DEFINED BY TRANSVERSUS ABDOMINIS MUSCLE N/A 9/22/2022     Procedure: BLOCK, TRANSVERSUS ABDOMINIS PLANE;  Surgeon: Cj Quinn MD;  Location: [facility] OR;  Service: General;  Laterality: N/A;    MOBILIZATION OF SPLENIC FLEXURE N/A 2022    Procedure: MOBILIZATION, SPLENIC FLEXURE;  Surgeon: Cj Quinn MD;  Location: [facility] OR;  Service: General;  Laterality: N/A;    ROBOT-ASSISTED LOW ANTERIOR RESECTION OF COLON N/A 2022    Procedure: XI ROBOTIC RESECTION, COLON, LOW ANTERIOR;  Surgeon: Cj Quinn MD;  Location: [facility] OR;  Service: General;  Laterality: N/A;  CONVERTED TO OPEN     Family History   Problem Relation Age of Onset    Diabetes Mother     Hypertension Father      Social History     Tobacco Use    Smoking status: Former     Types: Cigarettes     Quit date: 2014     Years since quittin.7    Smokeless tobacco: Former   Substance Use Topics    Alcohol use: Not Currently    Drug use: Never        Review of Systems:  Review of Systems   Constitutional:  Positive for fatigue. Negative for chills, fever and unexpected weight change.   HENT:  Negative for congestion.    Eyes:  Negative for visual disturbance.   Respiratory:  Negative for shortness of breath.    Cardiovascular:  Negative for chest pain.   Gastrointestinal:  Positive for diarrhea. Negative for abdominal distention, abdominal pain, anal bleeding, blood in stool, constipation, nausea, rectal pain and vomiting.   Genitourinary:  Negative for dysuria.   Musculoskeletal:  Positive for myalgias. Negative for arthralgias.   Skin:  Negative for rash.   Neurological:  Negative for light-headedness.   Hematological:  Negative for adenopathy.     OBJECTIVE:     Vital Signs (Most Recent)  Temp: 97 °F (36.1 °C) (10/03/22 0920)  Pulse: 103 (10/03/22 0920)  BP: (!) 143/84 (10/03/22 0920)     98.4 kg (216 lb 14.9 oz)     Physical Exam:  Physical Exam  Constitutional:       Appearance: He is well-developed.   HENT:      Head: Normocephalic and atraumatic.   Eyes:      Conjunctiva/sclera:  Conjunctivae normal.   Neck:      Thyroid: No thyromegaly.   Cardiovascular:      Rate and Rhythm: Normal rate.   Pulmonary:      Effort: Pulmonary effort is normal. No respiratory distress.   Abdominal:      Comments: Appropriately TTP. Staples in place, incisions healing well without signs of infection. Staples removed today.   Musculoskeletal:         General: No tenderness. Normal range of motion.      Cervical back: Normal range of motion.   Skin:     General: Skin is warm and dry.      Capillary Refill: Capillary refill takes less than 2 seconds.      Findings: No rash.   Neurological:      Mental Status: He is alert and oriented to person, place, and time.       Laboratory  Lab Results   Component Value Date    WBC 7.07 09/26/2022    HGB 13.7 (L) 09/26/2022    HCT 40.1 09/26/2022     09/26/2022    CHOL 158 01/29/2022    TRIG 177 (H) 01/29/2022    HDL 35 (L) 01/29/2022    ALT 48 (H) 09/19/2022    AST 21 09/19/2022     (L) 09/26/2022    K 3.1 (L) 09/26/2022     09/26/2022    CREATININE 0.7 09/26/2022    BUN 7 09/26/2022    CO2 21 (L) 09/26/2022    TSH 0.943 10/03/2020    PSA 17.6 (H) 03/15/2021    HGBA1C 8.7 (H) 08/06/2022       Component Ref Range & Units 1 d ago    CEA 0.0 - 5.0 ng/mL 60.3 High         Diagnostic Results:  Colonoscopy: reviewed  CT and MRI Reviewed      CT C/A/P:  FINDINGS:  Chest:     Heart and great vessels: Within normal limits.     Adenopathy: None demonstrated.     Lungs: Clear bilaterally.     Abdomen:     Liver: There are 2 similar appearing hypoattenuating lesions seen in the posterior right hepatic lobe measuring to 2.8 cmr on series 3, image 45 and 3.2 cm more inferiorly on series 3, image 72.  These nodules appear to demonstrate peripheral interrupted nodular enhancement and do not appear significantly changed from previous CT, most likely representing hemangiomas.  Multiple additional scattered subcentimeter hypoattenuating foci seen throughout the liver are too  small to reliably characterize but also appears stable.     Gallbladder and biliary: Within normal limits.     Spleen: Within normal limits.     Pancreas: Within normal limits.     Adrenals: Within normal limits.     Kidneys: There is a 13 mm cyst at the upper pole of the right kidney which is not appear significantly changed.  No hydronephrosis or hydroureter.     Bowel: Focal bowel wall thickening is noted near the rectosigmoid junction, in keeping with known malignancy.     Peritoneum: No ascites or pneumoperitoneum.     Abdominal Adenopathy: None.     Vasculature: Within normal limits.     Pelvis:     Urinary bladder: Unremarkable.     Prostate and seminal vesicles: Within normal limits.     Pelvis adenopathy: There are multiple prominent inferior mesenteric/superior rectal lymph nodes present adjacent to the known mass at the rectosigmoid junction.     Bones: No acute findings.     Miscellaneous: None.     Impression:     1. Focal bowel wall thickening seen near the rectosigmoid junction, in keeping with known malignancy.  No evidence of associated obstruction.  2. Unchanged appearance of multiple hypoattenuating lesions in the liver, at least 2 of which most likely represent hemangiomas.  This can be confirmed with MRI.  Otherwise no evidence of distant metastatic disease.  3. Multiple prominent inferior mesenteric/superior rectal lymph node seen adjacent to the known malignancy in the rectosigmoid colon (see report for rectal MRI performed same day).  No evidence of non locoregional adenopathy.        MRI Rectal:  FINDINGS:  Chest:     Heart and great vessels: Within normal limits.     Adenopathy: None demonstrated.     Lungs: Clear bilaterally.     Abdomen:     Liver: There are 2 similar appearing hypoattenuating lesions seen in the posterior right hepatic lobe measuring to 2.8 cmr on series 3, image 45 and 3.2 cm more inferiorly on series 3, image 72.  These nodules appear to demonstrate peripheral  interrupted nodular enhancement and do not appear significantly changed from previous CT, most likely representing hemangiomas.  Multiple additional scattered subcentimeter hypoattenuating foci seen throughout the liver are too small to reliably characterize but also appears stable.     Gallbladder and biliary: Within normal limits.     Spleen: Within normal limits.     Pancreas: Within normal limits.     Adrenals: Within normal limits.     Kidneys: There is a 13 mm cyst at the upper pole of the right kidney which is not appear significantly changed.  No hydronephrosis or hydroureter.     Bowel: Focal bowel wall thickening is noted near the rectosigmoid junction, in keeping with known malignancy.     Peritoneum: No ascites or pneumoperitoneum.     Abdominal Adenopathy: None.     Vasculature: Within normal limits.     Pelvis:     Urinary bladder: Unremarkable.     Prostate and seminal vesicles: Within normal limits.     Pelvis adenopathy: There are multiple prominent inferior mesenteric/superior rectal lymph nodes present adjacent to the known mass at the rectosigmoid junction.     Bones: No acute findings.     Miscellaneous: None.     Impression:     1. Focal bowel wall thickening seen near the rectosigmoid junction, in keeping with known malignancy.  No evidence of associated obstruction.  2. Unchanged appearance of multiple hypoattenuating lesions in the liver, at least 2 of which most likely represent hemangiomas.  This can be confirmed with MRI.  Otherwise no evidence of distant metastatic disease.  3. Multiple prominent inferior mesenteric/superior rectal lymph node seen adjacent to the known malignancy in the rectosigmoid colon (see report for rectal MRI performed same day).  No evidence of non locoregional adenopathy.    Final Surgical Pathology:  Final Pathologic Diagnosis 1. Colon, anastomotic donuts, low anterior resection:   - Incidental hyperplastic polyp present in one segment of colon   - Negative for  dysplasia and malignancy   2. Colon, proximal margin, low anterior resection:   - Segment of colon with no diagnostic histopathologic alterations   - Negative for dysplasia and malignancy   3. Colon, sigmoid and rectum, low anterior resection:   - Invasive adenocarcinoma, moderately differentiated   - Immunohistochemical studies for Mismatch Repair (MMR) proteins pending   - Pathologic stage: pT2N0   - See synoptic report   TUMOR SYNOPTIC: (COLON and RECTUM)   Standard(s): AJCC 8th Edition   SPECIMEN   Procedure: Low anterior resection   Macroscopic Evaluation of Mesorectum: Complete   TUMOR   Tumor Site: Rectum, entirely above anterior peritoneal reflection   Histologic Type: Adenocarcinoma   Histologic Grade: G2, moderately differentiated (50-95% gland formation)   Tumor Size: 7.5 cm   Tumor Extent: Invades into muscularis propria   Macroscopic Tumor Perforation: Not identified   Lymphovascular invasion: Not identified   Perineural Invasion: Not identified   TREATMENT EFFECT: No known presurgical therapy   MARGINS   Margin Status for Invasive Carcinoma: All margins uninvolved by invasive   carcinoma and dysplasia   Margin Status for Non-Invasive Tumor: All margins negative for dysplasia   Closest Margin to Invasive Carcinoma: Distal, 2.5 cm   REGIONAL LYMPH NODES   Number of Lymph Nodes Involved: 0   Number of Lymph Nodes Examined: 27   Tumor Deposits: Not identified   PATHOLOGIC STAGE   TNM Descriptors: N/A   pT Category: pT2; Tumor invades the muscularis propria   pN Category: pN0; No regional lymph node metastasis   Additional findings: N/A   The following blocks are suitable for additional testing: 3F, 3G  VC      Comment: Interp By Silvestre Bassett MD, Signed on 09/30/2022 at 16:15   Supplemental Diagnosis This supplemental report is issued to include results of immunohistochemical   studies. No changes are made to the previously rendered diagnoses.   Immunohistochemistry (IHC) Testing for Mismatch Repair  (MMR) Proteins:   MLH1 - Intact nuclear expression   MSH2 - Intact nuclear expression   MSH6 - Intact nuclear expression   PMS2 - Intact nuclear expression   Background nonneoplastic tissue/internal control with intact nuclear   expression   IHC Interpretation   No loss of nuclear expression of MMR proteins: low probability of   microsatellite instability   There are exceptions to the above IHC interpretations. These results should   not be considered in isolation, and clinical correlation with genetic   counseling is recommended to assess the need for germline testing.           ASSESSMENT/PLAN:     53yo M with rectosigmoid/upper rectal adenocarcinoma now s/p robotic converted to open LAR with final pathology T2N0    - Pathology reviewed with patient. Will need oncologic follow-up for complete evaluation.  - CEA Q3 months  - CT C/A/P Q6-12 months  - Colonoscopy 1 year post-operatively  - RTC 3 months for surveillance    Cj Quinn MD  Colon and Rectal Surgery  [facility]

## 2022-10-04 ENCOUNTER — PATIENT MESSAGE (OUTPATIENT)
Dept: HEMATOLOGY/ONCOLOGY | Facility: CLINIC | Age: 52
End: 2022-10-04
Payer: COMMERCIAL

## 2022-10-04 ENCOUNTER — DOCUMENTATION ONLY (OUTPATIENT)
Dept: HEMATOLOGY/ONCOLOGY | Facility: CLINIC | Age: 52
End: 2022-10-04
Payer: COMMERCIAL

## 2022-10-04 NOTE — PROGRESS NOTES
Called and spoke with pt over the phone about referral from Dr. Quinn. Explained my role as NN. Offered appt 10/10 @ the  with Dr. Muro or 10/11 at  with Dr. Alves. Pt [facility]. Offered 7am or 1120am on 10/10 with Dr. Alves, pt prefers 1120am. Offered to give my contact info but pt states he's unable to write it down now, told him I could send it via my chart, he states that is fine. All questions answered, he voiced understanding information given. Sent my contact info via [employer].   Oncology Navigation   Intake  Date of Diagnosis: 09/22/22  Cancer Type: GI  Internal / External Referral: Internal  Date of Referral: 10/03/22  Initial Nurse Navigator Contact: 10/04/22  Referral to Initial Contact Timeline (days): 1  Date Worked: 10/04/22  Appointment Date: 10/11/22     Treatment                              Acuity      Follow Up  Follow up in 1 week (on 10/11/2022) for oncology consult with Dr. Alves.

## 2022-10-05 ENCOUNTER — PATIENT MESSAGE (OUTPATIENT)
Dept: SURGERY | Facility: CLINIC | Age: 52
End: 2022-10-05
Payer: COMMERCIAL

## 2022-10-05 RX ORDER — CYCLOBENZAPRINE HCL 5 MG
5 TABLET ORAL 3 TIMES DAILY PRN
Qty: 30 TABLET | Refills: 0 | Status: SHIPPED | OUTPATIENT
Start: 2022-10-05 | End: 2022-10-15

## 2022-10-09 ENCOUNTER — PATIENT MESSAGE (OUTPATIENT)
Dept: SURGERY | Facility: CLINIC | Age: 52
End: 2022-10-09
Payer: COMMERCIAL

## 2022-10-10 ENCOUNTER — PATIENT MESSAGE (OUTPATIENT)
Dept: DIABETES | Facility: CLINIC | Age: 52
End: 2022-10-10
Payer: COMMERCIAL

## 2022-10-10 RX ORDER — TRAMADOL HYDROCHLORIDE 50 MG/1
50 TABLET ORAL EVERY 6 HOURS PRN
Qty: 20 TABLET | Refills: 0 | Status: SHIPPED | OUTPATIENT
Start: 2022-10-10 | End: 2024-01-22

## 2022-10-11 ENCOUNTER — LAB VISIT (OUTPATIENT)
Dept: LAB | Facility: HOSPITAL | Age: 52
End: 2022-10-11
Attending: INTERNAL MEDICINE
Payer: COMMERCIAL

## 2022-10-11 ENCOUNTER — OFFICE VISIT (OUTPATIENT)
Dept: HEMATOLOGY/ONCOLOGY | Facility: CLINIC | Age: 52
End: 2022-10-11
Payer: COMMERCIAL

## 2022-10-11 VITALS
HEART RATE: 118 BPM | RESPIRATION RATE: 20 BRPM | TEMPERATURE: 97 F | BODY MASS INDEX: 32.98 KG/M2 | DIASTOLIC BLOOD PRESSURE: 87 MMHG | HEIGHT: 68 IN | WEIGHT: 217.63 LBS | OXYGEN SATURATION: 98 % | SYSTOLIC BLOOD PRESSURE: 143 MMHG

## 2022-10-11 DIAGNOSIS — C19 ADENOCARCINOMA OF RECTOSIGMOID JUNCTION: ICD-10-CM

## 2022-10-11 DIAGNOSIS — C61 MALIGNANT NEOPLASM OF PROSTATE: ICD-10-CM

## 2022-10-11 LAB
ALBUMIN SERPL BCP-MCNC: 4.2 G/DL (ref 3.5–5.2)
ALP SERPL-CCNC: 125 U/L (ref 55–135)
ALT SERPL W/O P-5'-P-CCNC: 46 U/L (ref 10–44)
ANION GAP SERPL CALC-SCNC: 14 MMOL/L (ref 8–16)
AST SERPL-CCNC: 26 U/L (ref 10–40)
BASOPHILS # BLD AUTO: 0.05 K/UL (ref 0–0.2)
BASOPHILS NFR BLD: 0.5 % (ref 0–1.9)
BILIRUB SERPL-MCNC: 0.4 MG/DL (ref 0.1–1)
BUN SERPL-MCNC: 14 MG/DL (ref 6–20)
CALCIUM SERPL-MCNC: 10 MG/DL (ref 8.7–10.5)
CEA SERPL-MCNC: 7.4 NG/ML (ref 0–5)
CHLORIDE SERPL-SCNC: 101 MMOL/L (ref 95–110)
CO2 SERPL-SCNC: 23 MMOL/L (ref 23–29)
CREAT SERPL-MCNC: 1 MG/DL (ref 0.5–1.4)
DIFFERENTIAL METHOD: ABNORMAL
EOSINOPHIL # BLD AUTO: 0.1 K/UL (ref 0–0.5)
EOSINOPHIL NFR BLD: 1.2 % (ref 0–8)
ERYTHROCYTE [DISTWIDTH] IN BLOOD BY AUTOMATED COUNT: 14 % (ref 11.5–14.5)
EST. GFR  (NO RACE VARIABLE): >60 ML/MIN/1.73 M^2
GLUCOSE SERPL-MCNC: 266 MG/DL (ref 70–110)
HCT VFR BLD AUTO: 44.4 % (ref 40–54)
HGB BLD-MCNC: 14.8 G/DL (ref 14–18)
IMM GRANULOCYTES # BLD AUTO: 0.04 K/UL (ref 0–0.04)
IMM GRANULOCYTES NFR BLD AUTO: 0.4 % (ref 0–0.5)
LYMPHOCYTES # BLD AUTO: 2.9 K/UL (ref 1–4.8)
LYMPHOCYTES NFR BLD: 28.3 % (ref 18–48)
MCH RBC QN AUTO: 28.4 PG (ref 27–31)
MCHC RBC AUTO-ENTMCNC: 33.3 G/DL (ref 32–36)
MCV RBC AUTO: 85 FL (ref 82–98)
MONOCYTES # BLD AUTO: 0.6 K/UL (ref 0.3–1)
MONOCYTES NFR BLD: 6.2 % (ref 4–15)
NEUTROPHILS # BLD AUTO: 6.6 K/UL (ref 1.8–7.7)
NEUTROPHILS NFR BLD: 63.4 % (ref 38–73)
NRBC BLD-RTO: 0 /100 WBC
PLATELET # BLD AUTO: 622 K/UL (ref 150–450)
PMV BLD AUTO: 8.3 FL (ref 9.2–12.9)
POTASSIUM SERPL-SCNC: 5.6 MMOL/L (ref 3.5–5.1)
PROT SERPL-MCNC: 7.9 G/DL (ref 6–8.4)
RBC # BLD AUTO: 5.22 M/UL (ref 4.6–6.2)
SODIUM SERPL-SCNC: 138 MMOL/L (ref 136–145)
WBC # BLD AUTO: 10.37 K/UL (ref 3.9–12.7)

## 2022-10-11 PROCEDURE — 85025 COMPLETE CBC W/AUTO DIFF WBC: CPT | Performed by: INTERNAL MEDICINE

## 2022-10-11 PROCEDURE — 99999 PR PBB SHADOW E&M-EST. PATIENT-LVL V: ICD-10-PCS | Mod: PBBFAC,,, | Performed by: INTERNAL MEDICINE

## 2022-10-11 PROCEDURE — 99245 OFF/OP CONSLTJ NEW/EST HI 55: CPT | Mod: S$GLB,,, | Performed by: INTERNAL MEDICINE

## 2022-10-11 PROCEDURE — 36415 COLL VENOUS BLD VENIPUNCTURE: CPT | Performed by: INTERNAL MEDICINE

## 2022-10-11 PROCEDURE — 80053 COMPREHEN METABOLIC PANEL: CPT | Performed by: INTERNAL MEDICINE

## 2022-10-11 PROCEDURE — 99245 PR OFFICE CONSULTATION,LEVEL V: ICD-10-PCS | Mod: S$GLB,,, | Performed by: INTERNAL MEDICINE

## 2022-10-11 PROCEDURE — 99999 PR PBB SHADOW E&M-EST. PATIENT-LVL V: CPT | Mod: PBBFAC,,, | Performed by: INTERNAL MEDICINE

## 2022-10-11 PROCEDURE — 84153 ASSAY OF PSA TOTAL: CPT | Performed by: INTERNAL MEDICINE

## 2022-10-11 PROCEDURE — 84466 ASSAY OF TRANSFERRIN: CPT | Performed by: INTERNAL MEDICINE

## 2022-10-11 PROCEDURE — 82728 ASSAY OF FERRITIN: CPT | Performed by: INTERNAL MEDICINE

## 2022-10-11 PROCEDURE — 82378 CARCINOEMBRYONIC ANTIGEN: CPT | Performed by: INTERNAL MEDICINE

## 2022-10-11 NOTE — PROGRESS NOTES
Subjective:       Patient ID: Patrick Sanz is a 52 y.o. male.    Chief Complaint: Results, Colon Cancer, and Prostate Cancer    HPI:  52-year-old male recently diagnosed with T2 N0 colon cancer discovered on a P MSH in for rising PSA status post T3a prostate cancer operated on at [facility].  Patient returns for further evaluation ECOG status 1    Past Medical History:   Diagnosis Date    Adenocarcinoma of rectosigmoid junction 2022    Anxiety     Bipolar disorder     Depression     Diabetes mellitus, type 2     Elevated PSA 2021    Kidney stones     Malignant neoplasm of prostate 2021    Mr. Patrick Sanz is a 52 y.o. male patient of Dr. Woodward status post robot-assisted radical prostatectomy with partial nerve sparing on 10/11/21 for what proved to be a 8cc, pGS4+3 (70% high grade) [id number] (0/15) prostate cancer excised with a focal positive 11mm margin and a positive benign margin. His RADHA-S score is 7. His post op PSA was low detectable and his most recent is now 0.177. H    Sleep apnea      Family History   Problem Relation Age of Onset    Diabetes Mother     Hypertension Father      Social History     Socioeconomic History    Marital status: Significant Other   Tobacco Use    Smoking status: Former     Types: Cigarettes     Quit date: 2014     Years since quittin.7    Smokeless tobacco: Former   Substance and Sexual Activity    Alcohol use: Not Currently    Drug use: Never    Sexual activity: Yes     Social Determinants of Health     Financial Resource Strain: Medium Risk    Difficulty of Paying Living Expenses: Somewhat hard   Food Insecurity: No Food Insecurity    Worried About Running Out of Food in the Last Year: Never true    Ran Out of Food in the Last Year: Never true   Transportation Needs: No Transportation Needs    Lack of Transportation (Medical): No    Lack of Transportation (Non-Medical): No   Physical Activity: Inactive    Days of Exercise per Week: 0 days     Minutes of Exercise per Session: 0 min   Stress: No Stress Concern Present    Feeling of Stress : Only a little   Social Connections: Socially Isolated    Frequency of Communication with Friends and Family: Twice a week    Frequency of Social Gatherings with Friends and Family: Never    Attends [employer] Services: Never    Active Member of Clubs or Organizations: No    Attends Club or Organization Meetings: Never    Marital Status:    Housing Stability: Low Risk     Unable to Pay for Housing in the Last Year: No    Number of Places Lived in the Last Year: 1    Unstable Housing in the Last Year: No     Past Surgical History:   Procedure Laterality Date    COLONOSCOPY N/A 8/4/2022    Procedure: COLONOSCOPY;  Surgeon: Michelle Flores MD;  Location: [facility];  Service: Endoscopy;  Laterality: N/A;    HERNIA REPAIR      INJECTION OF ANESTHETIC AGENT INTO TISSUE PLANE DEFINED BY TRANSVERSUS ABDOMINIS MUSCLE N/A 9/22/2022    Procedure: BLOCK, TRANSVERSUS ABDOMINIS PLANE;  Surgeon: Cj Quinn MD;  Location: [facility] OR;  Service: General;  Laterality: N/A;    MOBILIZATION OF SPLENIC FLEXURE N/A 9/22/2022    Procedure: MOBILIZATION, SPLENIC FLEXURE;  Surgeon: Cj Quinn MD;  Location: [facility] OR;  Service: General;  Laterality: N/A;    ROBOT-ASSISTED LOW ANTERIOR RESECTION OF COLON N/A 9/22/2022    Procedure: XI ROBOTIC RESECTION, COLON, LOW ANTERIOR;  Surgeon: Cj Quinn MD;  Location: [facility];  Service: General;  Laterality: N/A;  CONVERTED TO OPEN       Labs:  Lab Results   Component Value Date    WBC 7.07 09/26/2022    HGB 13.7 (L) 09/26/2022    HCT 40.1 09/26/2022    MCV 82 09/26/2022     09/26/2022     BMP  Lab Results   Component Value Date     (L) 09/26/2022    K 3.1 (L) 09/26/2022     09/26/2022    CO2 21 (L) 09/26/2022    BUN 7 09/26/2022    CREATININE 0.7 09/26/2022    CALCIUM 8.3 (L) 09/26/2022    ANIONGAP 13 09/26/2022    ESTGFRAFRICA >60.0 12/07/2021    EGFRNONAA  >60.0 12/07/2021     Lab Results   Component Value Date    ALT 48 (H) 09/19/2022    AST 21 09/19/2022    ALKPHOS 133 09/19/2022    BILITOT 0.6 09/19/2022       No results found for: IRON, TIBC, FERRITIN, SATURATEDIRO  No results found for: VGSPWXPT37  No results found for: FOLATE  Lab Results   Component Value Date    TSH 0.943 10/03/2020         Review of Systems   Constitutional:  Negative for activity change, appetite change, chills, diaphoresis, fatigue, fever and unexpected weight change.   HENT:  Negative for congestion, dental problem, drooling, ear discharge, ear pain, facial swelling, hearing loss, mouth sores, nosebleeds, postnasal drip, rhinorrhea, sinus pressure, sneezing, sore throat, tinnitus, trouble swallowing and voice change.    Eyes:  Negative for photophobia, pain, discharge, redness, itching and visual disturbance.   Respiratory:  Negative for apnea, cough, choking, chest tightness, shortness of breath, wheezing and stridor.    Cardiovascular:  Negative for chest pain, palpitations and leg swelling.   Gastrointestinal:  Negative for abdominal distention, abdominal pain, anal bleeding, blood in stool, constipation, diarrhea, nausea, rectal pain and vomiting.   Endocrine: Negative for cold intolerance, heat intolerance, polydipsia, polyphagia and polyuria.   Genitourinary:  Negative for decreased urine volume, difficulty urinating, dysuria, enuresis, flank pain, frequency, genital sores, hematuria, penile discharge, penile pain, penile swelling, scrotal swelling, testicular pain and urgency.   Musculoskeletal:  Negative for arthralgias, back pain, gait problem, joint swelling, myalgias, neck pain and neck stiffness.   Skin:  Negative for color change, pallor, rash and wound.   Allergic/Immunologic: Negative for environmental allergies, food allergies and immunocompromised state.   Neurological:  Negative for dizziness, tremors, seizures, syncope, facial asymmetry, speech difficulty, weakness,  light-headedness, numbness and headaches.   Hematological:  Negative for adenopathy. Does not bruise/bleed easily.   Psychiatric/Behavioral:  Positive for dysphoric mood. Negative for agitation, behavioral problems, confusion, decreased concentration, hallucinations, self-injury, sleep disturbance and suicidal ideas. The patient is nervous/anxious. The patient is not hyperactive.      Objective:      Physical Exam  Vitals reviewed.   Constitutional:       General: He is not in acute distress.     Appearance: He is well-developed. He is obese. He is not diaphoretic.   HENT:      Head: Normocephalic.      Right Ear: External ear normal.      Left Ear: External ear normal.      Nose: Nose normal.      Right Sinus: No maxillary sinus tenderness or frontal sinus tenderness.      Left Sinus: No maxillary sinus tenderness or frontal sinus tenderness.      Mouth/Throat:      Pharynx: No oropharyngeal exudate.   Eyes:      General: Lids are normal. No scleral icterus.        Right eye: No discharge.         Left eye: No discharge.      Extraocular Movements:      Right eye: Normal extraocular motion.      Left eye: Normal extraocular motion.      Conjunctiva/sclera:      Right eye: Right conjunctiva is not injected. No hemorrhage.     Left eye: Left conjunctiva is not injected. No hemorrhage.     Pupils: Pupils are equal, round, and reactive to light.   Neck:      Thyroid: No thyromegaly.      Vascular: No JVD.      Trachea: No tracheal deviation.   Cardiovascular:      Rate and Rhythm: Normal rate.   Pulmonary:      Effort: Pulmonary effort is normal. No respiratory distress.      Breath sounds: No stridor.   Abdominal:      General: Bowel sounds are normal.      Palpations: Abdomen is soft. There is no hepatomegaly, splenomegaly or mass.      Tenderness: There is no abdominal tenderness.   Musculoskeletal:         General: No tenderness. Normal range of motion.      Cervical back: Normal range of motion and neck supple.    Lymphadenopathy:      Head:      Right side of head: No posterior auricular or occipital adenopathy.      Left side of head: No posterior auricular or occipital adenopathy.      Cervical: No cervical adenopathy.      Right cervical: No superficial, deep or posterior cervical adenopathy.     Left cervical: No superficial, deep or posterior cervical adenopathy.      Upper Body:      Right upper body: No supraclavicular adenopathy.      Left upper body: No supraclavicular adenopathy.   Skin:     General: Skin is dry.      Findings: No erythema or rash.      Nails: There is no clubbing.   Neurological:      Mental Status: He is alert and oriented to person, place, and time.      Cranial Nerves: No cranial nerve deficit.      Coordination: Coordination normal.   Psychiatric:         Behavior: Behavior normal.         Thought Content: Thought content normal.         Judgment: Judgment normal.           Assessment:      1. Adenocarcinoma of rectosigmoid junction    2. Malignant neoplasm of prostate           Plan:     Preoperative evaluation did not reveal any sign of metastatic disease.  Rising PSA noted from August of 2022.  At this time repeat PSA today baseline laboratory studies post surgery.  Germ line testing ordered for history of prostate cancer as well as cancer below the age of 50 with rising PSA post surgery discussed the fact early metastatic disease most likely.  Proceed with germ line testing return in 1 month for review of told him not only visits have been important terms of his family in terms of children and siblings but also potential therapeutic implications discussed implications with him and return to clinic in 1 month otherwise communicate results of laboratory studies through patient portal from colon cancer standpoint article from up-to-date followed to him for review no need for any systemic therapy.  With early stage colon cancer discovered incidentally on PMSA scan for rising PSA post  prostatectomy        Tomi Alves Jr, MD FACP

## 2022-10-12 LAB
COMPLEXED PSA SERPL-MCNC: 0.35 NG/ML (ref 0–4)
FERRITIN SERPL-MCNC: 163 NG/ML (ref 20–300)
IRON SERPL-MCNC: 72 UG/DL (ref 45–160)
SATURATED IRON: 18 % (ref 20–50)
TOTAL IRON BINDING CAPACITY: 398 UG/DL (ref 250–450)
TRANSFERRIN SERPL-MCNC: 269 MG/DL (ref 200–375)

## 2022-10-24 NOTE — PROGRESS NOTES
PCP: Edilberto Figueroa MD    Subjective:     Chief Complaint: Diabetes     TELEMEDICINE VISIT:     The patient location is: Home  The chief complaint leading to consultation is: Diabetes Follow up  Visit type: Virtual visit with synchronous audio and video  Total time spent with patient: 30  Each patient to whom he or she provides medical services by telemedicine is:  (1) informed of the relationship between the physician and patient and the respective role of any other health care provider with respect to management of the patient; and (2) notified that he or she may decline to receive medical services by telemedicine and may withdraw from such care at any time.    Notes: N/A    HISTORY OF PRESENT ILLNESS: 52 y.o.   male presenting for diabetes management.   The patient's last visit with me was on 1/14/2021.  Patient has had Type II diabetes since 2012.  Pertinent to decision making is the following comorbidities: Obesity by BMI and Bipolar Disorder  Patient has the following Diabetes complications: without complications  He is to be enrolled in diabetes education classes.     Patient's most recent A1c of 8.7% was completed 2 months ago.   Patient states since His last A1c His blood glucose levels have been high  after meals.   Patient monitors blood glucose 4 times per day with meter : Fasting, After Lunch, After Dinner and Before Bed. Previously using Dexcom CGM but there was cost issue. Patient does endorse recently meeting deductible.   Patient blood glucose monitoring device will not be uploaded into [employer] today secondary to patient forgetting device.   Per patient recall, fasting blood sugars ranging from 100 - mid 200s and after meals 180 - high 200s.   Patient endorses the following diabetes related symptoms:  None .  Patient is due today for the following diabetes-related health maintenance standards: Foot Exam , Influenza Vaccine, and COVID-19 Vaccine .   He denies recent hospital  admissions or emergency room visits.   He denies having hypoglycemia.   Patient's concerns today include glycemic control.    GLP-1 Therapy Initiation Considerations:   Family history of pancreatic cancer in first-degree relative: no  Personal history of pancreatitis: no  Family history of MTC/MEN/endocrine tumors: no  Personal history of Gastroparesis: no  Past use of GLP-1 Therapy with adverse effects: no  Past Bariatric Surgery: no  Personal history of Diabetic Retinopathy: no  Most Recent GFR: > 60 completed on 10/11/22  Need for Cardiovascular Risk Reduction: yes  Need for Potential Weight Loss: yes    Patient medication regimen is as below.     CURRENT DM MEDICATIONS:   Semglee 30 units at night  Humalog 10 units before meals TID - 10 mins before / missed 2-3 times per week  Jardiance 25 mg daily   Glipizide 5 mg TID wm     Patient has failed the following Diabetes medications:   Trulicity - did not take   Basaglar / Lantus  Metformin        Labs Reviewed.       No results found for: CPEPTIDE  No results found for: GLUTAMICACID       //   , There is no height or weight on file to calculate BMI.  His blood sugar in clinic today is:         Review of Systems   Constitutional:  Negative for activity change, appetite change, chills and fever.   HENT:  Negative for dental problem, mouth sores, nosebleeds, sore throat and trouble swallowing.    Eyes:  Negative for pain and discharge.   Respiratory:  Negative for shortness of breath, wheezing and stridor.    Cardiovascular:  Negative for chest pain, palpitations and leg swelling.   Gastrointestinal:  Negative for abdominal pain, diarrhea, nausea and vomiting.   Endocrine: Negative for polydipsia, polyphagia and polyuria.   Genitourinary:  Negative for dysuria, frequency and urgency.   Musculoskeletal:  Negative for joint swelling and myalgias.   Skin:  Negative for rash and wound.   Neurological:  Negative for dizziness, syncope, weakness and headaches.    Psychiatric/Behavioral:  Negative for behavioral problems and dysphoric mood.        Diabetes Management Status  Statin: Taking  ACE/ARB: Not taking    Screening or Prevention Patient's value Goal Complete/Controlled?   HgA1C Testing and Control   Lab Results   Component Value Date    HGBA1C 8.7 (H) 2022      Annually/Less than 8% No   Lipid profile : 2022 Annually Yes   LDL control Lab Results   Component Value Date    LDLCALC 87.6 2022    Annually/Less than 100 mg/dl  Yes   Nephropathy screening Lab Results   Component Value Date    MICALBCREAT 16.1 2022     Lab Results   Component Value Date    PROTEINUA Trace (A) 2021    Annually Yes   Blood pressure BP Readings from Last 1 Encounters:   10/11/22 (!) 143/87    Less than 140/90 Yes   Dilated retinal exam : 2022 Annually No    Foot exam   : 2021 Annually No     Social History     Socioeconomic History    Marital status: Significant Other   Tobacco Use    Smoking status: Former     Types: Cigarettes     Quit date: 2014     Years since quittin.8    Smokeless tobacco: Former   Substance and Sexual Activity    Alcohol use: Not Currently    Drug use: Never    Sexual activity: Yes     Social Determinants of Health     Financial Resource Strain: Medium Risk    Difficulty of Paying Living Expenses: Somewhat hard   Food Insecurity: No Food Insecurity    Worried About Running Out of Food in the Last Year: Never true    Ran Out of Food in the Last Year: Never true   Transportation Needs: No Transportation Needs    Lack of Transportation (Medical): No    Lack of Transportation (Non-Medical): No   Physical Activity: Inactive    Days of Exercise per Week: 0 days    Minutes of Exercise per Session: 0 min   Stress: No Stress Concern Present    Feeling of Stress : Only a little   Social Connections: Socially Isolated    Frequency of Communication with Friends and Family: Twice a week    Frequency of Social Gatherings with Friends  and Family: Never    Attends [employer] Services: Never    Active Member of Clubs or Organizations: No    Attends Club or Organization Meetings: Never    Marital Status:    Housing Stability: Low Risk     Unable to Pay for Housing in the Last Year: No    Number of Places Lived in the Last Year: 1    Unstable Housing in the Last Year: No     Past Medical History:   Diagnosis Date    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 Patrick Sanz is a 52 y.o. male patient of Dr. Woodward status post robot-assisted radical prostatectomy with partial nerve sparing on 10/11/21 for what proved to be a 8cc, pGS4+3 (70% high grade) [id number] (0/15) prostate cancer excised with a focal positive 11mm margin and a positive benign margin. His RADHA-S score is 7. His post op PSA was low detectable and his most recent is now 0.177. H    Sleep apnea        Objective:      Physical Exam  Constitutional:       General: He is not in acute distress.     Appearance: He is well-developed. He is not diaphoretic.   HENT:      Head: Normocephalic and atraumatic.      Right Ear: External ear normal.      Left Ear: External ear normal.      Nose: Nose normal.   Eyes:      General:         Right eye: No discharge.         Left eye: No discharge.   Pulmonary:      Effort: Pulmonary effort is normal. No respiratory distress.      Breath sounds: No stridor.   Musculoskeletal:         General: Normal range of motion.      Cervical back: Normal range of motion.   Skin:     Coloration: Skin is not pale.   Neurological:      Mental Status: He is alert and oriented to person, place, and time.      Motor: No abnormal muscle tone.      Coordination: Coordination normal.   Psychiatric:         Behavior: Behavior normal.         Thought Content: Thought content normal.          Judgment: Judgment normal.         Assessment / Plan:     Uncontrolled type 2 diabetes mellitus with hyperglycemia  -     blood-glucose transmitter (DEXCOM G6 TRANSMITTER) Miriam; 1 each by Misc.(Non-Drug; Combo Route) route once daily.  Dispense: 1 each; Refill: 3  -     blood-glucose sensor (DEXCOM G6 SENSOR) Miriam; 1 each by Misc.(Non-Drug; Combo Route) route every 14 (fourteen) days.  Dispense: 3 each; Refill: 11  -     dulaglutide (TRULICITY) 0.75 mg/0.5 mL pen injector; Inject 0.75 mg into the skin once a week.  Dispense: 4 pen; Refill: 11    Obesity (BMI 30-39.9)      Additional Plan Details:    - POCT Glucose  - Encouraged continuation of lifestyle changes including regular exercise and limiting carbohydrates to 30-45 grams per meal threes times daily and 15 grams per snack with a limit of two daily.   - Encouraged continued monitoring of blood glucose with maintenance of 4 times daily at Fasting, Before Lunch, Before Dinner and Before Bed. Dexcom Rx - will check cost since met deductible. Will check Vipin if not affordable;   - Current DM Medication Regimen: Change Humalog 14 units TID wm. Change Semglee 26 units daily. Continue Jardiance 25 mg daily. Start Trulicity 0.75 mg weekly - pen training video. STOP Glipizide.   - Health Maintenance standards addressed today: Foot Exam - completed today and documented in physical exam with feedback to patient about proper diabetic foot care and findings, Flu Shot - patient would like to complete outside of [facility], and COVID - 19 Vaccine - patient will schedule outside of [facility] .   - Nursing Visit: Patient is age 79 or younger with an A1c of 7.5 or greater and will not need nursing visit at this time .   - Follow up with me in 1 months with A1c prior.       Blakeney McKnight, PA-C [facility]

## 2022-10-26 ENCOUNTER — TELEPHONE (OUTPATIENT)
Dept: DIABETES | Facility: CLINIC | Age: 52
End: 2022-10-26
Payer: COMMERCIAL

## 2022-10-26 NOTE — TELEPHONE ENCOUNTER
Called pt per provider request. Asked pt if he is using Dexcom. Pt stated that he is not using the device. Advised pt to keep appointment with provider. Pt verbalized understanding.

## 2022-10-26 NOTE — TELEPHONE ENCOUNTER
----- Message from Jeffrey Aguilar PA-C sent at 10/25/2022 10:39 AM CDT -----  Pt not sharing Dexcom - please call to see if using. If so, please make NV appt between now and Friday at noon to get downloaded / set up sharing. Thank you

## 2022-10-27 ENCOUNTER — PATIENT MESSAGE (OUTPATIENT)
Dept: SURGERY | Facility: CLINIC | Age: 52
End: 2022-10-27
Payer: COMMERCIAL

## 2022-10-28 ENCOUNTER — OFFICE VISIT (OUTPATIENT)
Dept: DIABETES | Facility: CLINIC | Age: 52
End: 2022-10-28
Payer: COMMERCIAL

## 2022-10-28 DIAGNOSIS — E66.9 OBESITY (BMI 30-39.9): ICD-10-CM

## 2022-10-28 DIAGNOSIS — E11.65 UNCONTROLLED TYPE 2 DIABETES MELLITUS WITH HYPERGLYCEMIA: Primary | ICD-10-CM

## 2022-10-28 PROCEDURE — 99214 OFFICE O/P EST MOD 30 MIN: CPT | Mod: 95,,, | Performed by: PHYSICIAN ASSISTANT

## 2022-10-28 PROCEDURE — 99214 PR OFFICE/OUTPT VISIT, EST, LEVL IV, 30-39 MIN: ICD-10-PCS | Mod: 95,,, | Performed by: PHYSICIAN ASSISTANT

## 2022-10-28 RX ORDER — BLOOD-GLUCOSE SENSOR
1 EACH MISCELLANEOUS
Qty: 3 EACH | Refills: 11 | Status: SHIPPED | OUTPATIENT
Start: 2022-10-28 | End: 2023-05-23

## 2022-10-28 RX ORDER — DULAGLUTIDE 0.75 MG/.5ML
0.75 INJECTION, SOLUTION SUBCUTANEOUS WEEKLY
Qty: 4 PEN | Refills: 11 | Status: SHIPPED | OUTPATIENT
Start: 2022-10-28 | End: 2023-01-17 | Stop reason: SDUPTHER

## 2022-10-28 RX ORDER — BLOOD-GLUCOSE TRANSMITTER
1 EACH MISCELLANEOUS DAILY
Qty: 1 EACH | Refills: 3 | Status: SHIPPED | OUTPATIENT
Start: 2022-10-28 | End: 2024-01-22

## 2022-10-28 NOTE — PATIENT INSTRUCTIONS
CURRENT DM MEDICATIONS:   Semglee 26 units at night - change the day you start Trulicity   Humalog 14 units before meals three times daily - change now  Jardiance 25 mg daily   Glipizide - STOP   Trulicity 0.75 mg weekly       Trulicity Training Video:   [email or web address]    Please either copy and paste the above link in your internet search bar or click on it if linked.      Trulicity Savings Card link:   [email or web address]    Please either copy and paste the above link in your internet search bar or click on it if linked. Please make sure to download the savings card so you can present to the pharmacy at .

## 2022-10-31 ENCOUNTER — PATIENT MESSAGE (OUTPATIENT)
Dept: HEMATOLOGY/ONCOLOGY | Facility: CLINIC | Age: 52
End: 2022-10-31
Payer: COMMERCIAL

## 2022-11-07 ENCOUNTER — PATIENT MESSAGE (OUTPATIENT)
Dept: DIABETES | Facility: CLINIC | Age: 52
End: 2022-11-07
Payer: COMMERCIAL

## 2022-11-07 ENCOUNTER — PATIENT MESSAGE (OUTPATIENT)
Dept: UROLOGY | Facility: CLINIC | Age: 52
End: 2022-11-07
Payer: COMMERCIAL

## 2022-11-08 ENCOUNTER — PATIENT MESSAGE (OUTPATIENT)
Dept: SURGERY | Facility: HOSPITAL | Age: 52
End: 2022-11-08
Payer: COMMERCIAL

## 2022-11-11 ENCOUNTER — TELEPHONE (OUTPATIENT)
Dept: DIABETES | Facility: CLINIC | Age: 52
End: 2022-11-11
Payer: COMMERCIAL

## 2022-11-11 ENCOUNTER — LAB VISIT (OUTPATIENT)
Dept: LAB | Facility: HOSPITAL | Age: 52
End: 2022-11-11
Attending: INTERNAL MEDICINE
Payer: COMMERCIAL

## 2022-11-11 ENCOUNTER — DOCUMENTATION ONLY (OUTPATIENT)
Dept: HEMATOLOGY/ONCOLOGY | Facility: CLINIC | Age: 52
End: 2022-11-11
Payer: COMMERCIAL

## 2022-11-11 ENCOUNTER — OFFICE VISIT (OUTPATIENT)
Dept: HEMATOLOGY/ONCOLOGY | Facility: CLINIC | Age: 52
End: 2022-11-11
Payer: COMMERCIAL

## 2022-11-11 VITALS
WEIGHT: 226 LBS | DIASTOLIC BLOOD PRESSURE: 81 MMHG | TEMPERATURE: 98 F | OXYGEN SATURATION: 98 % | HEIGHT: 68 IN | HEART RATE: 87 BPM | BODY MASS INDEX: 34.25 KG/M2 | SYSTOLIC BLOOD PRESSURE: 128 MMHG

## 2022-11-11 DIAGNOSIS — E11.65 UNCONTROLLED TYPE 2 DIABETES MELLITUS WITH HYPERGLYCEMIA: ICD-10-CM

## 2022-11-11 DIAGNOSIS — C19 ADENOCARCINOMA OF RECTOSIGMOID JUNCTION: ICD-10-CM

## 2022-11-11 DIAGNOSIS — E66.01 SEVERE OBESITY (BMI 35.0-39.9) WITH COMORBIDITY: ICD-10-CM

## 2022-11-11 DIAGNOSIS — C61 MALIGNANT NEOPLASM OF PROSTATE: Primary | ICD-10-CM

## 2022-11-11 DIAGNOSIS — C61 MALIGNANT NEOPLASM OF PROSTATE: ICD-10-CM

## 2022-11-11 PROCEDURE — 84153 ASSAY OF PSA TOTAL: CPT | Mod: 91 | Performed by: INTERNAL MEDICINE

## 2022-11-11 PROCEDURE — 99999 PR PBB SHADOW E&M-EST. PATIENT-LVL V: ICD-10-PCS | Mod: PBBFAC,,, | Performed by: INTERNAL MEDICINE

## 2022-11-11 PROCEDURE — 99214 PR OFFICE/OUTPT VISIT, EST, LEVL IV, 30-39 MIN: ICD-10-PCS | Mod: S$GLB,,, | Performed by: INTERNAL MEDICINE

## 2022-11-11 PROCEDURE — 99214 OFFICE O/P EST MOD 30 MIN: CPT | Mod: S$GLB,,, | Performed by: INTERNAL MEDICINE

## 2022-11-11 PROCEDURE — 99999 PR PBB SHADOW E&M-EST. PATIENT-LVL V: CPT | Mod: PBBFAC,,, | Performed by: INTERNAL MEDICINE

## 2022-11-11 NOTE — PROGRESS NOTES
Subjective:       Patient ID: Patrick Sanz is a 52 y.o. male.    Chief Complaint: Results and Prostate Cancer    HPI:  52-year-old male history of T2 N0 colon cancer as well as early stage prostate carcinoma status post surgery with rising PSA patient returns for clinical follow-up    Past Medical History:   Diagnosis Date    Adenocarcinoma of rectosigmoid junction 2022    Anxiety     Bipolar disorder     Depression     Diabetes mellitus, type 2     Elevated PSA 2021    Kidney stones     Malignant neoplasm of prostate 2021    Mr. Patrick Sanz is a 52 y.o. male patient of Dr. Woodward status post robot-assisted radical prostatectomy with partial nerve sparing on 10/11/21 for what proved to be a 8cc, pGS4+3 (70% high grade) [id number] (0/15) prostate cancer excised with a focal positive 11mm margin and a positive benign margin. His RADHA-S score is 7. His post op PSA was low detectable and his most recent is now 0.177. H    Sleep apnea      Family History   Problem Relation Age of Onset    Diabetes Mother     Hypertension Father      Social History     Socioeconomic History    Marital status: Significant Other   Tobacco Use    Smoking status: Former     Types: Cigarettes     Quit date: 2014     Years since quittin.8    Smokeless tobacco: Former   Substance and Sexual Activity    Alcohol use: Not Currently    Drug use: Never    Sexual activity: Yes     Social Determinants of Health     Financial Resource Strain: Medium Risk    Difficulty of Paying Living Expenses: Somewhat hard   Food Insecurity: No Food Insecurity    Worried About Running Out of Food in the Last Year: Never true    Ran Out of Food in the Last Year: Never true   Transportation Needs: No Transportation Needs    Lack of Transportation (Medical): No    Lack of Transportation (Non-Medical): No   Physical Activity: Inactive    Days of Exercise per Week: 0 days    Minutes of Exercise per Session: 0 min   Stress: No Stress  Concern Present    Feeling of Stress : Only a little   Social Connections: Socially Isolated    Frequency of Communication with Friends and Family: Twice a week    Frequency of Social Gatherings with Friends and Family: Never    Attends Oriental orthodox Services: Never    Active Member of Clubs or Organizations: No    Attends Club or Organization Meetings: Never    Marital Status:    Housing Stability: Low Risk     Unable to Pay for Housing in the Last Year: No    Number of Places Lived in the Last Year: 1    Unstable Housing in the Last Year: No     Past Surgical History:   Procedure Laterality Date    COLONOSCOPY N/A 8/4/2022    Procedure: COLONOSCOPY;  Surgeon: Michelle Flores MD;  Location: [facility] ENDO;  Service: Endoscopy;  Laterality: N/A;    HERNIA REPAIR      INJECTION OF ANESTHETIC AGENT INTO TISSUE PLANE DEFINED BY TRANSVERSUS ABDOMINIS MUSCLE N/A 9/22/2022    Procedure: BLOCK, TRANSVERSUS ABDOMINIS PLANE;  Surgeon: Cj Quinn MD;  Location: [facility] OR;  Service: General;  Laterality: N/A;    MOBILIZATION OF SPLENIC FLEXURE N/A 9/22/2022    Procedure: MOBILIZATION, SPLENIC FLEXURE;  Surgeon: Cj Quinn MD;  Location: [facility] OR;  Service: General;  Laterality: N/A;    ROBOT-ASSISTED LOW ANTERIOR RESECTION OF COLON N/A 9/22/2022    Procedure: XI ROBOTIC RESECTION, COLON, LOW ANTERIOR;  Surgeon: Cj Quinn MD;  Location: [facility] OR;  Service: General;  Laterality: N/A;  CONVERTED TO OPEN       Labs:  Lab Results   Component Value Date    WBC 10.37 10/11/2022    HGB 14.8 10/11/2022    HCT 44.4 10/11/2022    MCV 85 10/11/2022     (H) 10/11/2022     BMP  Lab Results   Component Value Date     10/11/2022    K 5.6 (H) 10/11/2022     10/11/2022    CO2 23 10/11/2022    BUN 14 10/11/2022    CREATININE 1.0 10/11/2022    CALCIUM 10.0 10/11/2022    ANIONGAP 14 10/11/2022    ESTGFRAFRICA >60.0 12/07/2021    EGFRNONAA >60.0 12/07/2021     Lab Results   Component Value Date    ALT 46  (H) 10/11/2022    AST 26 10/11/2022    ALKPHOS 125 10/11/2022    BILITOT 0.4 10/11/2022       Lab Results   Component Value Date    IRON 72 10/11/2022    TIBC 398 10/11/2022    FERRITIN 163 10/11/2022     No results found for: [id number]  No results found for: FOLATE  Lab Results   Component Value Date    TSH 0.943 10/03/2020         Review of Systems   Constitutional:  Negative for activity change, appetite change, chills, diaphoresis, fatigue, fever and unexpected weight change.   HENT:  Negative for congestion, dental problem, drooling, ear discharge, ear pain, facial swelling, hearing loss, mouth sores, nosebleeds, postnasal drip, rhinorrhea, sinus pressure, sneezing, sore throat, tinnitus, trouble swallowing and voice change.    Eyes:  Negative for photophobia, pain, discharge, redness, itching and visual disturbance.   Respiratory:  Negative for apnea, cough, choking, chest tightness, shortness of breath, wheezing and stridor.    Cardiovascular:  Negative for chest pain, palpitations and leg swelling.   Gastrointestinal:  Negative for abdominal distention, abdominal pain, anal bleeding, blood in stool, constipation, diarrhea, nausea, rectal pain and vomiting.   Endocrine: Negative for cold intolerance, heat intolerance, polydipsia, polyphagia and polyuria.   Genitourinary:  Negative for decreased urine volume, difficulty urinating, dysuria, enuresis, flank pain, frequency, genital sores, hematuria, penile discharge, penile pain, penile swelling, scrotal swelling, testicular pain and urgency.   Musculoskeletal:  Negative for arthralgias, back pain, gait problem, joint swelling, myalgias, neck pain and neck stiffness.   Skin:  Negative for color change, pallor, rash and wound.   Allergic/Immunologic: Negative for environmental allergies, food allergies and immunocompromised state.   Neurological:  Negative for dizziness, tremors, seizures, syncope, facial asymmetry, speech difficulty, weakness, light-headedness,  numbness and headaches.   Hematological:  Negative for adenopathy. Does not bruise/bleed easily.   Psychiatric/Behavioral:  Negative for agitation, behavioral problems, confusion, decreased concentration, dysphoric mood, hallucinations, self-injury, sleep disturbance and suicidal ideas. The patient is not nervous/anxious and is not hyperactive.      Objective:      Physical Exam  Vitals reviewed.   Constitutional:       General: He is not in acute distress.     Appearance: He is well-developed. He is not diaphoretic.   HENT:      Head: Normocephalic.      Right Ear: External ear normal.      Left Ear: External ear normal.      Nose: Nose normal.      Right Sinus: No maxillary sinus tenderness or frontal sinus tenderness.      Left Sinus: No maxillary sinus tenderness or frontal sinus tenderness.      Mouth/Throat:      Pharynx: No oropharyngeal exudate.   Eyes:      General: Lids are normal. No scleral icterus.        Right eye: No discharge.         Left eye: No discharge.      Extraocular Movements:      Right eye: Normal extraocular motion.      Left eye: Normal extraocular motion.      Conjunctiva/sclera:      Right eye: Right conjunctiva is not injected. No hemorrhage.     Left eye: Left conjunctiva is not injected. No hemorrhage.     Pupils: Pupils are equal, round, and reactive to light.   Neck:      Thyroid: No thyromegaly.      Vascular: No JVD.      Trachea: No tracheal deviation.   Cardiovascular:      Rate and Rhythm: Normal rate.   Pulmonary:      Effort: Pulmonary effort is normal. No respiratory distress.      Breath sounds: No stridor.   Abdominal:      General: Bowel sounds are normal.      Palpations: Abdomen is soft. There is no hepatomegaly, splenomegaly or mass.      Tenderness: There is no abdominal tenderness.   Musculoskeletal:         General: No tenderness. Normal range of motion.      Cervical back: Normal range of motion and neck supple.   Lymphadenopathy:      Head:      Right side of  head: No posterior auricular or occipital adenopathy.      Left side of head: No posterior auricular or occipital adenopathy.      Cervical: No cervical adenopathy.      Right cervical: No superficial, deep or posterior cervical adenopathy.     Left cervical: No superficial, deep or posterior cervical adenopathy.      Upper Body:      Right upper body: No supraclavicular adenopathy.      Left upper body: No supraclavicular adenopathy.   Skin:     General: Skin is dry.      Findings: No erythema or rash.      Nails: There is no clubbing.   Neurological:      Mental Status: He is alert and oriented to person, place, and time.      Cranial Nerves: No cranial nerve deficit.      Coordination: Coordination normal.   Psychiatric:         Behavior: Behavior normal.         Thought Content: Thought content normal.         Judgment: Judgment normal.           Assessment:      1. Malignant neoplasm of prostate    2. Adenocarcinoma of rectosigmoid junction    3. Severe obesity (BMI 35.0-39.9) with comorbidity    4. Uncontrolled type 2 diabetes mellitus with hyperglycemia           Plan:     Results of germ line testing for my risk demonstrates no clinically significant mutation identified.  Rising PSA.  Repeat today repeat PMS a scan see Radiation Oncology to see whether not external beam radiation therapy appropriate at this particular point information from up-to-date followed to him for his review follow-up with repeat PSAs 4 months        Tomi Alves Jr, MD FACP

## 2022-11-11 NOTE — PROGRESS NOTES
FELICITA met with patient on today in clinic. Pt states he does not have leave at his job but wants FMLA to be completed to save his job. FELICITA explained the process and reports he has to initiate with his HR first and email us the forms. FELICITA provided patient new  packet. FELICITA provided her business card to email forms once received. ROBBYR will remain available.

## 2022-11-12 LAB — COMPLEXED PSA SERPL-MCNC: 0.23 NG/ML (ref 0–4)

## 2022-11-15 ENCOUNTER — TELEPHONE (OUTPATIENT)
Dept: RADIATION ONCOLOGY | Facility: CLINIC | Age: 52
End: 2022-11-15
Payer: COMMERCIAL

## 2022-11-15 NOTE — TELEPHONE ENCOUNTER
Attempted to call patient to schedule Rad Onc consult but there was no answer. Left a detailed message along with our direct number to call back.

## 2022-11-16 DIAGNOSIS — E11.9 TYPE 2 DIABETES MELLITUS WITHOUT COMPLICATION: ICD-10-CM

## 2022-11-18 ENCOUNTER — PATIENT MESSAGE (OUTPATIENT)
Dept: HEMATOLOGY/ONCOLOGY | Facility: CLINIC | Age: 52
End: 2022-11-18
Payer: COMMERCIAL

## 2022-11-21 ENCOUNTER — TELEPHONE (OUTPATIENT)
Dept: RADIATION ONCOLOGY | Facility: CLINIC | Age: 52
End: 2022-11-21
Payer: COMMERCIAL

## 2022-11-21 NOTE — TELEPHONE ENCOUNTER
Attempted to call patient to schedule Rad Onc consult but there was no answer. Left a detailed message along with our direct number to call us back.

## 2022-11-22 ENCOUNTER — TELEPHONE (OUTPATIENT)
Dept: INFUSION THERAPY | Facility: HOSPITAL | Age: 52
End: 2022-11-22
Payer: COMMERCIAL

## 2022-11-23 ENCOUNTER — TELEPHONE (OUTPATIENT)
Dept: RADIATION ONCOLOGY | Facility: CLINIC | Age: 52
End: 2022-11-23
Payer: COMMERCIAL

## 2022-11-23 NOTE — TELEPHONE ENCOUNTER
Spoke with patient's wife Cristal regarding trying to get the patient scheduled for a Rad Onc consult. She said she will have the patient call when he gets a break at work, gave her our direct number to the department.

## 2022-11-29 ENCOUNTER — OFFICE VISIT (OUTPATIENT)
Dept: PSYCHIATRY | Facility: CLINIC | Age: 52
End: 2022-11-29
Payer: COMMERCIAL

## 2022-11-29 ENCOUNTER — PATIENT MESSAGE (OUTPATIENT)
Dept: DIABETES | Facility: CLINIC | Age: 52
End: 2022-11-29
Payer: COMMERCIAL

## 2022-11-29 DIAGNOSIS — F39 MOOD DISORDER: Primary | ICD-10-CM

## 2022-11-29 DIAGNOSIS — G47.00 INSOMNIA, UNSPECIFIED TYPE: ICD-10-CM

## 2022-11-29 PROCEDURE — 99214 PR OFFICE/OUTPT VISIT, EST, LEVL IV, 30-39 MIN: ICD-10-PCS | Mod: 95,,, | Performed by: PSYCHIATRY & NEUROLOGY

## 2022-11-29 PROCEDURE — 99214 OFFICE O/P EST MOD 30 MIN: CPT | Mod: 95,,, | Performed by: PSYCHIATRY & NEUROLOGY

## 2022-11-29 RX ORDER — LAMOTRIGINE 200 MG/1
300 TABLET ORAL DAILY
Qty: 135 TABLET | Refills: 0 | Status: SHIPPED | OUTPATIENT
Start: 2022-11-29 | End: 2023-04-24

## 2022-11-29 RX ORDER — CLONAZEPAM 0.5 MG/1
0.5 TABLET ORAL 2 TIMES DAILY PRN
Qty: 60 TABLET | Refills: 2 | Status: SHIPPED | OUTPATIENT
Start: 2022-11-29 | End: 2023-06-09

## 2022-11-29 RX ORDER — TRAZODONE HYDROCHLORIDE 100 MG/1
TABLET ORAL
Qty: 30 TABLET | Refills: 2 | Status: SHIPPED | OUTPATIENT
Start: 2022-11-29 | End: 2023-02-08

## 2022-11-29 RX ORDER — VENLAFAXINE HYDROCHLORIDE 150 MG/1
150 CAPSULE, EXTENDED RELEASE ORAL DAILY
Qty: 90 CAPSULE | Refills: 0 | Status: SHIPPED | OUTPATIENT
Start: 2022-11-29 | End: 2023-05-01
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
## 2022-12-01 ENCOUNTER — HOSPITAL ENCOUNTER (OUTPATIENT)
Dept: RADIATION THERAPY | Facility: HOSPITAL | Age: 52
Discharge: HOME | End: 2022-12-01
Attending: RADIOLOGY
Payer: COMMERCIAL

## 2022-12-05 ENCOUNTER — PATIENT OUTREACH (OUTPATIENT)
Dept: ADMINISTRATIVE | Facility: HOSPITAL | Age: 52
End: 2022-12-05
Payer: COMMERCIAL

## 2022-12-07 ENCOUNTER — HOSPITAL ENCOUNTER (OUTPATIENT)
Dept: RADIOLOGY | Facility: HOSPITAL | Age: 52
Discharge: HOME | End: 2022-12-07
Attending: INTERNAL MEDICINE
Payer: COMMERCIAL

## 2022-12-07 DIAGNOSIS — C61 MALIGNANT NEOPLASM OF PROSTATE: ICD-10-CM

## 2022-12-07 PROCEDURE — 78815 PET IMAGE W/CT SKULL-THIGH: CPT | Mod: TC

## 2022-12-07 PROCEDURE — 78815 PET IMAGE W/CT SKULL-THIGH: CPT | Mod: 26,PS,, | Performed by: RADIOLOGY

## 2022-12-07 PROCEDURE — 78815 NM PET CT F 18 PYL PSMA, MIDTHIGH TO VERTEX: ICD-10-PCS | Mod: 26,PS,, | Performed by: RADIOLOGY

## 2022-12-08 ENCOUNTER — OFFICE VISIT (OUTPATIENT)
Dept: RADIATION ONCOLOGY | Facility: CLINIC | Age: 52
End: 2022-12-08
Payer: COMMERCIAL

## 2022-12-08 VITALS
TEMPERATURE: 96 F | WEIGHT: 226.63 LBS | RESPIRATION RATE: 18 BRPM | DIASTOLIC BLOOD PRESSURE: 86 MMHG | SYSTOLIC BLOOD PRESSURE: 129 MMHG | HEART RATE: 96 BPM | OXYGEN SATURATION: 98 % | HEIGHT: 68 IN | BODY MASS INDEX: 34.35 KG/M2

## 2022-12-08 DIAGNOSIS — C61 MALIGNANT NEOPLASM OF PROSTATE: Primary | ICD-10-CM

## 2022-12-08 DIAGNOSIS — C61 PROSTATE CANCER: Primary | ICD-10-CM

## 2022-12-08 PROCEDURE — 99215 OFFICE O/P EST HI 40 MIN: CPT | Mod: S$GLB,,, | Performed by: RADIOLOGY

## 2022-12-08 PROCEDURE — 99999 PR PBB SHADOW E&M-EST. PATIENT-LVL V: ICD-10-PCS | Mod: PBBFAC,,, | Performed by: RADIOLOGY

## 2022-12-08 PROCEDURE — 99215 PR OFFICE/OUTPT VISIT, EST, LEVL V, 40-54 MIN: ICD-10-PCS | Mod: S$GLB,,, | Performed by: RADIOLOGY

## 2022-12-08 PROCEDURE — 99999 PR PBB SHADOW E&M-EST. PATIENT-LVL V: CPT | Mod: PBBFAC,,, | Performed by: RADIOLOGY

## 2022-12-08 NOTE — PROGRESS NOTES
"[facility] [place]  RADIATION ONCOLOGY FOLLOW UP    Name: Patrick Sanz  : 1970     DIAGNOSIS: biochemical recurrence of prostate cancer, Kylah 4+4=8, PSA 0.23, positive margins, [id number]    TREATMENT HISTORY:   Prostatectomy 10/11/21 at [facility]  LAR for colon adenocarcinoma pT2N0 22    INTERVAL HISTORY: Patrick Sanz is a 52 y.o. male who presents today for follow-up.      Was initially seen last year in consult for unfav IR prostate cancer  He initially presented to urology in 2020 with elevated PSA of 16.1, repeat PSA a few days later was 15.7.   PSA still elevated at 17.6 on 3/15/21.  Prostate biopsy pathology showed adenocarcinoma, Gos[place] 4+3=7, in R base, R mid, R apex, 40% max involvement of single core.  MRI showed 32cc gland, PIRADS 2, no adenopathy, no targets for fusion biopsy    He ended up deciding on prostatectomy at [facility]  Pathology showed 15 negative lymph nodes, Kylah 4+3=7 adenocarcinoma, extensive extracapsular extension, no SV invasion, positive margins, including adenocarcinoma in nerves and adipose tissue along with Goshen 4+4 at R mid margin, suspicious LVI and extensive PNI.    Also had colonoscopy 22 showed mass in rectosigmoid. Biopsy confirmed adenocarcinoma.  LAR 22 by Dr. Quinn pathology showed pT2N0    [facility] done genetic testing no actionable mutation     Initial PSMA PET 8/10/22 negative outside    Has seen radonc at [facility]    Pylarify 22 with no evidence of locally recurrent or distant disease.    Today, he is doing well overall.  Bowels normalizing after colon surgery.  Leakage improved only stress incontinence.  No dysuria, hematuria.    PHYSICAL EXAM:   Constitutional: well appearing, no acute distress, ECOG 0 - Fully Active  Vitals:    /86   Pulse 96   Temp 96 °F (35.6 °C)   Resp 18   Ht 5' 8" (1.727 m)   Wt 102.8 kg (226 lb 9.6 oz)   SpO2 98%   BMI 34.45 kg/m²   Eyes: sclera anicteric, EOMI, " pupils equal, round and reactive to light  ENT: oral cavity without lesions, moist mucous membranes  Neck: trachea midline, neck supple  Lymphatic: no cervical, supraclavicular or axillary adenopathy  Cardiovascular: regular rate, no edema of the upper or lower extremities, radial pulse 2+  Respiratory: unlabored effort, clear to auscultation, no wheezes  Abdomen: soft, non-tender, no rigidity, no masses, no hepatomegaly  Rectal: deferred    Laboratory & X-Ray Findings: Per above.  Images reviewed personally.    PSA  11/11/22 - 0.23  10/11/22 - 0.35  12/7/21 - 0.06  9/17/21 - 26.7    ASSESSMENT: biochemical recurrence of prostate cancer, Kylah 4+4=8, PSA 0.23, positive margins, [id number]    PLAN: Mr. Sanz has biochemical recurrence with PSA 0.23 after surgery with multiple high risk factors.  Pylarify negative  Given PSA would consider this biochemical recurrence, along with age and risk factors on surgery    XRT + ADT, include nodes even with dissection given LVI/PNI on path report  Would give 45Gy to prostate fossa and pelvic lymph nodes followed by boost to fossa of 64.8-70.2Gy pending anatomy  Rec 6 months of ADT    Per [facility] salvage nomogram, progression-free probability at 6 years is 75%    Location of treatment to be determined by him - prefers here  Informed consent obtained will schedule CT simulation in about 2 weeks after Lupron is given    I spent approximately 60 minutes reviewing the available records and evaluating the patient, out of which over 50% of the time was spent face to face with the patient in counseling and coordinating this patient's care.    Damion Hargrove III, M.D.  Radiation Oncology  [facility] [address] [place] [address]  Ph: [phone]  [email or web address]

## 2022-12-16 ENCOUNTER — TELEPHONE (OUTPATIENT)
Dept: HEMATOLOGY/ONCOLOGY | Facility: CLINIC | Age: 52
End: 2022-12-16
Payer: COMMERCIAL

## 2022-12-16 ENCOUNTER — PATIENT MESSAGE (OUTPATIENT)
Dept: UROLOGY | Facility: CLINIC | Age: 52
End: 2022-12-16
Payer: COMMERCIAL

## 2022-12-16 NOTE — TELEPHONE ENCOUNTER
Called pt to verify if urology or oncology will be giving his lupron injections, no answer. LVM with call back number.   Received in basket msg from onc MD that he placed orders for lupron, informed MD that pt is scheduled with urology 12/20 for injection and will provide update once I get a call back from pt.

## 2022-12-19 ENCOUNTER — PATIENT MESSAGE (OUTPATIENT)
Dept: UROLOGY | Facility: CLINIC | Age: 52
End: 2022-12-19
Payer: COMMERCIAL

## 2022-12-19 ENCOUNTER — TELEPHONE (OUTPATIENT)
Dept: UROLOGY | Facility: CLINIC | Age: 52
End: 2022-12-19
Payer: COMMERCIAL

## 2022-12-19 ENCOUNTER — PATIENT MESSAGE (OUTPATIENT)
Dept: DIABETES | Facility: CLINIC | Age: 52
End: 2022-12-19
Payer: COMMERCIAL

## 2022-12-19 DIAGNOSIS — E11.65 UNCONTROLLED TYPE 2 DIABETES MELLITUS WITH HYPERGLYCEMIA: Primary | ICD-10-CM

## 2022-12-19 RX ORDER — FLASH GLUCOSE SENSOR
1 KIT MISCELLANEOUS
Qty: 2 KIT | Refills: 11 | Status: SHIPPED | OUTPATIENT
Start: 2022-12-19 | End: 2023-11-13

## 2022-12-20 ENCOUNTER — CLINICAL SUPPORT (OUTPATIENT)
Dept: UROLOGY | Facility: CLINIC | Age: 52
End: 2022-12-20
Payer: COMMERCIAL

## 2022-12-20 DIAGNOSIS — C61 PROSTATE CANCER: Primary | ICD-10-CM

## 2022-12-20 PROCEDURE — 99999 PR PBB SHADOW E&M-EST. PATIENT-LVL III: ICD-10-PCS | Mod: PBBFAC,,,

## 2022-12-20 PROCEDURE — 96402 PR CHEMOTHER HORMON ANTINEOPL SUB-Q/IM: ICD-10-PCS | Mod: S$GLB,,, | Performed by: UROLOGY

## 2022-12-20 PROCEDURE — 96402 CHEMO HORMON ANTINEOPL SQ/IM: CPT | Mod: S$GLB,,, | Performed by: UROLOGY

## 2022-12-20 PROCEDURE — 99999 PR PBB SHADOW E&M-EST. PATIENT-LVL III: CPT | Mod: PBBFAC,,,

## 2022-12-20 NOTE — PROGRESS NOTES
.Patient came today for Lupron injection. Donned proper PPE and using aseptic technique, pt was giving 45 mg of Lupron IM to right ventrogluteal. Pt tolerated procedure well. Pt was instructed to remain in clinic for 15 minutes and to report any adverse reaction to staff.      Loraine Milanes RN

## 2022-12-21 ENCOUNTER — HOSPITAL ENCOUNTER (OUTPATIENT)
Dept: RADIATION THERAPY | Facility: HOSPITAL | Age: 52
Discharge: HOME | End: 2022-12-21
Attending: INTERNAL MEDICINE
Payer: COMMERCIAL

## 2022-12-21 ENCOUNTER — HOSPITAL ENCOUNTER (OUTPATIENT)
Dept: RADIOLOGY | Facility: HOSPITAL | Age: 52
Discharge: HOME | End: 2022-12-21
Attending: INTERNAL MEDICINE
Payer: COMMERCIAL

## 2022-12-21 PROCEDURE — 77014 PR  CT GUIDANCE PLACEMENT RAD THERAPY FIELDS: ICD-10-PCS | Mod: 26,,, | Performed by: RADIOLOGY

## 2022-12-21 PROCEDURE — 77263 PR  RADIATION THERAPY PLAN COMPLEX: ICD-10-PCS | Mod: ,,, | Performed by: RADIOLOGY

## 2022-12-21 PROCEDURE — 77263 THER RADIOLOGY TX PLNG CPLX: CPT | Mod: ,,, | Performed by: RADIOLOGY

## 2022-12-21 PROCEDURE — 77334 PR  RADN TREATMENT AID(S) COMPLX: ICD-10-PCS | Mod: 26,,, | Performed by: RADIOLOGY

## 2022-12-21 PROCEDURE — 77014 PR  CT GUIDANCE PLACEMENT RAD THERAPY FIELDS: CPT | Mod: 26,,, | Performed by: RADIOLOGY

## 2022-12-21 PROCEDURE — 77334 RADIATION TREATMENT AID(S): CPT | Mod: 26,,, | Performed by: RADIOLOGY

## 2022-12-21 PROCEDURE — 77014 HC CT GUIDANCE RADIATION THERAPY FLDS PLACEMENT: CPT | Mod: TC | Performed by: RADIOLOGY

## 2022-12-21 PROCEDURE — 77334 RADIATION TREATMENT AID(S): CPT | Mod: TC | Performed by: RADIOLOGY

## 2022-12-30 PROCEDURE — 77301 PR  INTEN MOD RADIOTHER PLAN W/DOSE VOL HIST: ICD-10-PCS | Mod: 26,,, | Performed by: RADIOLOGY

## 2022-12-30 PROCEDURE — 77301 RADIOTHERAPY DOSE PLAN IMRT: CPT | Mod: TC | Performed by: RADIOLOGY

## 2022-12-30 PROCEDURE — 77301 RADIOTHERAPY DOSE PLAN IMRT: CPT | Mod: 26,,, | Performed by: RADIOLOGY

## 2023-01-02 PROCEDURE — 77338 PR  MLC IMRT DESIGN & CONSTRUCTION PER IMRT PLAN: ICD-10-PCS | Mod: 26,,, | Performed by: RADIOLOGY

## 2023-01-02 PROCEDURE — 77338 DESIGN MLC DEVICE FOR IMRT: CPT | Mod: TC | Performed by: RADIOLOGY

## 2023-01-02 PROCEDURE — 77300 RADIATION THERAPY DOSE PLAN: CPT | Mod: 26,,, | Performed by: RADIOLOGY

## 2023-01-02 PROCEDURE — 77300 RADIATION THERAPY DOSE PLAN: CPT | Mod: TC | Performed by: RADIOLOGY

## 2023-01-02 PROCEDURE — 77338 DESIGN MLC DEVICE FOR IMRT: CPT | Mod: 26,,, | Performed by: RADIOLOGY

## 2023-01-02 PROCEDURE — 77300 PR RADIATION THERAPY,DOSIMETRY PLAN: ICD-10-PCS | Mod: 26,,, | Performed by: RADIOLOGY

## 2023-01-03 ENCOUNTER — DOCUMENTATION ONLY (OUTPATIENT)
Dept: RADIATION ONCOLOGY | Facility: CLINIC | Age: 53
End: 2023-01-03
Payer: COMMERCIAL

## 2023-01-03 ENCOUNTER — HOSPITAL ENCOUNTER (OUTPATIENT)
Dept: RADIATION THERAPY | Facility: HOSPITAL | Age: 53
Discharge: HOME | End: 2023-01-03
Attending: RADIOLOGY
Payer: COMMERCIAL

## 2023-01-03 PROCEDURE — 77014 HC CT GUIDANCE RADIATION THERAPY FLDS PLACEMENT: CPT | Mod: TC | Performed by: RADIOLOGY

## 2023-01-03 PROCEDURE — 77014 PR  CT GUIDANCE PLACEMENT RAD THERAPY FIELDS: ICD-10-PCS | Mod: 26,,, | Performed by: RADIOLOGY

## 2023-01-03 PROCEDURE — 77014 PR  CT GUIDANCE PLACEMENT RAD THERAPY FIELDS: CPT | Mod: 26,,, | Performed by: RADIOLOGY

## 2023-01-03 PROCEDURE — 77385 HC IMRT, SIMPLE: CPT | Performed by: RADIOLOGY

## 2023-01-04 ENCOUNTER — DOCUMENTATION ONLY (OUTPATIENT)
Dept: RADIATION ONCOLOGY | Facility: CLINIC | Age: 53
End: 2023-01-04
Payer: COMMERCIAL

## 2023-01-04 PROCEDURE — 77014 PR  CT GUIDANCE PLACEMENT RAD THERAPY FIELDS: CPT | Mod: 26,,, | Performed by: RADIOLOGY

## 2023-01-04 PROCEDURE — 77385 HC IMRT, SIMPLE: CPT | Performed by: RADIOLOGY

## 2023-01-04 PROCEDURE — 77014 PR  CT GUIDANCE PLACEMENT RAD THERAPY FIELDS: ICD-10-PCS | Mod: 26,,, | Performed by: RADIOLOGY

## 2023-01-04 PROCEDURE — 77014 HC CT GUIDANCE RADIATION THERAPY FLDS PLACEMENT: CPT | Mod: TC | Performed by: RADIOLOGY

## 2023-01-04 NOTE — PLAN OF CARE
Day 2 of outpatient xrt to the prostate. New start yesterday; skin care & side effects have been reviewed. Will continue to monitor.

## 2023-01-05 PROCEDURE — 77014 PR  CT GUIDANCE PLACEMENT RAD THERAPY FIELDS: ICD-10-PCS | Mod: 26,,, | Performed by: RADIOLOGY

## 2023-01-05 PROCEDURE — 77014 PR  CT GUIDANCE PLACEMENT RAD THERAPY FIELDS: CPT | Mod: 26,,, | Performed by: RADIOLOGY

## 2023-01-05 PROCEDURE — 77385 HC IMRT, SIMPLE: CPT | Performed by: RADIOLOGY

## 2023-01-05 PROCEDURE — 77014 HC CT GUIDANCE RADIATION THERAPY FLDS PLACEMENT: CPT | Mod: TC | Performed by: RADIOLOGY

## 2023-01-06 PROCEDURE — 77014 PR  CT GUIDANCE PLACEMENT RAD THERAPY FIELDS: CPT | Mod: 26,,, | Performed by: RADIOLOGY

## 2023-01-06 PROCEDURE — 77385 HC IMRT, SIMPLE: CPT | Performed by: RADIOLOGY

## 2023-01-06 PROCEDURE — 77014 PR  CT GUIDANCE PLACEMENT RAD THERAPY FIELDS: ICD-10-PCS | Mod: 26,,, | Performed by: RADIOLOGY

## 2023-01-06 PROCEDURE — 77014 HC CT GUIDANCE RADIATION THERAPY FLDS PLACEMENT: CPT | Mod: TC | Performed by: RADIOLOGY

## 2023-01-09 PROCEDURE — 77385 HC IMRT, SIMPLE: CPT | Performed by: RADIOLOGY

## 2023-01-09 PROCEDURE — 77014 PR  CT GUIDANCE PLACEMENT RAD THERAPY FIELDS: ICD-10-PCS | Mod: 26,,, | Performed by: RADIOLOGY

## 2023-01-09 PROCEDURE — 77014 PR  CT GUIDANCE PLACEMENT RAD THERAPY FIELDS: CPT | Mod: 26,,, | Performed by: RADIOLOGY

## 2023-01-09 PROCEDURE — 77014 HC CT GUIDANCE RADIATION THERAPY FLDS PLACEMENT: CPT | Mod: TC | Performed by: RADIOLOGY

## 2023-01-10 PROCEDURE — 77336 RADIATION PHYSICS CONSULT: CPT | Performed by: RADIOLOGY

## 2023-01-10 PROCEDURE — 77385 HC IMRT, SIMPLE: CPT | Performed by: RADIOLOGY

## 2023-01-10 PROCEDURE — 77014 HC CT GUIDANCE RADIATION THERAPY FLDS PLACEMENT: CPT | Mod: TC | Performed by: RADIOLOGY

## 2023-01-10 PROCEDURE — 77014 PR  CT GUIDANCE PLACEMENT RAD THERAPY FIELDS: ICD-10-PCS | Mod: 26,,, | Performed by: RADIOLOGY

## 2023-01-10 PROCEDURE — 77014 PR  CT GUIDANCE PLACEMENT RAD THERAPY FIELDS: CPT | Mod: 26,,, | Performed by: RADIOLOGY

## 2023-01-11 ENCOUNTER — DOCUMENTATION ONLY (OUTPATIENT)
Dept: RADIATION ONCOLOGY | Facility: CLINIC | Age: 53
End: 2023-01-11
Payer: COMMERCIAL

## 2023-01-11 PROCEDURE — 77385 HC IMRT, SIMPLE: CPT | Performed by: RADIOLOGY

## 2023-01-11 PROCEDURE — 77014 PR  CT GUIDANCE PLACEMENT RAD THERAPY FIELDS: CPT | Mod: 26,,, | Performed by: RADIOLOGY

## 2023-01-11 PROCEDURE — 77014 PR  CT GUIDANCE PLACEMENT RAD THERAPY FIELDS: ICD-10-PCS | Mod: 26,,, | Performed by: RADIOLOGY

## 2023-01-11 PROCEDURE — 77014 HC CT GUIDANCE RADIATION THERAPY FLDS PLACEMENT: CPT | Mod: TC | Performed by: RADIOLOGY

## 2023-01-11 NOTE — PLAN OF CARE
Day 7 of outpatient xrt to the prostate. Doing well; denies any issues at this time. Will continue to monitor.

## 2023-01-12 PROCEDURE — 77014 PR  CT GUIDANCE PLACEMENT RAD THERAPY FIELDS: ICD-10-PCS | Mod: 26,,, | Performed by: RADIOLOGY

## 2023-01-12 PROCEDURE — 77014 PR  CT GUIDANCE PLACEMENT RAD THERAPY FIELDS: CPT | Mod: 26,,, | Performed by: RADIOLOGY

## 2023-01-12 PROCEDURE — 77385 HC IMRT, SIMPLE: CPT | Performed by: RADIOLOGY

## 2023-01-12 PROCEDURE — 77014 HC CT GUIDANCE RADIATION THERAPY FLDS PLACEMENT: CPT | Mod: TC | Performed by: RADIOLOGY

## 2023-01-13 PROCEDURE — 77014 PR  CT GUIDANCE PLACEMENT RAD THERAPY FIELDS: ICD-10-PCS | Mod: 26,,, | Performed by: RADIOLOGY

## 2023-01-13 PROCEDURE — 77014 HC CT GUIDANCE RADIATION THERAPY FLDS PLACEMENT: CPT | Mod: TC | Performed by: RADIOLOGY

## 2023-01-13 PROCEDURE — 77385 HC IMRT, SIMPLE: CPT | Performed by: RADIOLOGY

## 2023-01-13 PROCEDURE — 77014 PR  CT GUIDANCE PLACEMENT RAD THERAPY FIELDS: CPT | Mod: 26,,, | Performed by: RADIOLOGY

## 2023-01-17 ENCOUNTER — OFFICE VISIT (OUTPATIENT)
Dept: DIABETES | Facility: CLINIC | Age: 53
End: 2023-01-17
Payer: COMMERCIAL

## 2023-01-17 DIAGNOSIS — E11.65 UNCONTROLLED TYPE 2 DIABETES MELLITUS WITH HYPERGLYCEMIA: Primary | ICD-10-CM

## 2023-01-17 DIAGNOSIS — E66.9 OBESITY (BMI 30-39.9): ICD-10-CM

## 2023-01-17 PROCEDURE — 99214 PR OFFICE/OUTPT VISIT, EST, LEVL IV, 30-39 MIN: ICD-10-PCS | Mod: 95,,, | Performed by: PHYSICIAN ASSISTANT

## 2023-01-17 PROCEDURE — 3072F LOW RISK FOR RETINOPATHY: CPT | Mod: CPTII,95,, | Performed by: PHYSICIAN ASSISTANT

## 2023-01-17 PROCEDURE — 77385 HC IMRT, SIMPLE: CPT | Performed by: RADIOLOGY

## 2023-01-17 PROCEDURE — 77014 PR  CT GUIDANCE PLACEMENT RAD THERAPY FIELDS: ICD-10-PCS | Mod: 26,,, | Performed by: RADIOLOGY

## 2023-01-17 PROCEDURE — 77014 PR  CT GUIDANCE PLACEMENT RAD THERAPY FIELDS: CPT | Mod: 26,,, | Performed by: RADIOLOGY

## 2023-01-17 PROCEDURE — 77336 RADIATION PHYSICS CONSULT: CPT | Performed by: RADIOLOGY

## 2023-01-17 PROCEDURE — 77014 HC CT GUIDANCE RADIATION THERAPY FLDS PLACEMENT: CPT | Mod: TC | Performed by: RADIOLOGY

## 2023-01-17 PROCEDURE — 99214 OFFICE O/P EST MOD 30 MIN: CPT | Mod: 95,,, | Performed by: PHYSICIAN ASSISTANT

## 2023-01-17 PROCEDURE — 3072F PR LOW RISK FOR RETINOPATHY: ICD-10-PCS | Mod: CPTII,95,, | Performed by: PHYSICIAN ASSISTANT

## 2023-01-17 RX ORDER — INSULIN LISPRO 100 [IU]/ML
12 INJECTION, SOLUTION INTRAVENOUS; SUBCUTANEOUS
Qty: 12 ML | Refills: 11 | Status: SHIPPED | OUTPATIENT
Start: 2023-01-17 | End: 2024-01-17

## 2023-01-17 RX ORDER — INSULIN GLARGINE 100 [IU]/ML
26 INJECTION, SOLUTION SUBCUTANEOUS DAILY
Qty: 9 ML | Refills: 11 | Status: SHIPPED | OUTPATIENT
Start: 2023-01-17 | End: 2023-05-23

## 2023-01-17 RX ORDER — DULAGLUTIDE 0.75 MG/.5ML
0.75 INJECTION, SOLUTION SUBCUTANEOUS WEEKLY
Qty: 4 PEN | Refills: 11 | Status: SHIPPED | OUTPATIENT
Start: 2023-01-17 | End: 2023-03-08 | Stop reason: ALTCHOICE

## 2023-01-17 NOTE — Clinical Note
STAFF: end of March / beginning of April in person visit  A1c and urine micro near 3/7 at [facility]   Soraya - this manuel is going to come train tomorrow on Vipin and get set up for sharing. He is going to come straight from Radiation at 315 which ususally last 8 mins at the Cancer building

## 2023-01-17 NOTE — PATIENT INSTRUCTIONS
CURRENT DM MEDICATIONS:   Semglee 26 units at night  Humalog 12 units before meals TID and correction dosing every 3 hours as needed - see below  Jardiance 25 mg daily   Trulicity 0.75 mg weekly     Humalog Correction Dosing every 3 hours as needed OUTSIDE OF EATING  If  - 250, may take 2 units of Humalog  If  - 300, may take 4 units of Humalog   If  - 350, may take 6 units of Humalog  If  - 400, may take 8 units of Humalog  If +, may take 10 units of Humalog

## 2023-01-17 NOTE — PROGRESS NOTES
PCP: Edilberto Figueroa MD    Subjective:     Chief Complaint: Diabetes     TELEMEDICINE VISIT:     The patient location is: Home  The chief complaint leading to consultation is: Diabetes Follow up  Visit type: Virtual visit with synchronous audio and video  Total time spent with patient: 22  Each patient to whom he or she provides medical services by telemedicine is:  (1) informed of the relationship between the physician and patient and the respective role of any other health care provider with respect to management of the patient; and (2) notified that he or she may decline to receive medical services by telemedicine and may withdraw from such care at any time.    Notes: Began as telemedicine visit but converted to audio only due to poor audio quality.     HISTORY OF PRESENT ILLNESS: 52 y.o.   male presenting for diabetes management.   The patient's last visit with me was on 10/28/2022.  Patient has had Type II diabetes since 2012.  Pertinent to decision making is the following comorbidities: Obesity by BMI and Bipolar Disorder  Patient has the following Diabetes complications: without complications  He is to be enrolled in diabetes education classes.     Patient's most recent A1c of 8.6% was completed 1 months ago.   Patient states since His last A1c His blood glucose levels have been high  after meals.   Patient monitors blood glucose 4 times per day with meter : Fasting, After Lunch, After Dinner and Before Bed. Vipin received - needs training.   Patient blood glucose monitoring device will not be uploaded into Media Section today secondary to patient forgetting device.   Per patient recall, fasting blood sugars ranging from 100 - 150 and after meals 200 - 225.   Patient endorses the following diabetes related symptoms:  None .  Patient is due today for the following diabetes-related health maintenance standards: Foot Exam , Eye Exam, and Urine Microalbumin/creatinine ratio. Previously est with   Pasha.   He denies recent hospital admissions or emergency room visits.   He denies having hypoglycemia.   Patient's concerns today include glycemic control. Of note, patient was diagnosed with prostate cancer is currently undergoing Lupron treatment and radiation. No plans for chemotherapy at present.   Patient medication regimen is as below.     CURRENT DM MEDICATIONS:   Semglee 26 units at night  Humalog 8 units before meals TID   Jardiance 25 mg daily   Trulicity 0.75 mg weekly     Patient has failed the following Diabetes medications:   Trulicity - did not take   Basaglar / Lantus  Metformin        Labs Reviewed.       No results found for: CPEPTIDE  No results found for: GLUTAMICACID       //   , There is no height or weight on file to calculate BMI.  His blood sugar in clinic today is:         Review of Systems   Constitutional:  Negative for activity change, appetite change, chills and fever.   HENT:  Negative for dental problem, mouth sores, nosebleeds, sore throat and trouble swallowing.    Eyes:  Negative for pain and discharge.   Respiratory:  Negative for shortness of breath, wheezing and stridor.    Cardiovascular:  Negative for chest pain, palpitations and leg swelling.   Gastrointestinal:  Negative for abdominal pain, diarrhea, nausea and vomiting.   Endocrine: Negative for polydipsia, polyphagia and polyuria.   Genitourinary:  Negative for dysuria, frequency and urgency.   Musculoskeletal:  Negative for joint swelling and myalgias.   Skin:  Negative for rash and wound.   Neurological:  Negative for dizziness, syncope, weakness and headaches.   Psychiatric/Behavioral:  Negative for behavioral problems and dysphoric mood.        Diabetes Management Status  Statin: Taking  ACE/ARB: Not taking    Screening or Prevention Patient's value Goal Complete/Controlled?   HgA1C Testing and Control   Lab Results   Component Value Date    HGBA1C 8.6 (H) 12/07/2022      Annually/Less than 8% No   Lipid profile :  2022 Annually Yes   LDL control Lab Results   Component Value Date    LDLCALC 87.6 2022    Annually/Less than 100 mg/dl  Yes   Nephropathy screening Lab Results   Component Value Date    MICALBCREAT 16.1 2022     Lab Results   Component Value Date    PROTEINUA Trace (A) 2021    Annually Yes   Blood pressure BP Readings from Last 1 Encounters:   22 129/86    Less than 140/90 Yes   Dilated retinal exam : 2022 Annually No    Foot exam   : 2021 Annually No     Social History     Socioeconomic History    Marital status: Significant Other   Tobacco Use    Smoking status: Former     Types: Cigarettes     Quit date: 2014     Years since quittin.0    Smokeless tobacco: Former   Substance and Sexual Activity    Alcohol use: Not Currently    Drug use: Never    Sexual activity: Yes     Social Determinants of Health     Financial Resource Strain: Medium Risk    Difficulty of Paying Living Expenses: Somewhat hard   Food Insecurity: No Food Insecurity    Worried About Running Out of Food in the Last Year: Never true    Ran Out of Food in the Last Year: Never true   Transportation Needs: No Transportation Needs    Lack of Transportation (Medical): No    Lack of Transportation (Non-Medical): No   Physical Activity: Inactive    Days of Exercise per Week: 0 days    Minutes of Exercise per Session: 0 min   Stress: No Stress Concern Present    Feeling of Stress : Only a little   Social Connections: Socially Isolated    Frequency of Communication with Friends and Family: Twice a week    Frequency of Social Gatherings with Friends and Family: Never    Attends Buddhism Services: Never    Active Member of Clubs or Organizations: No    Attends Club or Organization Meetings: Never    Marital Status:    Housing Stability: Low Risk     Unable to Pay for Housing in the Last Year: No    Number of Places Lived in the Last Year: 1    Unstable Housing in the Last Year: No     Past Medical  History:   Diagnosis Date    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 Patrick Sanz is a 52 y.o. male patient of Dr. Woodward status post robot-assisted radical prostatectomy with partial nerve sparing on 10/11/21 for what proved to be a 8cc, pGS4+3 (70% high grade) [id number] (0/15) prostate cancer excised with a focal positive 11mm margin and a positive benign margin. His RADHA-S score is 7. His post op PSA was low detectable and his most recent is now 0.177. H    Sleep apnea        Objective:      Physical Exam  Constitutional:       General: He is not in acute distress.     Appearance: He is well-developed. He is not diaphoretic.   HENT:      Head: Normocephalic and atraumatic.      Right Ear: External ear normal.      Left Ear: External ear normal.      Nose: Nose normal.   Eyes:      General:         Right eye: No discharge.         Left eye: No discharge.   Pulmonary:      Effort: Pulmonary effort is normal. No respiratory distress.      Breath sounds: No stridor.   Musculoskeletal:         General: Normal range of motion.      Cervical back: Normal range of motion.   Skin:     Coloration: Skin is not pale.   Neurological:      Mental Status: He is alert and oriented to person, place, and time.      Motor: No abnormal muscle tone.      Coordination: Coordination normal.   Psychiatric:         Behavior: Behavior normal.         Thought Content: Thought content normal.         Judgment: Judgment normal.         Assessment / Plan:     Uncontrolled type 2 diabetes mellitus with hyperglycemia  -     Ambulatory referral/consult to Diabetes Education; Future; Expected date: 01/24/2023  -     insulin lispro (HUMALOG KWIKPEN INSULIN) 100 unit/mL pen; Inject 12 Units into the skin 3 (three) times daily with meals.  Dispense: 12 mL; Refill:  11  -     insulin (LANTUS SOLOSTAR U-100 INSULIN) glargine 100 units/mL SubQ pen; Inject 26 Units into the skin once daily.  Dispense: 9 mL; Refill: 11  -     empagliflozin (JARDIANCE) 25 mg tablet; Take 1 tablet (25 mg total) by mouth once daily.  Dispense: 30 tablet; Refill: 11  -     dulaglutide (TRULICITY) 0.75 mg/0.5 mL pen injector; Inject 0.75 mg into the skin once a week.  Dispense: 4 pen; Refill: 11  -     Hemoglobin A1C; Standing  -     Microalbumin/Creatinine Ratio, Urine; Future; Expected date: 01/17/2023    Obesity (BMI 30-39.9)        Additional Plan Details:    - POCT Glucose  - Encouraged continuation of lifestyle changes including regular exercise and limiting carbohydrates to 30-45 grams per meal threes times daily and 15 grams per snack with a limit of two daily.   - Encouraged continued monitoring of blood glucose with maintenance of 4 times daily at Fasting, Before Lunch, Before Dinner and Before Bed. Vipin - needs to be training  - Current DM Medication Regimen: Change Humalog 12 units TID wm. Continuee Semglee 26 units daily. Continue Jardiance 25 mg daily. Continue Trulicity 0.75 mg weekly - pen training video.   - Referral to CDE for Vipin CGM training  - Health Maintenance standards addressed today: Foot Exam - completed today and documented in physical exam with feedback to patient about proper diabetic foot care and findings, Eye Exam - will be completed within [facility] and scheduled today, and Urine Microalbumin / Creatinine Ratio scheduled.   - Nursing Visit: Patient is age 79 or younger with an A1c of 7.5 or greater and will not need nursing visit at this time .   - Follow up with me in 1 months with A1c prior.       Jeffrey Aguilar PA-C  [facility]

## 2023-01-18 ENCOUNTER — DOCUMENTATION ONLY (OUTPATIENT)
Dept: RADIATION ONCOLOGY | Facility: CLINIC | Age: 53
End: 2023-01-18
Payer: COMMERCIAL

## 2023-01-18 PROCEDURE — 77014 PR  CT GUIDANCE PLACEMENT RAD THERAPY FIELDS: ICD-10-PCS | Mod: 26,,, | Performed by: RADIOLOGY

## 2023-01-18 PROCEDURE — 77385 HC IMRT, SIMPLE: CPT | Performed by: RADIOLOGY

## 2023-01-18 PROCEDURE — 77014 HC CT GUIDANCE RADIATION THERAPY FLDS PLACEMENT: CPT | Mod: TC | Performed by: RADIOLOGY

## 2023-01-18 PROCEDURE — 77014 PR  CT GUIDANCE PLACEMENT RAD THERAPY FIELDS: CPT | Mod: 26,,, | Performed by: RADIOLOGY

## 2023-01-18 NOTE — PLAN OF CARE
Day 11 of outpatient xrt to the prostate. Doing well, denies any issues at this time. Will continue to monitor.

## 2023-01-19 ENCOUNTER — PATIENT MESSAGE (OUTPATIENT)
Dept: DIABETES | Facility: CLINIC | Age: 53
End: 2023-01-19
Payer: COMMERCIAL

## 2023-01-19 PROCEDURE — 77014 HC CT GUIDANCE RADIATION THERAPY FLDS PLACEMENT: CPT | Mod: TC | Performed by: RADIOLOGY

## 2023-01-19 PROCEDURE — 77014 PR  CT GUIDANCE PLACEMENT RAD THERAPY FIELDS: CPT | Mod: 26,,, | Performed by: RADIOLOGY

## 2023-01-19 PROCEDURE — 77014 PR  CT GUIDANCE PLACEMENT RAD THERAPY FIELDS: ICD-10-PCS | Mod: 26,,, | Performed by: RADIOLOGY

## 2023-01-19 PROCEDURE — 77385 HC IMRT, SIMPLE: CPT | Performed by: RADIOLOGY

## 2023-01-20 ENCOUNTER — PATIENT MESSAGE (OUTPATIENT)
Dept: RADIATION ONCOLOGY | Facility: CLINIC | Age: 53
End: 2023-01-20
Payer: COMMERCIAL

## 2023-01-23 PROCEDURE — 77014 PR  CT GUIDANCE PLACEMENT RAD THERAPY FIELDS: CPT | Mod: 26,,, | Performed by: RADIOLOGY

## 2023-01-23 PROCEDURE — 77014 PR  CT GUIDANCE PLACEMENT RAD THERAPY FIELDS: ICD-10-PCS | Mod: 26,,, | Performed by: RADIOLOGY

## 2023-01-23 PROCEDURE — 77014 HC CT GUIDANCE RADIATION THERAPY FLDS PLACEMENT: CPT | Mod: TC | Performed by: RADIOLOGY

## 2023-01-23 PROCEDURE — 77385 HC IMRT, SIMPLE: CPT | Performed by: RADIOLOGY

## 2023-01-24 PROCEDURE — 77014 PR  CT GUIDANCE PLACEMENT RAD THERAPY FIELDS: CPT | Mod: 26,,, | Performed by: RADIOLOGY

## 2023-01-24 PROCEDURE — 77014 PR  CT GUIDANCE PLACEMENT RAD THERAPY FIELDS: ICD-10-PCS | Mod: 26,,, | Performed by: RADIOLOGY

## 2023-01-24 PROCEDURE — 77385 HC IMRT, SIMPLE: CPT | Performed by: RADIOLOGY

## 2023-01-24 PROCEDURE — 77014 HC CT GUIDANCE RADIATION THERAPY FLDS PLACEMENT: CPT | Mod: TC | Performed by: RADIOLOGY

## 2023-01-25 ENCOUNTER — DOCUMENTATION ONLY (OUTPATIENT)
Dept: RADIATION ONCOLOGY | Facility: CLINIC | Age: 53
End: 2023-01-25
Payer: COMMERCIAL

## 2023-01-25 DIAGNOSIS — C61 MALIGNANT NEOPLASM OF PROSTATE: Primary | ICD-10-CM

## 2023-01-25 PROCEDURE — 77014 HC CT GUIDANCE RADIATION THERAPY FLDS PLACEMENT: CPT | Mod: TC | Performed by: RADIOLOGY

## 2023-01-25 PROCEDURE — 77336 RADIATION PHYSICS CONSULT: CPT | Performed by: RADIOLOGY

## 2023-01-25 PROCEDURE — 77014 PR  CT GUIDANCE PLACEMENT RAD THERAPY FIELDS: ICD-10-PCS | Mod: 26,,, | Performed by: RADIOLOGY

## 2023-01-25 PROCEDURE — 77385 HC IMRT, SIMPLE: CPT | Performed by: RADIOLOGY

## 2023-01-25 PROCEDURE — 77014 PR  CT GUIDANCE PLACEMENT RAD THERAPY FIELDS: CPT | Mod: 26,,, | Performed by: RADIOLOGY

## 2023-01-25 RX ORDER — ONDANSETRON 4 MG/1
4 TABLET, ORALLY DISINTEGRATING ORAL EVERY 12 HOURS PRN
Qty: 60 TABLET | Refills: 2 | Status: SHIPPED | OUTPATIENT
Start: 2023-01-25 | End: 2023-04-25

## 2023-01-25 NOTE — PLAN OF CARE
Day 15 of outpatient xrt to the prostate. Denies dysuria, has softer stools but not diarrhea. starting to have hotflashes throughout the day and some nausea. Will continue to monitor.

## 2023-01-26 PROCEDURE — 77014 HC CT GUIDANCE RADIATION THERAPY FLDS PLACEMENT: CPT | Mod: TC | Performed by: RADIOLOGY

## 2023-01-26 PROCEDURE — 77385 HC IMRT, SIMPLE: CPT | Performed by: RADIOLOGY

## 2023-01-26 PROCEDURE — 77014 PR  CT GUIDANCE PLACEMENT RAD THERAPY FIELDS: CPT | Mod: 26,,, | Performed by: RADIOLOGY

## 2023-01-26 PROCEDURE — 77014 PR  CT GUIDANCE PLACEMENT RAD THERAPY FIELDS: ICD-10-PCS | Mod: 26,,, | Performed by: RADIOLOGY

## 2023-01-27 PROCEDURE — 77014 PR  CT GUIDANCE PLACEMENT RAD THERAPY FIELDS: CPT | Mod: 26,,, | Performed by: RADIOLOGY

## 2023-01-27 PROCEDURE — 77385 HC IMRT, SIMPLE: CPT | Performed by: RADIOLOGY

## 2023-01-27 PROCEDURE — 77014 HC CT GUIDANCE RADIATION THERAPY FLDS PLACEMENT: CPT | Mod: TC | Performed by: RADIOLOGY

## 2023-01-27 PROCEDURE — 77014 PR  CT GUIDANCE PLACEMENT RAD THERAPY FIELDS: ICD-10-PCS | Mod: 26,,, | Performed by: RADIOLOGY

## 2023-01-30 ENCOUNTER — NUTRITION (OUTPATIENT)
Dept: DIABETES | Facility: CLINIC | Age: 53
End: 2023-01-30
Payer: COMMERCIAL

## 2023-01-30 DIAGNOSIS — E11.65 UNCONTROLLED TYPE 2 DIABETES MELLITUS WITH HYPERGLYCEMIA: ICD-10-CM

## 2023-01-30 PROCEDURE — G0108 PR DIAB MANAGE TRN  PER INDIV: ICD-10-PCS | Mod: S$GLB,,,

## 2023-01-30 PROCEDURE — 99999 PR PBB SHADOW E&M-EST. PATIENT-LVL III: ICD-10-PCS | Mod: PBBFAC,,,

## 2023-01-30 PROCEDURE — 77014 PR  CT GUIDANCE PLACEMENT RAD THERAPY FIELDS: ICD-10-PCS | Mod: 26,,, | Performed by: RADIOLOGY

## 2023-01-30 PROCEDURE — 99999 PR PBB SHADOW E&M-EST. PATIENT-LVL III: CPT | Mod: PBBFAC,,,

## 2023-01-30 PROCEDURE — 77385 HC IMRT, SIMPLE: CPT | Performed by: RADIOLOGY

## 2023-01-30 PROCEDURE — G0108 DIAB MANAGE TRN  PER INDIV: HCPCS | Mod: S$GLB,,,

## 2023-01-30 PROCEDURE — 77014 PR  CT GUIDANCE PLACEMENT RAD THERAPY FIELDS: CPT | Mod: 26,,, | Performed by: RADIOLOGY

## 2023-01-30 PROCEDURE — 77014 HC CT GUIDANCE RADIATION THERAPY FLDS PLACEMENT: CPT | Mod: TC | Performed by: RADIOLOGY

## 2023-01-30 NOTE — PROGRESS NOTES
Diabetes Care Specialist Progress Note  Author: Soraya Lyle RD  Date: 1/30/2023    Program Intake  Reason for Diabetes Program Visit:: Intervention  Type of Intervention:: Individual  Individual: Device Training  Device Training: Personal CGM  Current diabetes risk level:: moderate  In the last 12 months, have you:: been admitted to a hospital  Was the ER or hospital admission related to diabetes?: No    Lab Results   Component Value Date    HGBA1C 8.6 (H) 12/07/2022       Clinical    Clinical Assessment  Current Diabetes Treatment: Insulin, Oral Medication    Medication Information  How do you obtain your medications?: Patient drives    Labs  Do you have regular lab work to monitor your medications?: Yes  Type of Regular Lab Work: A1c, Cholesterol, Microalbumin  Where do you get your labs drawn?: [facility]  Lab Compliance Barriers: No    Additional Social History    Support  Does anyone support you with your diabetes care?: yes  Who supports you?: self  Who takes you to your medical appointments?: self  Does the current support meet the patient's needs?: Yes  Is Support an area impacting ability to self-manage diabetes?: No    Access to Mass Media & Technology  Does the patient have access to any of the following devices or technologies?: Smart phone, Internet Access  Media or technology needs impacting ability to self-manage diabetes?: No    Cognitive/Behavioral Health  Alert and Oriented: Yes  Difficulty Thinking: No  Requires Prompting: No  Requires assistance for routine expression?: No  Cognitive or behavioral barriers impacting ability to self-manage diabetes?: No    Culture/Catholic  Culture or Nondenominational beliefs that may impact ability to access healthcare: No    Communication  Language preference: English  Hearing Problems: No  Communication needs impacting ability to self-manage diabetes?: No    Health Literacy  Preferred Learning Method: Face to Face, Demonstration  How often do you need to have someone  help you read instructions, pamphlets, or written material from your doctor or pharmacy?: Never  Health literacy needs impacting ability to self-manage diabetes?: No      Diabetes Self-Management Skills Assessment    Diabetes Disease Process/Treatment Options  Diabetes Disease Process/Treatment Options: Skills Assessment Completed: No  Deferred due to:: Time    Nutrition/Healthy Eating  Method of carbohydrate measurement:: no method  Nutrition/Healthy Eating Skills Assessment Completed:: No  Deffered due to:: Time    Physical Activity/Exercise  Physical Activity/Exercise Skills Assessment Completed: : No  Deffered due to:: Time    Medications  Medication Skills Assessment Completed:: No  Deffered due to:: Time    Home Blood Glucose Monitoring  Home Blood Glucose Monitoring Skills Assessment Completed: : Yes  Assessment indicates:: Instruction Needed  Area of need?: Yes    Acute Complications  Acute Complications Skills Assessment Completed: : No  Deffered due to:: Time    Chronic Complications  Chronic Complications Skills Assessment Completed: : No  Deferred due to:: Time    Psychosocial/Coping  Psychosocial/Coping Skills Assessment Completed: : No  Deffered due to:: Time      Diabetes Self Support Plan    Assessment Summary and Plan    Based on today's diabetes care assessment, the following areas of need were identified:      Social 1/30/2023   Support No   Access to Mass Media/Tech No   Cognitive/Behavioral Health No   Culture/Mandaeism No   Communication No   Health Literacy No        Clinical 1/30/2023   Medication Adherence -   Lab Compliance No   Nutritional Status -        Diabetes Self-Management Skills 1/30/2023   Diabetes Disease Process/Treatment Options -   Nutrition/Healthy Eating -   Physical Activity/Exercise -   Medication -   Home Blood Glucose Monitoring Yes-see care plan   Acute Complications -   Chronic Complications -          Today's interventions were provided through individual discussion,  instruction, and written materials were provided.      Patient verbalized understanding of instruction and written materials.  Pt was able to return back demonstration of instructions today. Patient understood key points, needs reinforcement and further instruction.     Diabetes Self-Management Care Plan:    Today's Diabetes Self-Management Care Plan was developed with Patrick's input. Patrick has agreed to work toward the following goal(s) to improve his/her overall diabetes control.      Care Plan: Diabetes Management   Updates made since 12/31/2022 12:00 AM        Problem: Blood Glucose Self-Monitoring         Goal: Patient agrees to monitor blood sugar using Vipin 2.    Start Date: 1/30/2023   Expected End Date: 7/30/2023   Priority: High   Barriers: No Barriers Identified   Note:    VoxPopMe 2 TRAINING  Vipin 2 bret downloaded to phone. Connected patient to our clinic.      Overview:  scan every 8 hours, trending arrows, BG graph screens, battery life indicator.  Menus: Trend graph, Start sensor, Events, Alerts, Settings, Shutdown, Stop Sensor   Settings:                          * Urgent Low: 55 mg/dL                          * Low alert: 80 mg/dl                           * High alert: 240 mg/dl                            * Signal Loss: on                               Patient paired sensor with phone.    Reviewed where to find sensor insertion time and sensor expiration date.   Reviewed sensor site selection. Patient selected and prepped site using aseptic technique, inserted sensor and transmitter and started session.   Discussed sensor removal from site.  Patient able to demonstrate without difficulty.  Encouraged to review manual prior to starting another sensor.   Reviewed problem solving aspects of sensor transmission/variables that can disrupt transmission.  range 20 feet.  Patient instructed on lag time and encouraged patient to use meter if sensor blood sugar does not match  symptoms.  Vipin technical support contact number given and examples of when to contact them discussed.             Follow Up Plan     Follow up if symptoms worsen or fail to improve.    Today's care plan and follow up schedule was discussed with patient.  Patrick verbalized understanding of the care plan, goals, and agrees to follow up plan.        The patient was encouraged to communicate with his/her health care provider/physician and care team regarding his/her condition(s) and treatment.  I provided the patient with my contact information today and encouraged to contact me via phone or [facility] as needed.     Length of Visit   Total Time: 30 Minutes

## 2023-01-30 NOTE — PATIENT INSTRUCTIONS
Goal for this visit:  Enter all carbs and insulin doses into your Epigami 2 bret. Message your provider if you notice a pattern of highs or lows in your blood sugar.    Message me if you have any questions or need help with anything.

## 2023-01-31 PROCEDURE — 77014 PR  CT GUIDANCE PLACEMENT RAD THERAPY FIELDS: CPT | Mod: 26,,, | Performed by: RADIOLOGY

## 2023-01-31 PROCEDURE — 77385 HC IMRT, SIMPLE: CPT | Performed by: RADIOLOGY

## 2023-01-31 PROCEDURE — 77014 PR  CT GUIDANCE PLACEMENT RAD THERAPY FIELDS: ICD-10-PCS | Mod: 26,,, | Performed by: RADIOLOGY

## 2023-01-31 PROCEDURE — 77014 HC CT GUIDANCE RADIATION THERAPY FLDS PLACEMENT: CPT | Mod: TC | Performed by: RADIOLOGY

## 2023-02-01 ENCOUNTER — HOSPITAL ENCOUNTER (OUTPATIENT)
Dept: RADIATION THERAPY | Facility: HOSPITAL | Age: 53
Discharge: HOME | End: 2023-02-01
Attending: RADIOLOGY
Payer: COMMERCIAL

## 2023-02-01 ENCOUNTER — DOCUMENTATION ONLY (OUTPATIENT)
Dept: RADIATION ONCOLOGY | Facility: CLINIC | Age: 53
End: 2023-02-01
Payer: COMMERCIAL

## 2023-02-01 ENCOUNTER — TELEPHONE (OUTPATIENT)
Dept: INTERNAL MEDICINE | Facility: CLINIC | Age: 53
End: 2023-02-01
Payer: COMMERCIAL

## 2023-02-01 DIAGNOSIS — E11.9 TYPE 2 DIABETES MELLITUS WITHOUT COMPLICATION: ICD-10-CM

## 2023-02-01 PROCEDURE — 77385 HC IMRT, SIMPLE: CPT | Performed by: RADIOLOGY

## 2023-02-01 PROCEDURE — 77014 PR  CT GUIDANCE PLACEMENT RAD THERAPY FIELDS: CPT | Mod: 26,,, | Performed by: RADIOLOGY

## 2023-02-01 PROCEDURE — 77014 PR  CT GUIDANCE PLACEMENT RAD THERAPY FIELDS: ICD-10-PCS | Mod: 26,,, | Performed by: RADIOLOGY

## 2023-02-01 PROCEDURE — 77014 HC CT GUIDANCE RADIATION THERAPY FLDS PLACEMENT: CPT | Mod: TC | Performed by: RADIOLOGY

## 2023-02-01 NOTE — TELEPHONE ENCOUNTER
Spoke with pt. Appt scheduled for follow-up after labs; appt. Date and time confirmed; Pt. Verbalizes understanding.

## 2023-02-01 NOTE — TELEPHONE ENCOUNTER
Please let pt know that [facility] has sent lab orders for me to sign. I have signed but he is due for appt.

## 2023-02-01 NOTE — PLAN OF CARE
Day 20 of outpatient xrt to the prostate. Denies dysuria and diarrhea. still has hotflashes throughout the day and some nausea, and timmons. Will continue to monitor.

## 2023-02-02 PROCEDURE — 77336 RADIATION PHYSICS CONSULT: CPT | Performed by: RADIOLOGY

## 2023-02-02 PROCEDURE — 77014 PR  CT GUIDANCE PLACEMENT RAD THERAPY FIELDS: CPT | Mod: 26,,, | Performed by: RADIOLOGY

## 2023-02-02 PROCEDURE — 77014 HC CT GUIDANCE RADIATION THERAPY FLDS PLACEMENT: CPT | Mod: TC | Performed by: RADIOLOGY

## 2023-02-02 PROCEDURE — 77385 HC IMRT, SIMPLE: CPT | Performed by: RADIOLOGY

## 2023-02-02 PROCEDURE — 77014 PR  CT GUIDANCE PLACEMENT RAD THERAPY FIELDS: ICD-10-PCS | Mod: 26,,, | Performed by: RADIOLOGY

## 2023-02-03 PROCEDURE — 77301 RADIOTHERAPY DOSE PLAN IMRT: CPT | Mod: TC | Performed by: RADIOLOGY

## 2023-02-03 PROCEDURE — 77014 HC CT GUIDANCE RADIATION THERAPY FLDS PLACEMENT: CPT | Mod: TC | Performed by: RADIOLOGY

## 2023-02-03 PROCEDURE — 77385 HC IMRT, SIMPLE: CPT | Performed by: RADIOLOGY

## 2023-02-06 PROCEDURE — 77338 DESIGN MLC DEVICE FOR IMRT: CPT | Mod: 26,,, | Performed by: RADIOLOGY

## 2023-02-06 PROCEDURE — 77338 DESIGN MLC DEVICE FOR IMRT: CPT | Mod: TC | Performed by: RADIOLOGY

## 2023-02-06 PROCEDURE — 77014 HC CT GUIDANCE RADIATION THERAPY FLDS PLACEMENT: CPT | Mod: TC | Performed by: RADIOLOGY

## 2023-02-06 PROCEDURE — 77300 RADIATION THERAPY DOSE PLAN: CPT | Mod: 26,,, | Performed by: RADIOLOGY

## 2023-02-06 PROCEDURE — 77014 PR  CT GUIDANCE PLACEMENT RAD THERAPY FIELDS: ICD-10-PCS | Mod: 26,,, | Performed by: RADIOLOGY

## 2023-02-06 PROCEDURE — 77014 PR  CT GUIDANCE PLACEMENT RAD THERAPY FIELDS: CPT | Mod: 26,,, | Performed by: RADIOLOGY

## 2023-02-06 PROCEDURE — 77300 PR RADIATION THERAPY,DOSIMETRY PLAN: ICD-10-PCS | Mod: 26,,, | Performed by: RADIOLOGY

## 2023-02-06 PROCEDURE — 77300 RADIATION THERAPY DOSE PLAN: CPT | Mod: TC | Performed by: RADIOLOGY

## 2023-02-06 PROCEDURE — 77338 PR  MLC IMRT DESIGN & CONSTRUCTION PER IMRT PLAN: ICD-10-PCS | Mod: 26,,, | Performed by: RADIOLOGY

## 2023-02-06 PROCEDURE — 77385 HC IMRT, SIMPLE: CPT | Performed by: RADIOLOGY

## 2023-02-07 PROCEDURE — 77014 PR  CT GUIDANCE PLACEMENT RAD THERAPY FIELDS: ICD-10-PCS | Mod: 26,,, | Performed by: RADIOLOGY

## 2023-02-07 PROCEDURE — 77014 PR  CT GUIDANCE PLACEMENT RAD THERAPY FIELDS: CPT | Mod: 26,,, | Performed by: RADIOLOGY

## 2023-02-07 PROCEDURE — 77385 HC IMRT, SIMPLE: CPT | Performed by: RADIOLOGY

## 2023-02-07 PROCEDURE — 77014 HC CT GUIDANCE RADIATION THERAPY FLDS PLACEMENT: CPT | Mod: TC | Performed by: RADIOLOGY

## 2023-02-08 ENCOUNTER — PATIENT MESSAGE (OUTPATIENT)
Dept: DIABETES | Facility: CLINIC | Age: 53
End: 2023-02-08
Payer: COMMERCIAL

## 2023-02-08 ENCOUNTER — PATIENT MESSAGE (OUTPATIENT)
Dept: PSYCHIATRY | Facility: CLINIC | Age: 53
End: 2023-02-08
Payer: COMMERCIAL

## 2023-02-08 ENCOUNTER — DOCUMENTATION ONLY (OUTPATIENT)
Dept: RADIATION ONCOLOGY | Facility: CLINIC | Age: 53
End: 2023-02-08
Payer: COMMERCIAL

## 2023-02-08 PROCEDURE — 77014 PR  CT GUIDANCE PLACEMENT RAD THERAPY FIELDS: CPT | Mod: 26,,, | Performed by: RADIOLOGY

## 2023-02-08 PROCEDURE — 77014 HC CT GUIDANCE RADIATION THERAPY FLDS PLACEMENT: CPT | Mod: TC | Performed by: RADIOLOGY

## 2023-02-08 PROCEDURE — 77385 HC IMRT, SIMPLE: CPT | Performed by: RADIOLOGY

## 2023-02-08 PROCEDURE — 77014 PR  CT GUIDANCE PLACEMENT RAD THERAPY FIELDS: ICD-10-PCS | Mod: 26,,, | Performed by: RADIOLOGY

## 2023-02-08 RX ORDER — QUETIAPINE FUMARATE 100 MG/1
100 TABLET, FILM COATED ORAL NIGHTLY
Qty: 30 TABLET | Refills: 2 | Status: SHIPPED | OUTPATIENT
Start: 2023-02-08 | End: 2023-04-17

## 2023-02-08 NOTE — PLAN OF CARE
Day 25 of outpatient xrt to the prostate. C/o having difficulty having bms but stolls are normal, having nausea, denies any other issues. Will continue to monitor.

## 2023-02-09 PROCEDURE — 77385 HC IMRT, SIMPLE: CPT | Performed by: RADIOLOGY

## 2023-02-09 PROCEDURE — 77336 RADIATION PHYSICS CONSULT: CPT | Performed by: RADIOLOGY

## 2023-02-09 PROCEDURE — 77014 PR  CT GUIDANCE PLACEMENT RAD THERAPY FIELDS: CPT | Mod: 26,,, | Performed by: RADIOLOGY

## 2023-02-09 PROCEDURE — 77014 PR  CT GUIDANCE PLACEMENT RAD THERAPY FIELDS: ICD-10-PCS | Mod: 26,,, | Performed by: RADIOLOGY

## 2023-02-09 PROCEDURE — 77014 HC CT GUIDANCE RADIATION THERAPY FLDS PLACEMENT: CPT | Mod: TC | Performed by: RADIOLOGY

## 2023-02-10 PROCEDURE — 77385 HC IMRT, SIMPLE: CPT | Performed by: RADIOLOGY

## 2023-02-10 PROCEDURE — 77014 HC CT GUIDANCE RADIATION THERAPY FLDS PLACEMENT: CPT | Mod: TC | Performed by: RADIOLOGY

## 2023-02-10 PROCEDURE — 77014 PR  CT GUIDANCE PLACEMENT RAD THERAPY FIELDS: CPT | Mod: 26,,, | Performed by: RADIOLOGY

## 2023-02-10 PROCEDURE — 77014 PR  CT GUIDANCE PLACEMENT RAD THERAPY FIELDS: ICD-10-PCS | Mod: 26,,, | Performed by: RADIOLOGY

## 2023-02-13 PROCEDURE — 77014 HC CT GUIDANCE RADIATION THERAPY FLDS PLACEMENT: CPT | Mod: TC | Performed by: RADIOLOGY

## 2023-02-13 PROCEDURE — 77014 PR  CT GUIDANCE PLACEMENT RAD THERAPY FIELDS: ICD-10-PCS | Mod: 26,,, | Performed by: RADIOLOGY

## 2023-02-13 PROCEDURE — 77385 HC IMRT, SIMPLE: CPT | Performed by: RADIOLOGY

## 2023-02-13 PROCEDURE — 77014 PR  CT GUIDANCE PLACEMENT RAD THERAPY FIELDS: CPT | Mod: 26,,, | Performed by: RADIOLOGY

## 2023-02-15 ENCOUNTER — DOCUMENTATION ONLY (OUTPATIENT)
Dept: RADIATION ONCOLOGY | Facility: CLINIC | Age: 53
End: 2023-02-15
Payer: COMMERCIAL

## 2023-02-15 PROCEDURE — 77014 PR  CT GUIDANCE PLACEMENT RAD THERAPY FIELDS: ICD-10-PCS | Mod: 26,,, | Performed by: RADIOLOGY

## 2023-02-15 PROCEDURE — 77385 HC IMRT, SIMPLE: CPT | Performed by: RADIOLOGY

## 2023-02-15 PROCEDURE — 77014 PR  CT GUIDANCE PLACEMENT RAD THERAPY FIELDS: CPT | Mod: 26,,, | Performed by: RADIOLOGY

## 2023-02-15 PROCEDURE — 77014 HC CT GUIDANCE RADIATION THERAPY FLDS PLACEMENT: CPT | Mod: TC | Performed by: RADIOLOGY

## 2023-02-15 NOTE — PLAN OF CARE
Day 29 of outpatient xrt to the prostate. Still has some nausea; feeling like having to bare down but nothing there;no blood in stool or urine; no dysuria. Will continue to monitor.

## 2023-02-16 PROCEDURE — 77014 HC CT GUIDANCE RADIATION THERAPY FLDS PLACEMENT: CPT | Mod: TC | Performed by: RADIOLOGY

## 2023-02-16 PROCEDURE — 77336 RADIATION PHYSICS CONSULT: CPT | Performed by: RADIOLOGY

## 2023-02-16 PROCEDURE — 77014 PR  CT GUIDANCE PLACEMENT RAD THERAPY FIELDS: CPT | Mod: 26,,, | Performed by: RADIOLOGY

## 2023-02-16 PROCEDURE — 77014 PR  CT GUIDANCE PLACEMENT RAD THERAPY FIELDS: ICD-10-PCS | Mod: 26,,, | Performed by: RADIOLOGY

## 2023-02-16 PROCEDURE — 77385 HC IMRT, SIMPLE: CPT | Performed by: RADIOLOGY

## 2023-02-17 PROCEDURE — 77014 PR  CT GUIDANCE PLACEMENT RAD THERAPY FIELDS: ICD-10-PCS | Mod: 26,,, | Performed by: RADIOLOGY

## 2023-02-17 PROCEDURE — 77014 HC CT GUIDANCE RADIATION THERAPY FLDS PLACEMENT: CPT | Mod: TC | Performed by: RADIOLOGY

## 2023-02-17 PROCEDURE — 77385 HC IMRT, SIMPLE: CPT | Performed by: RADIOLOGY

## 2023-02-17 PROCEDURE — 77014 PR  CT GUIDANCE PLACEMENT RAD THERAPY FIELDS: CPT | Mod: 26,,, | Performed by: RADIOLOGY

## 2023-02-20 PROCEDURE — 77014 PR  CT GUIDANCE PLACEMENT RAD THERAPY FIELDS: ICD-10-PCS | Mod: 26,,, | Performed by: RADIOLOGY

## 2023-02-20 PROCEDURE — 77014 HC CT GUIDANCE RADIATION THERAPY FLDS PLACEMENT: CPT | Mod: TC | Performed by: RADIOLOGY

## 2023-02-20 PROCEDURE — 77385 HC IMRT, SIMPLE: CPT | Performed by: RADIOLOGY

## 2023-02-20 PROCEDURE — 77014 PR  CT GUIDANCE PLACEMENT RAD THERAPY FIELDS: CPT | Mod: 26,,, | Performed by: RADIOLOGY

## 2023-02-21 ENCOUNTER — OFFICE VISIT (OUTPATIENT)
Dept: DIABETES | Facility: CLINIC | Age: 53
End: 2023-02-21
Payer: COMMERCIAL

## 2023-02-21 DIAGNOSIS — E66.9 OBESITY (BMI 30-39.9): ICD-10-CM

## 2023-02-21 DIAGNOSIS — E11.65 UNCONTROLLED TYPE 2 DIABETES MELLITUS WITH HYPERGLYCEMIA: Primary | ICD-10-CM

## 2023-02-21 PROCEDURE — 3072F LOW RISK FOR RETINOPATHY: CPT | Mod: CPTII,95,, | Performed by: PHYSICIAN ASSISTANT

## 2023-02-21 PROCEDURE — 99214 OFFICE O/P EST MOD 30 MIN: CPT | Mod: 95,,, | Performed by: PHYSICIAN ASSISTANT

## 2023-02-21 PROCEDURE — 3072F PR LOW RISK FOR RETINOPATHY: ICD-10-PCS | Mod: CPTII,95,, | Performed by: PHYSICIAN ASSISTANT

## 2023-02-21 PROCEDURE — 99214 PR OFFICE/OUTPT VISIT, EST, LEVL IV, 30-39 MIN: ICD-10-PCS | Mod: 95,,, | Performed by: PHYSICIAN ASSISTANT

## 2023-02-21 NOTE — PATIENT INSTRUCTIONS
CURRENT DM MEDICATIONS:   Semglee 28 units at night  Humalog 15 units before meals TID   Jardiance 25 mg daily   Trulicity 0.75 mg weekly       WITH INCREASE OF GLP-1:   Semglee 24 units at night  Humalog 12 units before meals TID   Jardiance 25 mg daily   Trulicity 1.5 mg weekly  OR Ozempic 1 mg weekly

## 2023-02-21 NOTE — PROGRESS NOTES
PCP: Edilberto Figueroa MD    Subjective:     Chief Complaint: Diabetes     TELEMEDICINE VISIT:     The patient location is: Home  The chief complaint leading to consultation is: Diabetes Follow up  Visit type: Virtual visit with synchronous audio and video  Total time spent with patient: 15  Each patient to whom he or she provides medical services by telemedicine is:  (1) informed of the relationship between the physician and patient and the respective role of any other health care provider with respect to management of the patient; and (2) notified that he or she may decline to receive medical services by telemedicine and may withdraw from such care at any time.    Notes:     HISTORY OF PRESENT ILLNESS: 52 y.o.   male presenting for diabetes management.   The patient's last visit with me was on 1/17/2023.  Patient has had Type II diabetes since 2012.  Pertinent to decision making is the following comorbidities: Obesity by BMI and Bipolar Disorder  Patient has the following Diabetes complications: without complications  He is to be enrolled in diabetes education classes.     Patient's most recent A1c of 8.6% was completed 2 months ago.   Patient states since His last A1c His blood glucose levels have been high  after meals.   Patient monitors blood glucose 4 times per day with meter and Continuously with personal CGM Vipin.   Patient blood glucose monitoring device will be uploaded into [employer] today.    Patient records show baseline hyperglycemia and postprandial hyperglycemia throughout the day.   Patient endorses the following diabetes related symptoms: Nausea. Unclear if nausea is related to Prostate Ca tx or trulicity.   Patient is due today for the following diabetes-related health maintenance standards: Foot Exam , Eye Exam, Lipid panel, and Urine Microalbumin/creatinine ratio. Previously est with Dr. Pak.   He denies recent hospital admissions or emergency room visits.   He denies having  hypoglycemia.   Patient's concerns today include glycemic control. Of note, patient was diagnosed with prostate cancer is currently undergoing Lupron treatment and radiation. No plans for chemotherapy at present.   Patient medication regimen is as below.     CURRENT DM MEDICATIONS:   Semglee 26 units at night  Humalog 12 units before meals TID   Jardiance 25 mg daily   Trulicity 0.75 mg weekly     Patient has failed the following Diabetes medications:   Trulicity - did not take   Basaglar / Lantus  Metformin        Labs Reviewed.       No results found for: CPEPTIDE  No results found for: GLUTAMICACID       //   , There is no height or weight on file to calculate BMI.  His blood sugar in clinic today is:         Review of Systems   Constitutional:  Negative for activity change, appetite change, chills and fever.   HENT:  Negative for dental problem, mouth sores, nosebleeds, sore throat and trouble swallowing.    Eyes:  Negative for pain and discharge.   Respiratory:  Negative for shortness of breath, wheezing and stridor.    Cardiovascular:  Negative for chest pain, palpitations and leg swelling.   Gastrointestinal:  Negative for abdominal pain, diarrhea, nausea and vomiting.   Endocrine: Negative for polydipsia, polyphagia and polyuria.   Genitourinary:  Negative for dysuria, frequency and urgency.   Musculoskeletal:  Negative for joint swelling and myalgias.   Skin:  Negative for rash and wound.   Neurological:  Negative for dizziness, syncope, weakness and headaches.   Psychiatric/Behavioral:  Negative for behavioral problems and dysphoric mood.        Diabetes Management Status  Statin: Taking  ACE/ARB: Not taking    Screening or Prevention Patient's value Goal Complete/Controlled?   HgA1C Testing and Control   Lab Results   Component Value Date    HGBA1C 8.6 (H) 12/07/2022      Annually/Less than 8% No   Lipid profile : 01/29/2022 Annually Yes   LDL control Lab Results   Component Value Date    LDLCALC 87.6  2022    Annually/Less than 100 mg/dl  Yes   Nephropathy screening Lab Results   Component Value Date    MICALBCREAT 16.1 2022     Lab Results   Component Value Date    PROTEINUA Trace (A) 2021    Annually Yes   Blood pressure BP Readings from Last 1 Encounters:   22 129/86    Less than 140/90 Yes   Dilated retinal exam : 2022 Annually No    Foot exam   : 2021 Annually No     Social History     Socioeconomic History    Marital status: Significant Other   Tobacco Use    Smoking status: Former     Types: Cigarettes     Quit date: 2014     Years since quittin.1    Smokeless tobacco: Former   Substance and Sexual Activity    Alcohol use: Not Currently    Drug use: Never    Sexual activity: Yes     Social Determinants of Health     Financial Resource Strain: Medium Risk    Difficulty of Paying Living Expenses: Somewhat hard   Food Insecurity: No Food Insecurity    Worried About Running Out of Food in the Last Year: Never true    Ran Out of Food in the Last Year: Never true   Transportation Needs: No Transportation Needs    Lack of Transportation (Medical): No    Lack of Transportation (Non-Medical): No   Physical Activity: Inactive    Days of Exercise per Week: 0 days    Minutes of Exercise per Session: 0 min   Stress: No Stress Concern Present    Feeling of Stress : Only a little   Social Connections: Socially Isolated    Frequency of Communication with Friends and Family: Twice a week    Frequency of Social Gatherings with Friends and Family: Never    Attends Pentecostal Services: Never    Active Member of Clubs or Organizations: No    Attends Club or Organization Meetings: Never    Marital Status:    Housing Stability: Low Risk     Unable to Pay for Housing in the Last Year: No    Number of Places Lived in the Last Year: 1    Unstable Housing in the Last Year: No     Past Medical History:   Diagnosis Date    Adenocarcinoma of rectosigmoid junction 2022    Anxiety      Bipolar disorder     Depression     Diabetes mellitus, type 2     Elevated PSA 03/16/2021    Kidney stones     Malignant neoplasm of prostate 5/18/2021    Mr. Patrick Sanz is a 52 y.o. male patient of Dr. Woodward status post robot-assisted radical prostatectomy with partial nerve sparing on 10/11/21 for what proved to be a 8cc, pGS4+3 (70% high grade) [id number] (0/15) prostate cancer excised with a focal positive 11mm margin and a positive benign margin. His RADHA-S score is 7. His post op PSA was low detectable and his most recent is now 0.177. H    Sleep apnea        Objective:      Physical Exam  Constitutional:       General: He is not in acute distress.     Appearance: He is well-developed. He is not diaphoretic.   HENT:      Head: Normocephalic and atraumatic.      Right Ear: External ear normal.      Left Ear: External ear normal.      Nose: Nose normal.   Eyes:      General:         Right eye: No discharge.         Left eye: No discharge.   Pulmonary:      Effort: Pulmonary effort is normal. No respiratory distress.      Breath sounds: No stridor.   Musculoskeletal:         General: Normal range of motion.      Cervical back: Normal range of motion.   Skin:     Coloration: Skin is not pale.   Neurological:      Mental Status: He is alert and oriented to person, place, and time.      Motor: No abnormal muscle tone.      Coordination: Coordination normal.   Psychiatric:         Behavior: Behavior normal.         Thought Content: Thought content normal.         Judgment: Judgment normal.         Assessment / Plan:     Uncontrolled type 2 diabetes mellitus with hyperglycemia  -     Lipid Panel; Future; Expected date: 02/21/2023  -     Comprehensive Metabolic Panel; Future; Expected date: 02/21/2023  -     Glutamic Acid Decarboxylase; Future; Expected date: 02/21/2023  -     ANTI-ISLET CELL ANTIBODY; Future; Expected date: 02/21/2023  -     C-Peptide; Future; Expected date: 02/21/2023    Obesity (BMI  30-39.9)        Additional Plan Details:    - POCT Glucose  - Encouraged continuation of lifestyle changes including regular exercise and limiting carbohydrates to 30-45 grams per meal threes times daily and 15 grams per snack with a limit of two daily.   - Encouraged continued monitoring of blood glucose with maintenance of 4 times daily and Continuously with personal CGM Vipin.   - Current DM Medication Regimen: Change Humalog 15 units TID wm (and change to 12 units TID wm with GLP-1 escalation). Change Semglee 28 units daily (and change to 24 units with GLP-1 escalation). Continue Jardiance 25 mg daily. Continue Trulicity 0.75 mg weekly - will change to Trulicity 1.5 mg weekly or Ozempic 1 mg weekly depending on insurance coverage.   - Change labs to fasting  - Health Maintenance standards addressed today: Foot Exam - completed today and documented in physical exam with feedback to patient about proper diabetic foot care and findings, Eye Exam - will be completed within [facility] system and scheduled today, Lipid panel to be scheduled today, and Urine Microalbumin / Creatinine Ratio scheduled.   - Nursing Visit: Patient is age 79 or younger with an A1c of 7.5 or greater and will not need nursing visit at this time .   - Follow up with me in 6 weeks with A1c prior.       Jeffrey Aguilar PA-C  [facility]

## 2023-02-22 ENCOUNTER — DOCUMENTATION ONLY (OUTPATIENT)
Dept: RADIATION ONCOLOGY | Facility: CLINIC | Age: 53
End: 2023-02-22
Payer: COMMERCIAL

## 2023-02-22 DIAGNOSIS — C61 MALIGNANT NEOPLASM OF PROSTATE: Primary | ICD-10-CM

## 2023-02-22 PROCEDURE — 77014 PR  CT GUIDANCE PLACEMENT RAD THERAPY FIELDS: ICD-10-PCS | Mod: 26,,, | Performed by: RADIOLOGY

## 2023-02-22 PROCEDURE — 77385 HC IMRT, SIMPLE: CPT | Performed by: RADIOLOGY

## 2023-02-22 PROCEDURE — 77014 PR  CT GUIDANCE PLACEMENT RAD THERAPY FIELDS: CPT | Mod: 26,,, | Performed by: RADIOLOGY

## 2023-02-22 PROCEDURE — 77014 HC CT GUIDANCE RADIATION THERAPY FLDS PLACEMENT: CPT | Mod: TC | Performed by: RADIOLOGY

## 2023-02-22 NOTE — PLAN OF CARE
Day 33 outpatient xrt to prostate.Not sleeping well fatigue level the same; no dysuria or diarrhea but c/o rectal spasms. Will continue to follow.

## 2023-02-23 PROCEDURE — 77014 HC CT GUIDANCE RADIATION THERAPY FLDS PLACEMENT: CPT | Mod: TC | Performed by: RADIOLOGY

## 2023-02-23 PROCEDURE — 77385 HC IMRT, SIMPLE: CPT | Performed by: RADIOLOGY

## 2023-02-23 PROCEDURE — 77014 PR  CT GUIDANCE PLACEMENT RAD THERAPY FIELDS: CPT | Mod: 26,,, | Performed by: RADIOLOGY

## 2023-02-23 PROCEDURE — 77014 PR  CT GUIDANCE PLACEMENT RAD THERAPY FIELDS: ICD-10-PCS | Mod: 26,,, | Performed by: RADIOLOGY

## 2023-02-24 PROCEDURE — 77014 PR  CT GUIDANCE PLACEMENT RAD THERAPY FIELDS: CPT | Mod: 26,,, | Performed by: RADIOLOGY

## 2023-02-24 PROCEDURE — 77014 PR  CT GUIDANCE PLACEMENT RAD THERAPY FIELDS: ICD-10-PCS | Mod: 26,,, | Performed by: RADIOLOGY

## 2023-02-24 PROCEDURE — 77385 HC IMRT, SIMPLE: CPT | Performed by: RADIOLOGY

## 2023-02-24 PROCEDURE — 77336 RADIATION PHYSICS CONSULT: CPT | Performed by: RADIOLOGY

## 2023-02-24 PROCEDURE — 77014 HC CT GUIDANCE RADIATION THERAPY FLDS PLACEMENT: CPT | Mod: TC | Performed by: RADIOLOGY

## 2023-02-27 PROCEDURE — 77385 HC IMRT, SIMPLE: CPT | Performed by: RADIOLOGY

## 2023-02-27 PROCEDURE — 77014 PR  CT GUIDANCE PLACEMENT RAD THERAPY FIELDS: ICD-10-PCS | Mod: 26,,, | Performed by: RADIOLOGY

## 2023-02-27 PROCEDURE — 77014 PR  CT GUIDANCE PLACEMENT RAD THERAPY FIELDS: CPT | Mod: 26,,, | Performed by: RADIOLOGY

## 2023-02-27 PROCEDURE — 77014 HC CT GUIDANCE RADIATION THERAPY FLDS PLACEMENT: CPT | Mod: TC | Performed by: RADIOLOGY

## 2023-02-28 PROCEDURE — 77014 HC CT GUIDANCE RADIATION THERAPY FLDS PLACEMENT: CPT | Mod: TC | Performed by: RADIOLOGY

## 2023-02-28 PROCEDURE — 77385 HC IMRT, SIMPLE: CPT | Performed by: RADIOLOGY

## 2023-02-28 PROCEDURE — 77014 PR  CT GUIDANCE PLACEMENT RAD THERAPY FIELDS: ICD-10-PCS | Mod: 26,,, | Performed by: RADIOLOGY

## 2023-02-28 PROCEDURE — 77014 PR  CT GUIDANCE PLACEMENT RAD THERAPY FIELDS: CPT | Mod: 26,,, | Performed by: RADIOLOGY

## 2023-03-01 ENCOUNTER — DOCUMENTATION ONLY (OUTPATIENT)
Dept: RADIATION ONCOLOGY | Facility: CLINIC | Age: 53
End: 2023-03-01
Payer: COMMERCIAL

## 2023-03-01 ENCOUNTER — HOSPITAL ENCOUNTER (OUTPATIENT)
Dept: RADIATION THERAPY | Facility: HOSPITAL | Age: 53
Discharge: HOME | End: 2023-03-01
Attending: STUDENT IN AN ORGANIZED HEALTH CARE EDUCATION/TRAINING PROGRAM
Payer: COMMERCIAL

## 2023-03-01 PROCEDURE — 77014 HC CT GUIDANCE RADIATION THERAPY FLDS PLACEMENT: CPT | Mod: TC | Performed by: RADIOLOGY

## 2023-03-01 PROCEDURE — 77336 RADIATION PHYSICS CONSULT: CPT | Performed by: RADIOLOGY

## 2023-03-01 PROCEDURE — 77385 HC IMRT, SIMPLE: CPT | Performed by: RADIOLOGY

## 2023-03-02 ENCOUNTER — TELEPHONE (OUTPATIENT)
Dept: UROLOGY | Facility: CLINIC | Age: 53
End: 2023-03-02
Payer: COMMERCIAL

## 2023-03-02 ENCOUNTER — PATIENT MESSAGE (OUTPATIENT)
Dept: UROLOGY | Facility: CLINIC | Age: 53
End: 2023-03-02
Payer: COMMERCIAL

## 2023-03-02 NOTE — TELEPHONE ENCOUNTER
Follow up appointment already scheduled, pt notified.       ----- Message from Damion Hargrove III, MD sent at 2/22/2023  2:26 PM CST -----  Please arrange for follow up with Dr Hernandez in 2-3 months, he will be finishing radiation next week

## 2023-03-07 ENCOUNTER — LAB VISIT (OUTPATIENT)
Dept: LAB | Facility: HOSPITAL | Age: 53
End: 2023-03-07
Payer: COMMERCIAL

## 2023-03-07 ENCOUNTER — PATIENT MESSAGE (OUTPATIENT)
Dept: DIABETES | Facility: CLINIC | Age: 53
End: 2023-03-07
Payer: COMMERCIAL

## 2023-03-07 ENCOUNTER — PATIENT MESSAGE (OUTPATIENT)
Dept: SURGERY | Facility: CLINIC | Age: 53
End: 2023-03-07
Payer: COMMERCIAL

## 2023-03-07 DIAGNOSIS — E11.65 UNCONTROLLED TYPE 2 DIABETES MELLITUS WITH HYPERGLYCEMIA: ICD-10-CM

## 2023-03-07 PROCEDURE — 82570 ASSAY OF URINE CREATININE: CPT | Performed by: PHYSICIAN ASSISTANT

## 2023-03-08 ENCOUNTER — TELEPHONE (OUTPATIENT)
Dept: RADIATION ONCOLOGY | Facility: CLINIC | Age: 53
End: 2023-03-08
Payer: COMMERCIAL

## 2023-03-08 LAB
ALBUMIN/CREAT UR: 19.6 UG/MG (ref 0–30)
CREAT UR-MCNC: 112 MG/DL (ref 23–375)
MICROALBUMIN UR DL<=1MG/L-MCNC: 22 UG/ML

## 2023-03-08 RX ORDER — SEMAGLUTIDE 1.34 MG/ML
1 INJECTION, SOLUTION SUBCUTANEOUS
Qty: 1 PEN | Refills: 11 | Status: SHIPPED | OUTPATIENT
Start: 2023-03-08 | End: 2024-04-01

## 2023-03-08 NOTE — TELEPHONE ENCOUNTER
Made call to see how patient was doing since completing xrt last week. No answer, LVM and call back number.

## 2023-03-09 ENCOUNTER — OFFICE VISIT (OUTPATIENT)
Dept: INTERNAL MEDICINE | Facility: CLINIC | Age: 53
End: 2023-03-09
Payer: COMMERCIAL

## 2023-03-09 VITALS
HEIGHT: 68 IN | SYSTOLIC BLOOD PRESSURE: 106 MMHG | OXYGEN SATURATION: 98 % | DIASTOLIC BLOOD PRESSURE: 80 MMHG | BODY MASS INDEX: 35.02 KG/M2 | TEMPERATURE: 98 F | WEIGHT: 231.06 LBS | HEART RATE: 100 BPM

## 2023-03-09 DIAGNOSIS — E11.65 UNCONTROLLED TYPE 2 DIABETES MELLITUS WITH HYPERGLYCEMIA: ICD-10-CM

## 2023-03-09 DIAGNOSIS — Z90.79 STATUS POST PROSTATECTOMY: ICD-10-CM

## 2023-03-09 DIAGNOSIS — C61 MALIGNANT NEOPLASM OF PROSTATE: ICD-10-CM

## 2023-03-09 DIAGNOSIS — E66.01 SEVERE OBESITY (BMI 35.0-39.9) WITH COMORBIDITY: ICD-10-CM

## 2023-03-09 DIAGNOSIS — C19 ADENOCARCINOMA OF RECTOSIGMOID JUNCTION: ICD-10-CM

## 2023-03-09 DIAGNOSIS — G89.29 CHRONIC RIGHT SHOULDER PAIN: ICD-10-CM

## 2023-03-09 DIAGNOSIS — Z00.00 ROUTINE GENERAL MEDICAL EXAMINATION AT A HEALTH CARE FACILITY: Primary | ICD-10-CM

## 2023-03-09 DIAGNOSIS — M25.511 CHRONIC RIGHT SHOULDER PAIN: ICD-10-CM

## 2023-03-09 DIAGNOSIS — F31.9 BIPOLAR AFFECTIVE DISORDER, REMISSION STATUS UNSPECIFIED: ICD-10-CM

## 2023-03-09 DIAGNOSIS — E11.69 HYPERLIPIDEMIA ASSOCIATED WITH TYPE 2 DIABETES MELLITUS: ICD-10-CM

## 2023-03-09 DIAGNOSIS — E78.5 HYPERLIPIDEMIA ASSOCIATED WITH TYPE 2 DIABETES MELLITUS: ICD-10-CM

## 2023-03-09 PROCEDURE — 99999 PR PBB SHADOW E&M-EST. PATIENT-LVL V: ICD-10-PCS | Mod: PBBFAC,,, | Performed by: INTERNAL MEDICINE

## 2023-03-09 PROCEDURE — 3079F DIAST BP 80-89 MM HG: CPT | Mod: CPTII,S$GLB,, | Performed by: INTERNAL MEDICINE

## 2023-03-09 PROCEDURE — 3008F BODY MASS INDEX DOCD: CPT | Mod: CPTII,S$GLB,, | Performed by: INTERNAL MEDICINE

## 2023-03-09 PROCEDURE — 3072F LOW RISK FOR RETINOPATHY: CPT | Mod: CPTII,S$GLB,, | Performed by: INTERNAL MEDICINE

## 2023-03-09 PROCEDURE — 1159F PR MEDICATION LIST DOCUMENTED IN MEDICAL RECORD: ICD-10-PCS | Mod: CPTII,S$GLB,, | Performed by: INTERNAL MEDICINE

## 2023-03-09 PROCEDURE — 3061F NEG MICROALBUMINURIA REV: CPT | Mod: CPTII,S$GLB,, | Performed by: INTERNAL MEDICINE

## 2023-03-09 PROCEDURE — 1159F MED LIST DOCD IN RCRD: CPT | Mod: CPTII,S$GLB,, | Performed by: INTERNAL MEDICINE

## 2023-03-09 PROCEDURE — 3079F PR MOST RECENT DIASTOLIC BLOOD PRESSURE 80-89 MM HG: ICD-10-PCS | Mod: CPTII,S$GLB,, | Performed by: INTERNAL MEDICINE

## 2023-03-09 PROCEDURE — 3072F PR LOW RISK FOR RETINOPATHY: ICD-10-PCS | Mod: CPTII,S$GLB,, | Performed by: INTERNAL MEDICINE

## 2023-03-09 PROCEDURE — 3052F HG A1C>EQUAL 8.0%<EQUAL 9.0%: CPT | Mod: CPTII,S$GLB,, | Performed by: INTERNAL MEDICINE

## 2023-03-09 PROCEDURE — 3074F PR MOST RECENT SYSTOLIC BLOOD PRESSURE < 130 MM HG: ICD-10-PCS | Mod: CPTII,S$GLB,, | Performed by: INTERNAL MEDICINE

## 2023-03-09 PROCEDURE — 99396 PREV VISIT EST AGE 40-64: CPT | Mod: S$GLB,,, | Performed by: INTERNAL MEDICINE

## 2023-03-09 PROCEDURE — 3008F PR BODY MASS INDEX (BMI) DOCUMENTED: ICD-10-PCS | Mod: CPTII,S$GLB,, | Performed by: INTERNAL MEDICINE

## 2023-03-09 PROCEDURE — 3052F PR MOST RECENT HEMOGLOBIN A1C LEVEL 8.0 - < 9.0%: ICD-10-PCS | Mod: CPTII,S$GLB,, | Performed by: INTERNAL MEDICINE

## 2023-03-09 PROCEDURE — 3061F PR NEG MICROALBUMINURIA RESULT DOCUMENTED/REVIEW: ICD-10-PCS | Mod: CPTII,S$GLB,, | Performed by: INTERNAL MEDICINE

## 2023-03-09 PROCEDURE — 3066F NEPHROPATHY DOC TX: CPT | Mod: CPTII,S$GLB,, | Performed by: INTERNAL MEDICINE

## 2023-03-09 PROCEDURE — 3074F SYST BP LT 130 MM HG: CPT | Mod: CPTII,S$GLB,, | Performed by: INTERNAL MEDICINE

## 2023-03-09 PROCEDURE — 99999 PR PBB SHADOW E&M-EST. PATIENT-LVL V: CPT | Mod: PBBFAC,,, | Performed by: INTERNAL MEDICINE

## 2023-03-09 PROCEDURE — 3066F PR DOCUMENTATION OF TREATMENT FOR NEPHROPATHY: ICD-10-PCS | Mod: CPTII,S$GLB,, | Performed by: INTERNAL MEDICINE

## 2023-03-09 PROCEDURE — 99396 PR PREVENTIVE VISIT,EST,40-64: ICD-10-PCS | Mod: S$GLB,,, | Performed by: INTERNAL MEDICINE

## 2023-03-09 RX ORDER — ATORVASTATIN CALCIUM 80 MG/1
80 TABLET, FILM COATED ORAL DAILY
Qty: 90 TABLET | Refills: 1 | Status: SHIPPED | OUTPATIENT
Start: 2023-03-09 | End: 2023-03-28 | Stop reason: SDUPTHER

## 2023-03-09 NOTE — ASSESSMENT & PLAN NOTE
Worsening  a1c.  However he feels his home glucose has been improving since he has been on continuous glucose monitoring over the last 4 weeks.  Advised to continue medication adjustment and follow-up as per diabetes clinic.

## 2023-03-09 NOTE — PROGRESS NOTES
Subjective:      Patient ID: Patrick Sanz is a 52 y.o. male.    Chief Complaint: Follow-up    Follow-up  Pertinent negatives include no abdominal pain, chest pain, chills, coughing, fever or sore throat.         52 y.o. with  Patient Active Problem List   Diagnosis    Uncontrolled type 2 diabetes mellitus with hyperglycemia    Bipolar disorder    Kidney stone    Malignant neoplasm of prostate    Severe obesity (BMI 35.0-39.9) with comorbidity    Colon cancer screening    Adenocarcinoma of rectosigmoid junction    Status post prostatectomy    Routine general medical examination at a health care facility    Hyperlipidemia associated with type 2 diabetes mellitus     Past Medical History:   Diagnosis Date    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 Patrick Sanz is a 52 y.o. male patient of Dr. Woodward status post robot-assisted radical prostatectomy with partial nerve sparing on 10/11/21 for what proved to be a 8cc, pGS4+3 (70% high grade) [id number] (0/15) prostate cancer excised with a focal positive 11mm margin and a positive benign margin. His RADHA-S score is 7. His post op PSA was low detectable and his most recent is now 0.177. H    Sleep apnea        Here today for annual prev exam.  Compliant with meds without significant side effects. Energy and appetite are good.     Reports chronic right shoulder pain for several months.  No recent injury but reports injury to area back in college.    Past Surgical History:   Procedure Laterality Date    COLON SURGERY  10.2022    COLONOSCOPY N/A 08/04/2022    Procedure: COLONOSCOPY;  Surgeon: Michelle Flores MD;  Location: [facility];  Service: Endoscopy;  Laterality: N/A;    HERNIA REPAIR      INJECTION OF ANESTHETIC AGENT INTO TISSUE PLANE DEFINED BY TRANSVERSUS ABDOMINIS MUSCLE  N/A 09/22/2022    Procedure: BLOCK, TRANSVERSUS ABDOMINIS PLANE;  Surgeon: Cj Quinn MD;  Location: [facility] OR;  Service: General;  Laterality: N/A;    MOBILIZATION OF SPLENIC FLEXURE N/A 09/22/2022    Procedure: MOBILIZATION, SPLENIC FLEXURE;  Surgeon: Cj Quinn MD;  Location: [facility] OR;  Service: General;  Laterality: N/A;    PROSTATE SURGERY  09.2021    ROBOT-ASSISTED LOW ANTERIOR RESECTION OF COLON N/A 09/22/2022    Procedure: XI ROBOTIC RESECTION, COLON, LOW ANTERIOR;  Surgeon: Cj Quinn MD;  Location: [facility] OR;  Service: General;  Laterality: N/A;  CONVERTED TO OPEN     Social History     Socioeconomic History    Marital status: Significant Other   Tobacco Use    Smoking status: Former     Packs/day: 0.25     Years: 15.00     Pack years: 3.75     Types: Cigarettes     Start date: 1/1/1998     Quit date: 1/1/2013     Years since quitting: 10.1    Smokeless tobacco: Former    Tobacco comments:     Was a part time smoker.  1 pack could last 2 or more weeks   Substance and Sexual Activity    Alcohol use: Not Currently     Comment: May have 1 or 2 drinks at social events or restaurants    Drug use: Never    Sexual activity: Yes     Partners: Female     Birth control/protection: Partner-Vasectomy, Post-menopausal     Social Determinants of Health     Financial Resource Strain: Medium Risk    Difficulty of Paying Living Expenses: Somewhat hard   Food Insecurity: No Food Insecurity    Worried About Running Out of Food in the Last Year: Never true    Ran Out of Food in the Last Year: Never true   Transportation Needs: No Transportation Needs    Lack of Transportation (Medical): No    Lack of Transportation (Non-Medical): No   Physical Activity: Inactive    Days of Exercise per Week: 0 days    Minutes of Exercise per Session: 0 min   Stress: No Stress Concern Present    Feeling of Stress : Only a little   Social Connections: Socially Isolated    Frequency of Communication with Friends and Family:  "Twice a week    Frequency of Social Gatherings with Friends and Family: Never    Attends Restorationism Services: Never    Active Member of Clubs or Organizations: No    Attends Club or Organization Meetings: Never    Marital Status:    Housing Stability: Low Risk     Unable to Pay for Housing in the Last Year: No    Number of Places Lived in the Last Year: 1    Unstable Housing in the Last Year: No     family history includes Diabetes in his maternal aunt, maternal uncle, and mother; Hearing loss in his mother; Hypertension in his father.  Review of Systems   Constitutional:  Negative for chills and fever.   HENT:  Negative for ear pain and sore throat.    Respiratory:  Negative for cough.    Cardiovascular:  Negative for chest pain.   Gastrointestinal:  Negative for abdominal pain and blood in stool.   Genitourinary:  Negative for dysuria and hematuria.   Neurological:  Negative for seizures and syncope.   Objective:   /80 (BP Location: Right arm, Patient Position: Sitting, BP Method: Large (Manual))   Pulse 100   Temp 97.5 °F (36.4 °C) (Tympanic)   Ht 5' 8" (1.727 m)   Wt 104.8 kg (231 lb 0.7 oz)   SpO2 98%   BMI 35.13 kg/m²     Physical Exam  Constitutional:       General: He is not in acute distress.     Appearance: He is well-developed.   HENT:      Head: Normocephalic and atraumatic.   Eyes:      Extraocular Movements: Extraocular movements intact.   Neck:      Thyroid: No thyromegaly.   Cardiovascular:      Rate and Rhythm: Normal rate and regular rhythm.   Pulmonary:      Breath sounds: Normal breath sounds. No wheezing or rales.   Abdominal:      General: Bowel sounds are normal.      Palpations: Abdomen is soft.      Tenderness: There is no abdominal tenderness.   Musculoskeletal:         General: No swelling.      Cervical back: Neck supple. No rigidity.   Lymphadenopathy:      Cervical: No cervical adenopathy.   Skin:     General: Skin is warm and dry.   Neurological:      Mental Status: " He is alert and oriented to person, place, and time.   Psychiatric:         Behavior: Behavior normal.       Lab Results   Component Value Date    WBC 8.77 11/11/2022    HGB 15.5 11/11/2022    HGB 14.8 10/11/2022    HGB 13.7 (L) 09/26/2022    HCT 46.5 11/11/2022    MCV 86 11/11/2022    MCV 85 10/11/2022    MCV 82 09/26/2022     11/11/2022    CHOL 244 (H) 03/07/2023    TRIG 233 (H) 03/07/2023    HDL 50 03/07/2023    LDLCALC 147.4 03/07/2023    LDLCALC 87.6 01/29/2022    LDLCALC 61.6 (L) 10/03/2020    ALT 63 (H) 03/07/2023    AST 27 03/07/2023     03/07/2023    K 4.2 03/07/2023     03/07/2023    CO2 25 03/07/2023    BUN 11 03/07/2023    CREATININE 0.9 03/07/2023    CREATININE 1.0 11/11/2022    CREATININE 1.0 10/11/2022    EGFRNORACEVR >60.0 03/07/2023    EGFRNORACEVR >60 11/11/2022    EGFRNORACEVR >60 10/11/2022    TSH 0.943 10/03/2020    PSA 17.6 (H) 03/15/2021    PSA 15.7 (H) 10/07/2020    PSA 16.1 (H) 10/03/2020    PSADIAG 0.23 11/11/2022    PSADIAG 0.23 11/11/2022    PSADIAG 0.35 10/11/2022     03/07/2023    HGBA1C 8.9 (H) 03/07/2023    HGBA1C 8.6 (H) 12/07/2022    HGBA1C 8.7 (H) 08/06/2022          The 10-year ASCVD risk score (Rebecca ALFORD, et al., 2019) is: 6.3%    Values used to calculate the score:      Age: 52 years      Sex: Male      Is Non- : No      Diabetic: Yes      Tobacco smoker: No      Systolic Blood Pressure: 106 mmHg      Is BP treated: No      HDL Cholesterol: 50 mg/dL      Total Cholesterol: 244 mg/dL     Assessment:     1. Routine general medical examination at a health care facility    2. Uncontrolled type 2 diabetes mellitus with hyperglycemia    3. Malignant neoplasm of prostate    4. Adenocarcinoma of rectosigmoid junction    5. Bipolar affective disorder, remission status unspecified    6. Severe obesity (BMI 35.0-39.9) with comorbidity    7. Status post prostatectomy    8. Hyperlipidemia associated with type 2 diabetes mellitus      Plan:    1. Routine general medical examination at a health care facility  Overview:  Heart healthy diet and regular exercise.  Health maintenance reviewed      2. Uncontrolled type 2 diabetes mellitus with hyperglycemia  Assessment & Plan:  Worsening  a1c.  However he feels his home glucose has been improving since he has been on continuous glucose monitoring over the last 4 weeks.  Advised to continue medication adjustment and follow-up as per diabetes clinic.    Orders:  -     Hemoglobin A1C; Future; Expected date: 06/07/2023    3. Malignant neoplasm of prostate  Overview:  Mr. Patrick Sanz is a 52 y.o. male patient of Dr. Woodward status post robot-assisted radical prostatectomy with partial nerve sparing on 10/11/21 for what proved to be a 8cc, pGS4+3 (70% high grade) hY2bO2K2 (0/15) prostate cancer excised with a focal positive 11mm margin and a positive benign margin. His RADHA-S score is 7. His post op PSA was low detectable and his most recent is now 0.177.  He returns today for PSMA PET follow up.      Biopsy 5/6/21: 4+3 in 5/12 cores, PSA 17.6        4. Adenocarcinoma of rectosigmoid junction  Assessment & Plan:  Status post surgery and radiation.  Aware to continue follow-up and recommendations with colorectal.      5. Bipolar affective disorder, remission status unspecified  Assessment & Plan:  Stable.  Aware to continue recommendations and follow-up as per Psychiatry.      6. Severe obesity (BMI 35.0-39.9) with comorbidity    7. Status post prostatectomy    8. Hyperlipidemia associated with type 2 diabetes mellitus  Assessment & Plan:  Rising LDL.  Will increase atorvastatin from 40 to 80.    Orders:  -     atorvastatin (LIPITOR) 80 MG tablet; Take 1 tablet (80 mg total) by mouth once daily. TK 1 T PO D  Dispense: 90 tablet; Refill: 1  -     Lipid Panel; Future; Expected date: 06/07/2023  -     ALT (SGPT); Future; Expected date: 06/09/2023        Patient Instructions   Check with your pharmacy regarding new  shingles vaccine.      Future Appointments   Date Time Provider Department Center   3/24/2023  1:30 PM Obinna Hernandez MD [facility]OLOGY [facility]   4/3/2023  4:00 PM Cj Quinn MD [facility]   4/11/2023  3:40 PM Jimbo Pak OD [facility] OPHTHAL [facility]   4/14/2023  2:00 PM Jeffrey Aguilar PA-C [facility] DIABETE [facility]   5/29/2023  2:00 PM LABORATORY, [facility] LAB [facility]       Lab Frequency Next Occurrence   Ambulatory referral/consult to [facility] Once 10/05/2022   Ambulatory referral/consult to Social Work Once 11/18/2022   Lipid panel Once 02/01/2023   Prostate Specific Antigen, Diagnostic Once 04/24/2023   CEA Every 4 Weeks (Manual)    Prostate Specific Antigen, Diagnostic Every 4 Weeks (Manual)    Comprehensive Metabolic Panel Every 2 Weeks 3/10/2023, 3/24/2023, 4/7/2023, 4/21/2023, 5/5/2023, 5/19/2023, 6/2/2023, 6/16/2023, 6/30/2023, 7/14/2023, 7/28/2023, 8/11/2023   Comprehensive Metabolic Panel Every 2 Weeks 3/10/2023, 3/24/2023, 4/7/2023, 4/21/2023, 5/5/2023, 5/19/2023, 6/2/2023, 6/16/2023, 6/30/2023, 7/14/2023, 7/28/2023, 8/11/2023   CBC Auto Differential Every 2 Weeks 3/10/2023, 3/24/2023, 4/7/2023, 4/21/2023, 5/5/2023, 5/19/2023, 6/2/2023, 6/16/2023, 6/30/2023, 7/14/2023, 7/28/2023, 8/11/2023   CBC Auto Differential Every 2 Weeks 3/10/2023, 3/24/2023, 4/7/2023, 4/21/2023, 5/5/2023, 5/19/2023, 6/2/2023, 6/16/2023, 6/30/2023, 7/14/2023, 7/28/2023, 8/11/2023   Iron and TIBC Every 4 Weeks (Manual)    Ferritin Every 4 Weeks (Manual)    Prostate Specific Antigen, Diagnostic Every 4 Weeks (Manual)    Hemoglobin A1C Every 12 Weeks 4/7/2023, 6/30/2023, 9/22/2023       Follow up in about 3 months (around 6/9/2023), or if symptoms worsen or fail to improve.

## 2023-03-09 NOTE — ASSESSMENT & PLAN NOTE
Status post surgery and radiation.  Aware to continue follow-up and recommendations with colorectal.

## 2023-03-16 ENCOUNTER — TELEPHONE (OUTPATIENT)
Dept: DIABETES | Facility: CLINIC | Age: 53
End: 2023-03-16
Payer: COMMERCIAL

## 2023-03-16 ENCOUNTER — PATIENT MESSAGE (OUTPATIENT)
Dept: ORTHOPEDICS | Facility: CLINIC | Age: 53
End: 2023-03-16
Payer: COMMERCIAL

## 2023-03-16 DIAGNOSIS — M25.511 CHRONIC RIGHT SHOULDER PAIN: Primary | ICD-10-CM

## 2023-03-16 DIAGNOSIS — G89.29 CHRONIC RIGHT SHOULDER PAIN: Primary | ICD-10-CM

## 2023-03-16 NOTE — TELEPHONE ENCOUNTER
----- Message from Jeffrey Aguilar PA-C sent at 3/13/2023  9:21 AM CDT -----  The following labs have been reviewed and interpreted as following:     Lipid: regression of TG and LDL. STAFF: please call pt and see if still taking Lipitor 80 mg daily or if missing doses. If missing doses, tell him to restart. If taking compliantly, may need to adjust dose. Please let me know his response. POORNIMA Blue - will let you know if compliant with Lipitor.     CMP: ALT elevated - has been elevated transiently in past. Will cc PCP for recommendations. Dr. Figueroa - do you want to repeat?     AMBER / Anti islet Cell Ab: both antibodies negative for underlying autoimmune diabetes     C peptide: producing insulin     A1c: regression - need to discuss at upcoming f/u     Urine micro: normal       STAFF: please call pt and see if still taking Lipitor 80 mg daily or if missing doses. If missing doses, tell him to restart. If taking compliantly, may need to adjust dose. Please let me know his response.    Dr. Figueroa - will let you know if compliant with Lipitor. Transient ALT rise. Do you want to repeat CMP? Thank you

## 2023-03-21 ENCOUNTER — HOSPITAL ENCOUNTER (OUTPATIENT)
Dept: RADIOLOGY | Facility: HOSPITAL | Age: 53
Discharge: HOME | End: 2023-03-21
Attending: STUDENT IN AN ORGANIZED HEALTH CARE EDUCATION/TRAINING PROGRAM
Payer: COMMERCIAL

## 2023-03-21 ENCOUNTER — OFFICE VISIT (OUTPATIENT)
Dept: ORTHOPEDICS | Facility: CLINIC | Age: 53
End: 2023-03-21
Payer: COMMERCIAL

## 2023-03-21 VITALS — BODY MASS INDEX: 35.01 KG/M2 | WEIGHT: 231 LBS | HEIGHT: 68 IN

## 2023-03-21 DIAGNOSIS — M75.41 SUBACROMIAL IMPINGEMENT OF RIGHT SHOULDER: Primary | ICD-10-CM

## 2023-03-21 DIAGNOSIS — G89.29 CHRONIC RIGHT SHOULDER PAIN: ICD-10-CM

## 2023-03-21 DIAGNOSIS — M67.911 TENDINOPATHY OF RIGHT ROTATOR CUFF: ICD-10-CM

## 2023-03-21 DIAGNOSIS — M25.511 CHRONIC RIGHT SHOULDER PAIN: ICD-10-CM

## 2023-03-21 PROCEDURE — 1159F PR MEDICATION LIST DOCUMENTED IN MEDICAL RECORD: ICD-10-PCS | Mod: CPTII,S$GLB,, | Performed by: STUDENT IN AN ORGANIZED HEALTH CARE EDUCATION/TRAINING PROGRAM

## 2023-03-21 PROCEDURE — 3066F PR DOCUMENTATION OF TREATMENT FOR NEPHROPATHY: ICD-10-PCS | Mod: CPTII,S$GLB,, | Performed by: STUDENT IN AN ORGANIZED HEALTH CARE EDUCATION/TRAINING PROGRAM

## 2023-03-21 PROCEDURE — 73030 XR SHOULDER COMPLETE 2 OR MORE VIEWS RIGHT: ICD-10-PCS | Mod: 26,RT,, | Performed by: RADIOLOGY

## 2023-03-21 PROCEDURE — 1160F RVW MEDS BY RX/DR IN RCRD: CPT | Mod: CPTII,S$GLB,, | Performed by: STUDENT IN AN ORGANIZED HEALTH CARE EDUCATION/TRAINING PROGRAM

## 2023-03-21 PROCEDURE — 73030 X-RAY EXAM OF SHOULDER: CPT | Mod: 26,RT,, | Performed by: RADIOLOGY

## 2023-03-21 PROCEDURE — 99203 PR OFFICE/OUTPT VISIT, NEW, LEVL III, 30-44 MIN: ICD-10-PCS | Mod: 25,S$GLB,, | Performed by: STUDENT IN AN ORGANIZED HEALTH CARE EDUCATION/TRAINING PROGRAM

## 2023-03-21 PROCEDURE — 20610 LARGE JOINT ASPIRATION/INJECTION: R SUBACROMIAL BURSA: ICD-10-PCS | Mod: RT,S$GLB,, | Performed by: STUDENT IN AN ORGANIZED HEALTH CARE EDUCATION/TRAINING PROGRAM

## 2023-03-21 PROCEDURE — 3061F PR NEG MICROALBUMINURIA RESULT DOCUMENTED/REVIEW: ICD-10-PCS | Mod: CPTII,S$GLB,, | Performed by: STUDENT IN AN ORGANIZED HEALTH CARE EDUCATION/TRAINING PROGRAM

## 2023-03-21 PROCEDURE — 3008F BODY MASS INDEX DOCD: CPT | Mod: CPTII,S$GLB,, | Performed by: STUDENT IN AN ORGANIZED HEALTH CARE EDUCATION/TRAINING PROGRAM

## 2023-03-21 PROCEDURE — 99999 PR PBB SHADOW E&M-EST. PATIENT-LVL IV: CPT | Mod: PBBFAC,,, | Performed by: STUDENT IN AN ORGANIZED HEALTH CARE EDUCATION/TRAINING PROGRAM

## 2023-03-21 PROCEDURE — 3072F LOW RISK FOR RETINOPATHY: CPT | Mod: CPTII,S$GLB,, | Performed by: STUDENT IN AN ORGANIZED HEALTH CARE EDUCATION/TRAINING PROGRAM

## 2023-03-21 PROCEDURE — 3008F PR BODY MASS INDEX (BMI) DOCUMENTED: ICD-10-PCS | Mod: CPTII,S$GLB,, | Performed by: STUDENT IN AN ORGANIZED HEALTH CARE EDUCATION/TRAINING PROGRAM

## 2023-03-21 PROCEDURE — 99999 PR PBB SHADOW E&M-EST. PATIENT-LVL IV: ICD-10-PCS | Mod: PBBFAC,,, | Performed by: STUDENT IN AN ORGANIZED HEALTH CARE EDUCATION/TRAINING PROGRAM

## 2023-03-21 PROCEDURE — 3066F NEPHROPATHY DOC TX: CPT | Mod: CPTII,S$GLB,, | Performed by: STUDENT IN AN ORGANIZED HEALTH CARE EDUCATION/TRAINING PROGRAM

## 2023-03-21 PROCEDURE — 1160F PR REVIEW ALL MEDS BY PRESCRIBER/CLIN PHARMACIST DOCUMENTED: ICD-10-PCS | Mod: CPTII,S$GLB,, | Performed by: STUDENT IN AN ORGANIZED HEALTH CARE EDUCATION/TRAINING PROGRAM

## 2023-03-21 PROCEDURE — 73030 X-RAY EXAM OF SHOULDER: CPT | Mod: TC,RT

## 2023-03-21 PROCEDURE — 3052F PR MOST RECENT HEMOGLOBIN A1C LEVEL 8.0 - < 9.0%: ICD-10-PCS | Mod: CPTII,S$GLB,, | Performed by: STUDENT IN AN ORGANIZED HEALTH CARE EDUCATION/TRAINING PROGRAM

## 2023-03-21 PROCEDURE — 1159F MED LIST DOCD IN RCRD: CPT | Mod: CPTII,S$GLB,, | Performed by: STUDENT IN AN ORGANIZED HEALTH CARE EDUCATION/TRAINING PROGRAM

## 2023-03-21 PROCEDURE — 3072F PR LOW RISK FOR RETINOPATHY: ICD-10-PCS | Mod: CPTII,S$GLB,, | Performed by: STUDENT IN AN ORGANIZED HEALTH CARE EDUCATION/TRAINING PROGRAM

## 2023-03-21 PROCEDURE — 97110 PR THERAPEUTIC EXERCISES: ICD-10-PCS | Mod: GP,S$GLB,, | Performed by: STUDENT IN AN ORGANIZED HEALTH CARE EDUCATION/TRAINING PROGRAM

## 2023-03-21 PROCEDURE — 3061F NEG MICROALBUMINURIA REV: CPT | Mod: CPTII,S$GLB,, | Performed by: STUDENT IN AN ORGANIZED HEALTH CARE EDUCATION/TRAINING PROGRAM

## 2023-03-21 PROCEDURE — 99203 OFFICE O/P NEW LOW 30 MIN: CPT | Mod: 25,S$GLB,, | Performed by: STUDENT IN AN ORGANIZED HEALTH CARE EDUCATION/TRAINING PROGRAM

## 2023-03-21 PROCEDURE — 3052F HG A1C>EQUAL 8.0%<EQUAL 9.0%: CPT | Mod: CPTII,S$GLB,, | Performed by: STUDENT IN AN ORGANIZED HEALTH CARE EDUCATION/TRAINING PROGRAM

## 2023-03-21 PROCEDURE — 97110 THERAPEUTIC EXERCISES: CPT | Mod: GP,S$GLB,, | Performed by: STUDENT IN AN ORGANIZED HEALTH CARE EDUCATION/TRAINING PROGRAM

## 2023-03-21 PROCEDURE — 20610 DRAIN/INJ JOINT/BURSA W/O US: CPT | Mod: RT,S$GLB,, | Performed by: STUDENT IN AN ORGANIZED HEALTH CARE EDUCATION/TRAINING PROGRAM

## 2023-03-21 RX ORDER — INSULIN GLARGINE-YFGN 100 [IU]/ML
INJECTION, SOLUTION SUBCUTANEOUS
Qty: 15 ML | Refills: 0 | OUTPATIENT
Start: 2023-03-21

## 2023-03-21 RX ADMIN — TRIAMCINOLONE ACETONIDE 40 MG: 40 INJECTION, SUSPENSION INTRA-ARTICULAR; INTRAMUSCULAR at 04:03

## 2023-03-21 NOTE — PROGRESS NOTES
Orthopaedics Sports Medicine     Shoulder Initial Visit         3/21/2023    Referring MD: Edilberto Figueroa MD    Chief Complaint   Patient presents with    Right Shoulder - Pain         History of Present Illness:   Patrikc Sanz is a 53 y.o. right-hand dominant male who presents with right shoulder pain and dysfunction.    Onset of the symptoms was over 1 year ago.      Inciting event: Patient reports that he injured his shoulder in college, he felt a pop and was numb. This was while he lived in [place].     Current symptoms include right shoulder pain localized laterally with pain quality of intermittent aching and sharp pain. He rates his pain a 6/10 today. He currently denies any numbness or tingling.      Pain is aggravated by raising arm and putting his arm behind his back.       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 Patrick Sanz is a 52 y.o. male patient of Dr. Woodward status post robot-assisted radical prostatectomy with partial nerve sparing on 10/11/21 for what proved to be a 8cc, pGS4+3 (70% high grade) [id number] (0/15) prostate cancer excised with a focal positive 11mm margin and a positive benign margin. His RADHA-S score is 7. His post op PSA was low detectable and his most recent is now 0.177. H    Sleep apnea        Past Surgical History:   Past Surgical History:   Procedure Laterality Date    COLON SURGERY  10.2022    COLONOSCOPY N/A 08/04/2022    Procedure: COLONOSCOPY;  Surgeon: Michelle Flores MD;  Location: [facility];  Service: Endoscopy;  Laterality: N/A;    HERNIA REPAIR      INJECTION OF ANESTHETIC AGENT INTO TISSUE PLANE DEFINED BY  TRANSVERSUS ABDOMINIS MUSCLE N/A 09/22/2022    Procedure: BLOCK, TRANSVERSUS ABDOMINIS PLANE;  Surgeon: Cj Quinn MD;  Location: [facility] OR;  Service: General;  Laterality: N/A;    MOBILIZATION OF SPLENIC FLEXURE N/A 09/22/2022    Procedure: MOBILIZATION, SPLENIC FLEXURE;  Surgeon: Cj Quinn MD;  Location: [facility] OR;  Service: General;  Laterality: N/A;    PROSTATE SURGERY  09.2021    ROBOT-ASSISTED LOW ANTERIOR RESECTION OF COLON N/A 09/22/2022    Procedure: XI ROBOTIC RESECTION, COLON, LOW ANTERIOR;  Surgeon: Cj Quinn MD;  Location: [facility] OR;  Service: General;  Laterality: N/A;  CONVERTED TO OPEN       Medications:  Patient's Medications   New Prescriptions    No medications on file   Previous Medications    ATORVASTATIN (LIPITOR) 80 MG TABLET    Take 1 tablet (80 mg total) by mouth once daily. TK 1 T PO D    BLOOD-GLUCOSE SENSOR (DEXCOM G6 SENSOR) CHARITY    1 each by Misc.(Non-Drug; Combo Route) route every 14 (fourteen) days.    BLOOD-GLUCOSE TRANSMITTER (DEXCOM G6 TRANSMITTER) CHARITY    1 each by Misc.(Non-Drug; Combo Route) route once daily.    CLONAZEPAM (KLONOPIN) 0.5 MG TABLET    Take 1 tablet (0.5 mg total) by mouth 2 (two) times daily as needed for Anxiety.    DOCUSATE SODIUM (COLACE) 100 MG CAPSULE    Take 1 capsule (100 mg total) by mouth 2 (two) times daily as needed.    EMPAGLIFLOZIN (JARDIANCE) 25 MG TABLET    Take 1 tablet (25 mg total) by mouth once daily.    FLASH GLUCOSE SENSOR (FREESTYLE JOAQUIM 2 SENSOR) KIT    1 each by Misc.(Non-Drug; Combo Route) route every 14 (fourteen) days.    GLIPIZIDE (GLUCOTROL) 5 MG TABLET    Take 1 tablet (5 mg total) by mouth 2 (two) times daily with meals.    INSULIN (LANTUS SOLOSTAR U-100 INSULIN) GLARGINE 100 UNITS/ML SUBQ PEN    Inject 26 Units into the skin once daily.    INSULIN LISPRO (HUMALOG KWIKPEN INSULIN) 100 UNIT/ML PEN    Inject 12 Units into the skin 3 (three) times daily with meals.    LAMOTRIGINE (LAMICTAL) 200 MG TABLET    Take  "1.5 tablets (300 mg total) by mouth once daily.    ONDANSETRON (ZOFRAN-ODT) 4 MG TBDL    Take 1 tablet (4 mg total) by mouth every 12 (twelve) hours as needed (Nausea).    QUETIAPINE (SEROQUEL) 100 MG TAB    Take 1 tablet (100 mg total) by mouth every evening.    SEMAGLUTIDE (OZEMPIC) 1 MG/DOSE (4 MG/3 ML)    Inject 1 mg into the skin every 7 days.    TADALAFIL (CIALIS) 20 MG TAB        TRAMADOL (ULTRAM) 50 MG TABLET    Take 1 tablet (50 mg total) by mouth every 6 (six) hours as needed for Pain.    VENLAFAXINE (EFFEXOR-XR) 150 MG CP24    Take 1 capsule (150 mg total) by mouth once daily.   Modified Medications    No medications on file   Discontinued Medications    No medications on file       Allergies:   Review of patient's allergies indicates:   Allergen Reactions    Aripiprazole Other (See Comments)    Sildenafil Other (See Comments)    Bupropion hcl Anxiety    Penicillins Hives and Rash       Social History:   Home town: [place], [place]  Occupation: Sales  Alcohol use: He reports that he does not currently use alcohol.  Tobacco use: He reports that he quit smoking about 10 years ago. His smoking use included cigarettes. He started smoking about 25 years ago. He has a 3.75 pack-year smoking history. He has quit using smokeless tobacco.    Review of systems:  History of recent illness, fevers, shakes, or chills: no  History of cardiac problems or chest pain: no  History of pulmonary problems or asthma: no  History of diabetes: no  History of prior dvt or clotting problems: no  History of sleep apnea: no      Physical Examination:  Estimated body mass index is 35.12 kg/m² as calculated from the following:    Height as of this encounter: 5' 8" (1.727 m).    Weight as of this encounter: 104.8 kg (231 lb).    General  Healthy appearing male in no acute distress  Alert and oriented, normal mood, appropriate affect    Shoulder Examination:  Patient is alert and oriented, no distress. Skin is intact. Neuro is normal " with no focal motor or sensory findings.    Cervical exam is unremarkable. Intact cervical ROM. Negative Spurling's test    Physical Exam:  RIGHT    LEFT    Scap Dyskinesis/Winging (-)    (-)    Tenderness:          Greater Tuberosity             +    (-)  Bicipital Groove  (-)    (-)  AC joint   (-)    (-)  Other:     ROM:  Forward Elevation 180    180  Abduction  120    120  ER (at side)  60    80  IR   T8    T8    Strength:   Supraspinatus  5/5    5/5  Infraspinatus  5/5    5/5  Subscap / IR  5/5    5/5     Special Tests:   Neer:    +    (-)   Gallagher:   +    (-)   SS Stress:   (-)    (-)   Bear Hug:   (-)    (-)   Blodgett's:   (-)    (-)   Resisted Thrower's:   +    (-)   Cross Arm Abduction:  (-)    (-)    Neurovascular examination  - Motor grossly intact bilaterally to shoulder abduction, elbow flexion and extension, wrist flexion and extension, and intrinsic hand musculature  - Sensation intact to light touch bilaterally in axillary, median, radial, and ulnar distributions  - Symmetrical radial pulses      Imaging:  XR Results:  Results for orders placed during the hospital encounter of 03/21/23    X-ray Shoulder 2 or More Views Right    Narrative  EXAM: XR SHOULDER COMPLETE 2 OR MORE VIEWS RIGHT    CLINICAL HISTORY: Right shoulder pain.    FINDINGS: 4 views of the right shoulder.  Mild AC joint degenerative changes. Glenohumeral and acromioclavicular alignment is normal. No fracture or other acute osseous abnormality is seen.  The joint spaces of the shoulder are well-maintained without evidence of significant arthropathy.  No evidence of rotator cuff or bursal calcium deposition.    Impression  No significant radiographic abnormality of the right shoulder.    Finalized on: 3/21/2023 3:49 PM By:  Higinio Cuevas MD  [facility]# [id number]      2023-03-21 15:51:[id number]    [facility]      MRI Results:  No results found for this or any previous visit.      CT Results:  No results found for this or any previous  visit.      Physician Read: I agree with the above impression.      Impression:  53 y.o. male with chronic right shoulder pain, subacromial impingement, rotator cuff tendinopathy       Plan:  Discussed diagnosis and treatment options with patient today. His symptoms and physical exam is consistent with right shoulder subacromial impingement and resultant rotator cuff tendinopathy.   Discussed non-operative treatment options in the form of rest, activity modifications, oral anti-inflammatories, corticosteroid injections, and physical therapy.   I recommend proceeding with right shoulder corticosteroid injection into the subacromial space and home exercise program. The patient is in agreement with this plan.   Right shoulder corticosteroid injection into the subacromial space performed today. Patient tolerated the procedure well with no immediate complications.   At least 15 minutes were spent developing, teaching, and performing a home exercise program.  A written summary was provided and all questions were answered.  This service was performed under the direction of Rolando Cadet MD.  CPT 23480-ZW.  Follow up with me as needed. If his symptoms continue to persist we will plan to move forward with MRI.            Rolando Cadet MD

## 2023-03-23 ENCOUNTER — TELEPHONE (OUTPATIENT)
Dept: DIABETES | Facility: CLINIC | Age: 53
End: 2023-03-23
Payer: COMMERCIAL

## 2023-03-23 RX ORDER — TRIAMCINOLONE ACETONIDE 40 MG/ML
40 INJECTION, SUSPENSION INTRA-ARTICULAR; INTRAMUSCULAR
Status: DISCONTINUED | OUTPATIENT
Start: 2023-03-21 | End: 2023-03-23 | Stop reason: HOSPADM

## 2023-03-23 NOTE — PROCEDURES
Large Joint Aspiration/Injection: R subacromial bursa    Date/Time: 3/21/2023 4:00 PM  Performed by: Rolando Cadet MD  Authorized by: Rolando Cadet MD     Consent Done?:  Yes (Verbal)  Indications:  Pain  Site marked: the procedure site was marked    Timeout: prior to procedure the correct patient, procedure, and site was verified    Prep: patient was prepped and draped in usual sterile fashion      Local anesthesia used?: Yes    Anesthesia:  Local infiltration  Local anesthetic:  Lidocaine 1% without epinephrine    Details:  Needle Size:  22 G  Ultrasonic Guidance for needle placement?: No    Approach:  Posterior  Location:  Shoulder  Site:  R subacromial bursa  Medications:  40 mg triamcinolone acetonide 40 mg/mL  Patient tolerance:  Patient tolerated the procedure well with no immediate complications

## 2023-03-24 ENCOUNTER — TELEPHONE (OUTPATIENT)
Dept: DIABETES | Facility: CLINIC | Age: 53
End: 2023-03-24
Payer: COMMERCIAL

## 2023-03-24 ENCOUNTER — OFFICE VISIT (OUTPATIENT)
Dept: UROLOGY | Facility: CLINIC | Age: 53
End: 2023-03-24
Payer: COMMERCIAL

## 2023-03-24 VITALS
TEMPERATURE: 98 F | DIASTOLIC BLOOD PRESSURE: 86 MMHG | HEART RATE: 91 BPM | WEIGHT: 231 LBS | SYSTOLIC BLOOD PRESSURE: 121 MMHG | HEIGHT: 68 IN | BODY MASS INDEX: 35.01 KG/M2

## 2023-03-24 DIAGNOSIS — C61 MALIGNANT NEOPLASM OF PROSTATE: ICD-10-CM

## 2023-03-24 DIAGNOSIS — C61 PROSTATE CANCER: Primary | ICD-10-CM

## 2023-03-24 PROCEDURE — 1159F PR MEDICATION LIST DOCUMENTED IN MEDICAL RECORD: ICD-10-PCS | Mod: CPTII,S$GLB,, | Performed by: UROLOGY

## 2023-03-24 PROCEDURE — 99999 PR PBB SHADOW E&M-EST. PATIENT-LVL V: CPT | Mod: PBBFAC,,, | Performed by: UROLOGY

## 2023-03-24 PROCEDURE — 3061F PR NEG MICROALBUMINURIA RESULT DOCUMENTED/REVIEW: ICD-10-PCS | Mod: CPTII,S$GLB,, | Performed by: UROLOGY

## 2023-03-24 PROCEDURE — 3052F PR MOST RECENT HEMOGLOBIN A1C LEVEL 8.0 - < 9.0%: ICD-10-PCS | Mod: CPTII,S$GLB,, | Performed by: UROLOGY

## 2023-03-24 PROCEDURE — 99214 OFFICE O/P EST MOD 30 MIN: CPT | Mod: S$GLB,,, | Performed by: UROLOGY

## 2023-03-24 PROCEDURE — 3074F PR MOST RECENT SYSTOLIC BLOOD PRESSURE < 130 MM HG: ICD-10-PCS | Mod: CPTII,S$GLB,, | Performed by: UROLOGY

## 2023-03-24 PROCEDURE — 3079F PR MOST RECENT DIASTOLIC BLOOD PRESSURE 80-89 MM HG: ICD-10-PCS | Mod: CPTII,S$GLB,, | Performed by: UROLOGY

## 2023-03-24 PROCEDURE — 99214 PR OFFICE/OUTPT VISIT, EST, LEVL IV, 30-39 MIN: ICD-10-PCS | Mod: S$GLB,,, | Performed by: UROLOGY

## 2023-03-24 PROCEDURE — 3072F LOW RISK FOR RETINOPATHY: CPT | Mod: CPTII,S$GLB,, | Performed by: UROLOGY

## 2023-03-24 PROCEDURE — 1160F PR REVIEW ALL MEDS BY PRESCRIBER/CLIN PHARMACIST DOCUMENTED: ICD-10-PCS | Mod: CPTII,S$GLB,, | Performed by: UROLOGY

## 2023-03-24 PROCEDURE — 1160F RVW MEDS BY RX/DR IN RCRD: CPT | Mod: CPTII,S$GLB,, | Performed by: UROLOGY

## 2023-03-24 PROCEDURE — 3008F PR BODY MASS INDEX (BMI) DOCUMENTED: ICD-10-PCS | Mod: CPTII,S$GLB,, | Performed by: UROLOGY

## 2023-03-24 PROCEDURE — 3008F BODY MASS INDEX DOCD: CPT | Mod: CPTII,S$GLB,, | Performed by: UROLOGY

## 2023-03-24 PROCEDURE — 3052F HG A1C>EQUAL 8.0%<EQUAL 9.0%: CPT | Mod: CPTII,S$GLB,, | Performed by: UROLOGY

## 2023-03-24 PROCEDURE — 1159F MED LIST DOCD IN RCRD: CPT | Mod: CPTII,S$GLB,, | Performed by: UROLOGY

## 2023-03-24 PROCEDURE — 3072F PR LOW RISK FOR RETINOPATHY: ICD-10-PCS | Mod: CPTII,S$GLB,, | Performed by: UROLOGY

## 2023-03-24 PROCEDURE — 3061F NEG MICROALBUMINURIA REV: CPT | Mod: CPTII,S$GLB,, | Performed by: UROLOGY

## 2023-03-24 PROCEDURE — 3079F DIAST BP 80-89 MM HG: CPT | Mod: CPTII,S$GLB,, | Performed by: UROLOGY

## 2023-03-24 PROCEDURE — 3066F NEPHROPATHY DOC TX: CPT | Mod: CPTII,S$GLB,, | Performed by: UROLOGY

## 2023-03-24 PROCEDURE — 3066F PR DOCUMENTATION OF TREATMENT FOR NEPHROPATHY: ICD-10-PCS | Mod: CPTII,S$GLB,, | Performed by: UROLOGY

## 2023-03-24 PROCEDURE — 99999 PR PBB SHADOW E&M-EST. PATIENT-LVL V: ICD-10-PCS | Mod: PBBFAC,,, | Performed by: UROLOGY

## 2023-03-24 PROCEDURE — 3074F SYST BP LT 130 MM HG: CPT | Mod: CPTII,S$GLB,, | Performed by: UROLOGY

## 2023-03-24 NOTE — TELEPHONE ENCOUNTER
Attempted to call patient . He did not answer and his mailbox is full so I was unable to leave a vmail.     Please try again on Monday to contact patient.

## 2023-03-27 NOTE — PROGRESS NOTES
Chief Complaint:  Prostate cancer     HPI:   03/27/2023 - returns today for follow-up, completed radiation earlier this month, did well, denies any frequency or dysuria, voiding with a good stream, no incontinence unless he is very full, very little erectile function with Cialis, would like to try something else    06/29/2021 - Patient presents today for follow-up  For discussion of his prostate cancer, he had a total health visit with Dr. Randall at [facility] in consultation of the same, patient states that they essentially told him the same thing that I explained to him, that surgery would probably be the best long-term option for him due to his young age     05/18/2021 - patient underwent TRUS bx 05/06 which reveal right-sided GG 4+3=7 prostate cancer in the base mid and apex, patient presents with his wife to discuss options     04/27/2021 - patient presents today for follow-up, he notes began having right flank pain yesterday evening, went to [facility] ED, diagnosed with 5mm right prox/mid ureteral stone, this is the patient's 4th stone, passed all the others within several weeks, has not required surgery, no GH or fevers     03/16/2021 - patient presents for follow-up, he notes that after our last visit, he was worried about the possibility of having prostate cancer and this resulted in his delayed return, he notes no issues in the interim, he had a repeat PSA 03/15 which resulted at 17.6, continues to deny gross hematuria or dysuria     10/30/2020 - 51 y.o. male that presents for elevated PSA.  Patient had a PSA drawn a as part of a a yearly physical, this resulted at 16.1.  Dr. Figueroa then obtained a repeat PSA four days later which resulted at 15.7.  Patient states that he has no family history of prostate cancer.  He voids with a good stream and empties his bladder well.  He has no erectile dysfunction.  Denies gross hematuria. Recommended a repeat PSA in 3 weeks with f/u thereafter        PMH:       Past  Medical History:   Diagnosis Date    Anxiety      Bipolar disorder      Depression      Diabetes mellitus, type 2           PSH:        Past Surgical History:   Procedure Laterality Date    HERNIA REPAIR             Family History:        Family History   Problem Relation Age of Onset    Diabetes Mother      Hypertension Father           Social History:  Social History            Tobacco Use    Smoking status: Former Smoker       Quit date: 2014       Years since quittin.5    Smokeless tobacco: Former User   Substance Use Topics    Alcohol use: Not Currently    Drug use: Never         Review of Systems:  General: No fever, chills  Skin: No rashes  Chest:  Denies cough and sputum production  Heart: Denies chest pain  Resp: Denies dyspnea  Abdomen: Denies diarrhea, abdominal pain, hematemesis, or blood in stool.  Musculoskeletal: No joint stiffness or swelling. Denies back pain.  : see HPI  Neuro: no dizziness or weakness     Allergies:  Aripiprazole, Bupropion hcl, and Penicillins     Medications:     Current Outpatient Medications:     atorvastatin (LIPITOR) 40 MG tablet, TK 1 T PO D, Disp: 90 tablet, Rfl: 1    blood-glucose sensor (DEXCOM G6 SENSOR) Miriam, 1 each by Misc.(Non-Drug; Combo Route) route every 14 (fourteen) days., Disp: 3 each, Rfl: 11    blood-glucose transmitter (DEXCOM G6 TRANSMITTER) Miriam, 1 each by Misc.(Non-Drug; Combo Route) route once daily., Disp: 1 each, Rfl: 3    clonazePAM (KLONOPIN) 0.5 MG tablet, Take 1 tablet (0.5 mg total) by mouth 2 (two) times daily as needed for Anxiety., Disp: 60 tablet, Rfl: 0    insulin (LANTUS SOLOSTAR U-100 INSULIN) glargine 100 units/mL (3mL) SubQ pen, Inject 30 Units into the skin once daily. ADMINISTER 30 UNITS UNDER THE SKIN EVERY EVENING, Disp: 9 mL, Rfl: 11    insulin lispro (HUMALOG KWIKPEN INSULIN) 100 unit/mL pen, Inject 12 Units into the skin 3 (three) times daily with meals., Disp: 12 mL, Rfl: 11    lamoTRIgine (LAMICTAL) 200 MG tablet, Take  1.5 tablets (300 mg total) by mouth once daily., Disp: 45 tablet, Rfl: 1    metFORMIN (GLUMETZA) 1000 MG (MOD) 24hr tablet, Take 1 tablet (1,000 mg total) by mouth 2 (two) times daily with meals., Disp: 60 tablet, Rfl: 11    QUEtiapine (SEROQUEL) 100 MG Tab, Take 1 tablet (100 mg total) by mouth every evening., Disp: 30 tablet, Rfl: 1    venlafaxine (EFFEXOR-XR) 75 MG 24 hr capsule, Take 1 capsule (75 mg total) by mouth once daily., Disp: 30 capsule, Rfl: 1    empagliflozin (JARDIANCE) 25 mg tablet, Take 1 tablet (25 mg total) by mouth once daily. (Patient not taking: Reported on 6/29/2021), Disp: 90 tablet, Rfl: 0    HYDROcodone-acetaminophen (NORCO) 5-325 mg per tablet, Take 1 tablet by mouth every 6 (six) hours as needed., Disp: , Rfl:     tamsulosin (FLOMAX) 0.4 mg Cap, Take 0.4 mg by mouth., Disp: , Rfl:     traMADoL (ULTRAM) 50 mg tablet, Take 1 tablet (50 mg total) by mouth every 4 (four) hours as needed for Pain. (Patient not taking: Reported on 5/18/2021), Disp: 30 tablet, Rfl: 0     Physical Exam:      Vitals:     06/29/21 1630   BP: 128/88   Pulse: 93   Temp: 97.9 °F (36.6 °C)      General: awake, alert, cooperative  Head: NC/AT  Ears: external ears normal  Eyes: sclera normal  Lungs: normal inspiration, NAD  Heart: well-perfused  Abdomen: Soft, NT, ND   10/30/2020: Normal circ'd phallus, meatus normal in size and position, BL testicles palpable, no masses, nontender, no abnormalities of epididymi  KATY 10/30/2020: Normal rectal tone, no hemorrhoids. Prostate smooth and normal, no nodules 30 gm. Perineum and anus normal.  Skin: The skin is warm and dry.  Ext: No c/c/e.     RADIOLOGY:  No recent relevant imaging available for review.     LABS:  I personally reviewed the following lab values:        Lab Results   Component Value Date     WBC 8.41 10/03/2020     HGB 14.8 10/03/2020     HCT 46.7 10/03/2020      10/03/2020      10/03/2020     K 4.3 10/03/2020      10/03/2020     CREATININE  1.0 04/07/2021     BUN 13 10/03/2020     CO2 21 (L) 10/03/2020     TSH 0.943 10/03/2020     PSA 17.6 (H) 03/15/2021     HGBA1C 8.3 (H) 06/29/2021     CHOL 125 10/03/2020     TRIG 172 (H) 10/03/2020     HDL 29 (L) 10/03/2020     ALT 29 10/03/2020     AST 18 10/03/2020         Assessment/Plan:   Patrick Sanz is a 51 y.o. male with      Intermediate Risk Prostate Cancer - s/p RALRP and salvage XRT, f/u with PSA 6 weeks    Postprostatectomy ED - I reviewed the etiology and management of ED.  Management options include oral medications, vacuum erectile devices, MUSE urethral suppositories, intracavernosal injections, and surgical placement of a penile prosthesis.  I reviewed the risks and benefits of each.  For medications, I noted that they are safe and effective, but noted that side effects include flushing of the face, headaches, color vision change, blurry vision, and congestion/runny nose. They should not be used by anyone currently taking nitrates for chest pain, and I reviewed the patient's current medications in the chart and verbally with the patient and he is not. For vacuum erectile devices, I noted that they also are safe but that they require the use of a restrictive ring at the base of the penis in order to prevent a detumescence, which can be uncomfortable for some patients. They also have the added benefits of being able to be combined with medical therapy for added effect.  For MUSE, I noted that these are reasonably effective, but that patients usually experience urethral burning, which can also be experienced by the partner, often requiring the use of condoms.  For intracavernosal injections I explained that this is usually the most efficacious medication to achieve a natural erection, but requires injecting the penis, which is not a suitable option for everybody.  For surgical options I reviewed a that there is the surgical risks which include pain, bleeding, and infection.  I noted that an infection  of the device would require it to be removed, which can make replacement at a later date very difficult.  I explained that once a prosthesis is implanted the patient will never achieve a natural erection ever again. Patient would like to try trimix. Rx sent, will f/u for test injection.      Obinna Hernandez MD  Urology

## 2023-03-28 ENCOUNTER — PATIENT MESSAGE (OUTPATIENT)
Dept: DIABETES | Facility: CLINIC | Age: 53
End: 2023-03-28
Payer: COMMERCIAL

## 2023-03-28 ENCOUNTER — TELEPHONE (OUTPATIENT)
Dept: SURGERY | Facility: CLINIC | Age: 53
End: 2023-03-28
Payer: COMMERCIAL

## 2023-03-28 DIAGNOSIS — E11.69 HYPERLIPIDEMIA ASSOCIATED WITH TYPE 2 DIABETES MELLITUS: ICD-10-CM

## 2023-03-28 DIAGNOSIS — E78.5 HYPERLIPIDEMIA ASSOCIATED WITH TYPE 2 DIABETES MELLITUS: ICD-10-CM

## 2023-03-28 RX ORDER — ATORVASTATIN CALCIUM 80 MG/1
80 TABLET, FILM COATED ORAL DAILY
Qty: 90 TABLET | Refills: 1 | Status: SHIPPED | OUTPATIENT
Start: 2023-03-28 | End: 2023-09-18

## 2023-03-29 ENCOUNTER — PATIENT MESSAGE (OUTPATIENT)
Dept: SURGERY | Facility: CLINIC | Age: 53
End: 2023-03-29
Payer: COMMERCIAL

## 2023-03-30 ENCOUNTER — PATIENT MESSAGE (OUTPATIENT)
Dept: DIABETES | Facility: CLINIC | Age: 53
End: 2023-03-30
Payer: COMMERCIAL

## 2023-04-17 ENCOUNTER — OFFICE VISIT (OUTPATIENT)
Dept: SURGERY | Facility: CLINIC | Age: 53
End: 2023-04-17
Payer: COMMERCIAL

## 2023-04-17 ENCOUNTER — LAB VISIT (OUTPATIENT)
Dept: LAB | Facility: HOSPITAL | Age: 53
End: 2023-04-17
Attending: COLON & RECTAL SURGERY
Payer: COMMERCIAL

## 2023-04-17 VITALS
BODY MASS INDEX: 34.97 KG/M2 | DIASTOLIC BLOOD PRESSURE: 91 MMHG | WEIGHT: 230 LBS | SYSTOLIC BLOOD PRESSURE: 131 MMHG | HEART RATE: 96 BPM | OXYGEN SATURATION: 97 % | TEMPERATURE: 98 F

## 2023-04-17 DIAGNOSIS — C19 ADENOCARCINOMA OF RECTOSIGMOID JUNCTION: ICD-10-CM

## 2023-04-17 DIAGNOSIS — C19 ADENOCARCINOMA OF RECTOSIGMOID JUNCTION: Primary | ICD-10-CM

## 2023-04-17 PROCEDURE — 82378 CARCINOEMBRYONIC ANTIGEN: CPT | Performed by: COLON & RECTAL SURGERY

## 2023-04-17 PROCEDURE — 3008F PR BODY MASS INDEX (BMI) DOCUMENTED: ICD-10-PCS | Mod: CPTII,S$GLB,, | Performed by: COLON & RECTAL SURGERY

## 2023-04-17 PROCEDURE — 3075F PR MOST RECENT SYSTOLIC BLOOD PRESS GE 130-139MM HG: ICD-10-PCS | Mod: CPTII,S$GLB,, | Performed by: COLON & RECTAL SURGERY

## 2023-04-17 PROCEDURE — 3075F SYST BP GE 130 - 139MM HG: CPT | Mod: CPTII,S$GLB,, | Performed by: COLON & RECTAL SURGERY

## 2023-04-17 PROCEDURE — 3052F PR MOST RECENT HEMOGLOBIN A1C LEVEL 8.0 - < 9.0%: ICD-10-PCS | Mod: CPTII,S$GLB,, | Performed by: COLON & RECTAL SURGERY

## 2023-04-17 PROCEDURE — 3061F PR NEG MICROALBUMINURIA RESULT DOCUMENTED/REVIEW: ICD-10-PCS | Mod: CPTII,S$GLB,, | Performed by: COLON & RECTAL SURGERY

## 2023-04-17 PROCEDURE — 3080F PR MOST RECENT DIASTOLIC BLOOD PRESSURE >= 90 MM HG: ICD-10-PCS | Mod: CPTII,S$GLB,, | Performed by: COLON & RECTAL SURGERY

## 2023-04-17 PROCEDURE — 3072F LOW RISK FOR RETINOPATHY: CPT | Mod: CPTII,S$GLB,, | Performed by: COLON & RECTAL SURGERY

## 2023-04-17 PROCEDURE — 99214 PR OFFICE/OUTPT VISIT, EST, LEVL IV, 30-39 MIN: ICD-10-PCS | Mod: S$GLB,,, | Performed by: COLON & RECTAL SURGERY

## 2023-04-17 PROCEDURE — 3052F HG A1C>EQUAL 8.0%<EQUAL 9.0%: CPT | Mod: CPTII,S$GLB,, | Performed by: COLON & RECTAL SURGERY

## 2023-04-17 PROCEDURE — 1159F PR MEDICATION LIST DOCUMENTED IN MEDICAL RECORD: ICD-10-PCS | Mod: CPTII,S$GLB,, | Performed by: COLON & RECTAL SURGERY

## 2023-04-17 PROCEDURE — 3061F NEG MICROALBUMINURIA REV: CPT | Mod: CPTII,S$GLB,, | Performed by: COLON & RECTAL SURGERY

## 2023-04-17 PROCEDURE — 36415 COLL VENOUS BLD VENIPUNCTURE: CPT | Performed by: COLON & RECTAL SURGERY

## 2023-04-17 PROCEDURE — 3066F NEPHROPATHY DOC TX: CPT | Mod: CPTII,S$GLB,, | Performed by: COLON & RECTAL SURGERY

## 2023-04-17 PROCEDURE — 1159F MED LIST DOCD IN RCRD: CPT | Mod: CPTII,S$GLB,, | Performed by: COLON & RECTAL SURGERY

## 2023-04-17 PROCEDURE — 99999 PR PBB SHADOW E&M-EST. PATIENT-LVL IV: CPT | Mod: PBBFAC,,, | Performed by: COLON & RECTAL SURGERY

## 2023-04-17 PROCEDURE — 3008F BODY MASS INDEX DOCD: CPT | Mod: CPTII,S$GLB,, | Performed by: COLON & RECTAL SURGERY

## 2023-04-17 PROCEDURE — 99999 PR PBB SHADOW E&M-EST. PATIENT-LVL IV: ICD-10-PCS | Mod: PBBFAC,,, | Performed by: COLON & RECTAL SURGERY

## 2023-04-17 PROCEDURE — 3066F PR DOCUMENTATION OF TREATMENT FOR NEPHROPATHY: ICD-10-PCS | Mod: CPTII,S$GLB,, | Performed by: COLON & RECTAL SURGERY

## 2023-04-17 PROCEDURE — 3080F DIAST BP >= 90 MM HG: CPT | Mod: CPTII,S$GLB,, | Performed by: COLON & RECTAL SURGERY

## 2023-04-17 PROCEDURE — 3072F PR LOW RISK FOR RETINOPATHY: ICD-10-PCS | Mod: CPTII,S$GLB,, | Performed by: COLON & RECTAL SURGERY

## 2023-04-17 PROCEDURE — 99214 OFFICE O/P EST MOD 30 MIN: CPT | Mod: S$GLB,,, | Performed by: COLON & RECTAL SURGERY

## 2023-04-17 NOTE — PROGRESS NOTES
History & Physical    SUBJECTIVE:     CC: Rectosigmoid adenocarcinoma  Ref: Michelle Flores MD    History of Present Illness:  Patient is a 53 y.o. 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  Patient was undergoing his 1st ever colonoscopy on 08/04/2022 where a malignant-appearing mass was found in the rectosigmoid area.  This was biopsied and confirmed to be adenocarcinoma.  Patient also had multiple other adenomas removed.  He states that prior to the colonoscopy in since that time, he has been completely asymptomatic.  He denies any fever, chills, nausea, vomiting, diarrhea, constipation, hematochezia or melena.  He denies any family history of colorectal cancer or IBD.  He does have a past surgical history significant for robotic prostatectomy for prostate cancer.    09/22/2022: Robotic converted to open LAR, final pathology T2N0    Interval history:  Since last clinic visit, patient has completely healed his abdominal wounds.  He was found to have likely recurrent/residual prostate cancer and required pelvic radiation.  He states that since the radiation started, he developed some constipation difficulty having bowel movements.  This has improved since finishing the radiation.  Denies any fever, chills, nausea, vomiting, hematochezia or melena.       Review of patient's allergies indicates:   Allergen Reactions    Aripiprazole Other (See Comments)    Sildenafil Other (See Comments)    Bupropion hcl Anxiety    Penicillins Hives and Rash       Current Outpatient Medications   Medication Sig Dispense Refill    atorvastatin (LIPITOR) 80 MG tablet Take 1 tablet (80 mg total) by mouth once daily. TK 1 T PO D 90 tablet 1    blood-glucose sensor (DEXCOM G6 SENSOR) Miriam 1 each by Misc.(Non-Drug; Combo Route) route every 14 (fourteen) days. 3 each 11    blood-glucose transmitter (DEXCOM G6 TRANSMITTER) Miriam 1 each by Misc.(Non-Drug; Combo Route) route once daily. 1 each 3    clonazePAM (KLONOPIN) 0.5 MG  tablet Take 1 tablet (0.5 mg total) by mouth 2 (two) times daily as needed for Anxiety. 60 tablet 2    docusate sodium (COLACE) 100 MG capsule Take 1 capsule (100 mg total) by mouth 2 (two) times daily as needed. 20 capsule 0    empagliflozin (JARDIANCE) 25 mg tablet Take 1 tablet (25 mg total) by mouth once daily. 30 tablet 11    flash glucose sensor (FREESTYLE JOAQUIM 2 SENSOR) Kit 1 each by Misc.(Non-Drug; Combo Route) route every 14 (fourteen) days. 2 kit 11    glipiZIDE (GLUCOTROL) 5 MG tablet Take 1 tablet (5 mg total) by mouth 2 (two) times daily with meals. 60 tablet 0    insulin (LANTUS SOLOSTAR U-100 INSULIN) glargine 100 units/mL SubQ pen Inject 26 Units into the skin once daily. 9 mL 11    insulin glargine-yfgn (SEMGLEE,INSULIN GLARG-YFGN,PEN) 100 unit/mL (3 mL) InPn INJECT 30 UNITS SUBCUTANEOUSLY ONCE DAILY IN THE EVENING 15 mL 3    insulin lispro (HUMALOG KWIKPEN INSULIN) 100 unit/mL pen Inject 12 Units into the skin 3 (three) times daily with meals. 12 mL 11    lamoTRIgine (LAMICTAL) 200 MG tablet Take 1.5 tablets (300 mg total) by mouth once daily. 135 tablet 0    ondansetron (ZOFRAN-ODT) 4 MG TbDL Take 1 tablet (4 mg total) by mouth every 12 (twelve) hours as needed (Nausea). 60 tablet 2    pep injection Inject 0.15 ml as directed     For compounding pharmacy use:   Add PAPAVERINE 30 mg  Add PHENTOLAMINE 1 mg  Add ALPROSTADIL 20 mcg 1 vial 5    QUEtiapine (SEROQUEL) 100 MG Tab Take 1 tablet by mouth in the evening 30 tablet 1    semaglutide (OZEMPIC) 1 mg/dose (4 mg/3 mL) Inject 1 mg into the skin every 7 days. 1 pen 11    tadalafiL (CIALIS) 20 MG Tab       traMADoL (ULTRAM) 50 mg tablet Take 1 tablet (50 mg total) by mouth every 6 (six) hours as needed for Pain. 20 tablet 0    venlafaxine (EFFEXOR-XR) 150 MG Cp24 Take 1 capsule (150 mg total) by mouth once daily. 90 capsule 0     No current facility-administered medications for this visit.       Past Medical History:   Diagnosis Date     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 Patrick Sanz is a 52 y.o. male patient of Dr. Woodward status post robot-assisted radical prostatectomy with partial nerve sparing on 10/11/21 for what proved to be a 8cc, pGS4+3 (70% high grade) [id number] (0/15) prostate cancer excised with a focal positive 11mm margin and a positive benign margin. His RADHA-S score is 7. His post op PSA was low detectable and his most recent is now 0.177. H    Sleep apnea      Past Surgical History:   Procedure Laterality Date    COLON SURGERY  10.2022    COLONOSCOPY N/A 08/04/2022    Procedure: COLONOSCOPY;  Surgeon: Michelle Flores MD;  Location: [facility] ENDO;  Service: Endoscopy;  Laterality: N/A;    HERNIA REPAIR      INJECTION OF ANESTHETIC AGENT INTO TISSUE PLANE DEFINED BY TRANSVERSUS ABDOMINIS MUSCLE N/A 09/22/2022    Procedure: BLOCK, TRANSVERSUS ABDOMINIS PLANE;  Surgeon: Cj Quinn MD;  Location: [facility] OR;  Service: General;  Laterality: N/A;    MOBILIZATION OF SPLENIC FLEXURE N/A 09/22/2022    Procedure: MOBILIZATION, SPLENIC FLEXURE;  Surgeon: Cj Quinn MD;  Location: [facility] OR;  Service: General;  Laterality: N/A;    PROSTATE SURGERY  09.2021    ROBOT-ASSISTED LOW ANTERIOR RESECTION OF COLON N/A 09/22/2022    Procedure: XI ROBOTIC RESECTION, COLON, LOW ANTERIOR;  Surgeon: Cj Quinn MD;  Location: [facility] OR;  Service: General;  Laterality: N/A;  CONVERTED TO OPEN     Family History   Problem Relation Age of Onset    Diabetes Mother         Type2    Hearing loss Mother         Hearing Aids    Hypertension Father     Diabetes Maternal Uncle         Tyoe 2    Diabetes Maternal Aunt         Type 2     Social History     Tobacco Use    Smoking status: Former     Packs/day: 0.25     Years: 15.00     Pack years: 3.75     Types:  Cigarettes     Start date: 1/1/1998     Quit date: 1/1/2013     Years since quitting: 10.2    Smokeless tobacco: Former    Tobacco comments:     Was a part time smoker.  1 pack could last 2 or more weeks   Substance Use Topics    Alcohol use: Not Currently     Comment: May have 1 or 2 drinks at social events or restaurants    Drug use: Never        Review of Systems:  Review of Systems   Constitutional:  Positive for fatigue. Negative for chills, fever and unexpected weight change.   HENT:  Negative for congestion.    Eyes:  Negative for visual disturbance.   Respiratory:  Negative for shortness of breath.    Cardiovascular:  Negative for chest pain.   Gastrointestinal:  Positive for constipation. Negative for abdominal distention, abdominal pain, anal bleeding, blood in stool, diarrhea, nausea, rectal pain and vomiting.   Genitourinary:  Negative for dysuria.   Musculoskeletal:  Positive for myalgias. Negative for arthralgias.   Skin:  Negative for rash.   Neurological:  Negative for light-headedness.   Hematological:  Negative for adenopathy.     OBJECTIVE:     Vital Signs (Most Recent)  Temp: 98.4 °F (36.9 °C) (04/17/23 1534)  Pulse: 96 (04/17/23 1534)  BP: (!) 131/91 (04/17/23 1534)  SpO2: 97 % (04/17/23 1534)     104.3 kg (230 lb)     Physical Exam:  Physical Exam  Constitutional:       Appearance: He is well-developed.   HENT:      Head: Normocephalic and atraumatic.   Eyes:      Conjunctiva/sclera: Conjunctivae normal.   Neck:      Thyroid: No thyromegaly.   Cardiovascular:      Rate and Rhythm: Normal rate.   Pulmonary:      Effort: Pulmonary effort is normal. No respiratory distress.   Abdominal:      General: There is no distension.      Palpations: Abdomen is soft. There is no mass.      Tenderness: There is no abdominal tenderness.      Hernia: No hernia is present.      Comments: Well-healed incisions   Musculoskeletal:         General: No tenderness. Normal range of motion.      Cervical back: Normal  range of motion.   Skin:     General: Skin is warm and dry.      Capillary Refill: Capillary refill takes less than 2 seconds.      Findings: No rash.   Neurological:      Mental Status: He is alert and oriented to person, place, and time.       Laboratory  Lab Results   Component Value Date    WBC 8.77 11/11/2022    HGB 15.5 11/11/2022    HCT 46.5 11/11/2022     11/11/2022    CHOL 244 (H) 03/07/2023    TRIG 233 (H) 03/07/2023    HDL 50 03/07/2023    ALT 63 (H) 03/07/2023    AST 27 03/07/2023     03/07/2023    K 4.2 03/07/2023     03/07/2023    CREATININE 0.9 03/07/2023    BUN 11 03/07/2023    CO2 25 03/07/2023    TSH 0.943 10/03/2020    PSA 17.6 (H) 03/15/2021    HGBA1C 8.9 (H) 03/07/2023       Component Ref Range & Units 4 mo ago 5 mo ago 6 mo ago 8 mo ago   CEA 0.0 - 5.0 ng/mL 2.8  2.6 CM  7.4 High  CM  60.3 High  CM        Diagnostic Results:  Colonoscopy: reviewed      Final Surgical Pathology:  Final Pathologic Diagnosis 1. Colon, anastomotic donuts, low anterior resection:   - Incidental hyperplastic polyp present in one segment of colon   - Negative for dysplasia and malignancy   2. Colon, proximal margin, low anterior resection:   - Segment of colon with no diagnostic histopathologic alterations   - Negative for dysplasia and malignancy   3. Colon, sigmoid and rectum, low anterior resection:   - Invasive adenocarcinoma, moderately differentiated   - Immunohistochemical studies for Mismatch Repair (MMR) proteins pending   - Pathologic stage: pT2N0   - See synoptic report   TUMOR SYNOPTIC: (COLON and RECTUM)   Standard(s): AJCC 8th Edition   SPECIMEN   Procedure: Low anterior resection   Macroscopic Evaluation of Mesorectum: Complete   TUMOR   Tumor Site: Rectum, entirely above anterior peritoneal reflection   Histologic Type: Adenocarcinoma   Histologic Grade: G2, moderately differentiated (50-95% gland formation)   Tumor Size: 7.5 cm   Tumor Extent: Invades into muscularis propria    Macroscopic Tumor Perforation: Not identified   Lymphovascular invasion: Not identified   Perineural Invasion: Not identified   TREATMENT EFFECT: No known presurgical therapy   MARGINS   Margin Status for Invasive Carcinoma: All margins uninvolved by invasive   carcinoma and dysplasia   Margin Status for Non-Invasive Tumor: All margins negative for dysplasia   Closest Margin to Invasive Carcinoma: Distal, 2.5 cm   REGIONAL LYMPH NODES   Number of Lymph Nodes Involved: 0   Number of Lymph Nodes Examined: 27   Tumor Deposits: Not identified   PATHOLOGIC STAGE   TNM Descriptors: N/A   pT Category: pT2; Tumor invades the muscularis propria   pN Category: pN0; No regional lymph node metastasis   Additional findings: N/A   The following blocks are suitable for additional testing: 3F, 3G  VC      Comment: Interp By Silvestre Bassett MD, Signed on 09/30/2022 at 16:15   Supplemental Diagnosis This supplemental report is issued to include results of immunohistochemical   studies. No changes are made to the previously rendered diagnoses.   Immunohistochemistry (IHC) Testing for Mismatch Repair (MMR) Proteins:   MLH1 - Intact nuclear expression   MSH2 - Intact nuclear expression   MSH6 - Intact nuclear expression   PMS2 - Intact nuclear expression   Background nonneoplastic tissue/internal control with intact nuclear   expression   IHC Interpretation   No loss of nuclear expression of MMR proteins: low probability of   microsatellite instability   There are exceptions to the above IHC interpretations. These results should   not be considered in isolation, and clinical correlation with genetic   counseling is recommended to assess the need for germline testing.           ASSESSMENT/PLAN:     52yo M with rectosigmoid/upper rectal adenocarcinoma now s/p robotic converted to open LAR with final pathology showing Stage I T2N0 adenocarcinoma    - CEA level due today and [id number]  - CT C/A/P due, ordered  - Colonoscopy later this  year  - Recommended initiation of stool softener fiber supplementation to assist with his constipation after prostate radiation for the residual/recurrent prostate cancer  - RTC 3 months for surveillance    Cj Quinn MD  Colon and Rectal Surgery  [facility]

## 2023-04-18 LAB — CEA SERPL-MCNC: 2.8 NG/ML (ref 0–5)

## 2023-04-19 ENCOUNTER — OFFICE VISIT (OUTPATIENT)
Dept: UROLOGY | Facility: CLINIC | Age: 53
End: 2023-04-19
Payer: COMMERCIAL

## 2023-04-19 VITALS
SYSTOLIC BLOOD PRESSURE: 132 MMHG | HEIGHT: 68 IN | HEART RATE: 92 BPM | BODY MASS INDEX: 34.48 KG/M2 | WEIGHT: 227.5 LBS | TEMPERATURE: 99 F | DIASTOLIC BLOOD PRESSURE: 88 MMHG

## 2023-04-19 DIAGNOSIS — N52.9 ERECTILE DYSFUNCTION, UNSPECIFIED ERECTILE DYSFUNCTION TYPE: Primary | ICD-10-CM

## 2023-04-19 PROCEDURE — 1160F PR REVIEW ALL MEDS BY PRESCRIBER/CLIN PHARMACIST DOCUMENTED: ICD-10-PCS | Mod: CPTII,S$GLB,, | Performed by: UROLOGY

## 2023-04-19 PROCEDURE — 3061F PR NEG MICROALBUMINURIA RESULT DOCUMENTED/REVIEW: ICD-10-PCS | Mod: CPTII,S$GLB,, | Performed by: UROLOGY

## 2023-04-19 PROCEDURE — 3008F BODY MASS INDEX DOCD: CPT | Mod: CPTII,S$GLB,, | Performed by: UROLOGY

## 2023-04-19 PROCEDURE — 1159F PR MEDICATION LIST DOCUMENTED IN MEDICAL RECORD: ICD-10-PCS | Mod: CPTII,S$GLB,, | Performed by: UROLOGY

## 2023-04-19 PROCEDURE — 3079F PR MOST RECENT DIASTOLIC BLOOD PRESSURE 80-89 MM HG: ICD-10-PCS | Mod: CPTII,S$GLB,, | Performed by: UROLOGY

## 2023-04-19 PROCEDURE — 3061F NEG MICROALBUMINURIA REV: CPT | Mod: CPTII,S$GLB,, | Performed by: UROLOGY

## 2023-04-19 PROCEDURE — 3008F PR BODY MASS INDEX (BMI) DOCUMENTED: ICD-10-PCS | Mod: CPTII,S$GLB,, | Performed by: UROLOGY

## 2023-04-19 PROCEDURE — 99214 OFFICE O/P EST MOD 30 MIN: CPT | Mod: S$GLB,,, | Performed by: UROLOGY

## 2023-04-19 PROCEDURE — 3075F PR MOST RECENT SYSTOLIC BLOOD PRESS GE 130-139MM HG: ICD-10-PCS | Mod: CPTII,S$GLB,, | Performed by: UROLOGY

## 2023-04-19 PROCEDURE — 3066F NEPHROPATHY DOC TX: CPT | Mod: CPTII,S$GLB,, | Performed by: UROLOGY

## 2023-04-19 PROCEDURE — 99999 PR PBB SHADOW E&M-EST. PATIENT-LVL IV: ICD-10-PCS | Mod: PBBFAC,,, | Performed by: UROLOGY

## 2023-04-19 PROCEDURE — 99999 PR PBB SHADOW E&M-EST. PATIENT-LVL IV: CPT | Mod: PBBFAC,,, | Performed by: UROLOGY

## 2023-04-19 PROCEDURE — 3072F PR LOW RISK FOR RETINOPATHY: ICD-10-PCS | Mod: CPTII,S$GLB,, | Performed by: UROLOGY

## 2023-04-19 PROCEDURE — 3079F DIAST BP 80-89 MM HG: CPT | Mod: CPTII,S$GLB,, | Performed by: UROLOGY

## 2023-04-19 PROCEDURE — 1160F RVW MEDS BY RX/DR IN RCRD: CPT | Mod: CPTII,S$GLB,, | Performed by: UROLOGY

## 2023-04-19 PROCEDURE — 3072F LOW RISK FOR RETINOPATHY: CPT | Mod: CPTII,S$GLB,, | Performed by: UROLOGY

## 2023-04-19 PROCEDURE — 3052F PR MOST RECENT HEMOGLOBIN A1C LEVEL 8.0 - < 9.0%: ICD-10-PCS | Mod: CPTII,S$GLB,, | Performed by: UROLOGY

## 2023-04-19 PROCEDURE — 3066F PR DOCUMENTATION OF TREATMENT FOR NEPHROPATHY: ICD-10-PCS | Mod: CPTII,S$GLB,, | Performed by: UROLOGY

## 2023-04-19 PROCEDURE — 1159F MED LIST DOCD IN RCRD: CPT | Mod: CPTII,S$GLB,, | Performed by: UROLOGY

## 2023-04-19 PROCEDURE — 99214 PR OFFICE/OUTPT VISIT, EST, LEVL IV, 30-39 MIN: ICD-10-PCS | Mod: S$GLB,,, | Performed by: UROLOGY

## 2023-04-19 PROCEDURE — 3075F SYST BP GE 130 - 139MM HG: CPT | Mod: CPTII,S$GLB,, | Performed by: UROLOGY

## 2023-04-19 PROCEDURE — 3052F HG A1C>EQUAL 8.0%<EQUAL 9.0%: CPT | Mod: CPTII,S$GLB,, | Performed by: UROLOGY

## 2023-04-19 NOTE — PROGRESS NOTES
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 Randall at [facility] in consultation of the same, patient states that they essentially told him the same thing that I explained to him, that surgery would probably be the best long-term option for him due to his young age     05/18/2021 - patient underwent TRUS bx 05/06 which reveal right-sided GG 4+3=7 prostate cancer in the base mid and apex, patient presents with his wife to discuss options     04/27/2021 - patient presents today for follow-up, he notes began having right flank pain yesterday evening, went to [facility] ED, diagnosed with 5mm right prox/mid ureteral stone, this is the patient's 4th stone, passed all the others within several weeks, has not required surgery, no GH or fevers     03/16/2021 - patient presents for follow-up, he notes that after our last visit, he was worried about the possibility of having prostate cancer and this resulted in his delayed return, he notes no issues in the interim, he had a repeat PSA 03/15 which resulted at 17.6, continues to deny gross hematuria or dysuria     10/30/2020 - 51 y.o. male that presents for elevated PSA.  Patient had a PSA drawn a as part of a a yearly physical, this resulted at 16.1.  Dr. Figueroa then obtained a repeat PSA four days later which resulted at 15.7.  Patient states that he has no family history of prostate cancer.  He voids with a good stream and empties his bladder well.  He has no erectile dysfunction.  Denies gross hematuria. Recommended a repeat PSA in 3  weeks with f/u thereafter        PMH:       Past Medical History:   Diagnosis Date    Anxiety      Bipolar disorder      Depression      Diabetes mellitus, type 2           PSH:        Past Surgical History:   Procedure Laterality Date    HERNIA REPAIR             Family History:        Family History   Problem Relation Age of Onset    Diabetes Mother      Hypertension Father           Social History:  Social History            Tobacco Use    Smoking status: Former Smoker       Quit date: 2014       Years since quittin.5    Smokeless tobacco: Former User   Substance Use Topics    Alcohol use: Not Currently    Drug use: Never         Review of Systems:  General: No fever, chills  Skin: No rashes  Chest:  Denies cough and sputum production  Heart: Denies chest pain  Resp: Denies dyspnea  Abdomen: Denies diarrhea, abdominal pain, hematemesis, or blood in stool.  Musculoskeletal: No joint stiffness or swelling. Denies back pain.  : see HPI  Neuro: no dizziness or weakness     Allergies:  Aripiprazole, Bupropion hcl, and Penicillins     Medications:     Current Outpatient Medications:     atorvastatin (LIPITOR) 40 MG tablet, TK 1 T PO D, Disp: 90 tablet, Rfl: 1    blood-glucose sensor (DEXCOM G6 SENSOR) Miriam, 1 each by Misc.(Non-Drug; Combo Route) route every 14 (fourteen) days., Disp: 3 each, Rfl: 11    blood-glucose transmitter (DEXCOM G6 TRANSMITTER) Miriam, 1 each by Misc.(Non-Drug; Combo Route) route once daily., Disp: 1 each, Rfl: 3    clonazePAM (KLONOPIN) 0.5 MG tablet, Take 1 tablet (0.5 mg total) by mouth 2 (two) times daily as needed for Anxiety., Disp: 60 tablet, Rfl: 0    insulin (LANTUS SOLOSTAR U-100 INSULIN) glargine 100 units/mL (3mL) SubQ pen, Inject 30 Units into the skin once daily. ADMINISTER 30 UNITS UNDER THE SKIN EVERY EVENING, Disp: 9 mL, Rfl: 11    insulin lispro (HUMALOG KWIKPEN INSULIN) 100 unit/mL pen, Inject 12 Units into the skin 3 (three) times daily with meals., Disp: 12 mL, Rfl:  11    lamoTRIgine (LAMICTAL) 200 MG tablet, Take 1.5 tablets (300 mg total) by mouth once daily., Disp: 45 tablet, Rfl: 1    metFORMIN (GLUMETZA) 1000 MG (MOD) 24hr tablet, Take 1 tablet (1,000 mg total) by mouth 2 (two) times daily with meals., Disp: 60 tablet, Rfl: 11    QUEtiapine (SEROQUEL) 100 MG Tab, Take 1 tablet (100 mg total) by mouth every evening., Disp: 30 tablet, Rfl: 1    venlafaxine (EFFEXOR-XR) 75 MG 24 hr capsule, Take 1 capsule (75 mg total) by mouth once daily., Disp: 30 capsule, Rfl: 1    empagliflozin (JARDIANCE) 25 mg tablet, Take 1 tablet (25 mg total) by mouth once daily. (Patient not taking: Reported on 6/29/2021), Disp: 90 tablet, Rfl: 0    HYDROcodone-acetaminophen (NORCO) 5-325 mg per tablet, Take 1 tablet by mouth every 6 (six) hours as needed., Disp: , Rfl:     tamsulosin (FLOMAX) 0.4 mg Cap, Take 0.4 mg by mouth., Disp: , Rfl:     traMADoL (ULTRAM) 50 mg tablet, Take 1 tablet (50 mg total) by mouth every 4 (four) hours as needed for Pain. (Patient not taking: Reported on 5/18/2021), Disp: 30 tablet, Rfl: 0     Physical Exam:      Vitals:     06/29/21 1630   BP: 128/88   Pulse: 93   Temp: 97.9 °F (36.6 °C)      General: awake, alert, cooperative  Head: NC/AT  Ears: external ears normal  Eyes: sclera normal  Lungs: normal inspiration, NAD  Heart: well-perfused  Abdomen: Soft, NT, ND   10/30/2020: Normal circ'd phallus, meatus normal in size and position, BL testicles palpable, no masses, nontender, no abnormalities of epididymi  KATY 10/30/2020: Normal rectal tone, no hemorrhoids. Prostate smooth and normal, no nodules 30 gm. Perineum and anus normal.  Skin: The skin is warm and dry.  Ext: No c/c/e.     RADIOLOGY:  No recent relevant imaging available for review.     LABS:  I personally reviewed the following lab values:        Lab Results   Component Value Date     WBC 8.41 10/03/2020     HGB 14.8 10/03/2020     HCT 46.7 10/03/2020      10/03/2020      10/03/2020     K 4.3  10/03/2020      10/03/2020     CREATININE 1.0 04/07/2021     BUN 13 10/03/2020     CO2 21 (L) 10/03/2020     TSH 0.943 10/03/2020     PSA 17.6 (H) 03/15/2021     HGBA1C 8.3 (H) 06/29/2021     CHOL 125 10/03/2020     TRIG 172 (H) 10/03/2020     HDL 29 (L) 10/03/2020     ALT 29 10/03/2020     AST 18 10/03/2020         Assessment/Plan:   Patrick Sanz is a 51 y.o. male with      Intermediate Risk Prostate Cancer - s/p RALRP and salvage XRT, f/u with PSA 6 weeks    Postprostatectomy ED - I reviewed the proper administration with the patient and had him show me how to perform the injection, which he did adequately. Instructed him to call or go to the ED for an erection lasting more than 4 hours. Patient expressed understanding. Can titrate dosage for maximum effect.    DM2 - A1c stable but could be better     Obinna Hernandez MD  Urology

## 2023-05-01 ENCOUNTER — HOSPITAL ENCOUNTER (OUTPATIENT)
Dept: RADIOLOGY | Facility: HOSPITAL | Age: 53
Discharge: HOME | End: 2023-05-01
Attending: COLON & RECTAL SURGERY
Payer: COMMERCIAL

## 2023-05-01 DIAGNOSIS — C19 ADENOCARCINOMA OF RECTOSIGMOID JUNCTION: ICD-10-CM

## 2023-05-01 PROCEDURE — 71260 CT CHEST ABDOMEN PELVIS WITH CONTRAST (XPD): ICD-10-PCS | Mod: 26,,, | Performed by: RADIOLOGY

## 2023-05-01 PROCEDURE — 71260 CT THORAX DX C+: CPT | Mod: TC

## 2023-05-01 PROCEDURE — 74177 CT ABD & PELVIS W/CONTRAST: CPT | Mod: 26,,, | Performed by: RADIOLOGY

## 2023-05-01 PROCEDURE — 71260 CT THORAX DX C+: CPT | Mod: 26,,, | Performed by: RADIOLOGY

## 2023-05-01 PROCEDURE — 25500020 PHARM REV CODE 255: Performed by: COLON & RECTAL SURGERY

## 2023-05-01 PROCEDURE — 74177 CT CHEST ABDOMEN PELVIS WITH CONTRAST (XPD): ICD-10-PCS | Mod: 26,,, | Performed by: RADIOLOGY

## 2023-05-01 PROCEDURE — 74177 CT ABD & PELVIS W/CONTRAST: CPT | Mod: TC

## 2023-05-01 PROCEDURE — A9698 NON-RAD CONTRAST MATERIALNOC: HCPCS | Performed by: COLON & RECTAL SURGERY

## 2023-05-01 RX ORDER — VENLAFAXINE HYDROCHLORIDE 150 MG/1
CAPSULE, EXTENDED RELEASE ORAL
Qty: 90 CAPSULE | Refills: 0 | Status: SHIPPED | OUTPATIENT
Start: 2023-05-01 | End: 2023-07-23

## 2023-05-01 RX ADMIN — IOHEXOL 1000 ML: 9 SOLUTION ORAL at 05:05

## 2023-05-01 RX ADMIN — IOHEXOL 100 ML: 350 INJECTION, SOLUTION INTRAVENOUS at 05:05

## 2023-05-02 ENCOUNTER — PATIENT MESSAGE (OUTPATIENT)
Dept: SURGERY | Facility: CLINIC | Age: 53
End: 2023-05-02
Payer: COMMERCIAL

## 2023-05-08 ENCOUNTER — PATIENT MESSAGE (OUTPATIENT)
Dept: SURGERY | Facility: CLINIC | Age: 53
End: 2023-05-08
Payer: COMMERCIAL

## 2023-05-13 ENCOUNTER — PATIENT MESSAGE (OUTPATIENT)
Dept: UROLOGY | Facility: CLINIC | Age: 53
End: 2023-05-13
Payer: COMMERCIAL

## 2023-05-23 ENCOUNTER — OFFICE VISIT (OUTPATIENT)
Dept: DIABETES | Facility: CLINIC | Age: 53
End: 2023-05-23
Payer: COMMERCIAL

## 2023-05-23 ENCOUNTER — PATIENT MESSAGE (OUTPATIENT)
Dept: DIABETES | Facility: CLINIC | Age: 53
End: 2023-05-23

## 2023-05-23 DIAGNOSIS — E11.69 HYPERLIPIDEMIA ASSOCIATED WITH TYPE 2 DIABETES MELLITUS: ICD-10-CM

## 2023-05-23 DIAGNOSIS — E11.65 UNCONTROLLED TYPE 2 DIABETES MELLITUS WITH HYPERGLYCEMIA: Primary | ICD-10-CM

## 2023-05-23 DIAGNOSIS — E78.5 HYPERLIPIDEMIA ASSOCIATED WITH TYPE 2 DIABETES MELLITUS: ICD-10-CM

## 2023-05-23 DIAGNOSIS — E66.9 OBESITY (BMI 30-39.9): ICD-10-CM

## 2023-05-23 PROCEDURE — 99214 PR OFFICE/OUTPT VISIT, EST, LEVL IV, 30-39 MIN: ICD-10-PCS | Mod: 95,,, | Performed by: PHYSICIAN ASSISTANT

## 2023-05-23 PROCEDURE — 3052F HG A1C>EQUAL 8.0%<EQUAL 9.0%: CPT | Mod: CPTII,95,, | Performed by: PHYSICIAN ASSISTANT

## 2023-05-23 PROCEDURE — 3072F LOW RISK FOR RETINOPATHY: CPT | Mod: CPTII,95,, | Performed by: PHYSICIAN ASSISTANT

## 2023-05-23 PROCEDURE — 3061F PR NEG MICROALBUMINURIA RESULT DOCUMENTED/REVIEW: ICD-10-PCS | Mod: CPTII,95,, | Performed by: PHYSICIAN ASSISTANT

## 2023-05-23 PROCEDURE — 3072F PR LOW RISK FOR RETINOPATHY: ICD-10-PCS | Mod: CPTII,95,, | Performed by: PHYSICIAN ASSISTANT

## 2023-05-23 PROCEDURE — 3052F PR MOST RECENT HEMOGLOBIN A1C LEVEL 8.0 - < 9.0%: ICD-10-PCS | Mod: CPTII,95,, | Performed by: PHYSICIAN ASSISTANT

## 2023-05-23 PROCEDURE — 3066F NEPHROPATHY DOC TX: CPT | Mod: CPTII,95,, | Performed by: PHYSICIAN ASSISTANT

## 2023-05-23 PROCEDURE — 3061F NEG MICROALBUMINURIA REV: CPT | Mod: CPTII,95,, | Performed by: PHYSICIAN ASSISTANT

## 2023-05-23 PROCEDURE — 99214 OFFICE O/P EST MOD 30 MIN: CPT | Mod: 95,,, | Performed by: PHYSICIAN ASSISTANT

## 2023-05-23 PROCEDURE — 3066F PR DOCUMENTATION OF TREATMENT FOR NEPHROPATHY: ICD-10-PCS | Mod: CPTII,95,, | Performed by: PHYSICIAN ASSISTANT

## 2023-05-23 RX ORDER — BLOOD-GLUCOSE SENSOR
1 EACH MISCELLANEOUS
Qty: 3 EACH | Refills: 11 | Status: SHIPPED | OUTPATIENT
Start: 2023-05-23 | End: 2024-05-22

## 2023-05-23 NOTE — PROGRESS NOTES
PCP: Edilberto Figueroa MD    Subjective:     Chief Complaint: Diabetes     TELEMEDICINE VISIT:     The patient location is: Home  The chief complaint leading to consultation is: Diabetes Follow up  Visit type: Virtual visit with synchronous audio and video  Total time spent with patient: 15  Each patient to whom he or she provides medical services by telemedicine is:  (1) informed of the relationship between the physician and patient and the respective role of any other health care provider with respect to management of the patient; and (2) notified that he or she may decline to receive medical services by telemedicine and may withdraw from such care at any time.    Notes:     HISTORY OF PRESENT ILLNESS: 53 y.o.   male presenting for diabetes management.   The patient's last visit with me was on 2/21/2023.  Patient has had Type II diabetes since 2012.  Pertinent to decision making is the following comorbidities: Obesity by BMI and Bipolar Disorder  Patient has the following Diabetes complications: without complications  He is to be enrolled in diabetes education classes.     Patient's most recent A1c of 8.9% was completed 2 months ago.   Patient states since His last A1c His blood glucose levels have been high  after meals.   Patient monitors blood glucose 4 times per day with meter and Continuously with personal CGM Vipin. Off Vipni due to issues with reliability.   Patient blood glucose monitoring device will be uploaded into [employer] today.  Patient reports fasting blood sugars ranging from 150 - 200, in am around 9a 250 - 300 without known cause, and after meals less than 200.  Patient endorses the following diabetes related symptoms: Nausea. Related to ozempic.   Patient is due today for the following diabetes-related health maintenance standards: Eye Exam. Previously est with Dr. Pak.   He denies recent hospital admissions or emergency room visits.   He denies having hypoglycemia.   Patient's  concerns today include glycemic control. Of note, patient was diagnosed with prostate cancer is currently undergoing Lupron treatment and radiation. No plans for chemotherapy at present.   Patient medication regimen is as below.     CURRENT DM MEDICATIONS:   Semglee 28 units at night  Humalog 12 units before meals TID   Jardiance 25 mg daily   Ozempic 1 mg weekly    Patient has failed the following Diabetes medications:   Trulicity   Basaglar / Lantus  Metformin        Labs Reviewed.       Lab Results   Component Value Date    CPEPTIDE 2.06 03/07/2023     Lab Results   Component Value Date    GLUTAMICACID 0.00 03/07/2023          //   , There is no height or weight on file to calculate BMI.  His blood sugar in clinic today is:         Review of Systems   Constitutional:  Negative for activity change, appetite change, chills and fever.   HENT:  Negative for dental problem, mouth sores, nosebleeds, sore throat and trouble swallowing.    Eyes:  Negative for pain and discharge.   Respiratory:  Negative for shortness of breath, wheezing and stridor.    Cardiovascular:  Negative for chest pain, palpitations and leg swelling.   Gastrointestinal:  Negative for abdominal pain, diarrhea, nausea and vomiting.   Endocrine: Negative for polydipsia, polyphagia and polyuria.   Genitourinary:  Negative for dysuria, frequency and urgency.   Musculoskeletal:  Negative for joint swelling and myalgias.   Skin:  Negative for rash and wound.   Neurological:  Negative for dizziness, syncope, weakness and headaches.   Psychiatric/Behavioral:  Negative for behavioral problems and dysphoric mood.        Diabetes Management Status  Statin: Taking  ACE/ARB: Not taking    Screening or Prevention Patient's value Goal Complete/Controlled?   HgA1C Testing and Control   Lab Results   Component Value Date    HGBA1C 8.9 (H) 03/07/2023      Annually/Less than 8% No   Lipid profile : 03/07/2023 Annually Yes   LDL control Lab Results   Component Value  Date    LDLCALC 147.4 03/07/2023    Annually/Less than 100 mg/dl  Yes   Nephropathy screening Lab Results   Component Value Date    MICALBCREAT 19.6 03/07/2023     Lab Results   Component Value Date    PROTEINUA Trace (A) 08/05/2021    Annually Yes   Blood pressure BP Readings from Last 1 Encounters:   04/19/23 132/88    Less than 140/90 Yes   Dilated retinal exam : 01/31/2022 Annually No    Foot exam   : 03/09/2023 Annually No     Social History     Socioeconomic History    Marital status: Significant Other   Tobacco Use    Smoking status: Former     Packs/day: 0.25     Years: 15.00     Pack years: 3.75     Types: Cigarettes     Start date: 1/1/1998     Quit date: 1/1/2013     Years since quitting: 10.3    Smokeless tobacco: Former    Tobacco comments:     Was a part time smoker.  1 pack could last 2 or more weeks   Substance and Sexual Activity    Alcohol use: Not Currently     Comment: May have 1 or 2 drinks at social events or restaurants    Drug use: Never    Sexual activity: Yes     Partners: Female     Birth control/protection: Partner-Vasectomy, Post-menopausal     Social Determinants of Health     Financial Resource Strain: Medium Risk    Difficulty of Paying Living Expenses: Somewhat hard   Food Insecurity: No Food Insecurity    Worried About Running Out of Food in the Last Year: Never true    Ran Out of Food in the Last Year: Never true   Transportation Needs: No Transportation Needs    Lack of Transportation (Medical): No    Lack of Transportation (Non-Medical): No   Physical Activity: Inactive    Days of Exercise per Week: 0 days    Minutes of Exercise per Session: 0 min   Stress: No Stress Concern Present    Feeling of Stress : Only a little   Social Connections: Socially Isolated    Frequency of Communication with Friends and Family: Twice a week    Frequency of Social Gatherings with Friends and Family: Never    Attends Christianity Services: Never    Active Member of Clubs or Organizations: No     Attends Club or Organization Meetings: Never    Marital Status:    Housing Stability: Low Risk     Unable to Pay for Housing in the Last Year: No    Number of Places Lived in the Last Year: 1    Unstable Housing in the Last Year: No     Past Medical History:   Diagnosis Date    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 Patrick Sanz is a 52 y.o. male patient of Dr. Woodward status post robot-assisted radical prostatectomy with partial nerve sparing on 10/11/21 for what proved to be a 8cc, pGS4+3 (70% high grade) [id number] (0/15) prostate cancer excised with a focal positive 11mm margin and a positive benign margin. His RADHA-S score is 7. His post op PSA was low detectable and his most recent is now 0.177. H    Sleep apnea        Objective:      Physical Exam  Constitutional:       General: He is not in acute distress.     Appearance: He is well-developed. He is not diaphoretic.   HENT:      Head: Normocephalic and atraumatic.      Right Ear: External ear normal.      Left Ear: External ear normal.      Nose: Nose normal.   Eyes:      General:         Right eye: No discharge.         Left eye: No discharge.   Pulmonary:      Effort: Pulmonary effort is normal. No respiratory distress.      Breath sounds: No stridor.   Musculoskeletal:         General: Normal range of motion.      Cervical back: Normal range of motion.   Skin:     Coloration: Skin is not pale.   Neurological:      Mental Status: He is alert and oriented to person, place, and time.      Motor: No abnormal muscle tone.      Coordination: Coordination normal.   Psychiatric:         Behavior: Behavior normal.         Thought Content: Thought content normal.         Judgment: Judgment normal.         Assessment / Plan:     Uncontrolled type 2 diabetes mellitus with  hyperglycemia  -     blood-glucose sensor (DEXCOM G7 SENSOR) Miriam; 1 each by Misc.(Non-Drug; Combo Route) route every 10 days.  Dispense: 3 each; Refill: 11    Hyperlipidemia associated with type 2 diabetes mellitus    Obesity (BMI 30-39.9)        Additional Plan Details:    - POCT Glucose  - Encouraged continuation of lifestyle changes including regular exercise and limiting carbohydrates to 30-45 grams per meal threes times daily and 15 grams per snack with a limit of two daily.   - Encouraged continued monitoring of blood glucose with maintenance of 4 times daily and Continuously with personal CGM Vipin. G7 Rx.   - Current DM Medication Regimen: Change Humalog 18 units TID wm. Change Semglee 34 units daily. Continue Jardiance 25 mg daily. Change Ozempic 0.5 mg weekly - 36 clicks.   - Health Maintenance standards addressed today: Eye Exam - will be completed within [facility] system and scheduled today.   - Nursing Visit: Patient is age 79 or younger with an A1c of 7.5 or greater and will not need nursing visit at this time .   - Follow up with me in 6 weeks with A1c prior.      CURRENT DM MEDICATIONS:   Semglee 34 units at night  Humalog 18 units before meals TID   Jardiance 25 mg daily   Ozempic 0.5 mg weekly - 36 clicks on pen Blakeney McKnight, PA-C [facility]

## 2023-05-23 NOTE — PATIENT INSTRUCTIONS
CURRENT DM MEDICATIONS:   Semglee 34 units at night  Humalog 18 units before meals TID   Jardiance 25 mg daily   Ozempic 0.5 mg weekly - 36 clicks on pen     Let me know if you are able to get Dexcom G7

## 2023-05-30 ENCOUNTER — LAB VISIT (OUTPATIENT)
Dept: LAB | Facility: HOSPITAL | Age: 53
End: 2023-05-30
Attending: RADIOLOGY
Payer: COMMERCIAL

## 2023-05-30 DIAGNOSIS — C61 MALIGNANT NEOPLASM OF PROSTATE: ICD-10-CM

## 2023-05-30 PROCEDURE — 36415 COLL VENOUS BLD VENIPUNCTURE: CPT | Performed by: RADIOLOGY

## 2023-05-30 PROCEDURE — 84153 ASSAY OF PSA TOTAL: CPT | Performed by: RADIOLOGY

## 2023-05-31 ENCOUNTER — TELEPHONE (OUTPATIENT)
Dept: RADIATION ONCOLOGY | Facility: CLINIC | Age: 53
End: 2023-05-31
Payer: COMMERCIAL

## 2023-05-31 LAB — COMPLEXED PSA SERPL-MCNC: <0.01 NG/ML (ref 0–4)

## 2023-06-01 ENCOUNTER — TELEPHONE (OUTPATIENT)
Dept: RADIATION ONCOLOGY | Facility: CLINIC | Age: 53
End: 2023-06-01
Payer: COMMERCIAL

## 2023-06-01 NOTE — TELEPHONE ENCOUNTER
Attempted to call patient to schedule Rad Onc f/u call but there was no answer. Left a detailed message including our direct number to call back to schedule appt.

## 2023-06-02 ENCOUNTER — PATIENT MESSAGE (OUTPATIENT)
Dept: DIABETES | Facility: CLINIC | Age: 53
End: 2023-06-02
Payer: COMMERCIAL

## 2023-06-04 ENCOUNTER — PATIENT MESSAGE (OUTPATIENT)
Dept: SURGERY | Facility: CLINIC | Age: 53
End: 2023-06-04
Payer: COMMERCIAL

## 2023-06-09 ENCOUNTER — OFFICE VISIT (OUTPATIENT)
Dept: INTERNAL MEDICINE | Facility: CLINIC | Age: 53
End: 2023-06-09
Payer: COMMERCIAL

## 2023-06-09 VITALS
BODY MASS INDEX: 34.02 KG/M2 | TEMPERATURE: 97 F | WEIGHT: 223.75 LBS | SYSTOLIC BLOOD PRESSURE: 118 MMHG | DIASTOLIC BLOOD PRESSURE: 82 MMHG | HEART RATE: 93 BPM | OXYGEN SATURATION: 98 %

## 2023-06-09 DIAGNOSIS — S90.452A FOREIGN BODY OF SKIN OF LEFT GREAT TOE: ICD-10-CM

## 2023-06-09 DIAGNOSIS — E78.5 HYPERLIPIDEMIA ASSOCIATED WITH TYPE 2 DIABETES MELLITUS: ICD-10-CM

## 2023-06-09 DIAGNOSIS — L84 CALLUS OF FOOT: ICD-10-CM

## 2023-06-09 DIAGNOSIS — E11.65 UNCONTROLLED TYPE 2 DIABETES MELLITUS WITH HYPERGLYCEMIA: Primary | ICD-10-CM

## 2023-06-09 DIAGNOSIS — E11.69 HYPERLIPIDEMIA ASSOCIATED WITH TYPE 2 DIABETES MELLITUS: ICD-10-CM

## 2023-06-09 PROCEDURE — 3008F BODY MASS INDEX DOCD: CPT | Mod: CPTII,S$GLB,, | Performed by: INTERNAL MEDICINE

## 2023-06-09 PROCEDURE — 3066F NEPHROPATHY DOC TX: CPT | Mod: CPTII,S$GLB,, | Performed by: INTERNAL MEDICINE

## 2023-06-09 PROCEDURE — 3074F PR MOST RECENT SYSTOLIC BLOOD PRESSURE < 130 MM HG: ICD-10-PCS | Mod: CPTII,S$GLB,, | Performed by: INTERNAL MEDICINE

## 2023-06-09 PROCEDURE — 3079F DIAST BP 80-89 MM HG: CPT | Mod: CPTII,S$GLB,, | Performed by: INTERNAL MEDICINE

## 2023-06-09 PROCEDURE — 99999 PR PBB SHADOW E&M-EST. PATIENT-LVL V: ICD-10-PCS | Mod: PBBFAC,,, | Performed by: INTERNAL MEDICINE

## 2023-06-09 PROCEDURE — 3052F PR MOST RECENT HEMOGLOBIN A1C LEVEL 8.0 - < 9.0%: ICD-10-PCS | Mod: CPTII,S$GLB,, | Performed by: INTERNAL MEDICINE

## 2023-06-09 PROCEDURE — 3061F NEG MICROALBUMINURIA REV: CPT | Mod: CPTII,S$GLB,, | Performed by: INTERNAL MEDICINE

## 2023-06-09 PROCEDURE — 3008F PR BODY MASS INDEX (BMI) DOCUMENTED: ICD-10-PCS | Mod: CPTII,S$GLB,, | Performed by: INTERNAL MEDICINE

## 2023-06-09 PROCEDURE — 1159F MED LIST DOCD IN RCRD: CPT | Mod: CPTII,S$GLB,, | Performed by: INTERNAL MEDICINE

## 2023-06-09 PROCEDURE — 99214 OFFICE O/P EST MOD 30 MIN: CPT | Mod: S$GLB,,, | Performed by: INTERNAL MEDICINE

## 2023-06-09 PROCEDURE — 3066F PR DOCUMENTATION OF TREATMENT FOR NEPHROPATHY: ICD-10-PCS | Mod: CPTII,S$GLB,, | Performed by: INTERNAL MEDICINE

## 2023-06-09 PROCEDURE — 3052F HG A1C>EQUAL 8.0%<EQUAL 9.0%: CPT | Mod: CPTII,S$GLB,, | Performed by: INTERNAL MEDICINE

## 2023-06-09 PROCEDURE — 99999 PR PBB SHADOW E&M-EST. PATIENT-LVL V: CPT | Mod: PBBFAC,,, | Performed by: INTERNAL MEDICINE

## 2023-06-09 PROCEDURE — 3072F PR LOW RISK FOR RETINOPATHY: ICD-10-PCS | Mod: CPTII,S$GLB,, | Performed by: INTERNAL MEDICINE

## 2023-06-09 PROCEDURE — 3074F SYST BP LT 130 MM HG: CPT | Mod: CPTII,S$GLB,, | Performed by: INTERNAL MEDICINE

## 2023-06-09 PROCEDURE — 3079F PR MOST RECENT DIASTOLIC BLOOD PRESSURE 80-89 MM HG: ICD-10-PCS | Mod: CPTII,S$GLB,, | Performed by: INTERNAL MEDICINE

## 2023-06-09 PROCEDURE — 3061F PR NEG MICROALBUMINURIA RESULT DOCUMENTED/REVIEW: ICD-10-PCS | Mod: CPTII,S$GLB,, | Performed by: INTERNAL MEDICINE

## 2023-06-09 PROCEDURE — 3072F LOW RISK FOR RETINOPATHY: CPT | Mod: CPTII,S$GLB,, | Performed by: INTERNAL MEDICINE

## 2023-06-09 PROCEDURE — 99214 PR OFFICE/OUTPT VISIT, EST, LEVL IV, 30-39 MIN: ICD-10-PCS | Mod: S$GLB,,, | Performed by: INTERNAL MEDICINE

## 2023-06-09 PROCEDURE — 1159F PR MEDICATION LIST DOCUMENTED IN MEDICAL RECORD: ICD-10-PCS | Mod: CPTII,S$GLB,, | Performed by: INTERNAL MEDICINE

## 2023-06-09 NOTE — PROGRESS NOTES
Subjective:      Patient ID: Patrick Sanz is a 53 y.o. male.    Chief Complaint: No chief complaint on file.    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 Patrick Sanz is a 52 y.o. male patient of Dr. Woodward status post robot-assisted radical prostatectomy with partial nerve sparing on 10/11/21 for what proved to be a 8cc, pGS4+3 (70% high grade) [id number] (0/15) prostate cancer excised with a focal positive 11mm margin and a positive benign margin. His RADHA-S score is 7. His post op PSA was low detectable and his most recent is now 0.177. H    Sleep apnea      Here today for management of mult med problems as outlined below.  Compliant with meds without significant side effects. Energy and appetite good.     Home glucose 100s to 200s.     Pt also reports left toe with foreign body.  No pain. No d/c. No evidence of infection.     Review of Systems   Constitutional:  Negative for chills and fever.   HENT:  Negative for ear pain and sore throat.    Respiratory:  Negative for cough.    Cardiovascular:  Negative for chest pain.   Gastrointestinal:  Negative for abdominal pain and blood in stool.   Genitourinary:  Negative for dysuria and hematuria.   Neurological:  Negative for seizures and syncope.   Objective:   /82 (BP  Location: Right arm, Patient Position: Sitting, BP Method: Large (Manual))   Pulse 93   Temp 97.4 °F (36.3 °C) (Tympanic)   Wt 101.5 kg (223 lb 12.3 oz)   SpO2 98%   BMI 34.02 kg/m²     Physical Exam  Constitutional:       General: He is not in acute distress.     Appearance: He is well-developed.   HENT:      Head: Normocephalic and atraumatic.   Eyes:      Extraocular Movements: Extraocular movements intact.   Neck:      Thyroid: No thyromegaly.   Cardiovascular:      Rate and Rhythm: Normal rate and regular rhythm.   Pulmonary:      Breath sounds: Normal breath sounds. No wheezing or rales.   Abdominal:      General: Bowel sounds are normal.      Palpations: Abdomen is soft.      Tenderness: There is no abdominal tenderness.   Musculoskeletal:      Cervical back: Neck supple. No rigidity.   Lymphadenopathy:      Cervical: No cervical adenopathy.   Skin:     General: Skin is warm and dry.   Neurological:      Mental Status: He is alert and oriented to person, place, and time.   Psychiatric:         Behavior: Behavior normal.     Left great toe plantar surface with pin point foreign body. Also callus to plantar surface of left foot.   .   Lab Results   Component Value Date    WBC 8.77 11/11/2022    HGB 15.5 11/11/2022    HGB 14.8 10/11/2022    HGB 13.7 (L) 09/26/2022    HCT 46.5 11/11/2022    MCV 86 11/11/2022    MCV 85 10/11/2022    MCV 82 09/26/2022     11/11/2022    CHOL 244 (H) 03/07/2023    TRIG 233 (H) 03/07/2023    HDL 50 03/07/2023    LDLCALC 147.4 03/07/2023    LDLCALC 87.6 01/29/2022    LDLCALC 61.6 (L) 10/03/2020    ALT 63 (H) 03/07/2023    AST 27 03/07/2023     03/07/2023    K 4.2 03/07/2023    CALCIUM 10.0 03/07/2023     03/07/2023    CO2 25 03/07/2023    BUN 11 03/07/2023    CREATININE 0.9 03/07/2023    CREATININE 1.0 11/11/2022    CREATININE 1.0 10/11/2022    EGFRNORACEVR >60.0 03/07/2023    EGFRNORACEVR >60 11/11/2022    EGFRNORACEVR >60 10/11/2022    TSH 0.943 10/03/2020     PSA 17.6 (H) 03/15/2021    PSA 15.7 (H) 10/07/2020    PSA 16.1 (H) 10/03/2020    PSADIAG <0.01 05/30/2023    PSADIAG <0.01 05/01/2023    PSADIAG 0.23 11/11/2022    PSADIAG 0.23 11/11/2022     03/07/2023    HGBA1C 8.9 (H) 03/07/2023    HGBA1C 8.6 (H) 12/07/2022    HGBA1C 8.7 (H) 08/06/2022          The 10-year ASCVD risk score (Rebecca ALFORD, et al., 2019) is: 8.2%    Values used to calculate the score:      Age: 53 years      Sex: Male      Is Non- : No      Diabetic: Yes      Tobacco smoker: No      Systolic Blood Pressure: 118 mmHg      Is BP treated: No      HDL Cholesterol: 50 mg/dL      Total Cholesterol: 244 mg/dL     Assessment:     1. Uncontrolled type 2 diabetes mellitus with hyperglycemia    2. Hyperlipidemia associated with type 2 diabetes mellitus    3. Foreign body of skin of left great toe    4. Callus of foot      Plan:   1. Uncontrolled type 2 diabetes mellitus with hyperglycemia  Pt reports improved. Cont current meds  -     Hemoglobin A1C; Future; Expected date: 09/09/2023  -     Ambulatory referral/consult to Podiatry; Future; Expected date: 06/16/2023    2. Hyperlipidemia associated with type 2 diabetes mellitus  Complete labs today.     3. Foreign body of skin of left great toe    -     Ambulatory referral/consult to Podiatry; Future; Expected date: 06/16/2023    4. Callus of foot  -     Ambulatory referral/consult to Podiatry; Future; Expected date: 06/16/2023        Patient Instructions   Check with your pharmacy regarding new shingles vaccine.      Future Appointments   Date Time Provider Department Center   6/27/2023  3:30 PM Manfred Dickens DPM [facility] POD [facility]   6/30/2023  3:40 PM Jimbo Pak OD [facility] OPHTHAL [facility]   7/17/2023  2:40 PM Cj Quinn MD [facility]   7/25/2023  4:00 PM Jeffrey Aguilar PA-C [facility] DIABETE [facility]   9/7/2023  2:40 PM Edilberto Figueroa MD [facility]       Lab Frequency Next Occurrence    Ambulatory referral/consult to [facility] Once 10/05/2022   Ambulatory referral/consult to Social Work Once 11/18/2022   Hemoglobin A1C Once 06/07/2023   Lipid Panel Once 06/07/2023   ALT (SGPT) Once 06/09/2023   CEA Every 4 Weeks (Manual)    Prostate Specific Antigen, Diagnostic Every 4 Weeks (Manual)    Comprehensive Metabolic Panel Every 2 Weeks 6/16/2023, 6/30/2023, 7/14/2023, 7/28/2023, 8/11/2023, 8/25/2023, 9/8/2023, 9/22/2023, 10/6/2023, 10/20/2023, 11/3/2023, 11/17/2023   Comprehensive Metabolic Panel Every 2 Weeks 6/16/2023, 6/30/2023, 7/14/2023, 7/28/2023, 8/11/2023, 8/25/2023, 9/8/2023, 9/22/2023, 10/6/2023, 10/20/2023, 11/3/2023, 11/17/2023   CBC Auto Differential Every 2 Weeks 6/16/2023, 6/30/2023, 7/14/2023, 7/28/2023, 8/11/2023, 8/25/2023, 9/8/2023, 9/22/2023, 10/6/2023, 10/20/2023, 11/3/2023, 11/17/2023   CBC Auto Differential Every 2 Weeks 6/16/2023, 6/30/2023, 7/14/2023, 7/28/2023, 8/11/2023, 8/25/2023, 9/8/2023, 9/22/2023, 10/6/2023, 10/20/2023, 11/3/2023, 11/17/2023   Iron and TIBC Every 4 Weeks (Manual)    Ferritin Every 4 Weeks (Manual)    Prostate Specific Antigen, Diagnostic Every 4 Weeks (Manual)    Hemoglobin A1C Every 12 Weeks 6/30/2023, 9/22/2023       Follow up in about 3 months (around 9/9/2023), or if symptoms worsen or fail to improve.  Labs today

## 2023-06-12 PROBLEM — Z00.00 ROUTINE GENERAL MEDICAL EXAMINATION AT A HEALTH CARE FACILITY: Status: RESOLVED | Noted: 2023-03-09 | Resolved: 2023-06-12

## 2023-06-30 ENCOUNTER — OFFICE VISIT (OUTPATIENT)
Dept: OPHTHALMOLOGY | Facility: CLINIC | Age: 53
End: 2023-06-30
Payer: COMMERCIAL

## 2023-06-30 ENCOUNTER — PATIENT MESSAGE (OUTPATIENT)
Dept: OPHTHALMOLOGY | Facility: CLINIC | Age: 53
End: 2023-06-30

## 2023-06-30 DIAGNOSIS — H52.4 MYOPIA WITH ASTIGMATISM AND PRESBYOPIA, BILATERAL: ICD-10-CM

## 2023-06-30 DIAGNOSIS — E11.9 TYPE 2 DIABETES MELLITUS WITHOUT RETINOPATHY: Primary | ICD-10-CM

## 2023-06-30 DIAGNOSIS — D31.31 CHOROIDAL NEVUS OF RIGHT EYE: ICD-10-CM

## 2023-06-30 DIAGNOSIS — H52.13 MYOPIA WITH ASTIGMATISM AND PRESBYOPIA, BILATERAL: ICD-10-CM

## 2023-06-30 DIAGNOSIS — H52.203 MYOPIA WITH ASTIGMATISM AND PRESBYOPIA, BILATERAL: ICD-10-CM

## 2023-06-30 PROCEDURE — 3066F NEPHROPATHY DOC TX: CPT | Mod: CPTII,S$GLB,, | Performed by: OPTOMETRIST

## 2023-06-30 PROCEDURE — 99999 PR PBB SHADOW E&M-EST. PATIENT-LVL III: ICD-10-PCS | Mod: PBBFAC,,, | Performed by: OPTOMETRIST

## 2023-06-30 PROCEDURE — 92015 DETERMINE REFRACTIVE STATE: CPT | Mod: S$GLB,,, | Performed by: OPTOMETRIST

## 2023-06-30 PROCEDURE — 3052F PR MOST RECENT HEMOGLOBIN A1C LEVEL 8.0 - < 9.0%: ICD-10-PCS | Mod: CPTII,S$GLB,, | Performed by: OPTOMETRIST

## 2023-06-30 PROCEDURE — 3066F PR DOCUMENTATION OF TREATMENT FOR NEPHROPATHY: ICD-10-PCS | Mod: CPTII,S$GLB,, | Performed by: OPTOMETRIST

## 2023-06-30 PROCEDURE — 2023F DILAT RTA XM W/O RTNOPTHY: CPT | Mod: CPTII,S$GLB,, | Performed by: OPTOMETRIST

## 2023-06-30 PROCEDURE — 92015 PR REFRACTION: ICD-10-PCS | Mod: S$GLB,,, | Performed by: OPTOMETRIST

## 2023-06-30 PROCEDURE — 92014 COMPRE OPH EXAM EST PT 1/>: CPT | Mod: S$GLB,,, | Performed by: OPTOMETRIST

## 2023-06-30 PROCEDURE — 3052F HG A1C>EQUAL 8.0%<EQUAL 9.0%: CPT | Mod: CPTII,S$GLB,, | Performed by: OPTOMETRIST

## 2023-06-30 PROCEDURE — 92014 PR EYE EXAM, EST PATIENT,COMPREHESV: ICD-10-PCS | Mod: S$GLB,,, | Performed by: OPTOMETRIST

## 2023-06-30 PROCEDURE — 99999 PR PBB SHADOW E&M-EST. PATIENT-LVL III: CPT | Mod: PBBFAC,,, | Performed by: OPTOMETRIST

## 2023-06-30 PROCEDURE — 3061F NEG MICROALBUMINURIA REV: CPT | Mod: CPTII,S$GLB,, | Performed by: OPTOMETRIST

## 2023-06-30 PROCEDURE — 3061F PR NEG MICROALBUMINURIA RESULT DOCUMENTED/REVIEW: ICD-10-PCS | Mod: CPTII,S$GLB,, | Performed by: OPTOMETRIST

## 2023-06-30 PROCEDURE — 1160F RVW MEDS BY RX/DR IN RCRD: CPT | Mod: CPTII,S$GLB,, | Performed by: OPTOMETRIST

## 2023-06-30 PROCEDURE — 1160F PR REVIEW ALL MEDS BY PRESCRIBER/CLIN PHARMACIST DOCUMENTED: ICD-10-PCS | Mod: CPTII,S$GLB,, | Performed by: OPTOMETRIST

## 2023-06-30 PROCEDURE — 2023F PR DILATED RETINAL EXAM W/O EVID OF RETINOPATHY: ICD-10-PCS | Mod: CPTII,S$GLB,, | Performed by: OPTOMETRIST

## 2023-06-30 PROCEDURE — 1159F MED LIST DOCD IN RCRD: CPT | Mod: CPTII,S$GLB,, | Performed by: OPTOMETRIST

## 2023-06-30 PROCEDURE — 1159F PR MEDICATION LIST DOCUMENTED IN MEDICAL RECORD: ICD-10-PCS | Mod: CPTII,S$GLB,, | Performed by: OPTOMETRIST

## 2023-06-30 NOTE — PROGRESS NOTES
HPI     Diabetic Eye Exam            Comments: Diagnosed with diabetes in 2018  Lab Results       Component                Value               Date                       HGBA1C                   8.9 (H)             03/07/2023              Vision changes since last eye exam?: no  Any eye pain today: no  Other ocular symptoms: no  Interested in contact lens fitting today? no           Last edited by Isabella Elliott MA on 6/30/2023  3:25 PM.            Assessment /Plan     For exam results, see Encounter Report.    Type 2 diabetes mellitus without retinopathy  There was no diabetic retinopathy present in either eye today.   Recommended that pt continue care with PCP and/or specialists regarding diabetes.  Follow-up dilated eye exam recommended in 12 months, sooner with any vision changes or new concerns.    Choroidal nevus of right eye  Stable nevus OD  No changes compared to previous photos and exams  Optos down today, unable to take photos  Monitor 12 months    Myopia with astigmatism and presbyopia, bilateral  Eyeglass Final Rx       Eyeglass Final Rx         Sphere Cylinder Axis Add    Right -2.25 +0.50 180 +2.00    Left -2.75 +1.50 170 +2.00      Expiration Date: 6/30/2024                      RTC 1 yr for dilated eye exam and Optos or PRN if any problems.   Discussed above and answered questions.

## 2023-07-14 DIAGNOSIS — F39 MOOD DISORDER: Primary | ICD-10-CM

## 2023-07-17 ENCOUNTER — OFFICE VISIT (OUTPATIENT)
Dept: SURGERY | Facility: CLINIC | Age: 53
End: 2023-07-17
Payer: COMMERCIAL

## 2023-07-17 ENCOUNTER — LAB VISIT (OUTPATIENT)
Dept: LAB | Facility: HOSPITAL | Age: 53
End: 2023-07-17
Attending: COLON & RECTAL SURGERY
Payer: COMMERCIAL

## 2023-07-17 VITALS — HEART RATE: 91 BPM | SYSTOLIC BLOOD PRESSURE: 122 MMHG | DIASTOLIC BLOOD PRESSURE: 84 MMHG

## 2023-07-17 DIAGNOSIS — C19 ADENOCARCINOMA OF RECTOSIGMOID JUNCTION: ICD-10-CM

## 2023-07-17 DIAGNOSIS — C19 ADENOCARCINOMA OF RECTOSIGMOID JUNCTION: Primary | ICD-10-CM

## 2023-07-17 DIAGNOSIS — Z85.038 PERSONAL HISTORY OF COLON CANCER: ICD-10-CM

## 2023-07-17 LAB — CEA SERPL-MCNC: 2.6 NG/ML (ref 0–5)

## 2023-07-17 PROCEDURE — 99214 PR OFFICE/OUTPT VISIT, EST, LEVL IV, 30-39 MIN: ICD-10-PCS | Mod: S$GLB,,, | Performed by: COLON & RECTAL SURGERY

## 2023-07-17 PROCEDURE — 3052F HG A1C>EQUAL 8.0%<EQUAL 9.0%: CPT | Mod: CPTII,S$GLB,, | Performed by: COLON & RECTAL SURGERY

## 2023-07-17 PROCEDURE — 99214 OFFICE O/P EST MOD 30 MIN: CPT | Mod: S$GLB,,, | Performed by: COLON & RECTAL SURGERY

## 2023-07-17 PROCEDURE — 3061F PR NEG MICROALBUMINURIA RESULT DOCUMENTED/REVIEW: ICD-10-PCS | Mod: CPTII,S$GLB,, | Performed by: COLON & RECTAL SURGERY

## 2023-07-17 PROCEDURE — 3052F PR MOST RECENT HEMOGLOBIN A1C LEVEL 8.0 - < 9.0%: ICD-10-PCS | Mod: CPTII,S$GLB,, | Performed by: COLON & RECTAL SURGERY

## 2023-07-17 PROCEDURE — 3074F PR MOST RECENT SYSTOLIC BLOOD PRESSURE < 130 MM HG: ICD-10-PCS | Mod: CPTII,S$GLB,, | Performed by: COLON & RECTAL SURGERY

## 2023-07-17 PROCEDURE — 3072F PR LOW RISK FOR RETINOPATHY: ICD-10-PCS | Mod: CPTII,S$GLB,, | Performed by: COLON & RECTAL SURGERY

## 2023-07-17 PROCEDURE — 3072F LOW RISK FOR RETINOPATHY: CPT | Mod: CPTII,S$GLB,, | Performed by: COLON & RECTAL SURGERY

## 2023-07-17 PROCEDURE — 3061F NEG MICROALBUMINURIA REV: CPT | Mod: CPTII,S$GLB,, | Performed by: COLON & RECTAL SURGERY

## 2023-07-17 PROCEDURE — 36415 COLL VENOUS BLD VENIPUNCTURE: CPT | Performed by: COLON & RECTAL SURGERY

## 2023-07-17 PROCEDURE — 3066F NEPHROPATHY DOC TX: CPT | Mod: CPTII,S$GLB,, | Performed by: COLON & RECTAL SURGERY

## 2023-07-17 PROCEDURE — 3066F PR DOCUMENTATION OF TREATMENT FOR NEPHROPATHY: ICD-10-PCS | Mod: CPTII,S$GLB,, | Performed by: COLON & RECTAL SURGERY

## 2023-07-17 PROCEDURE — 3074F SYST BP LT 130 MM HG: CPT | Mod: CPTII,S$GLB,, | Performed by: COLON & RECTAL SURGERY

## 2023-07-17 PROCEDURE — 1159F MED LIST DOCD IN RCRD: CPT | Mod: CPTII,S$GLB,, | Performed by: COLON & RECTAL SURGERY

## 2023-07-17 PROCEDURE — 82378 CARCINOEMBRYONIC ANTIGEN: CPT | Performed by: COLON & RECTAL SURGERY

## 2023-07-17 PROCEDURE — 3079F PR MOST RECENT DIASTOLIC BLOOD PRESSURE 80-89 MM HG: ICD-10-PCS | Mod: CPTII,S$GLB,, | Performed by: COLON & RECTAL SURGERY

## 2023-07-17 PROCEDURE — 99999 PR PBB SHADOW E&M-EST. PATIENT-LVL III: ICD-10-PCS | Mod: PBBFAC,,, | Performed by: COLON & RECTAL SURGERY

## 2023-07-17 PROCEDURE — 99999 PR PBB SHADOW E&M-EST. PATIENT-LVL III: CPT | Mod: PBBFAC,,, | Performed by: COLON & RECTAL SURGERY

## 2023-07-17 PROCEDURE — 1159F PR MEDICATION LIST DOCUMENTED IN MEDICAL RECORD: ICD-10-PCS | Mod: CPTII,S$GLB,, | Performed by: COLON & RECTAL SURGERY

## 2023-07-17 PROCEDURE — 3079F DIAST BP 80-89 MM HG: CPT | Mod: CPTII,S$GLB,, | Performed by: COLON & RECTAL SURGERY

## 2023-07-17 RX ORDER — LAMOTRIGINE 200 MG/1
TABLET ORAL
Qty: 135 TABLET | Refills: 0 | Status: SHIPPED | OUTPATIENT
Start: 2023-07-17 | End: 2023-10-09

## 2023-07-17 RX ORDER — SODIUM, POTASSIUM,MAG SULFATES 17.5-3.13G
1 SOLUTION, RECONSTITUTED, ORAL ORAL DAILY
Qty: 1 KIT | Refills: 0 | Status: SHIPPED | OUTPATIENT
Start: 2023-07-17 | End: 2023-07-19

## 2023-07-17 NOTE — TELEPHONE ENCOUNTER
I called the patient and confirmed that he is actively taking the medication without interruption and without any concern and confirmed that he is aware that retitration is necessary for any gap in treatment beyond 3 days.  The refill is being authorized.

## 2023-07-17 NOTE — PROGRESS NOTES
History & Physical    SUBJECTIVE:     CC: Rectosigmoid adenocarcinoma  Ref: Michelle Flores MD    History of Present Illness:  Patient is a 53 y.o. 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  Patient was undergoing his 1st ever colonoscopy on 08/04/2022 where a malignant-appearing mass was found in the rectosigmoid area.  This was biopsied and confirmed to be adenocarcinoma.  Patient also had multiple other adenomas removed.  He states that prior to the colonoscopy in since that time, he has been completely asymptomatic.  He denies any fever, chills, nausea, vomiting, diarrhea, constipation, hematochezia or melena.  He denies any family history of colorectal cancer or IBD.  He does have a past surgical history significant for robotic prostatectomy for prostate cancer.    09/22/2022: Robotic converted to open LAR, final pathology T2N0    Interval history:  Since last clinic visit, patient has done well.  He is tolerating regular diet having normal bowel function.  Denies any fever, chills, nausea, vomiting, hematochezia or melena.  Had a CT scan showing no evidence of recurrent or metastatic disease.       Review of patient's allergies indicates:   Allergen Reactions    Aripiprazole Other (See Comments)    Sildenafil Other (See Comments)    Bupropion hcl Anxiety    Penicillins Hives and Rash       Current Outpatient Medications   Medication Sig Dispense Refill    atorvastatin (LIPITOR) 80 MG tablet Take 1 tablet (80 mg total) by mouth once daily. TK 1 T PO D 90 tablet 1    blood-glucose sensor (DEXCOM G7 SENSOR) Miriam 1 each by Misc.(Non-Drug; Combo Route) route every 10 days. 3 each 11    blood-glucose transmitter (DEXCOM G6 TRANSMITTER) Miriam 1 each by Misc.(Non-Drug; Combo Route) route once daily. 1 each 3    docusate sodium (COLACE) 100 MG capsule Take 1 capsule (100 mg total) by mouth 2 (two) times daily as needed. 20 capsule 0    empagliflozin (JARDIANCE) 25 mg tablet Take 1 tablet (25 mg total)  by mouth once daily. 30 tablet 11    flash glucose sensor (FREESTYLE JOAQUIM 2 SENSOR) Kit 1 each by Misc.(Non-Drug; Combo Route) route every 14 (fourteen) days. 2 kit 11    glipiZIDE (GLUCOTROL) 5 MG tablet Take 1 tablet (5 mg total) by mouth 2 (two) times daily with meals. 60 tablet 0    insulin glargine-yfgn (SEMGLEE,INSULIN GLARG-YFGN,PEN) 100 unit/mL (3 mL) InPn INJECT 30 UNITS SUBCUTANEOUSLY ONCE DAILY IN THE EVENING 15 mL 3    insulin lispro (HUMALOG KWIKPEN INSULIN) 100 unit/mL pen Inject 12 Units into the skin 3 (three) times daily with meals. 12 mL 11    lamoTRIgine (LAMICTAL) 200 MG tablet TAKE 1 & 1/2 (ONE & ONE-HALF) TABLETS BY MOUTH ONCE DAILY 135 tablet 0    pep injection Inject 0.15 ml as directed     For compounding pharmacy use:   Add PAPAVERINE 30 mg  Add PHENTOLAMINE 1 mg  Add ALPROSTADIL 20 mcg 1 vial 5    QUEtiapine (SEROQUEL) 100 MG Tab Take 1 tablet by mouth in the evening 30 tablet 1    semaglutide (OZEMPIC) 1 mg/dose (4 mg/3 mL) Inject 1 mg into the skin every 7 days. 1 pen 11    sodium,potassium,mag sulfates (SUPREP BOWEL PREP KIT) 17.5-3.13-1.6 gram SolR Take 177 mLs by mouth once daily. for 2 days 1 kit 0    tadalafiL (CIALIS) 20 MG Tab       traMADoL (ULTRAM) 50 mg tablet Take 1 tablet (50 mg total) by mouth every 6 (six) hours as needed for Pain. 20 tablet 0    venlafaxine (EFFEXOR-XR) 150 MG Cp24 Take 1 capsule by mouth once daily 90 capsule 0     No current facility-administered medications for this visit.       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 Patrick Sanz is a 52 y.o. male patient of Dr. Woodward status post robot-assisted radical prostatectomy with partial nerve sparing on 10/11/21 for what proved to be a 8cc, pGS4+3 (70% high grade) [id number] (0/15)  prostate cancer excised with a focal positive 11mm margin and a positive benign margin. His RADHA-S score is 7. His post op PSA was low detectable and his most recent is now 0.177. H    Sleep apnea      Past Surgical History:   Procedure Laterality Date    COLON SURGERY  10.2022    COLONOSCOPY N/A 08/04/2022    Procedure: COLONOSCOPY;  Surgeon: Michelle Flores MD;  Location: [facility] ENDO;  Service: Endoscopy;  Laterality: N/A;    HERNIA REPAIR      INJECTION OF ANESTHETIC AGENT INTO TISSUE PLANE DEFINED BY TRANSVERSUS ABDOMINIS MUSCLE N/A 09/22/2022    Procedure: BLOCK, TRANSVERSUS ABDOMINIS PLANE;  Surgeon: Cj Quinn MD;  Location: [facility] OR;  Service: General;  Laterality: N/A;    MOBILIZATION OF SPLENIC FLEXURE N/A 09/22/2022    Procedure: MOBILIZATION, SPLENIC FLEXURE;  Surgeon: Cj Quinn MD;  Location: [facility] OR;  Service: General;  Laterality: N/A;    PROSTATE SURGERY  09.2021    ROBOT-ASSISTED LOW ANTERIOR RESECTION OF COLON N/A 09/22/2022    Procedure: XI ROBOTIC RESECTION, COLON, LOW ANTERIOR;  Surgeon: Cj Quinn MD;  Location: [facility] OR;  Service: General;  Laterality: N/A;  CONVERTED TO OPEN     Family History   Problem Relation Age of Onset    Diabetes Mother         Type2    Hearing loss Mother         Hearing Aids    Hypertension Father     Diabetes Maternal Uncle         Tyoe 2    Diabetes Maternal Aunt         Type 2     Social History     Tobacco Use    Smoking status: Former     Packs/day: 0.25     Years: 15.00     Pack years: 3.75     Types: Cigarettes     Start date: 1/1/1998     Quit date: 1/1/2013     Years since quitting: 10.5    Smokeless tobacco: Former    Tobacco comments:     Was a part time smoker.  1 pack could last 2 or more weeks   Substance Use Topics    Alcohol use: Not Currently     Comment: May have 1 or 2 drinks at social events or restaurants    Drug use: Never        Review of Systems:  Review of Systems   Constitutional:  Negative for activity change,  appetite change, chills, fatigue, fever and unexpected weight change.   HENT:  Negative for congestion, ear pain, sore throat and trouble swallowing.    Eyes:  Negative for pain, redness, itching and visual disturbance.   Respiratory:  Negative for cough, shortness of breath and wheezing.    Cardiovascular:  Negative for chest pain, palpitations and leg swelling.   Gastrointestinal:  Negative for abdominal distention, abdominal pain, anal bleeding, blood in stool, constipation, diarrhea, nausea, rectal pain and vomiting.   Endocrine: Negative for cold intolerance, heat intolerance and polyuria.   Genitourinary:  Negative for dysuria, flank pain, frequency and hematuria.   Musculoskeletal:  Negative for arthralgias, gait problem, joint swelling and neck pain.   Skin:  Negative for color change, rash and wound.   Allergic/Immunologic: Negative for environmental allergies and immunocompromised state.   Neurological:  Negative for dizziness, speech difficulty, weakness, light-headedness and numbness.   Hematological:  Negative for adenopathy.   Psychiatric/Behavioral:  Negative for agitation, confusion and hallucinations.      OBJECTIVE:     Vital Signs (Most Recent)  Pulse: 91 (07/17/23 1435)  BP: 122/84 (07/17/23 1435)           Physical Exam:  Physical Exam  Constitutional:       Appearance: He is well-developed.   HENT:      Head: Normocephalic and atraumatic.   Eyes:      Conjunctiva/sclera: Conjunctivae normal.   Neck:      Thyroid: No thyromegaly.   Cardiovascular:      Rate and Rhythm: Normal rate.   Pulmonary:      Effort: Pulmonary effort is normal. No respiratory distress.   Abdominal:      General: There is no distension.      Palpations: Abdomen is soft. There is no mass.      Tenderness: There is no abdominal tenderness.      Hernia: No hernia is present.      Comments: Well-healed incisions   Musculoskeletal:         General: No tenderness. Normal range of motion.      Cervical back: Normal range of  motion.   Skin:     General: Skin is warm and dry.      Capillary Refill: Capillary refill takes less than 2 seconds.      Findings: No rash.   Neurological:      Mental Status: He is alert and oriented to person, place, and time.       Laboratory  Lab Results   Component Value Date    WBC 8.77 11/11/2022    HGB 15.5 11/11/2022    HCT 46.5 11/11/2022     11/11/2022    CHOL 244 (H) 03/07/2023    TRIG 233 (H) 03/07/2023    HDL 50 03/07/2023    ALT 63 (H) 03/07/2023    AST 27 03/07/2023     03/07/2023    K 4.2 03/07/2023     03/07/2023    CREATININE 0.9 03/07/2023    BUN 11 03/07/2023    CO2 25 03/07/2023    TSH 0.943 10/03/2020    PSA 17.6 (H) 03/15/2021    HGBA1C 8.9 (H) 03/07/2023       Component Ref Range & Units 3 mo ago 7 mo ago 8 mo ago 9 mo ago 11 mo ago   CEA 0.0 - 5.0 ng/mL 2.8  2.8 CM  2.6 CM  7.4 High  CM  60.3 High  CM        Diagnostic Results:  Colonoscopy: reviewed  CT: Reviewed      CT May 2023:  FINDINGS:  Chest:     Structures at the base of the neck: Unremarkable     Heart and great vessels: Within normal limits.  Trace pericardial effusion.     Adenopathy: None demonstrated.     Lungs: Clear bilaterally.     Abdomen:     Liver: 2.2 cm lesion within segment 6 and 3 cm lesion within segment 5 previously characterized as hemangiomas.  Multiple additional scattered subcentimeter hypoattenuating foci seen throughout the liver are too small to reliably characterize but also appears stable.  Focal fatty infiltration seen along the falciform ligament.     Gallbladder and biliary: Within normal limits.     Spleen: Within normal limits.     Pancreas: Within normal limits.     Adrenals: Within normal limits.     Kidneys: There is a 13 mm cyst at the upper pole of the right kidney which is not appear significantly changed.  No hydronephrosis or hydroureter.     Bowel: No evidence of bowel obstruction.  Postoperative changes are seen within the distal sigmoid colon.  Mild constipation.  Tiny  nodes are seen within the pelvis none of which appear significantly enlarged.  Minimal presacral edema.     Peritoneum: No ascites or pneumoperitoneum.     Abdominal Adenopathy: None.     Vasculature: Within normal limits.     Pelvis:     Urinary bladder: Mild nonspecific bladder wall thickening..     Prostate and seminal vesicles: Within normal limits.     Pelvis adenopathy: There are multiple prominent inferior mesenteric/superior rectal lymph nodes present adjacent to the known mass at the rectosigmoid junction.     Bones: No acute findings.     Miscellaneous: Small fat containing inguinal hernias..     Impression:     Postoperative changes seen within the sigmoid colon.  Minimal thickening at the anastomosis without discrete mass identified.  No evidence of recurrent or metastatic disease.    Final Surgical Pathology:  Final Pathologic Diagnosis 1. Colon, anastomotic donuts, low anterior resection:   - Incidental hyperplastic polyp present in one segment of colon   - Negative for dysplasia and malignancy   2. Colon, proximal margin, low anterior resection:   - Segment of colon with no diagnostic histopathologic alterations   - Negative for dysplasia and malignancy   3. Colon, sigmoid and rectum, low anterior resection:   - Invasive adenocarcinoma, moderately differentiated   - Immunohistochemical studies for Mismatch Repair (MMR) proteins pending   - Pathologic stage: pT2N0   - See synoptic report   TUMOR SYNOPTIC: (COLON and RECTUM)   Standard(s): AJCC 8th Edition   SPECIMEN   Procedure: Low anterior resection   Macroscopic Evaluation of Mesorectum: Complete   TUMOR   Tumor Site: Rectum, entirely above anterior peritoneal reflection   Histologic Type: Adenocarcinoma   Histologic Grade: G2, moderately differentiated (50-95% gland formation)   Tumor Size: 7.5 cm   Tumor Extent: Invades into muscularis propria   Macroscopic Tumor Perforation: Not identified   Lymphovascular invasion: Not identified   Perineural  Invasion: Not identified   TREATMENT EFFECT: No known presurgical therapy   MARGINS   Margin Status for Invasive Carcinoma: All margins uninvolved by invasive   carcinoma and dysplasia   Margin Status for Non-Invasive Tumor: All margins negative for dysplasia   Closest Margin to Invasive Carcinoma: Distal, 2.5 cm   REGIONAL LYMPH NODES   Number of Lymph Nodes Involved: 0   Number of Lymph Nodes Examined: 27   Tumor Deposits: Not identified   PATHOLOGIC STAGE   TNM Descriptors: N/A   pT Category: pT2; Tumor invades the muscularis propria   pN Category: pN0; No regional lymph node metastasis   Additional findings: N/A   The following blocks are suitable for additional testing: 3F, 3G  VC      Comment: Interp By Silvestre Bassett MD, Signed on 09/30/2022 at 16:15   Supplemental Diagnosis This supplemental report is issued to include results of immunohistochemical   studies. No changes are made to the previously rendered diagnoses.   Immunohistochemistry (IHC) Testing for Mismatch Repair (MMR) Proteins:   MLH1 - Intact nuclear expression   MSH2 - Intact nuclear expression   MSH6 - Intact nuclear expression   PMS2 - Intact nuclear expression   Background nonneoplastic tissue/internal control with intact nuclear   expression   IHC Interpretation   No loss of nuclear expression of MMR proteins: low probability of   microsatellite instability   There are exceptions to the above IHC interpretations. These results should   not be considered in isolation, and clinical correlation with genetic   counseling is recommended to assess the need for germline testing.           ASSESSMENT/PLAN:     54yo M with rectosigmoid/upper rectal adenocarcinoma now s/p robotic converted to open LAR with final pathology showing Stage I T2N0 adenocarcinoma    - CEA level due today and [id number]  - CT C/A/P q6-12 months  - Colonoscopy due next 1-2 months. Ordered and Suprep sent to [facility]  - RTC 3 months for surveillance    Cj Quinn  MD  Colon and Rectal Surgery  [facility]

## 2023-07-19 ENCOUNTER — PATIENT MESSAGE (OUTPATIENT)
Dept: UROLOGY | Facility: CLINIC | Age: 53
End: 2023-07-19
Payer: COMMERCIAL

## 2023-07-23 RX ORDER — VENLAFAXINE HYDROCHLORIDE 150 MG/1
CAPSULE, EXTENDED RELEASE ORAL
Qty: 90 CAPSULE | Refills: 0 | Status: SHIPPED | OUTPATIENT
Start: 2023-07-23 | End: 2023-08-07

## 2023-07-24 ENCOUNTER — PATIENT MESSAGE (OUTPATIENT)
Dept: INTERNAL MEDICINE | Facility: CLINIC | Age: 53
End: 2023-07-24
Payer: COMMERCIAL

## 2023-08-06 ENCOUNTER — PATIENT MESSAGE (OUTPATIENT)
Dept: PSYCHIATRY | Facility: CLINIC | Age: 53
End: 2023-08-06
Payer: COMMERCIAL

## 2023-08-07 RX ORDER — VENLAFAXINE HYDROCHLORIDE 150 MG/1
CAPSULE, EXTENDED RELEASE ORAL
Qty: 90 CAPSULE | Refills: 0 | Status: SHIPPED | OUTPATIENT
Start: 2023-08-07 | End: 2023-10-16 | Stop reason: SDUPTHER

## 2023-08-07 RX ORDER — QUETIAPINE FUMARATE 100 MG/1
100 TABLET, FILM COATED ORAL NIGHTLY
Qty: 30 TABLET | Refills: 2 | Status: SHIPPED | OUTPATIENT
Start: 2023-08-07 | End: 2023-08-07

## 2023-08-07 RX ORDER — QUETIAPINE FUMARATE 200 MG/1
200 TABLET, FILM COATED ORAL NIGHTLY
Qty: 30 TABLET | Refills: 2 | Status: SHIPPED | OUTPATIENT
Start: 2023-08-07 | End: 2023-10-16 | Stop reason: SDUPTHER

## 2023-08-25 ENCOUNTER — HOSPITAL ENCOUNTER (OUTPATIENT)
Facility: HOSPITAL | Age: 53
Discharge: HOME | End: 2023-08-25
Attending: COLON & RECTAL SURGERY | Admitting: COLON & RECTAL SURGERY
Payer: COMMERCIAL

## 2023-08-25 ENCOUNTER — ANESTHESIA EVENT (OUTPATIENT)
Dept: ENDOSCOPY | Facility: HOSPITAL | Age: 53
End: 2023-08-25
Payer: COMMERCIAL

## 2023-08-25 ENCOUNTER — ANESTHESIA (OUTPATIENT)
Dept: ENDOSCOPY | Facility: HOSPITAL | Age: 53
End: 2023-08-25
Payer: COMMERCIAL

## 2023-08-25 DIAGNOSIS — Z12.11 SCREENING FOR COLON CANCER: ICD-10-CM

## 2023-08-25 LAB
GLUCOSE SERPL-MCNC: 137 MG/DL (ref 70–110)
POCT GLUCOSE: 137 MG/DL (ref 70–110)

## 2023-08-25 PROCEDURE — 37000009 HC ANESTHESIA EA ADD 15 MINS: Performed by: COLON & RECTAL SURGERY

## 2023-08-25 PROCEDURE — 27201012 HC FORCEPS, HOT/COLD, DISP: Performed by: COLON & RECTAL SURGERY

## 2023-08-25 PROCEDURE — 45385 COLONOSCOPY W/LESION REMOVAL: CPT | Mod: 33,,, | Performed by: COLON & RECTAL SURGERY

## 2023-08-25 PROCEDURE — 45385 PR COLONOSCOPY,REMV LESN,SNARE: ICD-10-PCS | Mod: 33,,, | Performed by: COLON & RECTAL SURGERY

## 2023-08-25 PROCEDURE — 88305 TISSUE EXAM BY PATHOLOGIST: CPT | Mod: 26,,, | Performed by: PATHOLOGY

## 2023-08-25 PROCEDURE — 88305 TISSUE EXAM BY PATHOLOGIST: CPT | Performed by: PATHOLOGY

## 2023-08-25 PROCEDURE — 25000003 PHARM REV CODE 250: Performed by: FAMILY MEDICINE

## 2023-08-25 PROCEDURE — 45380 PR COLONOSCOPY,BIOPSY: ICD-10-PCS | Mod: 33,59,, | Performed by: COLON & RECTAL SURGERY

## 2023-08-25 PROCEDURE — 45380 COLONOSCOPY AND BIOPSY: CPT | Mod: PT,59 | Performed by: COLON & RECTAL SURGERY

## 2023-08-25 PROCEDURE — 63600175 PHARM REV CODE 636 W HCPCS: Performed by: FAMILY MEDICINE

## 2023-08-25 PROCEDURE — 37000008 HC ANESTHESIA 1ST 15 MINUTES: Performed by: COLON & RECTAL SURGERY

## 2023-08-25 PROCEDURE — 27201089 HC SNARE, DISP (ANY): Performed by: COLON & RECTAL SURGERY

## 2023-08-25 PROCEDURE — 45385 COLONOSCOPY W/LESION REMOVAL: CPT | Mod: PT | Performed by: COLON & RECTAL SURGERY

## 2023-08-25 PROCEDURE — 45380 COLONOSCOPY AND BIOPSY: CPT | Mod: 33,59,, | Performed by: COLON & RECTAL SURGERY

## 2023-08-25 PROCEDURE — 88305 TISSUE EXAM BY PATHOLOGIST: ICD-10-PCS | Mod: 26,,, | Performed by: PATHOLOGY

## 2023-08-25 RX ORDER — LIDOCAINE HYDROCHLORIDE 20 MG/ML
INJECTION, SOLUTION EPIDURAL; INFILTRATION; INTRACAUDAL; PERINEURAL
Status: DISCONTINUED | OUTPATIENT
Start: 2023-08-25 | End: 2023-08-25

## 2023-08-25 RX ORDER — PROPOFOL 10 MG/ML
VIAL (ML) INTRAVENOUS
Status: DISCONTINUED | OUTPATIENT
Start: 2023-08-25 | End: 2023-08-25

## 2023-08-25 RX ADMIN — SODIUM CHLORIDE, SODIUM LACTATE, POTASSIUM CHLORIDE, AND CALCIUM CHLORIDE: .6; .31; .03; .02 INJECTION, SOLUTION INTRAVENOUS at 07:08

## 2023-08-25 RX ADMIN — LIDOCAINE HYDROCHLORIDE 50 MG: 20 INJECTION, SOLUTION EPIDURAL; INFILTRATION; INTRACAUDAL; PERINEURAL at 07:08

## 2023-08-25 RX ADMIN — PROPOFOL 40 MG: 10 INJECTION, EMULSION INTRAVENOUS at 08:08

## 2023-08-25 RX ADMIN — PROPOFOL 50 MG: 10 INJECTION, EMULSION INTRAVENOUS at 07:08

## 2023-08-25 RX ADMIN — PROPOFOL 100 MG: 10 INJECTION, EMULSION INTRAVENOUS at 07:08

## 2023-08-25 NOTE — TRANSFER OF CARE
"Anesthesia Transfer of Care Note    Patient: Patrick Sanz    Procedure(s) Performed: Procedure(s) (LRB):  COLONOSCOPY (N/A)    Patient location: GI    Anesthesia Type: MAC    Transport from OR: Transported from OR on room air with adequate spontaneous ventilation    Post pain: adequate analgesia    Post assessment: no apparent anesthetic complications    Post vital signs: stable    Level of consciousness: awake and responds to stimulation    Nausea/Vomiting: no nausea/vomiting    Complications: none    Transfer of care protocol was followed      Last vitals:   Visit Vitals  /85 (BP Location: Left arm, Patient Position: Lying)   Pulse 103   Temp 36.5 °C (97.7 °F) (Temporal)   Resp 16   Ht 5' 8" (1.727 m)   Wt 104.3 kg (230 lb)   SpO2 98%   BMI 34.97 kg/m²     "

## 2023-08-25 NOTE — BRIEF OP NOTE
[facility] Endoscopy (Hospital)  Brief Operative Note     SUMMARY     Surgery Date: 8/25/2023     Surgeon(s) and Role:     * Jem Quinn MD - Primary    Assisting Surgeon: None    Pre-op Diagnosis:  Adenocarcinoma of rectosigmoid junction [C19]    Post-op Diagnosis:  Post-Op Diagnosis Codes:     * Adenocarcinoma of rectosigmoid junction [C19]    Procedure(s) (LRB):  COLONOSCOPY (N/A)    Anesthesia: Choice    Description of the findings of the procedure: multiple polyps removed    Estimated Blood Loss: * No values recorded between 8/25/2023  7:44 AM and 8/25/2023  8:05 AM *         Specimens:   Specimen (24h ago, onward)       Start     Ordered    08/25/23 0758  Specimen to Pathology, Surgery Gastrointestinal tract  Once        Comments: Pre-op Diagnosis: Adenocarcinoma of rectosigmoid junction [C19]Procedure(s):COLONOSCOPY 1. Transverse Polypectomy2. Descending Polypectomy x3     References:    Click here for ordering Quick Tip   Question Answer Comment   Procedure Type: Gastrointestinal tract    Which provider would you like to cc? JEM QUINN    Release to patient Immediate        08/25/23 0805                    Discharge Note    SUMMARY     Admit Date: 8/25/2023    Discharge Date and Time: 8/25/2023 8:05 AM    Hospital Course Patient was seen in the preoperative area by both myself and anesthesia. All consents were verified and all questions appropriately answered. All risks, benefits and alternatives explained to patient. Patient proceeded to endoscopy suite for colonoscopy and was discharged home postoperative once cleared by anesthesia.    Final Diagnosis: Post-Op Diagnosis Codes:     * Adenocarcinoma of rectosigmoid junction [C19]    Disposition: Home or Self Care    Follow Up/Patient Instructions: See Provation report    Medications:  Reconciled Home Medications:      Medication List        CONTINUE taking these medications      atorvastatin 80 MG tablet  Commonly known as: LIPITOR  Take 1  tablet (80 mg total) by mouth once daily. TK 1 T PO D     DEXCOM G6 TRANSMITTER Miriam  Generic drug: blood-glucose transmitter  1 each by Misc.(Non-Drug; Combo Route) route once daily.     DEXCOM G7 SENSOR Miriam  Generic drug: blood-glucose sensor  1 each by Misc.(Non-Drug; Combo Route) route every 10 days.     docusate sodium 100 MG capsule  Commonly known as: COLACE  Take 1 capsule (100 mg total) by mouth 2 (two) times daily as needed.     empagliflozin 25 mg tablet  Commonly known as: JARDIANCE  Take 1 tablet (25 mg total) by mouth once daily.     FREESTYLE JOAQUIM 2 SENSOR Kit  Generic drug: flash glucose sensor  1 each by Misc.(Non-Drug; Combo Route) route every 14 (fourteen) days.     glipiZIDE 5 MG tablet  Commonly known as: GLUCOTROL  Take 1 tablet (5 mg total) by mouth 2 (two) times daily with meals.     insulin lispro 100 unit/mL pen  Commonly known as: HumaLOG KwikPen Insulin  Inject 12 Units into the skin 3 (three) times daily with meals.     lamoTRIgine 200 MG tablet  Commonly known as: LAMICTAL  TAKE 1 & 1/2 (ONE & ONE-HALF) TABLETS BY MOUTH ONCE DAILY     OZEMPIC 1 mg/dose (4 mg/3 mL)  Generic drug: semaglutide  Inject 1 mg into the skin every 7 days.     PEP INJECTION (FOR RX USE)  Inject 0.15 ml as directed     For compounding pharmacy use:   Add PAPAVERINE 30 mg  Add PHENTOLAMINE 1 mg  Add ALPROSTADIL 20 mcg     QUEtiapine 200 MG Tab  Commonly known as: SEROQUEL  Take 1 tablet (200 mg total) by mouth every evening.     SEMGLEE(INSULIN GLARG-YFGN) unit/mL (3 mL) Inpn  Generic drug: insulin glargine-yfgn  INJECT 30 UNITS SUBCUTANEOUSLY ONCE DAILY IN THE EVENING     tadalafiL 20 MG Tab  Commonly known as: CIALIS     traMADoL 50 mg tablet  Commonly known as: ULTRAM  Take 1 tablet (50 mg total) by mouth every 6 (six) hours as needed for Pain.     venlafaxine 150 MG Cp24  Commonly known as: EFFEXOR-XR  Take 1 capsule by mouth once daily            Discharge Procedure Orders   Diet general     Call MD  for:  temperature >100.4     Call MD for:  persistent nausea and vomiting     Call MD for:  severe uncontrolled pain     Call MD for:  difficulty breathing, headache or visual disturbances     Call MD for:  redness, tenderness, or signs of infection (pain, swelling, redness, odor or green/yellow discharge around incision site)     Call MD for:  hives     Call MD for:  persistent dizziness or light-headedness     Call MD for:  extreme fatigue     Activity as tolerated      Follow-up Information       Edilberto Figueroa MD Follow up.    Specialty: Internal Medicine  Why: As needed  Contact information:  [id number]  [place] [address] [phone]

## 2023-08-25 NOTE — ANESTHESIA PREPROCEDURE EVALUATION
08/25/2023  Patrick Sanz is a 53 y.o., male.      Pre-op Assessment    I have reviewed the Patient Summary Reports.     I have reviewed the Nursing Notes. I have reviewed the NPO Status.   I have reviewed the Medications.     Review of Systems  Social:  Former Smoker    Hematology/Oncology:  Hematology Normal   Oncology Normal     EENT/Dental:EENT/Dental Normal   Cardiovascular:  Cardiovascular Normal  ECG has been reviewed.    Pulmonary:   Sleep Apnea    Renal/:   renal calculi    Hepatic/GI:   Colorectal carcinoma   Musculoskeletal:  Musculoskeletal Normal    Neurological:  Neurology Normal    Endocrine:   Diabetes    Dermatological:  Skin Normal    Psych:   Psychiatric History          Physical Exam  General: Well nourished, Cooperative, Alert and Oriented    Airway:  Mallampati: II   Mouth Opening: Normal  TM Distance: Normal  Tongue: Normal  Neck ROM: Normal ROM    Dental:  Intact    Chest/Lungs:  Clear to auscultation    Heart:  Rhythm: Regular Rhythm  Sounds: Normal    Abdomen:  Normal        Anesthesia Plan  Type of Anesthesia, risks & benefits discussed:    Anesthesia Type: MAC  Intra-op Monitoring Plan: Standard ASA Monitors  Induction:  IV  Informed Consent: Informed consent signed with the Patient and all parties understand the risks and agree with anesthesia plan.  All questions answered.   ASA Score: 2  Day of Surgery Review of History & Physical: I have interviewed and examined the patient. I have reviewed the patient's H&P dated:     Ready For Surgery From Anesthesia Perspective.     .

## 2023-08-25 NOTE — PROVATION PATIENT INSTRUCTIONS
Discharge Summary/Instructions after an Endoscopic Procedure  Patient Name: Patrick Sanz  Patient MRN: [id number]  Patient YOB: 1970  Friday, August 25, 2023 Cj Quinn MD  Dear patient,  As a result of recent federal legislation (The Federal Cures Act), you may   receive lab or pathology results from your procedure in your [facility]   account before your physician is able to contact you. Your physician or   their representative will relay the results to you with their   recommendations at their soonest availability.  Thank you,  RESTRICTIONS:  During your procedure today, you received medications for sedation.  These   medications may affect your judgment, balance and coordination.  Therefore,   for 24 hours, you have the following restrictions:   - DO NOT drive a car, operate machinery, make legal/financial decisions,   sign important papers or drink alcohol.    ACTIVITY:  Today: no heavy lifting, straining or running due to procedural   sedation/anesthesia.  The following day: return to full activity including work.  DIET:  Eat and drink normally unless instructed otherwise.     TREATMENT FOR COMMON SIDE EFFECTS:  - Mild abdominal pain, nausea, belching, bloating or excessive gas:  rest,   eat lightly and use a heating pad.  - Sore Throat: treat with throat lozenges and/or gargle with warm salt   water.  - Because air was used during the procedure, expelling large amounts of air   from your rectum or belching is normal.  - If a bowel prep was taken, you may not have a bowel movement for 1-3 days.    This is normal.  SYMPTOMS TO WATCH FOR AND REPORT TO YOUR PHYSICIAN:  1. Abdominal pain or bloating, other than gas cramps.  2. Chest pain.  3. Back pain.  4. Signs of infection such as: chills or fever occurring within 24 hours   after the procedure.  5. Rectal bleeding, which would show as bright red, maroon, or black stools.   (A tablespoon of blood from the rectum is not serious, especially if    hemorrhoids are present.)  6. Vomiting.  7. Weakness or dizziness.  GO DIRECTLY TO THE NEAREST EMERGENCY ROOM IF YOU HAVE ANY OF THE FOLLOWING:      Difficulty breathing              Chills and/or fever over 101 F   Persistent vomiting and/or vomiting blood   Severe abdominal pain   Severe chest pain   Black, tarry stools   Bleeding- more than one tablespoon   Any other symptom or condition that you feel may need urgent attention  Your doctor recommends these additional instructions:  If any biopsies were taken, your doctors clinic will contact you in 1 to 2   weeks with any results.  - Discharge patient to home.   - High fiber diet.   - Continue present medications.   - Await pathology results.   - Repeat colonoscopy in 3 years for surveillance.   - Return to primary care physician PRN.  For questions, problems or results please call your physician Cj Quinn MD at Work:  [phone]  If you have any questions about the above instructions, call the GI   department at [phone] or call the endoscopy unit at [phone]   from 7am until 3 pm.  [facility] [place], EMERGENCY ROOM PHONE NUMBER:   [phone]  IF A COMPLICATION OR EMERGENCY SITUATION ARISES AND YOU ARE UNABLE TO REACH   YOUR PHYSICIAN - GO DIRECTLY TO THE EMERGENCY ROOM.  I have read or have had read to me these discharge instructions for my   procedure and have received a written copy.  I understand these   instructions and will follow-up with my physician if I have any questions.     __________________________________       _____________________________________  Nurse Signature                                          Patient/Designated   Responsible Party Signature  MD Cj Lubin MD  8/25/2023 8:15:53 AM  This report has been verified and signed electronically.  Dear patient,  As a result of recent federal legislation (The Federal Cures Act), you may   receive lab or pathology results  from your procedure in your [facility]   account before your physician is able to contact you. Your physician or   their representative will relay the results to you with their   recommendations at their soonest availability.  Thank you,  PROVATION

## 2023-08-25 NOTE — ANESTHESIA POSTPROCEDURE EVALUATION
Anesthesia Post Evaluation    Patient: Patrick Sanz    Procedure(s) Performed: Procedure(s) (LRB):  COLONOSCOPY (N/A)    Final Anesthesia Type: MAC      Patient location during evaluation: GI PACU  Patient participation: Yes- Able to Participate  Level of consciousness: awake and alert  Post-procedure vital signs: reviewed and stable  Pain management: adequate  Airway patency: patent    PONV status at discharge: No PONV  Anesthetic complications: no      Cardiovascular status: stable  Respiratory status: unassisted and spontaneous ventilation  Hydration status: euvolemic  Follow-up not needed.          Vitals Value Taken Time   BP  08/25/23 0833   Temp  08/25/23 0833   Pulse  08/25/23 0833   Resp  08/25/23 0833   SpO2  08/25/23 0833         No case tracking events are documented in the log.      Pain/Wendy Score: No data recorded

## 2023-08-25 NOTE — PLAN OF CARE
DR PAREDES AT BEDSIDE TO SPEAK TO PT. REGARDING RESULTS.  VSS, NO GI BLEEDING, NO ABD. PAIN, NO N/V. PT. DISCHARGED FROM UNIT.

## 2023-08-28 ENCOUNTER — PATIENT MESSAGE (OUTPATIENT)
Dept: SURGERY | Facility: CLINIC | Age: 53
End: 2023-08-28
Payer: COMMERCIAL

## 2023-08-28 VITALS
BODY MASS INDEX: 34.86 KG/M2 | TEMPERATURE: 98 F | HEART RATE: 88 BPM | OXYGEN SATURATION: 96 % | SYSTOLIC BLOOD PRESSURE: 125 MMHG | HEIGHT: 68 IN | RESPIRATION RATE: 24 BRPM | DIASTOLIC BLOOD PRESSURE: 72 MMHG | WEIGHT: 230 LBS

## 2023-08-28 LAB
FINAL PATHOLOGIC DIAGNOSIS: NORMAL
GROSS: NORMAL
Lab: NORMAL

## 2023-08-28 NOTE — PROGRESS NOTES
PCP: Edilberto Figueroa MD    Subjective:     Chief Complaint: Diabetes     TELEMEDICINE VISIT:     The patient location is: Home  The chief complaint leading to consultation is: Diabetes Follow up  Visit type: Virtual visit with synchronous audio and video  Total time spent with patient: 35  Each patient to whom he or she provides medical services by telemedicine is:  (1) informed of the relationship between the physician and patient and the respective role of any other health care provider with respect to management of the patient; and (2) notified that he or she may decline to receive medical services by telemedicine and may withdraw from such care at any time.    Notes:     HISTORY OF PRESENT ILLNESS: 53 y.o.   male presenting for diabetes management.   The patient's last visit with me was on 5/23/2023.  Patient has had Type II diabetes since 2012.  Pertinent to decision making is the following comorbidities: Obesity by BMI and Bipolar Disorder  Patient has the following Diabetes complications: without complications  He is to be enrolled in diabetes education classes.     Patient's most recent A1c of 8.9% was completed 6 months ago.   Patient states since His last A1c His blood glucose levels have been high  after meals.   Patient monitors blood glucose 4 times per day with meter and Continuously with personal CGM Vipin. Off Vipin due to issues with reliability. Now using Dexcom but unable to troubleshoot sharing today.   Patient blood glucose monitoring device will not be uploaded into [employer] [facility] today.  Patient reports fasting blood sugars ranging from 150 - 200 and after meals less than 200.  Patient endorses the following diabetes related symptoms: Nausea. Related to ozempic.   Patient is due today for the following diabetes-related health maintenance standards: A1c and Influenza Vaccine. Previously est with Dr. Pak.   He denies recent hospital admissions or emergency room visits.   He denies  having hypoglycemia.   Patient's concerns today include glycemic control. Of note, patient was diagnosed with prostate cancer is currently undergoing Lupron treatment and radiation. No plans for chemotherapy at present.   Patient medication regimen is as below.     CURRENT DM MEDICATIONS:   Semglee 30 units at night  Humalog 12 units before meals TID   Jardiance 25 mg daily   Ozempic 0.5 mg weekly    Patient has failed the following Diabetes medications:   Trulicity   Basaglar / Lantus  Metformin        Labs Reviewed.       Lab Results   Component Value Date    CPEPTIDE 2.06 03/07/2023     Lab Results   Component Value Date    GLUTAMICACID 0.00 03/07/2023          //   , There is no height or weight on file to calculate BMI.  His blood sugar in clinic today is:         Review of Systems   Constitutional:  Negative for activity change, appetite change, chills and fever.   HENT:  Negative for dental problem, mouth sores, nosebleeds, sore throat and trouble swallowing.    Eyes:  Negative for pain and discharge.   Respiratory:  Negative for shortness of breath, wheezing and stridor.    Cardiovascular:  Negative for chest pain, palpitations and leg swelling.   Gastrointestinal:  Negative for abdominal pain, diarrhea, nausea and vomiting.   Endocrine: Negative for polydipsia, polyphagia and polyuria.   Genitourinary:  Negative for dysuria, frequency and urgency.   Musculoskeletal:  Negative for joint swelling and myalgias.   Skin:  Negative for rash and wound.   Neurological:  Negative for dizziness, syncope, weakness and headaches.   Psychiatric/Behavioral:  Negative for behavioral problems and dysphoric mood.        Diabetes Management Status  Statin: Taking  ACE/ARB: Not taking    Screening or Prevention Patient's value Goal Complete/Controlled?   HgA1C Testing and Control   Lab Results   Component Value Date    HGBA1C 8.9 (H) 03/07/2023      Annually/Less than 8% No   Lipid profile : 03/07/2023 Annually Yes   LDL  control Lab Results   Component Value Date    LDLCALC 147.4 03/07/2023    Annually/Less than 100 mg/dl  Yes   Nephropathy screening Lab Results   Component Value Date    MICALBCREAT 19.6 03/07/2023     Lab Results   Component Value Date    PROTEINUA Trace (A) 08/05/2021    Annually Yes   Blood pressure BP Readings from Last 1 Encounters:   08/25/23 125/72    Less than 140/90 Yes   Dilated retinal exam : 06/30/2023 Annually No    Foot exam   : 03/09/2023 Annually No     Social History     Socioeconomic History    Marital status: Significant Other   Tobacco Use    Smoking status: Former     Current packs/day: 0.00     Average packs/day: 0.3 packs/day for 15.0 years (3.8 ttl pk-yrs)     Types: Cigarettes     Start date: 1/1/1998     Quit date: 1/1/2013     Years since quitting: 10.6    Smokeless tobacco: Former    Tobacco comments:     Was a part time smoker.  1 pack could last 2 or more weeks   Substance and Sexual Activity    Alcohol use: Not Currently     Comment: May have 1 or 2 drinks at social events or restaurants    Drug use: Never    Sexual activity: Yes     Partners: Female     Birth control/protection: Partner-Vasectomy, Post-menopausal     Social Determinants of Health     Financial Resource Strain: Medium Risk (9/23/2022)    Overall Financial Resource Strain (CARDIA)     Difficulty of Paying Living Expenses: Somewhat hard   Food Insecurity: No Food Insecurity (9/23/2022)    Hunger Vital Sign     Worried About Running Out of Food in the Last Year: Never true     Ran Out of Food in the Last Year: Never true   Transportation Needs: No Transportation Needs (9/23/2022)    PRAPARE - Transportation     Lack of Transportation (Medical): No     Lack of Transportation (Non-Medical): No   Physical Activity: Inactive (9/23/2022)    Exercise Vital Sign     Days of Exercise per Week: 0 days     Minutes of Exercise per Session: 0 min   Stress: No Stress Concern Present (9/23/2022)    [place] [employer]  [employer] - Occupational Stress Questionnaire     Feeling of Stress : Only a little   Social Connections: Socially Isolated (9/23/2022)    Social Connection and Isolation Panel [NHANES]     Frequency of Communication with Friends and Family: Twice a week     Frequency of Social Gatherings with Friends and Family: Never     Attends Mandaen Services: Never     Active Member of Clubs or Organizations: No     Attends Club or Organization Meetings: Never     Marital Status:    Housing Stability: Low Risk  (9/23/2022)    Housing Stability Vital Sign     Unable to Pay for Housing in the Last Year: No     Number of Places Lived in the Last Year: 1     Unstable Housing in the Last Year: No     Past Medical History:   Diagnosis Date    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 Patrick Sanz is a 52 y.o. male patient of Dr. Woodward status post robot-assisted radical prostatectomy with partial nerve sparing on 10/11/21 for what proved to be a 8cc, pGS4+3 (70% high grade) [id number] (0/15) prostate cancer excised with a focal positive 11mm margin and a positive benign margin. His RADHA-S score is 7. His post op PSA was low detectable and his most recent is now 0.177. H    Sleep apnea        Objective:      Physical Exam  Constitutional:       General: He is not in acute distress.     Appearance: He is well-developed. He is not diaphoretic.   HENT:      Head: Normocephalic and atraumatic.      Right Ear: External ear normal.      Left Ear: External ear normal.      Nose: Nose normal.   Eyes:      General:         Right eye: No discharge.         Left eye: No discharge.   Pulmonary:      Effort: Pulmonary effort is normal. No respiratory distress.      Breath sounds: No stridor.   Musculoskeletal:         General: Normal range of motion.       Cervical back: Normal range of motion.   Skin:     Coloration: Skin is not pale.   Neurological:      Mental Status: He is alert and oriented to person, place, and time.      Motor: No abnormal muscle tone.      Coordination: Coordination normal.   Psychiatric:         Behavior: Behavior normal.         Thought Content: Thought content normal.         Judgment: Judgment normal.         Assessment / Plan:     Uncontrolled type 2 diabetes mellitus with hyperglycemia  -     Ambulatory referral/consult to Diabetes Education; Future; Expected date: 09/11/2023  -     Hemoglobin A1C; Standing    Hyperlipidemia associated with type 2 diabetes mellitus    Obesity (BMI 30-39.9)    Nausea  -     ondansetron (ZOFRAN) 4 MG tablet; Take 1 tablet (4 mg total) by mouth every 6 (six) hours as needed for Nausea.  Dispense: 40 tablet; Refill: 0        Additional Plan Details:    - POCT Glucose  - Encouraged continuation of lifestyle changes including regular exercise and limiting carbohydrates to 30-45 grams per meal threes times daily and 15 grams per snack with a limit of two daily.   - Encouraged continued monitoring of blood glucose with maintenance of 4 times daily and Continuously with personal CGM Dexcom.   - Current DM Medication Regimen: Change Humalog 10 units TID wm. Change Semglee 24 units daily. Continue Jardiance 25 mg daily. Change Ozempic 1 mg weekly.  - Referral to Diabetes Education - Dexcom troubleshooting   - Zofran PRN nausea  - Health Maintenance standards addressed today: A1c to be scheduled and Flu Shot - patient would like to complete outside of [facility].   - Nursing Visit: Patient is age 79 or younger with an A1c of 7.5 or greater and will not need nursing visit at this time .   - Follow up with me in 6 weeks with A1c prior.      CURRENT DM MEDICATIONS:   Semglee 24 units at night  Humalog 10 units before regular meal, 12 units before heavy carb meals, 4 units before snack  Jardiance 25 mg daily   Ozempic 1 mg  weekly     Blakeney McKnight, PA-C [facility]

## 2023-08-29 ENCOUNTER — OFFICE VISIT (OUTPATIENT)
Dept: DIABETES | Facility: CLINIC | Age: 53
End: 2023-08-29
Payer: COMMERCIAL

## 2023-08-29 DIAGNOSIS — R11.0 NAUSEA: ICD-10-CM

## 2023-08-29 DIAGNOSIS — E78.5 HYPERLIPIDEMIA ASSOCIATED WITH TYPE 2 DIABETES MELLITUS: ICD-10-CM

## 2023-08-29 DIAGNOSIS — E66.9 OBESITY (BMI 30-39.9): ICD-10-CM

## 2023-08-29 DIAGNOSIS — E11.69 HYPERLIPIDEMIA ASSOCIATED WITH TYPE 2 DIABETES MELLITUS: ICD-10-CM

## 2023-08-29 DIAGNOSIS — E11.65 UNCONTROLLED TYPE 2 DIABETES MELLITUS WITH HYPERGLYCEMIA: Primary | ICD-10-CM

## 2023-08-29 PROCEDURE — 99214 PR OFFICE/OUTPT VISIT, EST, LEVL IV, 30-39 MIN: ICD-10-PCS | Mod: 95,,, | Performed by: PHYSICIAN ASSISTANT

## 2023-08-29 PROCEDURE — 3052F HG A1C>EQUAL 8.0%<EQUAL 9.0%: CPT | Mod: CPTII,95,, | Performed by: PHYSICIAN ASSISTANT

## 2023-08-29 PROCEDURE — 3066F PR DOCUMENTATION OF TREATMENT FOR NEPHROPATHY: ICD-10-PCS | Mod: CPTII,95,, | Performed by: PHYSICIAN ASSISTANT

## 2023-08-29 PROCEDURE — 3052F PR MOST RECENT HEMOGLOBIN A1C LEVEL 8.0 - < 9.0%: ICD-10-PCS | Mod: CPTII,95,, | Performed by: PHYSICIAN ASSISTANT

## 2023-08-29 PROCEDURE — 99214 OFFICE O/P EST MOD 30 MIN: CPT | Mod: 95,,, | Performed by: PHYSICIAN ASSISTANT

## 2023-08-29 PROCEDURE — 3061F NEG MICROALBUMINURIA REV: CPT | Mod: CPTII,95,, | Performed by: PHYSICIAN ASSISTANT

## 2023-08-29 PROCEDURE — 3066F NEPHROPATHY DOC TX: CPT | Mod: CPTII,95,, | Performed by: PHYSICIAN ASSISTANT

## 2023-08-29 PROCEDURE — 3061F PR NEG MICROALBUMINURIA RESULT DOCUMENTED/REVIEW: ICD-10-PCS | Mod: CPTII,95,, | Performed by: PHYSICIAN ASSISTANT

## 2023-08-29 RX ORDER — ONDANSETRON 4 MG/1
4 TABLET, FILM COATED ORAL EVERY 6 HOURS PRN
Qty: 40 TABLET | Refills: 0 | Status: SHIPPED | OUTPATIENT
Start: 2023-08-29

## 2023-09-04 ENCOUNTER — PATIENT MESSAGE (OUTPATIENT)
Dept: DIABETES | Facility: CLINIC | Age: 53
End: 2023-09-04
Payer: COMMERCIAL

## 2023-09-04 NOTE — PATIENT INSTRUCTIONS
CURRENT DM MEDICATIONS:   Semglee 24 units at night  Humalog 10 units before regular meal, 12 units before heavy carb meals, 4 units before snack  Jardiance 25 mg daily   Ozempic 1 mg weekly

## 2023-09-05 ENCOUNTER — PATIENT MESSAGE (OUTPATIENT)
Dept: ADMINISTRATIVE | Facility: HOSPITAL | Age: 53
End: 2023-09-05
Payer: COMMERCIAL

## 2023-09-05 ENCOUNTER — TELEPHONE (OUTPATIENT)
Dept: DIABETES | Facility: CLINIC | Age: 53
End: 2023-09-05
Payer: COMMERCIAL

## 2023-09-08 ENCOUNTER — CLINICAL SUPPORT (OUTPATIENT)
Dept: DIABETES | Facility: CLINIC | Age: 53
End: 2023-09-08
Payer: COMMERCIAL

## 2023-09-08 ENCOUNTER — LAB VISIT (OUTPATIENT)
Dept: LAB | Facility: HOSPITAL | Age: 53
End: 2023-09-08
Attending: PHYSICIAN ASSISTANT
Payer: COMMERCIAL

## 2023-09-08 DIAGNOSIS — E11.65 UNCONTROLLED TYPE 2 DIABETES MELLITUS WITH HYPERGLYCEMIA: Primary | ICD-10-CM

## 2023-09-08 DIAGNOSIS — E11.65 UNCONTROLLED TYPE 2 DIABETES MELLITUS WITH HYPERGLYCEMIA: ICD-10-CM

## 2023-09-08 PROCEDURE — 36415 COLL VENOUS BLD VENIPUNCTURE: CPT | Performed by: PHYSICIAN ASSISTANT

## 2023-09-08 PROCEDURE — 83036 HEMOGLOBIN GLYCOSYLATED A1C: CPT | Performed by: PHYSICIAN ASSISTANT

## 2023-09-08 NOTE — PROGRESS NOTES
Patient came in for Dexcom placement, trouble shoot and sharing. Placed on left upper arm, patient tolerated well. Trouble shoot successful.

## 2023-09-09 LAB
ESTIMATED AVG GLUCOSE: 157 MG/DL (ref 68–131)
HBA1C MFR BLD: 7.1 % (ref 4–5.6)

## 2023-09-18 DIAGNOSIS — E78.5 HYPERLIPIDEMIA ASSOCIATED WITH TYPE 2 DIABETES MELLITUS: ICD-10-CM

## 2023-09-18 DIAGNOSIS — E11.69 HYPERLIPIDEMIA ASSOCIATED WITH TYPE 2 DIABETES MELLITUS: ICD-10-CM

## 2023-09-18 RX ORDER — ATORVASTATIN CALCIUM 80 MG/1
80 TABLET, FILM COATED ORAL
Qty: 30 TABLET | Refills: 0 | Status: SHIPPED | OUTPATIENT
Start: 2023-09-18 | End: 2024-01-22 | Stop reason: SDUPTHER

## 2023-09-25 ENCOUNTER — PATIENT MESSAGE (OUTPATIENT)
Dept: INTERNAL MEDICINE | Facility: CLINIC | Age: 53
End: 2023-09-25
Payer: COMMERCIAL

## 2023-09-25 NOTE — TELEPHONE ENCOUNTER
Dr. Figueroa can you please advise?    Hello,     I've been experiencing diarrhea for the past week.  I have been taking Loperamide but it hasn't been helping.  Is there something else you can prescribe?      The worst part is that my intestines are cramping and my body continues to painfully bear down, trying to pass a bowel movement, even though there's no stool left to pass.  Stools are watery and brown, there is no blood.     Can you also provide me with a Doctor's excuse for today? I tried working but I have to keep running to the bathroom.       Thank you,  Vishal EDGE  [phone]

## 2023-09-26 NOTE — TELEPHONE ENCOUNTER
I can not give work excuse if didn't see patient. Needs appt with me or available provider or urgent care if continues with symptoms.

## 2023-09-27 ENCOUNTER — OFFICE VISIT (OUTPATIENT)
Dept: INTERNAL MEDICINE | Facility: CLINIC | Age: 53
End: 2023-09-27
Payer: COMMERCIAL

## 2023-09-27 ENCOUNTER — PATIENT MESSAGE (OUTPATIENT)
Dept: DIABETES | Facility: CLINIC | Age: 53
End: 2023-09-27
Payer: COMMERCIAL

## 2023-09-27 ENCOUNTER — HOSPITAL ENCOUNTER (OUTPATIENT)
Dept: RADIOLOGY | Facility: HOSPITAL | Age: 53
Discharge: HOME | End: 2023-09-27
Attending: PHYSICIAN ASSISTANT
Payer: COMMERCIAL

## 2023-09-27 ENCOUNTER — PATIENT MESSAGE (OUTPATIENT)
Dept: INTERNAL MEDICINE | Facility: CLINIC | Age: 53
End: 2023-09-27

## 2023-09-27 VITALS
TEMPERATURE: 97 F | DIASTOLIC BLOOD PRESSURE: 80 MMHG | HEART RATE: 91 BPM | WEIGHT: 222 LBS | SYSTOLIC BLOOD PRESSURE: 122 MMHG | BODY MASS INDEX: 33.76 KG/M2 | OXYGEN SATURATION: 99 %

## 2023-09-27 DIAGNOSIS — R19.7 DIARRHEA, UNSPECIFIED TYPE: ICD-10-CM

## 2023-09-27 DIAGNOSIS — R10.84 GENERALIZED ABDOMINAL PAIN: Primary | ICD-10-CM

## 2023-09-27 DIAGNOSIS — R10.84 GENERALIZED ABDOMINAL PAIN: ICD-10-CM

## 2023-09-27 PROBLEM — Z12.11 COLON CANCER SCREENING: Status: RESOLVED | Noted: 2022-08-04 | Resolved: 2023-09-27

## 2023-09-27 PROCEDURE — 1159F MED LIST DOCD IN RCRD: CPT | Mod: CPTII,S$GLB,, | Performed by: PHYSICIAN ASSISTANT

## 2023-09-27 PROCEDURE — 74019 RADEX ABDOMEN 2 VIEWS: CPT | Mod: 26,,, | Performed by: RADIOLOGY

## 2023-09-27 PROCEDURE — 3061F NEG MICROALBUMINURIA REV: CPT | Mod: CPTII,S$GLB,, | Performed by: PHYSICIAN ASSISTANT

## 2023-09-27 PROCEDURE — 99213 PR OFFICE/OUTPT VISIT, EST, LEVL III, 20-29 MIN: ICD-10-PCS | Mod: S$GLB,,, | Performed by: PHYSICIAN ASSISTANT

## 2023-09-27 PROCEDURE — 99213 OFFICE O/P EST LOW 20 MIN: CPT | Mod: S$GLB,,, | Performed by: PHYSICIAN ASSISTANT

## 2023-09-27 PROCEDURE — 3061F PR NEG MICROALBUMINURIA RESULT DOCUMENTED/REVIEW: ICD-10-PCS | Mod: CPTII,S$GLB,, | Performed by: PHYSICIAN ASSISTANT

## 2023-09-27 PROCEDURE — 3079F PR MOST RECENT DIASTOLIC BLOOD PRESSURE 80-89 MM HG: ICD-10-PCS | Mod: CPTII,S$GLB,, | Performed by: PHYSICIAN ASSISTANT

## 2023-09-27 PROCEDURE — 3066F NEPHROPATHY DOC TX: CPT | Mod: CPTII,S$GLB,, | Performed by: PHYSICIAN ASSISTANT

## 2023-09-27 PROCEDURE — 3051F HG A1C>EQUAL 7.0%<8.0%: CPT | Mod: CPTII,S$GLB,, | Performed by: PHYSICIAN ASSISTANT

## 2023-09-27 PROCEDURE — 1160F PR REVIEW ALL MEDS BY PRESCRIBER/CLIN PHARMACIST DOCUMENTED: ICD-10-PCS | Mod: CPTII,S$GLB,, | Performed by: PHYSICIAN ASSISTANT

## 2023-09-27 PROCEDURE — 3051F PR MOST RECENT HEMOGLOBIN A1C LEVEL 7.0 - < 8.0%: ICD-10-PCS | Mod: CPTII,S$GLB,, | Performed by: PHYSICIAN ASSISTANT

## 2023-09-27 PROCEDURE — 1159F PR MEDICATION LIST DOCUMENTED IN MEDICAL RECORD: ICD-10-PCS | Mod: CPTII,S$GLB,, | Performed by: PHYSICIAN ASSISTANT

## 2023-09-27 PROCEDURE — 74019 XR ABDOMEN FLAT AND ERECT: ICD-10-PCS | Mod: 26,,, | Performed by: RADIOLOGY

## 2023-09-27 PROCEDURE — 3008F BODY MASS INDEX DOCD: CPT | Mod: CPTII,S$GLB,, | Performed by: PHYSICIAN ASSISTANT

## 2023-09-27 PROCEDURE — 3079F DIAST BP 80-89 MM HG: CPT | Mod: CPTII,S$GLB,, | Performed by: PHYSICIAN ASSISTANT

## 2023-09-27 PROCEDURE — 99999 PR PBB SHADOW E&M-EST. PATIENT-LVL V: CPT | Mod: PBBFAC,,, | Performed by: PHYSICIAN ASSISTANT

## 2023-09-27 PROCEDURE — 3074F SYST BP LT 130 MM HG: CPT | Mod: CPTII,S$GLB,, | Performed by: PHYSICIAN ASSISTANT

## 2023-09-27 PROCEDURE — 3074F PR MOST RECENT SYSTOLIC BLOOD PRESSURE < 130 MM HG: ICD-10-PCS | Mod: CPTII,S$GLB,, | Performed by: PHYSICIAN ASSISTANT

## 2023-09-27 PROCEDURE — 1160F RVW MEDS BY RX/DR IN RCRD: CPT | Mod: CPTII,S$GLB,, | Performed by: PHYSICIAN ASSISTANT

## 2023-09-27 PROCEDURE — 74019 RADEX ABDOMEN 2 VIEWS: CPT | Mod: TC

## 2023-09-27 PROCEDURE — 3066F PR DOCUMENTATION OF TREATMENT FOR NEPHROPATHY: ICD-10-PCS | Mod: CPTII,S$GLB,, | Performed by: PHYSICIAN ASSISTANT

## 2023-09-27 PROCEDURE — 99999 PR PBB SHADOW E&M-EST. PATIENT-LVL V: ICD-10-PCS | Mod: PBBFAC,,, | Performed by: PHYSICIAN ASSISTANT

## 2023-09-27 PROCEDURE — 3008F PR BODY MASS INDEX (BMI) DOCUMENTED: ICD-10-PCS | Mod: CPTII,S$GLB,, | Performed by: PHYSICIAN ASSISTANT

## 2023-09-27 RX ORDER — DICYCLOMINE HYDROCHLORIDE 20 MG/1
20 TABLET ORAL EVERY 6 HOURS PRN
Qty: 20 TABLET | Refills: 0 | Status: SHIPPED | OUTPATIENT
Start: 2023-09-27 | End: 2024-01-22

## 2023-09-27 NOTE — LETTER
November 13, 2023      [facility]  [id number] [place] [id number]  Phone: [phone]  Fax: [phone]       Patient: Patrick Sanz   YOB: 1970  Date of Visit: 11/13/2023    To Whom It May Concern:    Narda Sanz  was at [facility] on 11/13/2023. The patient may return to work/school on 11/13/2023 with no restrictions. If you have any questions or concerns, or if I can be of further assistance, please do not hesitate to contact me.    Sincerely,    Ramonita Beasley MA

## 2023-09-27 NOTE — LETTER
September 27, 2023      [facility]  [id number] [place] [id number]  Phone: [phone]  Fax: [phone]       Patient: Patrick Sanz   YOB: 1970  Date of Visit: 09/27/2023    To Whom It May Concern:    Narda Sanz  was at [facility] on 09/27/2023. Please excuse patient from work for the following dates: 09/25/2023 -09/28/2023. The patient may return to work/school on 09/29/2023 with no restrictions. If you have any questions or concerns, or if I can be of further assistance, please do not hesitate to contact me.    Sincerely,    PRERNA Ervin MA

## 2023-09-27 NOTE — PROGRESS NOTES
Subjective:       Patient ID: Patrick Sanz is a 53 y.o. male.    Chief Complaint: Diarrhea (Patient stated that his diarrhea started on Sept 8th. It has just gotten to the point where he has to race to the bathroom. He has to get up multiple times in the middle of the night. He body has points where he has expelled all bowels and his body still has to bare down like there is more but nothing comes out. )      HPI  53-year-old male presents to clinic with complaints of constant diarrhea for the last 2-3 weeks after his son had similar symptoms but resolved within a few days.  He reports at least 6-8 episodes of nonbloody diarrhea per day.  He is getting up in the middle of the night to have diarrhea.  He denies recent antibiotic usage.  He reports stool is nonbloody.  He reports increased gas.  He reports chronic nausea due to his diabetic medication but no vomiting and nausea has not worsened since the symptoms have started.  He does report fatigue but denies any fever, chills or sweats.  He tried Imodium without relief.    Review of Systems   Constitutional:  Positive for fatigue. Negative for chills and fever.   Gastrointestinal:  Positive for abdominal pain, diarrhea and nausea. Negative for abdominal distention, blood in stool, constipation and vomiting.   Neurological:  Negative for dizziness, light-headedness and headaches.       Objective:      Physical Exam  Vitals and nursing note reviewed.   Constitutional:       General: He is not in acute distress.     Appearance: He is well-developed.   HENT:      Head: Normocephalic and atraumatic.   Eyes:      General: Lids are normal. No scleral icterus.     Extraocular Movements: Extraocular movements intact.      Conjunctiva/sclera: Conjunctivae normal.   Cardiovascular:      Rate and Rhythm: Normal rate and regular rhythm.   Pulmonary:      Effort: Pulmonary effort is normal.      Breath sounds: Normal breath sounds. No decreased breath sounds, wheezing,  rhonchi or rales.   Abdominal:      General: Bowel sounds are normal. There is no distension.      Palpations: Abdomen is soft. There is no mass.      Tenderness: There is no abdominal tenderness. There is no guarding or rebound.   Neurological:      Mental Status: He is alert.      Cranial Nerves: No cranial nerve deficit.   Psychiatric:         Mood and Affect: Mood and affect normal.         Assessment:       1. Generalized abdominal pain    2. Diarrhea, unspecified type        Plan:   1. Generalized abdominal pain  -     X-Ray Abdomen Flat And Erect; Future; Expected date: 09/27/2023  -     dicyclomine (BENTYL) 20 mg tablet; Take 1 tablet (20 mg total) by mouth every 6 (six) hours as needed (Diarrhea).  Dispense: 20 tablet; Refill: 0    2. Diarrhea, unspecified type  -     X-Ray Abdomen Flat And Erect; Future; Expected date: 09/27/2023  -     Clostridium difficile EIA; Future; Expected date: 09/27/2023  -     Stool culture; Future; Expected date: 09/27/2023  -     dicyclomine (BENTYL) 20 mg tablet; Take 1 tablet (20 mg total) by mouth every 6 (six) hours as needed (Diarrhea).  Dispense: 20 tablet; Refill: 0      Abdominal films viewed by provider.  No evidence of obstruction.  A few air-fluid levels.  No constipation.  Increased colonic gas noted.

## 2023-09-29 ENCOUNTER — LAB VISIT (OUTPATIENT)
Dept: LAB | Facility: HOSPITAL | Age: 53
End: 2023-09-29
Payer: COMMERCIAL

## 2023-09-29 DIAGNOSIS — R19.7 DIARRHEA, UNSPECIFIED TYPE: ICD-10-CM

## 2023-09-29 PROCEDURE — 87427 SHIGA-LIKE TOXIN AG IA: CPT | Performed by: PHYSICIAN ASSISTANT

## 2023-09-29 PROCEDURE — 87046 STOOL CULTR AEROBIC BACT EA: CPT | Performed by: PHYSICIAN ASSISTANT

## 2023-09-29 PROCEDURE — 87449 NOS EACH ORGANISM AG IA: CPT | Performed by: PHYSICIAN ASSISTANT

## 2023-09-29 PROCEDURE — 87449 NOS EACH ORGANISM AG IA: CPT | Mod: 91 | Performed by: PHYSICIAN ASSISTANT

## 2023-09-29 PROCEDURE — 87045 FECES CULTURE AEROBIC BACT: CPT | Performed by: PHYSICIAN ASSISTANT

## 2023-09-30 LAB
C DIFF GDH STL QL: NEGATIVE
C DIFF TOX A+B STL QL IA: NEGATIVE

## 2023-10-02 LAB
BACTERIA STL CULT: NORMAL
E COLI SXT1 STL QL IA: NEGATIVE
E COLI SXT2 STL QL IA: NEGATIVE

## 2023-10-03 ENCOUNTER — PATIENT MESSAGE (OUTPATIENT)
Dept: INTERNAL MEDICINE | Facility: CLINIC | Age: 53
End: 2023-10-03
Payer: COMMERCIAL

## 2023-10-07 DIAGNOSIS — F39 MOOD DISORDER: ICD-10-CM

## 2023-10-09 RX ORDER — LAMOTRIGINE 200 MG/1
TABLET ORAL
Qty: 135 TABLET | Refills: 0 | Status: SHIPPED | OUTPATIENT
Start: 2023-10-09 | End: 2023-10-16 | Stop reason: SDUPTHER

## 2023-10-12 ENCOUNTER — PATIENT MESSAGE (OUTPATIENT)
Dept: INTERNAL MEDICINE | Facility: CLINIC | Age: 53
End: 2023-10-12
Payer: COMMERCIAL

## 2023-10-12 ENCOUNTER — TELEPHONE (OUTPATIENT)
Dept: INTERNAL MEDICINE | Facility: CLINIC | Age: 53
End: 2023-10-12
Payer: COMMERCIAL

## 2023-10-16 ENCOUNTER — PATIENT MESSAGE (OUTPATIENT)
Dept: PSYCHIATRY | Facility: CLINIC | Age: 53
End: 2023-10-16

## 2023-10-16 ENCOUNTER — OFFICE VISIT (OUTPATIENT)
Dept: PSYCHIATRY | Facility: CLINIC | Age: 53
End: 2023-10-16
Payer: COMMERCIAL

## 2023-10-16 DIAGNOSIS — F39 MOOD DISORDER: ICD-10-CM

## 2023-10-16 DIAGNOSIS — G47.00 INSOMNIA, UNSPECIFIED TYPE: Primary | ICD-10-CM

## 2023-10-16 PROCEDURE — 99214 PR OFFICE/OUTPT VISIT, EST, LEVL IV, 30-39 MIN: ICD-10-PCS | Mod: 95,,, | Performed by: PSYCHIATRY & NEUROLOGY

## 2023-10-16 PROCEDURE — 3061F NEG MICROALBUMINURIA REV: CPT | Mod: CPTII,95,, | Performed by: PSYCHIATRY & NEUROLOGY

## 2023-10-16 PROCEDURE — 3066F NEPHROPATHY DOC TX: CPT | Mod: CPTII,95,, | Performed by: PSYCHIATRY & NEUROLOGY

## 2023-10-16 PROCEDURE — 3051F PR MOST RECENT HEMOGLOBIN A1C LEVEL 7.0 - < 8.0%: ICD-10-PCS | Mod: CPTII,95,, | Performed by: PSYCHIATRY & NEUROLOGY

## 2023-10-16 PROCEDURE — 3051F HG A1C>EQUAL 7.0%<8.0%: CPT | Mod: CPTII,95,, | Performed by: PSYCHIATRY & NEUROLOGY

## 2023-10-16 PROCEDURE — 3061F PR NEG MICROALBUMINURIA RESULT DOCUMENTED/REVIEW: ICD-10-PCS | Mod: CPTII,95,, | Performed by: PSYCHIATRY & NEUROLOGY

## 2023-10-16 PROCEDURE — 3066F PR DOCUMENTATION OF TREATMENT FOR NEPHROPATHY: ICD-10-PCS | Mod: CPTII,95,, | Performed by: PSYCHIATRY & NEUROLOGY

## 2023-10-16 PROCEDURE — 99214 OFFICE O/P EST MOD 30 MIN: CPT | Mod: 95,,, | Performed by: PSYCHIATRY & NEUROLOGY

## 2023-10-16 RX ORDER — VENLAFAXINE HYDROCHLORIDE 150 MG/1
150 CAPSULE, EXTENDED RELEASE ORAL DAILY
Qty: 90 CAPSULE | Refills: 1 | Status: SHIPPED | OUTPATIENT
Start: 2023-10-16 | End: 2024-03-26 | Stop reason: SDUPTHER

## 2023-10-16 RX ORDER — QUETIAPINE FUMARATE 200 MG/1
200 TABLET, FILM COATED ORAL NIGHTLY
Qty: 90 TABLET | Refills: 1 | Status: SHIPPED | OUTPATIENT
Start: 2023-10-16 | End: 2024-03-26 | Stop reason: SDUPTHER

## 2023-10-16 RX ORDER — LAMOTRIGINE 200 MG/1
TABLET ORAL
Qty: 135 TABLET | Refills: 1 | Status: SHIPPED | OUTPATIENT
Start: 2023-10-16 | End: 2024-03-26 | Stop reason: SDUPTHER
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
## 2023-10-20 ENCOUNTER — PATIENT MESSAGE (OUTPATIENT)
Dept: INTERNAL MEDICINE | Facility: CLINIC | Age: 53
End: 2023-10-20
Payer: COMMERCIAL

## 2023-10-20 DIAGNOSIS — G47.30 SLEEP APNEA, UNSPECIFIED TYPE: Primary | ICD-10-CM

## 2023-10-26 ENCOUNTER — PATIENT MESSAGE (OUTPATIENT)
Dept: DIABETES | Facility: CLINIC | Age: 53
End: 2023-10-26
Payer: COMMERCIAL

## 2023-11-07 ENCOUNTER — PATIENT MESSAGE (OUTPATIENT)
Dept: DIABETES | Facility: CLINIC | Age: 53
End: 2023-11-07

## 2023-11-07 ENCOUNTER — OFFICE VISIT (OUTPATIENT)
Dept: DIABETES | Facility: CLINIC | Age: 53
End: 2023-11-07
Payer: COMMERCIAL

## 2023-11-07 DIAGNOSIS — E78.5 HYPERLIPIDEMIA ASSOCIATED WITH TYPE 2 DIABETES MELLITUS: ICD-10-CM

## 2023-11-07 DIAGNOSIS — E66.9 OBESITY (BMI 30-39.9): ICD-10-CM

## 2023-11-07 DIAGNOSIS — E11.65 UNCONTROLLED TYPE 2 DIABETES MELLITUS WITH HYPERGLYCEMIA: Primary | ICD-10-CM

## 2023-11-07 DIAGNOSIS — E11.69 HYPERLIPIDEMIA ASSOCIATED WITH TYPE 2 DIABETES MELLITUS: ICD-10-CM

## 2023-11-07 PROCEDURE — 3066F NEPHROPATHY DOC TX: CPT | Mod: CPTII,95,, | Performed by: PHYSICIAN ASSISTANT

## 2023-11-07 PROCEDURE — 3061F NEG MICROALBUMINURIA REV: CPT | Mod: CPTII,95,, | Performed by: PHYSICIAN ASSISTANT

## 2023-11-07 PROCEDURE — 3051F HG A1C>EQUAL 7.0%<8.0%: CPT | Mod: CPTII,95,, | Performed by: PHYSICIAN ASSISTANT

## 2023-11-07 PROCEDURE — 99214 OFFICE O/P EST MOD 30 MIN: CPT | Mod: 95,,, | Performed by: PHYSICIAN ASSISTANT

## 2023-11-07 PROCEDURE — 95251 PR GLUCOSE MONITOR, 72 HOUR, PHYS INTERP: ICD-10-PCS | Mod: NDTC,,, | Performed by: PHYSICIAN ASSISTANT

## 2023-11-07 PROCEDURE — 3066F PR DOCUMENTATION OF TREATMENT FOR NEPHROPATHY: ICD-10-PCS | Mod: CPTII,95,, | Performed by: PHYSICIAN ASSISTANT

## 2023-11-07 PROCEDURE — 3051F PR MOST RECENT HEMOGLOBIN A1C LEVEL 7.0 - < 8.0%: ICD-10-PCS | Mod: CPTII,95,, | Performed by: PHYSICIAN ASSISTANT

## 2023-11-07 PROCEDURE — 3061F PR NEG MICROALBUMINURIA RESULT DOCUMENTED/REVIEW: ICD-10-PCS | Mod: CPTII,95,, | Performed by: PHYSICIAN ASSISTANT

## 2023-11-07 PROCEDURE — 95251 CONT GLUC MNTR ANALYSIS I&R: CPT | Mod: NDTC,,, | Performed by: PHYSICIAN ASSISTANT

## 2023-11-07 PROCEDURE — 99214 PR OFFICE/OUTPT VISIT, EST, LEVL IV, 30-39 MIN: ICD-10-PCS | Mod: 95,,, | Performed by: PHYSICIAN ASSISTANT

## 2023-11-07 NOTE — PATIENT INSTRUCTIONS
CURRENT DM MEDICATIONS:   Semglee 34 units at night  Humalog 12 units before regular meal, 16 units before heavy carb meals, 6 units before snack  Jardiance 25 mg daily   Ozempic 1 mg weekly

## 2023-11-07 NOTE — PROGRESS NOTES
PCP: Edilberto Figueroa MD    Subjective:     Chief Complaint: Diabetes     TELEMEDICINE VISIT:     The patient location is: Home  The chief complaint leading to consultation is: Diabetes Follow up  Visit type: Virtual visit with synchronous audio and video  Total time spent with patient: 12  Each patient to whom he or she provides medical services by telemedicine is:  (1) informed of the relationship between the physician and patient and the respective role of any other health care provider with respect to management of the patient; and (2) notified that he or she may decline to receive medical services by telemedicine and may withdraw from such care at any time.    Notes:     HISTORY OF PRESENT ILLNESS: 53 y.o.   male presenting for diabetes management.   The patient's last visit with me was on 8/29/2023.  Patient has had Type II diabetes since 2012.  Pertinent to decision making is the following comorbidities: Obesity by BMI and Bipolar Disorder  Patient has the following Diabetes complications: without complications  He is to be enrolled in diabetes education classes.     Patient's most recent A1c of 7.1% was completed 2 months ago.   Patient states since His last A1c His blood glucose levels have been high  after meals.   Patient monitors blood glucose 4 times per day with meter and Continuously with personal CGM Dexcom.   Patient blood glucose monitoring device will be uploaded into [employer] [facility] today.        Patient reports mild baseline hyperglycemia and postprandial hyperglycemia throughout the day.   Patient endorses the following diabetes related symptoms: Nausea. Related to ozempic.   Patient is due today for the following diabetes-related health maintenance standards: Influenza Vaccine and COVID-19 Vaccine .   He denies recent hospital admissions or emergency room visits.   He denies having hypoglycemia.   Patient's concerns today include glycemic control. Of note, patient was diagnosed with  prostate cancer is currently undergoing Lupron treatment and radiation. No plans for chemotherapy at present.   Patient medication regimen is as below.     CURRENT DM MEDICATIONS:   Semglee 30 units at night  Humalog 10 units before regular meal, 14 units before heavy carb meals, 4 units before snack  Jardiance 25 mg daily   Ozempic 1 mg weekly     Patient has failed the following Diabetes medications:   Trulicity   Basaglar / Lantus  Metformin        Labs Reviewed.       Lab Results   Component Value Date    CPEPTIDE 2.06 03/07/2023     Lab Results   Component Value Date    GLUTAMICACID 0.00 03/07/2023          //   , There is no height or weight on file to calculate BMI.  His blood sugar in clinic today is:         Review of Systems   Constitutional:  Negative for activity change, appetite change, chills and fever.   HENT:  Negative for dental problem, mouth sores, nosebleeds, sore throat and trouble swallowing.    Eyes:  Negative for pain and discharge.   Respiratory:  Negative for shortness of breath, wheezing and stridor.    Cardiovascular:  Negative for chest pain, palpitations and leg swelling.   Gastrointestinal:  Negative for abdominal pain, diarrhea, nausea and vomiting.   Endocrine: Negative for polydipsia, polyphagia and polyuria.   Genitourinary:  Negative for dysuria, frequency and urgency.   Musculoskeletal:  Negative for joint swelling and myalgias.   Skin:  Negative for rash and wound.   Neurological:  Negative for dizziness, syncope, weakness and headaches.   Psychiatric/Behavioral:  Negative for behavioral problems and dysphoric mood.          Diabetes Management Status  Statin: Taking  ACE/ARB: Not taking    Screening or Prevention Patient's value Goal Complete/Controlled?   HgA1C Testing and Control   Lab Results   Component Value Date    HGBA1C 7.1 (H) 09/08/2023      Annually/Less than 8% No   Lipid profile : 03/07/2023 Annually Yes   LDL control Lab Results   Component Value Date    LDLCALC  147.4 03/07/2023    Annually/Less than 100 mg/dl  Yes   Nephropathy screening Lab Results   Component Value Date    MICALBCREAT 19.6 03/07/2023     Lab Results   Component Value Date    PROTEINUA Trace (A) 08/05/2021    Annually Yes   Blood pressure BP Readings from Last 1 Encounters:   09/27/23 122/80    Less than 140/90 Yes   Dilated retinal exam : 06/30/2023 Annually No    Foot exam   : 03/09/2023 Annually No     Social History     Socioeconomic History    Marital status: Significant Other   Tobacco Use    Smoking status: Former     Current packs/day: 0.00     Average packs/day: 0.3 packs/day for 15.0 years (3.8 ttl pk-yrs)     Types: Cigarettes     Start date: 1/1/1998     Quit date: 1/1/2013     Years since quitting: 10.8    Smokeless tobacco: Former    Tobacco comments:     Was a part time smoker.  1 pack could last 2 or more weeks   Substance and Sexual Activity    Alcohol use: Not Currently     Comment: May have 1 or 2 drinks at social events or restaurants    Drug use: Never    Sexual activity: Yes     Partners: Female     Birth control/protection: Partner-Vasectomy, Post-menopausal     Social Determinants of Health     Financial Resource Strain: Medium Risk (9/23/2022)    Overall Financial Resource Strain (CARDIA)     Difficulty of Paying Living Expenses: Somewhat hard   Food Insecurity: No Food Insecurity (9/23/2022)    Hunger Vital Sign     Worried About Running Out of Food in the Last Year: Never true     Ran Out of Food in the Last Year: Never true   Transportation Needs: No Transportation Needs (9/23/2022)    PRAPARE - Transportation     Lack of Transportation (Medical): No     Lack of Transportation (Non-Medical): No   Physical Activity: Inactive (9/23/2022)    Exercise Vital Sign     Days of Exercise per Week: 0 days     Minutes of Exercise per Session: 0 min   Stress: No Stress Concern Present (9/23/2022)    [place] [employer] - Occupational Stress Questionnaire     Feeling of  Stress : Only a little   Social Connections: Socially Isolated (9/23/2022)    Social Connection and Isolation Panel [NHANES]     Frequency of Communication with Friends and Family: Twice a week     Frequency of Social Gatherings with Friends and Family: Never     Attends Tenriism Services: Never     Active Member of Clubs or Organizations: No     Attends Club or Organization Meetings: Never     Marital Status:    Housing Stability: Low Risk  (9/23/2022)    Housing Stability Vital Sign     Unable to Pay for Housing in the Last Year: No     Number of Places Lived in the Last Year: 1     Unstable Housing in the Last Year: No     Past Medical History:   Diagnosis Date    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 Patrick Sanz is a 52 y.o. male patient of Dr. Woodward status post robot-assisted radical prostatectomy with partial nerve sparing on 10/11/21 for what proved to be a 8cc, pGS4+3 (70% high grade) [id number] (0/15) prostate cancer excised with a focal positive 11mm margin and a positive benign margin. His RADHA-S score is 7. His post op PSA was low detectable and his most recent is now 0.177. H    Sleep apnea        Objective:      Physical Exam  Constitutional:       General: He is not in acute distress.     Appearance: He is well-developed. He is not diaphoretic.   HENT:      Head: Normocephalic and atraumatic.      Right Ear: External ear normal.      Left Ear: External ear normal.      Nose: Nose normal.   Eyes:      General:         Right eye: No discharge.         Left eye: No discharge.   Pulmonary:      Effort: Pulmonary effort is normal. No respiratory distress.      Breath sounds: No stridor.   Musculoskeletal:         General: Normal range of motion.      Cervical back: Normal range of motion.   Skin:      Coloration: Skin is not pale.   Neurological:      Mental Status: He is alert and oriented to person, place, and time.      Motor: No abnormal muscle tone.      Coordination: Coordination normal.   Psychiatric:         Behavior: Behavior normal.         Thought Content: Thought content normal.         Judgment: Judgment normal.           Assessment / Plan:     Uncontrolled type 2 diabetes mellitus with hyperglycemia  -     Hemoglobin A1C; Standing    Hyperlipidemia associated with type 2 diabetes mellitus    Obesity (BMI 30-39.9)        Additional Plan Details:    - POCT Glucose  - Encouraged continuation of lifestyle changes including regular exercise and limiting carbohydrates to 30-45 grams per meal threes times daily and 15 grams per snack with a limit of two daily.   - Encouraged continued monitoring of blood glucose with maintenance of 4 times daily and Continuously with personal CGM Dexcom.   - Current DM Medication Regimen: Change Humalog 12 units  before regular meal, 16 units before heavy meals, and 6 units before snacks TID wm. Change Semglee 34 units daily. Continue Jardiance 25 mg daily. Continue Ozempic 1 mg weekly - will defer increase for now due to nausea.  - Zofran PRN nausea  - Health Maintenance standards addressed today: Flu Shot [facility] and COVID - 19 Vaccine - patient will schedule outside of [facility] .   - Nursing Visit: Patient is age 79 or younger with an A1c of 7.5 or greater and will not need nursing visit at this time .   - Follow up with me in 6 weeks with A1c prior.      CURRENT DM MEDICATIONS:   Semglee 34 units at night  Humalog 12 units before regular meal, 16 units before heavy carb meals, 6 units before snack  Jardiance 25 mg daily   Ozempic 1 mg weekly     Blakeney McKnight, PA-C [facility]

## 2023-11-08 ENCOUNTER — PATIENT MESSAGE (OUTPATIENT)
Dept: INTERNAL MEDICINE | Facility: CLINIC | Age: 53
End: 2023-11-08
Payer: COMMERCIAL

## 2023-11-08 NOTE — TELEPHONE ENCOUNTER
A CT of his chest was completed in May of 2023 without any signs of lung cancer.   annual lung cancer screening is not needed unless a person has smoked at least 20 pack years of cigarettes and it has been less than 15 years since quitting smoking

## 2023-11-13 ENCOUNTER — OFFICE VISIT (OUTPATIENT)
Dept: PULMONOLOGY | Facility: CLINIC | Age: 53
End: 2023-11-13
Payer: COMMERCIAL

## 2023-11-13 ENCOUNTER — PATIENT MESSAGE (OUTPATIENT)
Dept: PULMONOLOGY | Facility: CLINIC | Age: 53
End: 2023-11-13

## 2023-11-13 VITALS
DIASTOLIC BLOOD PRESSURE: 80 MMHG | RESPIRATION RATE: 19 BRPM | BODY MASS INDEX: 35.55 KG/M2 | SYSTOLIC BLOOD PRESSURE: 118 MMHG | WEIGHT: 234.56 LBS | HEIGHT: 68 IN | OXYGEN SATURATION: 99 % | HEART RATE: 102 BPM

## 2023-11-13 DIAGNOSIS — Z23 NEED FOR VACCINATION: Primary | ICD-10-CM

## 2023-11-13 DIAGNOSIS — G47.30 SLEEP APNEA, UNSPECIFIED TYPE: ICD-10-CM

## 2023-11-13 DIAGNOSIS — E66.01 SEVERE OBESITY (BMI 35.0-39.9) WITH COMORBIDITY: ICD-10-CM

## 2023-11-13 PROCEDURE — 90686 FLU VACCINE (QUAD) GREATER THAN OR EQUAL TO 3YO PRESERVATIVE FREE IM: ICD-10-PCS | Mod: S$GLB,,, | Performed by: NURSE PRACTITIONER

## 2023-11-13 PROCEDURE — 99204 OFFICE O/P NEW MOD 45 MIN: CPT | Mod: 25,S$GLB,, | Performed by: NURSE PRACTITIONER

## 2023-11-13 PROCEDURE — 1160F PR REVIEW ALL MEDS BY PRESCRIBER/CLIN PHARMACIST DOCUMENTED: ICD-10-PCS | Mod: CPTII,S$GLB,, | Performed by: NURSE PRACTITIONER

## 2023-11-13 PROCEDURE — 1159F MED LIST DOCD IN RCRD: CPT | Mod: CPTII,S$GLB,, | Performed by: NURSE PRACTITIONER

## 2023-11-13 PROCEDURE — 3008F BODY MASS INDEX DOCD: CPT | Mod: CPTII,S$GLB,, | Performed by: NURSE PRACTITIONER

## 2023-11-13 PROCEDURE — 3051F HG A1C>EQUAL 7.0%<8.0%: CPT | Mod: CPTII,S$GLB,, | Performed by: NURSE PRACTITIONER

## 2023-11-13 PROCEDURE — 3061F PR NEG MICROALBUMINURIA RESULT DOCUMENTED/REVIEW: ICD-10-PCS | Mod: CPTII,S$GLB,, | Performed by: NURSE PRACTITIONER

## 2023-11-13 PROCEDURE — 3066F PR DOCUMENTATION OF TREATMENT FOR NEPHROPATHY: ICD-10-PCS | Mod: CPTII,S$GLB,, | Performed by: NURSE PRACTITIONER

## 2023-11-13 PROCEDURE — 90471 FLU VACCINE (QUAD) GREATER THAN OR EQUAL TO 3YO PRESERVATIVE FREE IM: ICD-10-PCS | Mod: S$GLB,,, | Performed by: NURSE PRACTITIONER

## 2023-11-13 PROCEDURE — 3074F SYST BP LT 130 MM HG: CPT | Mod: CPTII,S$GLB,, | Performed by: NURSE PRACTITIONER

## 2023-11-13 PROCEDURE — 1159F PR MEDICATION LIST DOCUMENTED IN MEDICAL RECORD: ICD-10-PCS | Mod: CPTII,S$GLB,, | Performed by: NURSE PRACTITIONER

## 2023-11-13 PROCEDURE — 90686 IIV4 VACC NO PRSV 0.5 ML IM: CPT | Mod: S$GLB,,, | Performed by: NURSE PRACTITIONER

## 2023-11-13 PROCEDURE — 3074F PR MOST RECENT SYSTOLIC BLOOD PRESSURE < 130 MM HG: ICD-10-PCS | Mod: CPTII,S$GLB,, | Performed by: NURSE PRACTITIONER

## 2023-11-13 PROCEDURE — 3079F PR MOST RECENT DIASTOLIC BLOOD PRESSURE 80-89 MM HG: ICD-10-PCS | Mod: CPTII,S$GLB,, | Performed by: NURSE PRACTITIONER

## 2023-11-13 PROCEDURE — 3079F DIAST BP 80-89 MM HG: CPT | Mod: CPTII,S$GLB,, | Performed by: NURSE PRACTITIONER

## 2023-11-13 PROCEDURE — 3008F PR BODY MASS INDEX (BMI) DOCUMENTED: ICD-10-PCS | Mod: CPTII,S$GLB,, | Performed by: NURSE PRACTITIONER

## 2023-11-13 PROCEDURE — 3061F NEG MICROALBUMINURIA REV: CPT | Mod: CPTII,S$GLB,, | Performed by: NURSE PRACTITIONER

## 2023-11-13 PROCEDURE — 1160F RVW MEDS BY RX/DR IN RCRD: CPT | Mod: CPTII,S$GLB,, | Performed by: NURSE PRACTITIONER

## 2023-11-13 PROCEDURE — 99999 PR PBB SHADOW E&M-EST. PATIENT-LVL V: ICD-10-PCS | Mod: PBBFAC,,, | Performed by: NURSE PRACTITIONER

## 2023-11-13 PROCEDURE — 3051F PR MOST RECENT HEMOGLOBIN A1C LEVEL 7.0 - < 8.0%: ICD-10-PCS | Mod: CPTII,S$GLB,, | Performed by: NURSE PRACTITIONER

## 2023-11-13 PROCEDURE — 90471 IMMUNIZATION ADMIN: CPT | Mod: S$GLB,,, | Performed by: NURSE PRACTITIONER

## 2023-11-13 PROCEDURE — 3066F NEPHROPATHY DOC TX: CPT | Mod: CPTII,S$GLB,, | Performed by: NURSE PRACTITIONER

## 2023-11-13 PROCEDURE — 99999 PR PBB SHADOW E&M-EST. PATIENT-LVL V: CPT | Mod: PBBFAC,,, | Performed by: NURSE PRACTITIONER

## 2023-11-13 PROCEDURE — 99204 PR OFFICE/OUTPT VISIT, NEW, LEVL IV, 45-59 MIN: ICD-10-PCS | Mod: 25,S$GLB,, | Performed by: NURSE PRACTITIONER

## 2023-11-13 NOTE — PROGRESS NOTES
"Subjective:      Patient ID: Patrick Sanz is a 53 y.o. male.    Chief Complaint: Sleep Apnea    HPI    Patient presents to the office today for evaluation of sleep apnea. DX with BRAYAN 15+ years ago and still using same CPAP. Benefits from use. If he does not wear CPAP he will have snoring and witnessed apneas, restless sleep.  Patient with daytime hypersomnolence.  [place] Sleepiness Scale score 9.  Comorbidities include BMI 35.      STOP - BANG Questionnaire: OFF CPAP     1. Snoring : Do you snore loudly ?    Yes    2. Tired : Do you often feel tired, fatigued, or sleepy during daytime?   Yes    3. Observed: Has anyone observed you stop breathing during your sleep?   Yes    4. Blood pressure : Do you have or are you being treated for high blood pressure?   No    5. BMI :BMI more than 35 kg/m2?   Yes    6. Age : Age over 50 yr old?   Yes    7. Neck circumference: Neck circumference greater than 40 cm?   Yes    8. Gender: Gender male?   Yes    High risk of BRAYAN: Yes 5 - 8  Intermediate risk of BRAYAN: Yes 3 - 4  Low risk of BRAYAN: Yes 0 - 2      References:   STOP Questionnaire   A Tool to Screen Patients for Obstructive Sleep Apnea: CHRISTIANO Rodriguez.R.C.P.C., Beltran Spear M.B.B.S., Facundo Younger M.D.,Janet Rebollar, Ph.D., MARILU Mcgill.B.B.S.,_ MARILU Lopez.Sc.,_ Jayleen Caballero M.D., Gilbert Roblero F.R.C.P.C.; Anesthesiology 2008; 108:812-21 Copyright © 2008, the American Society of Anesthesiologists, Inc. [employer]    Patient Active Problem List   Diagnosis    Uncontrolled type 2 diabetes mellitus with hyperglycemia    Bipolar disorder    Kidney stone    Malignant neoplasm of prostate    Severe obesity (BMI 35.0-39.9) with comorbidity    Adenocarcinoma of rectosigmoid junction    Status post prostatectomy    Hyperlipidemia associated with type 2 diabetes mellitus         /80   Pulse 102   Resp 19   Ht 5' 8" (1.727 m)   Wt 106.4 kg (234 lb 9.1 oz)   " SpO2 99%   BMI 35.67 kg/m²   Body mass index is 35.67 kg/m².    Review of Systems   Constitutional: Negative.    HENT: Negative.     Respiratory: Negative.     Cardiovascular: Negative.    Musculoskeletal: Negative.    Gastrointestinal: Negative.    Neurological: Negative.    Psychiatric/Behavioral: Negative.           Objective:      Physical Exam  Constitutional:       Appearance: He is well-developed. He is obese.   HENT:      Head: Normocephalic and atraumatic.      Mouth/Throat:      Comments: Mallampati Score: III  Neck:      Comments: Neck circumference: 18 inches   Cardiovascular:      Rate and Rhythm: Normal rate and regular rhythm.   Pulmonary:      Effort: Pulmonary effort is normal.      Breath sounds: Normal breath sounds.   Abdominal:      Palpations: Abdomen is soft.   Musculoskeletal:         General: Normal range of motion.      Cervical back: Normal range of motion and neck supple.   Skin:     General: Skin is warm and dry.   Neurological:      Mental Status: He is alert and oriented to person, place, and time.   Psychiatric:         Mood and Affect: Mood normal.         Behavior: Behavior normal.       Personal Diagnostic Review      Assessment:     1. Need for vaccination    2. Sleep apnea, unspecified type    3. Severe obesity (BMI 35.0-39.9) with comorbidity       Outpatient Encounter Medications as of 11/13/2023   Medication Sig Dispense Refill    atorvastatin (LIPITOR) 80 MG tablet Take 1 tablet by mouth once daily 30 tablet 0    blood-glucose sensor (DEXCOM G7 SENSOR) Miriam 1 each by Misc.(Non-Drug; Combo Route) route every 10 days. 3 each 11    dicyclomine (BENTYL) 20 mg tablet Take 1 tablet (20 mg total) by mouth every 6 (six) hours as needed (Diarrhea). 20 tablet 0    empagliflozin (JARDIANCE) 25 mg tablet Take 1 tablet (25 mg total) by mouth once daily. 30 tablet 11    insulin glargine-yfgn (SEMGLEE,INSULIN GLARG-YFGN,PEN) 100 unit/mL (3 mL) InPn INJECT 30 UNITS SUBCUTANEOUSLY ONCE DAILY  IN THE EVENING 15 mL 3    insulin lispro (HUMALOG KWIKPEN INSULIN) 100 unit/mL pen Inject 12 Units into the skin 3 (three) times daily with meals. 12 mL 11    lamoTRIgine (LAMICTAL) 200 MG tablet Take 1 and 1/2 tablets daily. 135 tablet 1    ondansetron (ZOFRAN) 4 MG tablet Take 1 tablet (4 mg total) by mouth every 6 (six) hours as needed for Nausea. 40 tablet 0    pep injection Inject 0.15 ml as directed     For compounding pharmacy use:   Add PAPAVERINE 30 mg  Add PHENTOLAMINE 1 mg  Add ALPROSTADIL 20 mcg 1 vial 5    QUEtiapine (SEROQUEL) 200 MG Tab Take 1 tablet (200 mg total) by mouth every evening. 90 tablet 1    semaglutide (OZEMPIC) 1 mg/dose (4 mg/3 mL) Inject 1 mg into the skin every 7 days. 1 pen 11    tadalafiL (CIALIS) 20 MG Tab       venlafaxine (EFFEXOR-XR) 150 MG Cp24 Take 1 capsule (150 mg total) by mouth once daily. 90 capsule 1    blood-glucose transmitter (DEXCOM G6 TRANSMITTER) Miriam 1 each by Misc.(Non-Drug; Combo Route) route once daily. 1 each 3    glipiZIDE (GLUCOTROL) 5 MG tablet Take 1 tablet (5 mg total) by mouth 2 (two) times daily with meals. 60 tablet 0    traMADoL (ULTRAM) 50 mg tablet Take 1 tablet (50 mg total) by mouth every 6 (six) hours as needed for Pain. 20 tablet 0    [DISCONTINUED] docusate sodium (COLACE) 100 MG capsule Take 1 capsule (100 mg total) by mouth 2 (two) times daily as needed. 20 capsule 0    [DISCONTINUED] flash glucose sensor (FREESTYLE JOAQUIM 2 SENSOR) Kit 1 each by Misc.(Non-Drug; Combo Route) route every 14 (fourteen) days. 2 kit 11     No facility-administered encounter medications on file as of 11/13/2023.     Orders Placed This Encounter   Procedures    Influenza - Quadrivalent (PF)    Polysomnogram (CPAP will be added if patient meets diagnostic criteria.)     Standing Status:   Future     Standing Expiration Date:   11/12/2024     Plan:   Sleep study and anticipate ordering new cpap.   His machine is at end of life.    Problem List Items Addressed This  Visit          Endocrine    Severe obesity (BMI 35.0-39.9) with comorbidity     Other Visit Diagnoses       Need for vaccination    -  Primary    Relevant Orders    Influenza - Quadrivalent (PF) (Completed)    Sleep apnea, unspecified type        Relevant Orders    Polysomnogram (CPAP will be added if patient meets diagnostic criteria.)           Thank you Dr. Figueroa for this consultation.

## 2023-11-28 DIAGNOSIS — G47.33 OSA (OBSTRUCTIVE SLEEP APNEA): Primary | ICD-10-CM

## 2023-11-29 ENCOUNTER — TELEPHONE (OUTPATIENT)
Dept: SLEEP MEDICINE | Facility: CLINIC | Age: 53
End: 2023-11-29
Payer: COMMERCIAL

## 2023-11-29 NOTE — TELEPHONE ENCOUNTER
----- Message from Speedy Sun sent at 11/29/2023  7:04 AM CST -----  [facility]

## 2023-12-11 ENCOUNTER — PATIENT MESSAGE (OUTPATIENT)
Dept: DIABETES | Facility: CLINIC | Age: 53
End: 2023-12-11
Payer: COMMERCIAL

## 2023-12-12 ENCOUNTER — PATIENT MESSAGE (OUTPATIENT)
Dept: SURGERY | Facility: CLINIC | Age: 53
End: 2023-12-12
Payer: COMMERCIAL

## 2023-12-16 ENCOUNTER — HOSPITAL ENCOUNTER (OUTPATIENT)
Dept: SLEEP MEDICINE | Facility: HOSPITAL | Age: 53
Discharge: HOME | End: 2023-12-16
Attending: NURSE PRACTITIONER
Payer: COMMERCIAL

## 2023-12-16 DIAGNOSIS — G47.30 SLEEP APNEA, UNSPECIFIED TYPE: ICD-10-CM

## 2023-12-16 DIAGNOSIS — G47.33 OSA (OBSTRUCTIVE SLEEP APNEA): Primary | ICD-10-CM

## 2023-12-16 DIAGNOSIS — G47.61 PLMD (PERIODIC LIMB MOVEMENT DISORDER): ICD-10-CM

## 2023-12-16 PROCEDURE — 95811 POLYSOM 6/>YRS CPAP 4/> PARM: CPT

## 2023-12-16 NOTE — Clinical Note
Diagnosis: Obstructive Sleep Apnea / 327.23     Periodic Limb Movement Disorder / 327.51  Recommendations:  1. Weight loss 2. Clinical correlation for restless legs 3. Therapy with CPAP 14.0 cmwp or autoPAP 10-20cmwp

## 2023-12-18 PROBLEM — G47.30 SLEEP APNEA: Status: ACTIVE | Noted: 2023-12-18

## 2023-12-19 ENCOUNTER — TELEPHONE (OUTPATIENT)
Dept: DIABETES | Facility: CLINIC | Age: 53
End: 2023-12-19
Payer: COMMERCIAL

## 2023-12-19 ENCOUNTER — PATIENT MESSAGE (OUTPATIENT)
Dept: DIABETES | Facility: CLINIC | Age: 53
End: 2023-12-19

## 2023-12-19 NOTE — TELEPHONE ENCOUNTER
----- Message from Jeffrey Aguilar PA-C sent at 12/19/2023 12:01 PM CST -----  Logged in after 15 mins this am for virtual visit. Staff attempted to call and send [employer] message. Please coordinate Dexcom download and reschedule with partner

## 2023-12-29 ENCOUNTER — PATIENT MESSAGE (OUTPATIENT)
Dept: PULMONOLOGY | Facility: CLINIC | Age: 53
End: 2023-12-29
Payer: COMMERCIAL

## 2023-12-29 PROCEDURE — 95811 PR POLYSOMNOGRAPHY W/CPAP: ICD-10-PCS | Mod: 26,,, | Performed by: INTERNAL MEDICINE

## 2023-12-29 PROCEDURE — 95811 POLYSOM 6/>YRS CPAP 4/> PARM: CPT | Mod: 26,,, | Performed by: INTERNAL MEDICINE

## 2023-12-29 NOTE — PROCEDURES
"Patient Name: Patrick Sanz Study Date: 12/16/2023   YOB: 1970 Study Type: SPLIT   Age: 53 year Patient #: [id number]   Sex: Male Billing ID: -   Height: 5' 8" Interpreting Physician: Michael Crow MD   Weight: 234.0 lbs Recording Tech: Wilman Jose   BMI: 35.9 Scoring Tech: Yue Immanuelpraveena   [place]: 2 Neck Circumference: 19     Indications for Polysomnography  The patient is a 53 year old Male who is 5' 8" and weighs 234.0 lbs.  His BMI equals 35.9.  A diagnostic polysomnogram was performed to evaluate for -.  After 114.5 minutes of sleep time the patient exhibited sufficient respiratory events qualifying him for a CPAP trial which was then initiated.      Referring Provider: Elizabeth Lejeune NP    Medical History: DX with BRAYAN 15+ years ago and still using same CPAP. Benefits from use. If he does not wear CPAP he will have snoring and witnessed apneas, restless sleep.  Patient with daytime hypersomnolence.  [place] Sleepiness Scale score 9.  Comorbidities include BMI 35.       STOP - BANG Questionnaire: OFF CPAP: 7      Medication   Lipitor   Bentyl   Jardiance   Glipizide   Insulin   Humalog   Lamictal   Zofran   Quetiapine   Ozempic   Ultram   Venlafaxine   Cialis   PEP injection     Test Description  Patient was connected to a full set of leads with a sleep technician in attendance.  Parameters used were EEG derivations (F4-A1, C4-A1, O2-A1), EOG derivations (LOC-A2, NIKHIL-A2), EKG, EMG - extremity (leg) & chin, oxygen saturation, effort parameters + abdominal/thoracic (RIP), and airflow parameters - nasal pressure/thermal.  Body position was visually monitored and noted.  Audio & video monitoring was performed during study.    Scoring is done in accordance with the American Academy of Sleep Medicine Manual for the Scoring of Sleep and Associated Events version 2.6.  Hypopneas are scored using the 4% desaturation rule.    Sleep Architecture  The total recording time of the diagnostic portion of " the study was 212.0 minutes.  The total sleep time was 114.5 minutes.  During the diagnostic portion of the study, the patient spent 14.0% of total sleep time in Stage N1, 45.4% in Stage N2, 24.0% in Stages N3, and 16.6% in REM.   Sleep latency was 88.0 minutes.  REM latency was 77.0 minutes.  Sleep Efficiency was 54.0%.  Wake after Sleep Onset time was 9.5 minutes.     At 12:59:20 AM the patient was placed on PAP treatment and was titrated at pressures ranging from 5* cm/H20 with supplemental oxygen at - up to 16* cm/H20 with supplemental oxygen at -.  The total recording time of the treatment portion of the study was 259.0 minutes.  The total sleep time was 239.5 minutes.  During the treatment portion of the study, the patient spent 11.9% of total sleep time in Stage N1, 48.0% in Stage N2, 9.0% in Stages N3, and 31.1% in REM.   Sleep latency was 0.5 minutes.  REM latency was 57.0 minutes.  Sleep Efficiency was 92.5%.  Wake after Sleep Onset time was 19.0 minutes.    Respiratory Events  During the diagnostic portion of the study, the polysomnogram revealed a presence of 72 obstructive, 2 central, and - mixed apneas resulting in an Apnea index of 38.8 events per hour.  There were 14 hypopneas (?3% desat and/or Ar.) resulting in an Apnea\Hypopnea Index (AHI ?3% desat and/or Ar.) of 46.1 events per hour.  There were 13 hypopneas (?4% desat) resulting in an Apnea\Hypopnea Index (AHI ?4% desat) of 45.6 events per hour.  There were - Respiratory Effort Related Arousals resulting in a RERA index of - events per hour. The Respiratory Disturbance Index is 46.1 events per hour.  Mean oxygen saturation was 91.5%.  The lowest oxygen saturation during sleep was 83.0%.  Time spent ?88% oxygen saturation was 4.0 minutes (2.0%).    During the treatment portion of the study, the polysomnogram revealed a presence of 13 obstructive, 19 central, and - mixed apneas resulting in an Apnea index of 8.0 events per hour.  There were 56  hypopneas (?3% desat and/or Ar.) resulting in an Apnea\Hypopnea Index (AHI ?3% desat and/or Ar.) of 22.0 events per hour.  There were 54 hypopneas (?4% desat) resulting in an Apnea\Hypopnea Index (AHI ?4% desat) of 21.5 events per hour.  There were - Respiratory Effort Related Arousals resulting in a RERA index of - events per hour. The Respiratory Disturbance Index is 22.0 events per hour.  Mean oxygen saturation was 92.8%.  The lowest oxygen saturation during sleep was 87.0%.  Time spent ?88% oxygen saturation was 0.8 minutes (0.3%).    Limb Activity  During the diagnostic portion of the study, there were 98 limb movements recorded.  Of this total, 98 were classified as PLMs.  Of the PLMs, 20 were associated with arousals.  The Limb Movement index was 51.4 per hour while the PLM index was 51.4 per hour.    During the treatment portion of the study, there were 188 limb movements recorded.  Of this total, 187 were classified as PLMs.  Of the PLMs, 53 were associated with arousals.  The Limb Movement index was 47.1 per hour while the PLM index was 46.8 per hour.    Cardiac Summary  During the diagnostic portion of the study, the average pulse rate was 83.5 bpm.  The minimum pulse rate was 64.0 bpm while the maximum pulse rate was 99.0 bpm.    During the treatment portion of the study, the average pulse rate was 83.0 bpm.  The minimum pulse rate was 63.0 bpm while the maximum pulse rate was 102.0 bpm.                                                   Behavioral observations:  Awake 21:27 hrs to 22:53 hrs  On phone    Conclusion:   Diagnostic AHI was 45.6/hr with 88 events: severe Obstructive sleep apnea: Sleep time 114.5 minutes: met split night criteria  CPAP 14 cmwp was well tolerated, other setting CPAP 12.0 c and 16.0 cm also were acceptable  PLM index 46.8/hr  Flex: 2  Humidity: Heated Mask & Size: F20 medium      Diagnosis: Obstructive Sleep Apnea / 327.23       Periodic Limb Movement Disorder /  "327.51    Recommendations:   Weight loss  Clinical correlation for restless legs  Therapy with CPAP 14.0 cmwp or autoPAP 10-20cmwp        Dear [place]  [id number] [facility]  [place] [id number]/Edilberto Figueroa MD         The sleep study that you ordered is complete.  You have ordered sleep LAB services to perform the sleep study for Patrick Sanz .      Please find Sleep Study result in  the "Media tab" of Chart Review menu.        You can look  for the report in the  Media by the document type "Sleep Study Documents". Alphabetizing  "Document type" column helps to find the SLEEP STUDY report  Faster.       As the ordering provider, you are responsible for reviewing the results and implementing a treatment plan with your patient.    If you need a Sleep Medicine provider to explain the sleep study findings and arrange treatment for the patient, please refer patient for consultation to our Sleep Clinic via [facility] with Ambulatory Consult Sleep.     To do that please place an order for an  "Ambulatory Consult Sleep" -  order , it will go to our clinic work queue for our staff  to contact the patient for an appointment.      For any questions, please contact our sleep lab  staff at [phone] to talk to clinical staff          Michael Crow MD    "

## 2024-01-02 ENCOUNTER — TELEPHONE (OUTPATIENT)
Dept: PULMONOLOGY | Facility: CLINIC | Age: 54
End: 2024-01-02
Payer: COMMERCIAL

## 2024-01-02 DIAGNOSIS — G47.33 OSA (OBSTRUCTIVE SLEEP APNEA): Primary | ICD-10-CM

## 2024-01-17 ENCOUNTER — PATIENT MESSAGE (OUTPATIENT)
Dept: PULMONOLOGY | Facility: CLINIC | Age: 54
End: 2024-01-17
Payer: COMMERCIAL

## 2024-01-22 ENCOUNTER — OFFICE VISIT (OUTPATIENT)
Dept: INTERNAL MEDICINE | Facility: CLINIC | Age: 54
End: 2024-01-22
Payer: COMMERCIAL

## 2024-01-22 VITALS
SYSTOLIC BLOOD PRESSURE: 130 MMHG | WEIGHT: 242.06 LBS | HEART RATE: 106 BPM | OXYGEN SATURATION: 98 % | DIASTOLIC BLOOD PRESSURE: 76 MMHG | TEMPERATURE: 98 F | HEIGHT: 68 IN | BODY MASS INDEX: 36.69 KG/M2

## 2024-01-22 DIAGNOSIS — E78.5 HYPERLIPIDEMIA ASSOCIATED WITH TYPE 2 DIABETES MELLITUS: ICD-10-CM

## 2024-01-22 DIAGNOSIS — F31.9 BIPOLAR AFFECTIVE DISORDER, REMISSION STATUS UNSPECIFIED: ICD-10-CM

## 2024-01-22 DIAGNOSIS — Z12.5 ENCOUNTER FOR SCREENING FOR MALIGNANT NEOPLASM OF PROSTATE: ICD-10-CM

## 2024-01-22 DIAGNOSIS — E11.65 UNCONTROLLED TYPE 2 DIABETES MELLITUS WITH HYPERGLYCEMIA: Primary | ICD-10-CM

## 2024-01-22 DIAGNOSIS — E66.01 SEVERE OBESITY (BMI 35.0-39.9) WITH COMORBIDITY: ICD-10-CM

## 2024-01-22 DIAGNOSIS — C61 MALIGNANT NEOPLASM OF PROSTATE: ICD-10-CM

## 2024-01-22 DIAGNOSIS — C19 ADENOCARCINOMA OF RECTOSIGMOID JUNCTION: ICD-10-CM

## 2024-01-22 DIAGNOSIS — E11.69 HYPERLIPIDEMIA ASSOCIATED WITH TYPE 2 DIABETES MELLITUS: ICD-10-CM

## 2024-01-22 PROCEDURE — 99214 OFFICE O/P EST MOD 30 MIN: CPT | Mod: S$GLB,,, | Performed by: INTERNAL MEDICINE

## 2024-01-22 PROCEDURE — 3072F LOW RISK FOR RETINOPATHY: CPT | Mod: CPTII,S$GLB,, | Performed by: INTERNAL MEDICINE

## 2024-01-22 PROCEDURE — 3078F DIAST BP <80 MM HG: CPT | Mod: CPTII,S$GLB,, | Performed by: INTERNAL MEDICINE

## 2024-01-22 PROCEDURE — 99999 PR PBB SHADOW E&M-EST. PATIENT-LVL III: CPT | Mod: PBBFAC,,, | Performed by: INTERNAL MEDICINE

## 2024-01-22 PROCEDURE — 3008F BODY MASS INDEX DOCD: CPT | Mod: CPTII,S$GLB,, | Performed by: INTERNAL MEDICINE

## 2024-01-22 PROCEDURE — 1159F MED LIST DOCD IN RCRD: CPT | Mod: CPTII,S$GLB,, | Performed by: INTERNAL MEDICINE

## 2024-01-22 PROCEDURE — 3075F SYST BP GE 130 - 139MM HG: CPT | Mod: CPTII,S$GLB,, | Performed by: INTERNAL MEDICINE

## 2024-01-22 RX ORDER — ATORVASTATIN CALCIUM 80 MG/1
80 TABLET, FILM COATED ORAL NIGHTLY
Qty: 30 TABLET | Refills: 0 | Status: SHIPPED | OUTPATIENT
Start: 2024-01-22 | End: 2024-04-22

## 2024-01-22 NOTE — PROGRESS NOTES
"Subjective:      Patient ID: Patrick Sanz is a 53 y.o. male.    Chief Complaint: Follow-up    HPI    54 yo with   Patient Active Problem List   Diagnosis    Uncontrolled type 2 diabetes mellitus with hyperglycemia    Bipolar disorder    Kidney stone    Malignant neoplasm of prostate    Severe obesity (BMI 35.0-39.9) with comorbidity    Adenocarcinoma of rectosigmoid junction    Status post prostatectomy    Hyperlipidemia associated with type 2 diabetes mellitus    Sleep apnea     Past Medical History:   Diagnosis Date    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 Patrick Sanz is a 52 y.o. male patient of Dr. Woodward status post robot-assisted radical prostatectomy with partial nerve sparing on 10/11/21 for what proved to be a 8cc, pGS4+3 (70% high grade) [id number] (0/15) prostate cancer excised with a focal positive 11mm margin and a positive benign margin. His RADHA-S score is 7. His post op PSA was low detectable and his most recent is now 0.177. H    Sleep apnea      Here today for management of mult med problems as outlined below.  Compliant with meds without significant side effects. Energy and appetite good.  Home glucose 90s to 300s.  Reports diet noncompliance about twice weekly.    Review of Systems   Constitutional:  Negative for chills and fever.   HENT:  Negative for ear pain and sore throat.    Respiratory:  Negative for cough.    Cardiovascular:  Negative for chest pain.   Gastrointestinal:  Negative for abdominal pain and blood in stool.   Genitourinary:  Negative for dysuria and hematuria.   Neurological:  Negative for seizures and syncope.     Objective:   /76 (BP Location: Left arm, Patient Position: Sitting, BP Method: Large (Manual))   Pulse 106   Temp 98.2 °F (36.8 °C) (Tympanic)   Ht 5' 7.99" " (1.727 m)   Wt 109.8 kg (242 lb 1 oz)   SpO2 98%   BMI 36.81 kg/m²     Physical Exam  Constitutional:       General: He is not in acute distress.     Appearance: He is well-developed.   HENT:      Head: Normocephalic and atraumatic.   Eyes:      Extraocular Movements: Extraocular movements intact.   Neck:      Thyroid: No thyromegaly.   Cardiovascular:      Rate and Rhythm: Normal rate and regular rhythm.   Pulmonary:      Breath sounds: Normal breath sounds. No wheezing or rales.   Abdominal:      General: Bowel sounds are normal.      Palpations: Abdomen is soft.      Tenderness: There is no abdominal tenderness.   Musculoskeletal:         General: No swelling.      Cervical back: Neck supple. No rigidity.   Lymphadenopathy:      Cervical: No cervical adenopathy.   Skin:     General: Skin is warm and dry.   Neurological:      Mental Status: He is alert and oriented to person, place, and time.   Psychiatric:         Behavior: Behavior normal.         Lab Results   Component Value Date    WBC 8.77 11/11/2022    HGB 15.5 11/11/2022    HGB 14.8 10/11/2022    HGB 13.7 (L) 09/26/2022    HCT 46.5 11/11/2022    MCV 86 11/11/2022    MCV 85 10/11/2022    MCV 82 09/26/2022     11/11/2022    CHOL 244 (H) 03/07/2023    TRIG 233 (H) 03/07/2023    HDL 50 03/07/2023    LDLCALC 147.4 03/07/2023    LDLCALC 87.6 01/29/2022    LDLCALC 61.6 (L) 10/03/2020    ALT 63 (H) 03/07/2023    AST 27 03/07/2023     03/07/2023    K 4.2 03/07/2023    CALCIUM 10.0 03/07/2023     03/07/2023    CO2 25 03/07/2023    BUN 11 03/07/2023    CREATININE 0.9 03/07/2023    CREATININE 1.0 11/11/2022    CREATININE 1.0 10/11/2022    EGFRNORACEVR >60.0 03/07/2023    EGFRNORACEVR >60 11/11/2022    EGFRNORACEVR >60 10/11/2022    TSH 0.943 10/03/2020    TSH 1.66 02/07/2020    PSA 17.6 (H) 03/15/2021    PSA 15.7 (H) 10/07/2020    PSA 16.1 (H) 10/03/2020    PSADIAG <0.01 05/30/2023    PSADIAG <0.01 05/01/2023    PSADIAG 0.23 11/11/2022    PSADIAG  0.23 11/11/2022     03/07/2023    HGBA1C 7.1 (H) 09/08/2023    HGBA1C 8.9 (H) 03/07/2023    HGBA1C 8.6 (H) 12/07/2022          The 10-year ASCVD risk score (Rebecca ALFORD, et al., 2019) is: 9.7%    Values used to calculate the score:      Age: 53 years      Sex: Male      Is Non- : No      Diabetic: Yes      Tobacco smoker: No      Systolic Blood Pressure: 130 mmHg      Is BP treated: No      HDL Cholesterol: 50 mg/dL      Total Cholesterol: 244 mg/dL     Assessment:     1. Uncontrolled type 2 diabetes mellitus with hyperglycemia    2. Hyperlipidemia associated with type 2 diabetes mellitus    3. Malignant neoplasm of prostate    4. Bipolar affective disorder, remission status unspecified    5. Encounter for screening for malignant neoplasm of prostate    6. Severe obesity (BMI 35.0-39.9) with comorbidity    7. Adenocarcinoma of rectosigmoid junction      Plan:   1. Uncontrolled type 2 diabetes mellitus with hyperglycemia  Assessment & Plan:  On Ozempic 2 mg for approximately 2 months.  Check A1c today.  Adjust medications as necessary.    Orders:  -     Hemoglobin A1C; Future; Expected date: 01/22/2024  -     Hemoglobin A1C; Future; Expected date: 07/19/2024    2. Hyperlipidemia associated with type 2 diabetes mellitus  Assessment & Plan:  Previously not at goal for diabetic.  Advised compliance with medication and follow lipid panel.    Orders:  -     Lipid Panel; Future; Expected date: 07/19/2024  -     Comprehensive Metabolic Panel; Future; Expected date: 07/19/2024  -     CBC Auto Differential; Future; Expected date: 07/19/2024  -     TSH; Future; Expected date: 07/19/2024  -     atorvastatin (LIPITOR) 80 MG tablet; Take 1 tablet (80 mg total) by mouth every evening.  Dispense: 30 tablet; Refill: 0    3. Malignant neoplasm of prostate  Overview:  Mr. Patrick Sanz is a 52 y.o. male patient of Dr. Woodward status post robot-assisted radical prostatectomy with partial nerve sparing on  10/11/21 for what proved to be a 8cc, pGS4+3 (70% high grade) [id number] (0/15) prostate cancer excised with a focal positive 11mm margin and a positive benign margin. His RADHA-S score is 7. His post op PSA was low detectable and his most recent is now 0.177.  He returns today for PSMA PET follow up.      Biopsy 5/6/21: 4+3 in 5/12 cores, PSA 17.6        4. Bipolar affective disorder, remission status unspecified  Overview:  Stable. Cont recs and f/u as per psyc      5. Encounter for screening for malignant neoplasm of prostate  -     PSA, Screening; Future; Expected date: 07/19/2024    6. Severe obesity (BMI 35.0-39.9) with comorbidity  Assessment & Plan:  D and e. He is also on glp 1 med      7. Adenocarcinoma of rectosigmoid junction  Assessment & Plan:  Stable. Cont recs and f/u as per colon/rectal clinic          There are no Patient Instructions on file for this visit.    Future Appointments   Date Time Provider Department Center   1/22/2024  2:50 PM LABORATORY, [facility]   1/29/2024  4:40 PM Cj Quinn MD [facility]   2/15/2024  4:00 PM Pedro Amin MD [facility] PSYCH [place]   3/19/2024  3:30 PM Lejeune, Elizabeth B, NP [facility] SLEEP [place]   7/15/2024  7:45 AM LABORATORY, [facility] LAB [place]   7/19/2024 11:40 AM Edilberto Figueroa MD [facility] IM [place]       Lab Frequency Next Occurrence   Ambulatory referral/consult to Podiatry Once 06/16/2023   Ambulatory referral/consult to Diabetes Education Once 09/11/2023   Hemoglobin A1C Every 12 Weeks    Hemoglobin A1C Every 12 Weeks 2/16/2024, 5/10/2024   Hemoglobin A1C Every 12 Weeks 1/26/2024, 4/19/2024, 7/12/2024       Follow up in about 6 months (around 7/22/2024), or if symptoms worsen or fail to improve.

## 2024-01-24 ENCOUNTER — TELEPHONE (OUTPATIENT)
Dept: PSYCHIATRY | Facility: CLINIC | Age: 54
End: 2024-01-24
Payer: COMMERCIAL

## 2024-01-29 ENCOUNTER — PATIENT MESSAGE (OUTPATIENT)
Dept: SURGERY | Facility: CLINIC | Age: 54
End: 2024-01-29

## 2024-03-06 RX ORDER — INSULIN GLARGINE-YFGN 100 [IU]/ML
INJECTION, SOLUTION SUBCUTANEOUS
Qty: 15 ML | Refills: 0 | Status: SHIPPED | OUTPATIENT
Start: 2024-03-06 | End: 2024-04-23

## 2024-03-22 ENCOUNTER — TELEPHONE (OUTPATIENT)
Dept: PSYCHIATRY | Facility: CLINIC | Age: 54
End: 2024-03-22
Payer: COMMERCIAL

## 2024-03-26 ENCOUNTER — OFFICE VISIT (OUTPATIENT)
Dept: PSYCHIATRY | Facility: CLINIC | Age: 54
End: 2024-03-26
Payer: COMMERCIAL

## 2024-03-26 DIAGNOSIS — G47.00 INSOMNIA, UNSPECIFIED TYPE: Primary | ICD-10-CM

## 2024-03-26 DIAGNOSIS — F39 MOOD DISORDER: ICD-10-CM

## 2024-03-26 PROCEDURE — 3072F LOW RISK FOR RETINOPATHY: CPT | Mod: CPTII,95,, | Performed by: PSYCHIATRY & NEUROLOGY

## 2024-03-26 PROCEDURE — 99214 OFFICE O/P EST MOD 30 MIN: CPT | Mod: 95,,, | Performed by: PSYCHIATRY & NEUROLOGY

## 2024-03-26 RX ORDER — CLONAZEPAM 0.5 MG/1
0.5 TABLET ORAL DAILY PRN
Qty: 30 TABLET | Refills: 2 | Status: SHIPPED | OUTPATIENT
Start: 2024-03-26 | End: 2025-03-26

## 2024-03-26 RX ORDER — LAMOTRIGINE 200 MG/1
TABLET ORAL
Qty: 135 TABLET | Refills: 1 | Status: SHIPPED | OUTPATIENT
Start: 2024-03-26

## 2024-03-26 RX ORDER — VENLAFAXINE HYDROCHLORIDE 150 MG/1
150 CAPSULE, EXTENDED RELEASE ORAL DAILY
Qty: 90 CAPSULE | Refills: 1 | Status: SHIPPED | OUTPATIENT
Start: 2024-03-26

## 2024-03-26 RX ORDER — QUETIAPINE FUMARATE 200 MG/1
200 TABLET, FILM COATED ORAL NIGHTLY
Qty: 90 TABLET | Refills: 1 | Status: SHIPPED | OUTPATIENT
Start: 2024-03-26 | End: 2025-03-26
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
## 2024-04-01 RX ORDER — SEMAGLUTIDE 1.34 MG/ML
INJECTION, SOLUTION SUBCUTANEOUS
Qty: 3 ML | Refills: 0 | Status: SHIPPED | OUTPATIENT
Start: 2024-04-01 | End: 2024-04-24

## 2024-04-16 ENCOUNTER — OFFICE VISIT (OUTPATIENT)
Dept: SLEEP MEDICINE | Facility: CLINIC | Age: 54
End: 2024-04-16
Payer: COMMERCIAL

## 2024-04-16 VITALS
SYSTOLIC BLOOD PRESSURE: 122 MMHG | DIASTOLIC BLOOD PRESSURE: 80 MMHG | HEART RATE: 72 BPM | WEIGHT: 229.75 LBS | BODY MASS INDEX: 34.82 KG/M2 | RESPIRATION RATE: 18 BRPM | OXYGEN SATURATION: 98 % | HEIGHT: 68 IN

## 2024-04-16 DIAGNOSIS — G47.33 OSA ON CPAP: Primary | ICD-10-CM

## 2024-04-16 DIAGNOSIS — E66.01 SEVERE OBESITY (BMI 35.0-39.9) WITH COMORBIDITY: ICD-10-CM

## 2024-04-16 PROCEDURE — 99999 PR PBB SHADOW E&M-EST. PATIENT-LVL IV: CPT | Mod: PBBFAC,,, | Performed by: NURSE PRACTITIONER

## 2024-04-16 PROCEDURE — 99213 OFFICE O/P EST LOW 20 MIN: CPT | Mod: S$GLB,,, | Performed by: NURSE PRACTITIONER

## 2024-04-16 PROCEDURE — 3079F DIAST BP 80-89 MM HG: CPT | Mod: CPTII,S$GLB,, | Performed by: NURSE PRACTITIONER

## 2024-04-16 PROCEDURE — 3074F SYST BP LT 130 MM HG: CPT | Mod: CPTII,S$GLB,, | Performed by: NURSE PRACTITIONER

## 2024-04-16 PROCEDURE — 3072F LOW RISK FOR RETINOPATHY: CPT | Mod: CPTII,S$GLB,, | Performed by: NURSE PRACTITIONER

## 2024-04-16 PROCEDURE — 1159F MED LIST DOCD IN RCRD: CPT | Mod: CPTII,S$GLB,, | Performed by: NURSE PRACTITIONER

## 2024-04-16 PROCEDURE — 3008F BODY MASS INDEX DOCD: CPT | Mod: CPTII,S$GLB,, | Performed by: NURSE PRACTITIONER

## 2024-04-16 PROCEDURE — 1160F RVW MEDS BY RX/DR IN RCRD: CPT | Mod: CPTII,S$GLB,, | Performed by: NURSE PRACTITIONER

## 2024-04-16 NOTE — PROGRESS NOTES
"Subjective:      Patient ID: Patrick Sanz is a 54 y.o. male.    Chief Complaint: Sleep Apnea    HPI  Presents for sleep apnea on AutoPAP therapy. Recent replacement. Patient states improved symptoms with use of AutoPAP. Sleeping more soundly. Waking up feeling more refreshed. Improved daytime sleepiness. Patient states he is benefiting from use of the AutoPAP.     Patient Active Problem List   Diagnosis    Uncontrolled type 2 diabetes mellitus with hyperglycemia    Bipolar disorder    Kidney stone    Malignant neoplasm of prostate    Severe obesity (BMI 35.0-39.9) with comorbidity    Adenocarcinoma of rectosigmoid junction    Status post prostatectomy    Hyperlipidemia associated with type 2 diabetes mellitus    BRAYAN on CPAP (severe)     /80   Pulse 72   Resp 18   Ht 5' 8" (1.727 m)   Wt 104.2 kg (229 lb 11.5 oz)   SpO2 98%   BMI 34.93 kg/m²   Body mass index is 34.93 kg/m².    Review of Systems   Constitutional: Negative.    HENT: Negative.     Respiratory: Negative.     Cardiovascular: Negative.    Musculoskeletal: Negative.    Gastrointestinal: Negative.    Neurological: Negative.    Psychiatric/Behavioral: Negative.       Objective:      Physical Exam  Constitutional:       Appearance: He is obese.   HENT:      Head: Normocephalic and atraumatic.      Nose: Nose normal.   Cardiovascular:      Rate and Rhythm: Normal rate and regular rhythm.   Pulmonary:      Effort: Pulmonary effort is normal.      Breath sounds: Normal breath sounds.   Abdominal:      Palpations: Abdomen is soft.      Tenderness: There is no abdominal tenderness.   Musculoskeletal:         General: Normal range of motion.      Cervical back: Normal range of motion and neck supple.   Skin:     General: Skin is warm and dry.   Neurological:      General: No focal deficit present.      Mental Status: He is alert and oriented to person, place, and time.   Psychiatric:         Mood and Affect: Mood normal.         Behavior: " "Behavior normal.       Personal Diagnostic Review      4/16/2024     3:38 PM   EPWORTH SLEEPINESS SCALE   Sitting and reading 1   Watching TV 0   Sitting, inactive in a public place (e.g. a theatre or a meeting) 0   As a passenger in a car for an hour without a break 0   Lying down to rest in the afternoon when circumstances permit 3   Sitting and talking to someone 0   Sitting quietly after a lunch without alcohol 0   In a car, while stopped for a few minutes in traffic 0   Total score 4          Initial compliance period 02/19/2024 - 03/19/2024  Compliance met Yes  Compliance percentage 97%  Payor Standard  Usage 02/19/2024 - 03/19/2024  Usage days 29/30 days (97%)  >= 4 hours 29 days (97%)  < 4 hours 0 days (0%)  Usage hours 249 hours 40 minutes  Average usage (total days) 8 hours 19 minutes  Average usage (days used) 8 hours 37 minutes  Median usage (days used) 8 hours 28 minutes  Total used hours (value since last reset - 03/19/2024) 289 hours  AirSense 11 AutoSet  Serial number [id number]  Mode AutoSet  Min Pressure 12 cmH2O  Max Pressure 16 cmH2O  EPR Fulltime  EPR level 3  Response Standard  Therapy  Pressure - cmH2O Median: 12.4 95th percentile: 13.8 Maximum: 14.5  Leaks - L/min Median: 0.0 95th percentile: 21.3 Maximum: 53.9  Events per hour AI: 1.4 HI: 0.6 AHI: 2.0  Apnea Index Central: 0.5 Obstructive: 0.7 Unknown: 0.1  RERA Index 0.1          Procedure Notes    Author Status Last  Updated Created   Michael Crow MD Signed Michael Crow MD 12/29/2023  3:55 PM 12/29/2023  3:54 PM          Assoc. Orders Procedures   POLYSOMNOGRAM POLYSOMNOGRAM       Pre-Op Dx Post-Op Dx   Sleep apnea, unspecified type None         Patient Name: Patrick Sanz Study Date: 12/16/2023   YOB: 1970 Study Type: SPLIT   Age: 53 year Patient #: [id number]   Sex: Male Billing ID: -   Height: 5' 8" Interpreting Physician: Michael Crow MD   Weight: 234.0 lbs Recording Tech: Wilman Garcia   BMI: " "35.9 Scoring Tech: Yue Madison   Elim: 2 Neck Circumference: 19      Indications for Polysomnography  The patient is a 53 year old Male who is 5' 8" and weighs 234.0 lbs.  His BMI equals 35.9.  A diagnostic polysomnogram was performed to evaluate for -.  After 114.5 minutes of sleep time the patient exhibited sufficient respiratory events qualifying him for a CPAP trial which was then initiated.       Referring Provider: Elizabeth Lejeune NP     Medical History: DX with BRAYAN 15+ years ago and still using same CPAP. Benefits from use. If he does not wear CPAP he will have snoring and witnessed apneas, restless sleep.  Patient with daytime hypersomnolence.  Elim Sleepiness Scale score 9.  Comorbidities include BMI 35.        STOP - BANG Questionnaire: OFF CPAP: 7        Medication   Lipitor   Bentyl   Jardiance   Glipizide   Insulin   Humalog   Lamictal   Zofran   Quetiapine   Ozempic   Ultram   Venlafaxine   Cialis   PEP injection      Test Description  Patient was connected to a full set of leads with a sleep technician in attendance.  Parameters used were EEG derivations (F4-A1, C4-A1, O2-A1), EOG derivations (LOC-A2, NIKHIL-A2), EKG, EMG - extremity (leg) & chin, oxygen saturation, effort parameters + abdominal/thoracic (RIP), and airflow parameters - nasal pressure/thermal.  Body position was visually monitored and noted.  Audio & video monitoring was performed during study.     Scoring is done in accordance with the American Academy of Sleep Medicine Manual for the Scoring of Sleep and Associated Events version 2.6.  Hypopneas are scored using the 4% desaturation rule.     Sleep Architecture  The total recording time of the diagnostic portion of the study was 212.0 minutes.  The total sleep time was 114.5 minutes.  During the diagnostic portion of the study, the patient spent 14.0% of total sleep time in Stage N1, 45.4% in Stage N2, 24.0% in Stages N3, and 16.6% in REM.   Sleep latency was 88.0 minutes.  " REM latency was 77.0 minutes.  Sleep Efficiency was 54.0%.  Wake after Sleep Onset time was 9.5 minutes.      At 12:59:20 AM the patient was placed on PAP treatment and was titrated at pressures ranging from 5* cm/H20 with supplemental oxygen at - up to 16* cm/H20 with supplemental oxygen at -.  The total recording time of the treatment portion of the study was 259.0 minutes.  The total sleep time was 239.5 minutes.  During the treatment portion of the study, the patient spent 11.9% of total sleep time in Stage N1, 48.0% in Stage N2, 9.0% in Stages N3, and 31.1% in REM.   Sleep latency was 0.5 minutes.  REM latency was 57.0 minutes.  Sleep Efficiency was 92.5%.  Wake after Sleep Onset time was 19.0 minutes.     Respiratory Events  During the diagnostic portion of the study, the polysomnogram revealed a presence of 72 obstructive, 2 central, and - mixed apneas resulting in an Apnea index of 38.8 events per hour.  There were 14 hypopneas (?3% desat and/or Ar.) resulting in an Apnea\Hypopnea Index (AHI ?3% desat and/or Ar.) of 46.1 events per hour.  There were 13 hypopneas (?4% desat) resulting in an Apnea\Hypopnea Index (AHI ?4% desat) of 45.6 events per hour.  There were - Respiratory Effort Related Arousals resulting in a RERA index of - events per hour. The Respiratory Disturbance Index is 46.1 events per hour.  Mean oxygen saturation was 91.5%.  The lowest oxygen saturation during sleep was 83.0%.  Time spent ?88% oxygen saturation was 4.0 minutes (2.0%).     During the treatment portion of the study, the polysomnogram revealed a presence of 13 obstructive, 19 central, and - mixed apneas resulting in an Apnea index of 8.0 events per hour.  There were 56 hypopneas (?3% desat and/or Ar.) resulting in an Apnea\Hypopnea Index (AHI ?3% desat and/or Ar.) of 22.0 events per hour.  There were 54 hypopneas (?4% desat) resulting in an Apnea\Hypopnea Index (AHI ?4% desat) of 21.5 events per hour.  There were - Respiratory  Effort Related Arousals resulting in a RERA index of - events per hour. The Respiratory Disturbance Index is 22.0 events per hour.  Mean oxygen saturation was 92.8%.  The lowest oxygen saturation during sleep was 87.0%.  Time spent ?88% oxygen saturation was 0.8 minutes (0.3%).     Limb Activity  During the diagnostic portion of the study, there were 98 limb movements recorded.  Of this total, 98 were classified as PLMs.  Of the PLMs, 20 were associated with arousals.  The Limb Movement index was 51.4 per hour while the PLM index was 51.4 per hour.     During the treatment portion of the study, there were 188 limb movements recorded.  Of this total, 187 were classified as PLMs.  Of the PLMs, 53 were associated with arousals.  The Limb Movement index was 47.1 per hour while the PLM index was 46.8 per hour.     Cardiac Summary  During the diagnostic portion of the study, the average pulse rate was 83.5 bpm.  The minimum pulse rate was 64.0 bpm while the maximum pulse rate was 99.0 bpm.     During the treatment portion of the study, the average pulse rate was 83.0 bpm.  The minimum pulse rate was 63.0 bpm while the maximum pulse rate was 102.0 bpm.                  Assessment:       1. BRAYAN on CPAP (severe)    2. Severe obesity (BMI 35.0-39.9) with comorbidity        Outpatient Encounter Medications as of 4/16/2024   Medication Sig Dispense Refill    atorvastatin (LIPITOR) 80 MG tablet Take 1 tablet (80 mg total) by mouth every evening. 30 tablet 0    blood-glucose sensor (DEXCOM G7 SENSOR) Miriam 1 each by Misc.(Non-Drug; Combo Route) route every 10 days. 3 each 11    clonazePAM (KLONOPIN) 0.5 MG tablet Take 1 tablet (0.5 mg total) by mouth daily as needed for Anxiety. 30 tablet 2    empagliflozin (JARDIANCE) 25 mg tablet Take 1 tablet (25 mg total) by mouth once daily. 30 tablet 11    insulin glargine-yfgn (SEMGLEE,INSULIN GLARG-YFGN,PEN) 100 unit/mL (3 mL) InPn INJECT 30 UNITS SUBCUTANEOUSLY ONCE DAILY IN THE EVENING  15 mL 0    lamoTRIgine (LAMICTAL) 200 MG tablet Take 1 and 1/2 tablets daily. 135 tablet 1    ondansetron (ZOFRAN) 4 MG tablet Take 1 tablet (4 mg total) by mouth every 6 (six) hours as needed for Nausea. 40 tablet 0    OZEMPIC 1 mg/dose (4 mg/3 mL) INJECT 1MG INTO THE SKIN ONCE A WEEK 3 mL 0    pep injection Inject 0.15 ml as directed     For compounding pharmacy use:   Add PAPAVERINE 30 mg  Add PHENTOLAMINE 1 mg  Add ALPROSTADIL 20 mcg 1 vial 5    QUEtiapine (SEROQUEL) 200 MG Tab Take 1 tablet (200 mg total) by mouth every evening. 90 tablet 1    tadalafiL (CIALIS) 20 MG Tab       venlafaxine (EFFEXOR-XR) 150 MG Cp24 Take 1 capsule (150 mg total) by mouth once daily. 90 capsule 1    insulin lispro (HUMALOG KWIKPEN INSULIN) 100 unit/mL pen Inject 12 Units into the skin 3 (three) times daily with meals. 12 mL 11    [DISCONTINUED] lamoTRIgine (LAMICTAL) 200 MG tablet Take 1 and 1/2 tablets daily. 135 tablet 1    [DISCONTINUED] QUEtiapine (SEROQUEL) 200 MG Tab Take 1 tablet (200 mg total) by mouth every evening. 90 tablet 1    [DISCONTINUED] venlafaxine (EFFEXOR-XR) 150 MG Cp24 Take 1 capsule (150 mg total) by mouth once daily. 90 capsule 1     No facility-administered encounter medications on file as of 4/16/2024.     No orders of the defined types were placed in this encounter.    Plan:     Compliant with PAP and benefits from use. Follow up annually in the sleep clinic.  Weight loss and exercise to improve overall health.    1. BRAYAN on CPAP (severe)    2. Severe obesity (BMI 35.0-39.9) with comorbidity                        Elizabeth LeJeune, ACNP, ANP

## 2024-04-20 DIAGNOSIS — E78.5 HYPERLIPIDEMIA ASSOCIATED WITH TYPE 2 DIABETES MELLITUS: ICD-10-CM

## 2024-04-20 DIAGNOSIS — E11.69 HYPERLIPIDEMIA ASSOCIATED WITH TYPE 2 DIABETES MELLITUS: ICD-10-CM

## 2024-04-22 RX ORDER — ATORVASTATIN CALCIUM 80 MG/1
80 TABLET, FILM COATED ORAL
Qty: 30 TABLET | Refills: 0 | Status: SHIPPED | OUTPATIENT
Start: 2024-04-22 | End: 2024-05-19

## 2024-04-23 RX ORDER — INSULIN GLARGINE-YFGN 100 [IU]/ML
INJECTION, SOLUTION SUBCUTANEOUS
Qty: 15 ML | Refills: 0 | Status: SHIPPED | OUTPATIENT
Start: 2024-04-23 | End: 2024-05-15 | Stop reason: ALTCHOICE

## 2024-04-24 RX ORDER — SEMAGLUTIDE 1.34 MG/ML
1 INJECTION, SOLUTION SUBCUTANEOUS
Qty: 3 ML | Refills: 0 | Status: SHIPPED | OUTPATIENT
Start: 2024-04-24 | End: 2024-05-15

## 2024-05-14 NOTE — PROGRESS NOTES
PCP: Edilberto Figueroa MD    Subjective:     Chief Complaint: Diabetes     TELEMEDICINE VISIT:     The patient location is: Home  The chief complaint leading to consultation is: Diabetes Follow up  Visit type: Virtual visit with synchronous audio and video  Total time spent with patient: 15  Each patient to whom he or she provides medical services by telemedicine is:  (1) informed of the relationship between the physician and patient and the respective role of any other health care provider with respect to management of the patient; and (2) notified that he or she may decline to receive medical services by telemedicine and may withdraw from such care at any time.    Notes:     HISTORY OF PRESENT ILLNESS: 54 y.o.   male presenting for diabetes management.   The patient's last visit with me was on 11/7/2023.  Patient has had Type II diabetes since 2012.  Pertinent to decision making is the following comorbidities: Obesity by BMI and Bipolar Disorder  Patient has the following Diabetes complications: without complications  He is to be enrolled in diabetes education classes.     Patient's most recent A1c of 7.1% was completed 8 months ago.   Patient states since His last A1c His blood glucose levels have been high  after meals.   Patient monitors blood glucose 4 times per day with meter and Continuously with personal CGM Dexcom.   Patient blood glucose monitoring device will be uploaded into Media Section today.        Patient reports baseline euglycemia and postprandial hyperglycemia especially after heavy meals.  Patient endorses the following diabetes related symptoms: Nausea. Related to ozempic.   Patient is due today for the following diabetes-related health maintenance standards: Eye Exam, Lipid panel, Urine Microalbumin/creatinine ratio, A1c, and COVID-19 Vaccine .   He denies recent hospital admissions or emergency room visits.   He denies having hypoglycemia.   Patient's concerns today include glycemic  control. Of note, patient was diagnosed with prostate cancer is currently undergoing Lupron treatment and radiation. No plans for chemotherapy at present.   Patient medication regimen is as below.     CURRENT DM MEDICATIONS:   Semglee 30 units at night  Humalog 12 units before regular meal, 16 units before heavy carb meals, 6 units before snack - not using heavy carb dosing   Jardiance 25 mg daily - off x 2 mo   Ozempic 1 mg weekly     Patient has failed the following Diabetes medications:   Trulicity   Basaglar / Lantus  Metformin        Labs Reviewed.       Lab Results   Component Value Date    CPEPTIDE 2.06 03/07/2023     Lab Results   Component Value Date    GLUTAMICACID 0.00 03/07/2023          //   , There is no height or weight on file to calculate BMI.  His blood sugar in clinic today is:         Review of Systems   Constitutional:  Negative for activity change, appetite change, chills and fever.   HENT:  Negative for dental problem, mouth sores, nosebleeds, sore throat and trouble swallowing.    Eyes:  Negative for pain and discharge.   Respiratory:  Negative for shortness of breath, wheezing and stridor.    Cardiovascular:  Negative for chest pain, palpitations and leg swelling.   Gastrointestinal:  Negative for abdominal pain, diarrhea, nausea and vomiting.   Endocrine: Negative for polydipsia, polyphagia and polyuria.   Genitourinary:  Negative for dysuria, frequency and urgency.   Musculoskeletal:  Negative for joint swelling and myalgias.   Skin:  Negative for rash and wound.   Neurological:  Negative for dizziness, syncope, weakness and headaches.   Psychiatric/Behavioral:  Negative for behavioral problems and dysphoric mood.          Diabetes Management Status  Statin: Taking  ACE/ARB: Not taking    Screening or Prevention Patient's value Goal Complete/Controlled?   HgA1C Testing and Control   Lab Results   Component Value Date    HGBA1C 7.1 (H) 09/08/2023      Annually/Less than 8% No   Lipid  profile : 2023 Annually Yes   LDL control Lab Results   Component Value Date    LDLCALC 147.4 2023    Annually/Less than 100 mg/dl  Yes   Nephropathy screening Lab Results   Component Value Date    MICALBCREAT 19.6 2023     Lab Results   Component Value Date    PROTEINUA Trace (A) 2021    Annually Yes   Blood pressure BP Readings from Last 1 Encounters:   24 122/80    Less than 140/90 Yes   Dilated retinal exam : 2023 Annually No    Foot exam   : 2023 Annually No     Social History     Socioeconomic History    Marital status: Significant Other   Tobacco Use    Smoking status: Former     Current packs/day: 0.00     Average packs/day: 0.3 packs/day for 15.0 years (3.8 ttl pk-yrs)     Types: Cigarettes     Start date: 1998     Quit date: 2013     Years since quittin.3    Smokeless tobacco: Former    Tobacco comments:     Was a part time smoker.  1 pack could last 2 or more weeks   Substance and Sexual Activity    Alcohol use: Not Currently     Comment: May have 1 or 2 drinks at social events or restaurants    Drug use: Never    Sexual activity: Yes     Partners: Female     Birth control/protection: Partner-Vasectomy, Post-menopausal     Social Determinants of Health     Financial Resource Strain: Medium Risk (2022)    Overall Financial Resource Strain (CARDIA)     Difficulty of Paying Living Expenses: Somewhat hard   Food Insecurity: No Food Insecurity (2022)    Hunger Vital Sign     Worried About Running Out of Food in the Last Year: Never true     Ran Out of Food in the Last Year: Never true   Transportation Needs: No Transportation Needs (2022)    PRAPARE - Transportation     Lack of Transportation (Medical): No     Lack of Transportation (Non-Medical): No   Physical Activity: Inactive (2022)    Exercise Vital Sign     Days of Exercise per Week: 0 days     Minutes of Exercise per Session: 0 min   Stress: No Stress Concern Present (2022)     [facility] of Occupational Health - Occupational Stress Questionnaire     Feeling of Stress : Only a little   Housing Stability: Low Risk  (9/23/2022)    Housing Stability Vital Sign     Unable to Pay for Housing in the Last Year: No     Number of Places Lived in the Last Year: 1     Unstable Housing in the Last Year: No     Past Medical History:   Diagnosis Date    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 Patrick Sanz is a 52 y.o. male patient of Dr. Woodward status post robot-assisted radical prostatectomy with partial nerve sparing on 10/11/21 for what proved to be a 8cc, pGS4+3 (70% high grade) [id number] (0/15) prostate cancer excised with a focal positive 11mm margin and a positive benign margin. His RADHA-S score is 7. His post op PSA was low detectable and his most recent is now 0.177. H    Sleep apnea        Objective:      Physical Exam  Constitutional:       General: He is not in acute distress.     Appearance: He is well-developed. He is not diaphoretic.   HENT:      Head: Normocephalic and atraumatic.      Right Ear: External ear normal.      Left Ear: External ear normal.      Nose: Nose normal.   Eyes:      General:         Right eye: No discharge.         Left eye: No discharge.   Pulmonary:      Effort: Pulmonary effort is normal. No respiratory distress.      Breath sounds: No stridor.   Musculoskeletal:         General: Normal range of motion.      Cervical back: Normal range of motion.   Skin:     Coloration: Skin is not pale.   Neurological:      Mental Status: He is alert and oriented to person, place, and time.      Motor: No abnormal muscle tone.      Coordination: Coordination normal.   Psychiatric:         Behavior: Behavior normal.         Thought Content: Thought content normal.         Judgment:  Judgment normal.           Assessment / Plan:     Uncontrolled type 2 diabetes mellitus with hyperglycemia  -     semaglutide (OZEMPIC) 2 mg/dose (8 mg/3 mL) PnIj; Inject 2 mg into the skin every 7 days.  Dispense: 3 mL; Refill: 11  -     empagliflozin (JARDIANCE) 25 mg tablet; Take 1 tablet (25 mg total) by mouth once daily.  Dispense: 30 tablet; Refill: 11  -     insulin lispro (HUMALOG KWIKPEN INSULIN) 100 unit/mL pen; Inject 12 Units into the skin 3 (three) times daily with meals.  Dispense: 12 mL; Refill: 11  -     blood-glucose sensor (DEXCOM G7 SENSOR) Miriam; 1 each by Misc.(Non-Drug; Combo Route) route every 10 days.  Dispense: 3 each; Refill: 11  -     insulin glargine U-100, Lantus, (BASAGLAR KWIKPEN U-100 INSULIN) 100 unit/mL (3 mL) InPn pen; Inject 26 Units into the skin once daily.  Dispense: 7.8 mL; Refill: 11  -     Hemoglobin A1C; Standing    Hyperlipidemia associated with type 2 diabetes mellitus    Obesity (BMI 30-39.9)        Additional Plan Details:    - POCT Glucose  - Encouraged continuation of lifestyle changes including regular exercise and limiting carbohydrates to 30-45 grams per meal threes times daily and 15 grams per snack with a limit of two daily.   - Encouraged continued monitoring of blood glucose with maintenance of 4 times daily and Continuously with personal CGM Dexcom.   - Current DM Medication Regimen: Change Humalog 12 units  before regular meal, 16 units before heavy meals, and 6 units before snacks TID wm. Change Semglee to Basaglar 26 units daily. Restart Jardiance 25 mg daily. Change Ozempic 2 mg weekly - will monitor constipation.  - Zofran PRN nausea  - Constipation: increased H20, otc fiber, otc stool softener daily as needed  - A1c tomorrow  - Health Maintenance standards addressed today: Eye Exam - will be completed outside of [facility] and patient will schedule, Lipid panel to be scheduled today, Urine Microalbumin / Creatinine Ratio scheduled, A1c to be scheduled, and  COVID - 19 Vaccine - patient will schedule outside of [facility] .   - Nursing Visit: Patient is age 79 or younger with an A1c of 7.5 or greater and will not need nursing visit at this time .   - Follow up with me in 8 weeks with A1c prior.      CURRENT DM MEDICATIONS:   Basaglar 26 units at night  Humalog 12 units before regular meal, 16 units before heavy carb meals, 6 units before snack   Jardiance 25 mg daily - restart  Ozempic 2 mg weekly     Jeffrey Aguilar PA-C  [facility]

## 2024-05-15 ENCOUNTER — PATIENT MESSAGE (OUTPATIENT)
Dept: DIABETES | Facility: CLINIC | Age: 54
End: 2024-05-15

## 2024-05-15 ENCOUNTER — OFFICE VISIT (OUTPATIENT)
Dept: DIABETES | Facility: CLINIC | Age: 54
End: 2024-05-15
Payer: COMMERCIAL

## 2024-05-15 DIAGNOSIS — E78.5 HYPERLIPIDEMIA ASSOCIATED WITH TYPE 2 DIABETES MELLITUS: ICD-10-CM

## 2024-05-15 DIAGNOSIS — E11.69 HYPERLIPIDEMIA ASSOCIATED WITH TYPE 2 DIABETES MELLITUS: ICD-10-CM

## 2024-05-15 DIAGNOSIS — E11.9 TYPE 2 DIABETES MELLITUS WITHOUT COMPLICATION: ICD-10-CM

## 2024-05-15 DIAGNOSIS — E11.65 UNCONTROLLED TYPE 2 DIABETES MELLITUS WITH HYPERGLYCEMIA: Primary | ICD-10-CM

## 2024-05-15 DIAGNOSIS — E66.9 OBESITY (BMI 30-39.9): ICD-10-CM

## 2024-05-15 PROCEDURE — 99214 OFFICE O/P EST MOD 30 MIN: CPT | Mod: 95,,, | Performed by: PHYSICIAN ASSISTANT

## 2024-05-15 PROCEDURE — 95251 CONT GLUC MNTR ANALYSIS I&R: CPT | Mod: NDTC,,, | Performed by: PHYSICIAN ASSISTANT

## 2024-05-15 PROCEDURE — 3072F LOW RISK FOR RETINOPATHY: CPT | Mod: CPTII,95,, | Performed by: PHYSICIAN ASSISTANT

## 2024-05-15 RX ORDER — SEMAGLUTIDE 2.68 MG/ML
2 INJECTION, SOLUTION SUBCUTANEOUS
Qty: 3 ML | Refills: 11 | Status: SHIPPED | OUTPATIENT
Start: 2024-05-15

## 2024-05-15 RX ORDER — INSULIN GLARGINE 100 [IU]/ML
26 INJECTION, SOLUTION SUBCUTANEOUS DAILY
Qty: 7.8 ML | Refills: 11 | Status: SHIPPED | OUTPATIENT
Start: 2024-05-15 | End: 2025-05-15

## 2024-05-15 RX ORDER — INSULIN LISPRO 100 [IU]/ML
12 INJECTION, SOLUTION INTRAVENOUS; SUBCUTANEOUS
Qty: 12 ML | Refills: 11 | Status: SHIPPED | OUTPATIENT
Start: 2024-05-15 | End: 2025-05-15

## 2024-05-15 RX ORDER — BLOOD-GLUCOSE SENSOR
1 EACH MISCELLANEOUS
Qty: 3 EACH | Refills: 11 | Status: SHIPPED | OUTPATIENT
Start: 2024-05-15 | End: 2025-05-15

## 2024-05-15 NOTE — PATIENT INSTRUCTIONS
CURRENT DM MEDICATIONS:   Basaglar 26 units at night  Humalog 12 units before regular meal, 16 units before heavy carb meals, 6 units before snack   Jardiance 25 mg daily - restart  Ozempic 2 mg weekly       Constipation: increased H20, otc fiber, otc stool softener daily as needed

## 2024-05-17 DIAGNOSIS — E78.5 HYPERLIPIDEMIA ASSOCIATED WITH TYPE 2 DIABETES MELLITUS: ICD-10-CM

## 2024-05-17 DIAGNOSIS — E11.69 HYPERLIPIDEMIA ASSOCIATED WITH TYPE 2 DIABETES MELLITUS: ICD-10-CM

## 2024-05-19 RX ORDER — ATORVASTATIN CALCIUM 80 MG/1
80 TABLET, FILM COATED ORAL
Qty: 30 TABLET | Refills: 1 | Status: SHIPPED | OUTPATIENT
Start: 2024-05-19

## 2024-05-19 NOTE — TELEPHONE ENCOUNTER
Care Due:                  Date            Visit Type   Department     Provider  --------------------------------------------------------------------------------                                EP -                              PRIMARY      [facility] INTERNAL  Last Visit: 01-      CARE ([facility])   ANGELES Figueroa                              EP -                              PRIMARY      [facility] INTERNAL  Next Visit: 07-      CARE ([facility])   ANGELES Figueroa                                                            Last  Test          Frequency    Reason                     Performed    Due Date  --------------------------------------------------------------------------------    CMP.........  12 months..  atorvastatin.............  03- 03-    Lipid Panel.  12 months..  atorvastatin.............  03- 03-    [facility] Embedded Care Due Messages. Reference number: [id number].   5/19/2024 4:10:09 AM CDT

## 2024-05-19 NOTE — TELEPHONE ENCOUNTER
Refill Routing Note   Medication(s) are not appropriate for processing by [facility] for the following reason(s):        Required labs outdated    [facility] action(s):  Defer        Medication Therapy Plan: FLOS      Appointments  past 12m or future 3m with PCP    Date Provider   Last Visit   1/22/2024 Edilberto Figueroa MD   Next Visit   7/19/2024 Edilberto Figueroa MD   ED visits in past 90 days: 0        Note composed:4:19 AM 05/19/2024

## 2024-05-29 ENCOUNTER — LAB VISIT (OUTPATIENT)
Dept: LAB | Facility: HOSPITAL | Age: 54
End: 2024-05-29
Attending: PHYSICIAN ASSISTANT
Payer: COMMERCIAL

## 2024-05-29 DIAGNOSIS — E11.65 UNCONTROLLED TYPE 2 DIABETES MELLITUS WITH HYPERGLYCEMIA: ICD-10-CM

## 2024-05-29 LAB
ESTIMATED AVG GLUCOSE: 146 MG/DL (ref 68–131)
HBA1C MFR BLD: 6.7 % (ref 4–5.6)

## 2024-05-29 PROCEDURE — 83036 HEMOGLOBIN GLYCOSYLATED A1C: CPT | Performed by: PHYSICIAN ASSISTANT

## 2024-05-29 PROCEDURE — 36415 COLL VENOUS BLD VENIPUNCTURE: CPT | Performed by: PHYSICIAN ASSISTANT

## 2024-08-14 ENCOUNTER — OFFICE VISIT (OUTPATIENT)
Dept: DIABETES | Facility: CLINIC | Age: 54
End: 2024-08-14
Payer: COMMERCIAL

## 2024-08-14 ENCOUNTER — TELEPHONE (OUTPATIENT)
Dept: OPHTHALMOLOGY | Facility: CLINIC | Age: 54
End: 2024-08-14
Payer: COMMERCIAL

## 2024-08-14 DIAGNOSIS — E78.5 HYPERLIPIDEMIA ASSOCIATED WITH TYPE 2 DIABETES MELLITUS: ICD-10-CM

## 2024-08-14 DIAGNOSIS — E11.69 HYPERLIPIDEMIA ASSOCIATED WITH TYPE 2 DIABETES MELLITUS: ICD-10-CM

## 2024-08-14 DIAGNOSIS — R11.0 NAUSEA: ICD-10-CM

## 2024-08-14 DIAGNOSIS — E11.65 UNCONTROLLED TYPE 2 DIABETES MELLITUS WITH HYPERGLYCEMIA: Primary | ICD-10-CM

## 2024-08-14 DIAGNOSIS — E66.9 OBESITY (BMI 30-39.9): ICD-10-CM

## 2024-08-14 PROCEDURE — 3044F HG A1C LEVEL LT 7.0%: CPT | Mod: CPTII,95,, | Performed by: PHYSICIAN ASSISTANT

## 2024-08-14 PROCEDURE — G2211 COMPLEX E/M VISIT ADD ON: HCPCS | Mod: 95,,, | Performed by: PHYSICIAN ASSISTANT

## 2024-08-14 PROCEDURE — 3072F LOW RISK FOR RETINOPATHY: CPT | Mod: CPTII,95,, | Performed by: PHYSICIAN ASSISTANT

## 2024-08-14 PROCEDURE — 95251 CONT GLUC MNTR ANALYSIS I&R: CPT | Mod: NDTC,,, | Performed by: PHYSICIAN ASSISTANT

## 2024-08-14 PROCEDURE — 99214 OFFICE O/P EST MOD 30 MIN: CPT | Mod: 95,,, | Performed by: PHYSICIAN ASSISTANT

## 2024-08-14 RX ORDER — ONDANSETRON 4 MG/1
4 TABLET, FILM COATED ORAL EVERY 6 HOURS PRN
Qty: 60 TABLET | Refills: 1 | Status: SHIPPED | OUTPATIENT
Start: 2024-08-14

## 2024-08-14 NOTE — Clinical Note
"8 weeks in person "DEX" early am  Fasting labs 1 wk prior   [facility] STAFF: please sched eye exam with pt   SARA: can pt do virt pre pump eval on Monday at 11a with you? Consideration for pump vs. Patch. May need to consider Omni / CeQur / V-Go along with Tandem / medtronic bc I am not sure if will have c peptide to qualify for Tandem  / medtronic. Thank you! "

## 2024-08-14 NOTE — PROGRESS NOTES
PCP: Edilberto Figueroa MD    Subjective:     Chief Complaint: Diabetes     TELEMEDICINE VISIT:     The patient location is: Home  The chief complaint leading to consultation is: Diabetes Follow up  Visit type: Virtual visit with synchronous audio and video  Total time spent with patient: 15  Each patient to whom he or she provides medical services by telemedicine is:  (1) informed of the relationship between the physician and patient and the respective role of any other health care provider with respect to management of the patient; and (2) notified that he or she may decline to receive medical services by telemedicine and may withdraw from such care at any time.    Notes:     HISTORY OF PRESENT ILLNESS: 54 y.o.   male presenting for diabetes management.   The patient's last visit with me was on 5/15/2024.   Patient has had Type II diabetes since 2012.  Pertinent to decision making is the following comorbidities: Obesity by BMI and Bipolar Disorder  Patient has the following Diabetes complications: without complications  He is to be enrolled in diabetes education classes.     Patient's most recent A1c of 6.7% was completed 3 months ago.   Patient states since His last A1c His blood glucose levels have been high  after meals.   Patient monitors blood glucose 4 times per day with meter and Continuously with personal CGM Dexcom.   Patient blood glucose monitoring device will be uploaded into Media Section today.        Patient reports baseline euglycemia and mild postprandial hyperglycemia after am coffee which is low carb but may be related to caffeine intake.   Patient endorses the following diabetes related symptoms: Nausea. Related to ozempic. Being managed with Zofran.   Patient is due today for the following diabetes-related health maintenance standards: Foot Exam , Eye Exam, Lipid panel, Urine Microalbumin/creatinine ratio, and COVID-19 Vaccine . Prev est with Dr. Pak.   He denies recent [facility]  admissions or emergency room visits.   He denies having hypoglycemia.   Patient's concerns today include glycemic control. Of note, patient was diagnosed with prostate cancer is currently undergoing Lupron treatment and radiation. No plans for chemotherapy at present.   Patient medication regimen is as below.     CURRENT DM MEDICATIONS:   Basaglar 26 units at night  Humalog 14 units before regular meal, 16 units before heavy carb meals, 6 units before snack   Jardiance 25 mg daily  Ozempic 2 mg weekly     Patient has failed the following Diabetes medications:   Trulicity   Basaglar / Lantus  Metformin        Labs Reviewed.       Lab Results   Component Value Date    CPEPTIDE 2.06 03/07/2023     Lab Results   Component Value Date    GLUTAMICACID 0.00 03/07/2023          //   , There is no height or weight on file to calculate BMI.  His blood sugar in clinic today is:         Review of Systems   Constitutional:  Negative for activity change, appetite change, chills and fever.   HENT:  Negative for dental problem, mouth sores, nosebleeds, sore throat and trouble swallowing.    Eyes:  Negative for pain and discharge.   Respiratory:  Negative for shortness of breath, wheezing and stridor.    Cardiovascular:  Negative for chest pain, palpitations and leg swelling.   Gastrointestinal:  Negative for abdominal pain, diarrhea, nausea and vomiting.   Endocrine: Negative for polydipsia, polyphagia and polyuria.   Genitourinary:  Negative for dysuria, frequency and urgency.   Musculoskeletal:  Negative for joint swelling and myalgias.   Skin:  Negative for rash and wound.   Neurological:  Negative for dizziness, syncope, weakness and headaches.   Psychiatric/Behavioral:  Negative for behavioral problems and dysphoric mood.          Diabetes Management Status  Statin: Taking  ACE/ARB: Not taking    Screening or Prevention Patient's value Goal Complete/Controlled?   HgA1C Testing and Control   Lab Results   Component Value Date     HGBA1C 6.7 (H) 2024      Annually/Less than 8% No   Lipid profile : 2023 Annually Yes   LDL control Lab Results   Component Value Date    LDLCALC 147.4 2023    Annually/Less than 100 mg/dl  Yes   Nephropathy screening Lab Results   Component Value Date    MICALBCREAT 19.6 2023     Lab Results   Component Value Date    PROTEINUA Trace (A) 2021    Annually Yes   Blood pressure BP Readings from Last 1 Encounters:   24 122/80    Less than 140/90 Yes   Dilated retinal exam : 2023 Annually No    Foot exam   : 2023 Annually No     Social History     Socioeconomic History    Marital status: Significant Other   Tobacco Use    Smoking status: Former     Current packs/day: 0.00     Average packs/day: 0.3 packs/day for 15.0 years (3.8 ttl pk-yrs)     Types: Cigarettes     Start date: 1998     Quit date: 2013     Years since quittin.6    Smokeless tobacco: Former    Tobacco comments:     Was a part time smoker.  1 pack could last 2 or more weeks   Substance and Sexual Activity    Alcohol use: Not Currently     Comment: May have 1 or 2 drinks at social events or restaurants    Drug use: Never    Sexual activity: Yes     Partners: Female     Birth control/protection: Partner-Vasectomy, Post-menopausal     Social Determinants of Health     Financial Resource Strain: Medium Risk (2022)    Overall Financial Resource Strain (CARDIA)     Difficulty of Paying Living Expenses: Somewhat hard   Food Insecurity: No Food Insecurity (2022)    Hunger Vital Sign     Worried About Running Out of Food in the Last Year: Never true     Ran Out of Food in the Last Year: Never true   Transportation Needs: No Transportation Needs (2022)    PRAPARE - Transportation     Lack of Transportation (Medical): No     Lack of Transportation (Non-Medical): No   Physical Activity: Inactive (2022)    Exercise Vital Sign     Days of Exercise per Week: 0 days     Minutes of Exercise per  Session: 0 min   Stress: No Stress Concern Present (9/23/2022)    [employer] [place] [employer] of Occupational Health - Occupational Stress Questionnaire     Feeling of Stress : Only a little   Housing Stability: Low Risk  (9/23/2022)    Housing Stability Vital Sign     Unable to Pay for Housing in the Last Year: No     Number of Places Lived in the Last Year: 1     Unstable Housing in the Last Year: No     Past Medical History:   Diagnosis Date    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 Patrick Sanz is a 52 y.o. male patient of Dr. Woodward status post robot-assisted radical prostatectomy with partial nerve sparing on 10/11/21 for what proved to be a 8cc, pGS4+3 (70% high grade) [id number] (0/15) prostate cancer excised with a focal positive 11mm margin and a positive benign margin. His RADHA-S score is 7. His post op PSA was low detectable and his most recent is now 0.177. H    Sleep apnea        Objective:      Physical Exam  Constitutional:       General: He is not in acute distress.     Appearance: He is well-developed. He is not diaphoretic.   HENT:      Head: Normocephalic and atraumatic.      Right Ear: External ear normal.      Left Ear: External ear normal.      Nose: Nose normal.   Eyes:      General:         Right eye: No discharge.         Left eye: No discharge.   Pulmonary:      Effort: Pulmonary effort is normal. No respiratory distress.      Breath sounds: No stridor.   Musculoskeletal:         General: Normal range of motion.      Cervical back: Normal range of motion.   Skin:     Coloration: Skin is not pale.   Neurological:      Mental Status: He is alert and oriented to person, place, and time.      Motor: No abnormal muscle tone.      Coordination: Coordination normal.   Psychiatric:         Behavior: Behavior normal.          Thought Content: Thought content normal.         Judgment: Judgment normal.           Assessment / Plan:     Uncontrolled type 2 diabetes mellitus with hyperglycemia  -     Ambulatory referral/consult to Diabetes Education; Future; Expected date: 08/21/2024  -     Lipid Panel; Future; Expected date: 08/14/2024  -     Microalbumin/Creatinine Ratio, Urine; Future; Expected date: 08/14/2024  -     TSH; Future; Expected date: 08/14/2024  -     Comprehensive Metabolic Panel; Future; Expected date: 08/14/2024  -     CBC Auto Differential; Future; Expected date: 08/14/2024  -     Hemoglobin A1C; Standing    Hyperlipidemia associated with type 2 diabetes mellitus    Obesity (BMI 30-39.9)    Nausea  -     ondansetron (ZOFRAN) 4 MG tablet; Take 1 tablet (4 mg total) by mouth every 6 (six) hours as needed for Nausea.  Dispense: 60 tablet; Refill: 1        Additional Plan Details:    - POCT Glucose  - Encouraged continuation of lifestyle changes including regular exercise and limiting carbohydrates to 30-45 grams per meal threes times daily and 15 grams per snack with a limit of two daily.   - Encouraged continued monitoring of blood glucose with maintenance of 4 times daily and Continuously with personal CGM Dexcom.   - Current DM Medication Regimen: Change Humalog 14 units  before regular meal, 16 units before heavy meals, and 6 units before snacks TID wm. Continue Basaglar 26 units daily. Continue Jardiance 25 mg daily. Change Ozempic 2 mg weekly - will monitor constipation.  - Zofran PRN nausea  - Discussion of pump vs. Patch options for management; Referral to CDE team for pre pump eval  - Fasting labs sched   - Health Maintenance standards addressed today: Foot Exam - deferred by patient today because Telemedicine or Telephone visit, Eye Exam - will be completed outside of [facility] and patient will schedule, Lipid panel to be scheduled today, Urine Microalbumin / Creatinine Ratio scheduled, A1c to be scheduled, and COVID  - 19 Vaccine - patient will schedule outside of [facility] .   - Nursing Visit: Patient is age 79 or younger with an A1c of 7.5 or greater and will not need nursing visit at this time .   - Follow up with me in 8 weeks with A1c prior.      CURRENT DM MEDICATIONS:   Basaglar 26 units at night  Humalog 14 units before regular meal / am coffee, 16 units before heavy carb meals, 6 units before snack   Jardiance 25 mg daily   Ozempic 2 mg weekly     [facility]

## 2024-08-14 NOTE — PATIENT INSTRUCTIONS
CURRENT DM MEDICATIONS:   Basaglar 26 units at night  Humalog 14 units before regular meal, 16 units before heavy carb meals, 6 units before snack   Jardiance 25 mg daily  Ozempic 2 mg weekly

## 2024-08-14 NOTE — TELEPHONE ENCOUNTER
"----- Message from Jeffrey Aguilar PA-C sent at 8/14/2024  8:06 AM CDT -----  8 weeks in person "DEX" early am   Fasting labs 1 wk prior     [facility] STAFF: please sched eye exam with pt     SARA: can pt do virt pre pump eval on Monday at 11a with you? Consideration for pump vs. Patch. May need to consider Omni / CeQur / V-Go along with Tandem / medtronic bc I am not sure if will have c peptide to qualify for Tandem  / medtronic. Thank you!   "

## 2024-08-18 DIAGNOSIS — E78.5 HYPERLIPIDEMIA ASSOCIATED WITH TYPE 2 DIABETES MELLITUS: ICD-10-CM

## 2024-08-18 DIAGNOSIS — E11.69 HYPERLIPIDEMIA ASSOCIATED WITH TYPE 2 DIABETES MELLITUS: ICD-10-CM

## 2024-08-18 NOTE — TELEPHONE ENCOUNTER
Care Due:                  Date            Visit Type   Department     Provider  --------------------------------------------------------------------------------                                EP -                              PRIMARY      [facility]  Last Visit: 01-      CARE ([facility])   MEDICINE       Edilberto Figueroa  Next Visit: None Scheduled  None         None Found                                                            Last  Test          Frequency    Reason                     Performed    Due Date  --------------------------------------------------------------------------------    CMP.........  12 months..  atorvastatin.............  03- 03-    Lipid Panel.  12 months..  atorvastatin.............  03- 03-    [employer] Embedded Care Due Messages. Reference number: [id number].   8/18/2024 9:31:09 AM CDT

## 2024-08-19 RX ORDER — ATORVASTATIN CALCIUM 80 MG/1
80 TABLET, FILM COATED ORAL
Qty: 30 TABLET | Refills: 0 | Status: SHIPPED | OUTPATIENT
Start: 2024-08-19

## 2024-08-19 NOTE — TELEPHONE ENCOUNTER
Refill Routing Note   Medication(s) are not appropriate for processing by [facility] for the following reason(s):        Required labs outdated    [facility] action(s):  Defer        Medication Therapy Plan: FLOS      Appointments  past 12m or future 3m with PCP    Date Provider   Last Visit   1/22/2024 Edilberto Figueroa MD   Next Visit   Visit date not found Edilberto Figueroa MD   ED visits in past 90 days: 0        Note composed:8:02 PM 08/18/2024

## 2024-09-24 DIAGNOSIS — E11.69 HYPERLIPIDEMIA ASSOCIATED WITH TYPE 2 DIABETES MELLITUS: ICD-10-CM

## 2024-09-24 DIAGNOSIS — E78.5 HYPERLIPIDEMIA ASSOCIATED WITH TYPE 2 DIABETES MELLITUS: ICD-10-CM

## 2024-09-24 NOTE — TELEPHONE ENCOUNTER
No care due was identified.  [facility] Embedded Care Due Messages. Reference number: [id number].   9/24/2024 2:52:54 PM CDT

## 2024-09-25 RX ORDER — QUETIAPINE FUMARATE 200 MG/1
200 TABLET, FILM COATED ORAL NIGHTLY
Qty: 90 TABLET | Refills: 0 | Status: SHIPPED | OUTPATIENT
Start: 2024-09-25

## 2024-09-25 RX ORDER — VENLAFAXINE HYDROCHLORIDE 150 MG/1
150 CAPSULE, EXTENDED RELEASE ORAL
Qty: 90 CAPSULE | Refills: 0 | Status: SHIPPED | OUTPATIENT
Start: 2024-09-25

## 2024-09-25 RX ORDER — ATORVASTATIN CALCIUM 80 MG/1
80 TABLET, FILM COATED ORAL
Qty: 90 TABLET | Refills: 0 | Status: SHIPPED | OUTPATIENT
Start: 2024-09-25

## 2024-09-25 NOTE — TELEPHONE ENCOUNTER
Refill Routing Note   Medication(s) are not appropriate for processing by [facility] for the following reason(s):        Required labs outdated    [facility] action(s):  Defer             Appointments  past 12m or future 3m with PCP    Date Provider   Last Visit   Visit date not found Edilberto Figueroa MD   Next Visit   Visit date not found Edilberto Figueroa MD   ED visits in past 90 days: 0        Note composed:11:26 PM 09/24/2024

## 2024-09-30 ENCOUNTER — PATIENT OUTREACH (OUTPATIENT)
Dept: ADMINISTRATIVE | Facility: HOSPITAL | Age: 54
End: 2024-09-30
Payer: COMMERCIAL

## 2024-09-30 NOTE — PROGRESS NOTES
Lab visit report: Patient has upcoming lab appointment on 10/4/24 for recheck of diabetic labs.

## 2024-10-14 RX ORDER — VENLAFAXINE HYDROCHLORIDE 150 MG/1
150 CAPSULE, EXTENDED RELEASE ORAL
Qty: 90 CAPSULE | Refills: 0 | Status: SHIPPED | OUTPATIENT
Start: 2024-10-14

## 2024-10-21 ENCOUNTER — OFFICE VISIT (OUTPATIENT)
Dept: DIABETES | Facility: CLINIC | Age: 54
End: 2024-10-21
Payer: COMMERCIAL

## 2024-10-21 ENCOUNTER — PATIENT MESSAGE (OUTPATIENT)
Dept: INTERNAL MEDICINE | Facility: CLINIC | Age: 54
End: 2024-10-21
Payer: COMMERCIAL

## 2024-10-21 DIAGNOSIS — E78.5 HYPERLIPIDEMIA ASSOCIATED WITH TYPE 2 DIABETES MELLITUS: ICD-10-CM

## 2024-10-21 DIAGNOSIS — E66.9 OBESITY (BMI 30-39.9): ICD-10-CM

## 2024-10-21 DIAGNOSIS — E11.65 UNCONTROLLED TYPE 2 DIABETES MELLITUS WITH HYPERGLYCEMIA: Primary | ICD-10-CM

## 2024-10-21 DIAGNOSIS — E11.69 HYPERLIPIDEMIA ASSOCIATED WITH TYPE 2 DIABETES MELLITUS: ICD-10-CM

## 2024-10-21 PROCEDURE — 3072F LOW RISK FOR RETINOPATHY: CPT | Mod: CPTII,95,, | Performed by: PHYSICIAN ASSISTANT

## 2024-10-21 PROCEDURE — 3044F HG A1C LEVEL LT 7.0%: CPT | Mod: CPTII,95,, | Performed by: PHYSICIAN ASSISTANT

## 2024-10-21 PROCEDURE — 95251 CONT GLUC MNTR ANALYSIS I&R: CPT | Mod: NDTC,,, | Performed by: PHYSICIAN ASSISTANT

## 2024-10-21 PROCEDURE — 99214 OFFICE O/P EST MOD 30 MIN: CPT | Mod: 95,,, | Performed by: PHYSICIAN ASSISTANT

## 2024-10-21 PROCEDURE — G2211 COMPLEX E/M VISIT ADD ON: HCPCS | Mod: 95,,, | Performed by: PHYSICIAN ASSISTANT

## 2024-10-21 NOTE — PROGRESS NOTES
PCP: Edilberto Figueroa MD    Subjective:     Chief Complaint: Diabetes     TELEMEDICINE VISIT:     The patient location is: Home  The chief complaint leading to consultation is: Diabetes Follow up  Visit type: Virtual visit with synchronous audio and video  Total time spent with patient: 8  Each patient to whom he or she provides medical services by telemedicine is:  (1) informed of the relationship between the physician and patient and the respective role of any other health care provider with respect to management of the patient; and (2) notified that he or she may decline to receive medical services by telemedicine and may withdraw from such care at any time.    Notes:     HISTORY OF PRESENT ILLNESS: 54 y.o.   male presenting for diabetes management.   The patient's last visit with me was on 8/14/2024.   Patient has had Type II diabetes since 2012.  Pertinent to decision making is the following comorbidities: Obesity by BMI and Bipolar Disorder  Patient has the following Diabetes complications: without complications  He is to be enrolled in diabetes education classes.     Patient's most recent A1c of 6.7% was completed 5 months ago.   Patient states since His last A1c His blood glucose levels have been high  after meals.   Patient monitors blood glucose 4 times per day with meter and Continuously with personal CGM Dexcom.   Patient blood glucose monitoring device will be uploaded into [employer] today.        Patient reports baseline euglycemia and persistent mild postprandial hyperglycemia after am coffee which is low carb but may be related to caffeine intake.   Patient endorses the following diabetes related symptoms: Nausea. Improving with ozempic use. Not having to use Zofran very much.   Patient is due today for the following diabetes-related health maintenance standards: Foot Exam , Eye Exam, Lipid panel, Urine Microalbumin/creatinine ratio, Influenza Vaccine, and COVID-19 Vaccine . Sched eye  exam with Dr. Quezada.    He denies recent hospital admissions or emergency room visits.   He denies having hypoglycemia.   Patient's concerns today include glycemic control. Of note, patient was diagnosed with prostate cancer is currently undergoing Lupron treatment and radiation. No plans for chemotherapy at present.   Patient medication regimen is as below.      CURRENT DM MEDICATIONS:   Basaglar 26 units at night  Humalog 14 units before regular meal / am coffee, 16 units before heavy carb meals, 6 units before snack   Jardiance 25 mg daily   Ozempic 2 mg weekly     Patient has failed the following Diabetes medications:   Trulicity   Basaglar / Lantus  Metformin        Labs Reviewed.       Lab Results   Component Value Date    CPEPTIDE 2.06 03/07/2023     Lab Results   Component Value Date    GLUTAMICACID 0.00 03/07/2023          //   , There is no height or weight on file to calculate BMI.  His blood sugar in clinic today is:         Review of Systems   Constitutional:  Negative for activity change, appetite change, chills and fever.   HENT:  Negative for dental problem, mouth sores, nosebleeds, sore throat and trouble swallowing.    Eyes:  Negative for pain and discharge.   Respiratory:  Negative for shortness of breath, wheezing and stridor.    Cardiovascular:  Negative for chest pain, palpitations and leg swelling.   Gastrointestinal:  Negative for abdominal pain, diarrhea, nausea and vomiting.   Endocrine: Negative for polydipsia, polyphagia and polyuria.   Genitourinary:  Negative for dysuria, frequency and urgency.   Musculoskeletal:  Negative for joint swelling and myalgias.   Skin:  Negative for rash and wound.   Neurological:  Negative for dizziness, syncope, weakness and headaches.   Psychiatric/Behavioral:  Negative for behavioral problems and dysphoric mood.          Diabetes Management Status  Statin: Taking  ACE/ARB: Not taking    Screening or Prevention Patient's value Goal Complete/Controlled?    HgA1C Testing and Control   Lab Results   Component Value Date    HGBA1C 6.7 (H) 2024      Annually/Less than 8% No   Lipid profile : 2023 Annually Yes   LDL control Lab Results   Component Value Date    LDLCALC 147.4 2023    Annually/Less than 100 mg/dl  Yes   Nephropathy screening Lab Results   Component Value Date    MICALBCREAT 19.6 2023     Lab Results   Component Value Date    PROTEINUA Trace (A) 2021    Annually Yes   Blood pressure BP Readings from Last 1 Encounters:   24 122/80    Less than 140/90 Yes   Dilated retinal exam : 2023 Annually No    Foot exam   : 2023 Annually No     Social History     Socioeconomic History    Marital status: Significant Other   Tobacco Use    Smoking status: Former     Current packs/day: 0.00     Average packs/day: 0.3 packs/day for 15.0 years (3.8 ttl pk-yrs)     Types: Cigarettes     Start date: 1998     Quit date: 2013     Years since quittin.8    Smokeless tobacco: Former    Tobacco comments:     Was a part time smoker.  1 pack could last 2 or more weeks   Substance and Sexual Activity    Alcohol use: Not Currently     Comment: May have 1 or 2 drinks at social events or restaurants    Drug use: Never    Sexual activity: Yes     Partners: Female     Birth control/protection: Partner-Vasectomy, Post-menopausal     Social Drivers of Health     Financial Resource Strain: Medium Risk (2022)    Overall Financial Resource Strain (CARDIA)     Difficulty of Paying Living Expenses: Somewhat hard   Food Insecurity: No Food Insecurity (2022)    Hunger Vital Sign     Worried About Running Out of Food in the Last Year: Never true     Ran Out of Food in the Last Year: Never true   Transportation Needs: No Transportation Needs (2022)    PRAPARE - Transportation     Lack of Transportation (Medical): No     Lack of Transportation (Non-Medical): No   Physical Activity: Inactive (2022)    Exercise Vital Sign      Days of Exercise per Week: 0 days     Minutes of Exercise per Session: 0 min   Stress: No Stress Concern Present (9/23/2022)    [employer] - Occupational Stress Questionnaire     Feeling of Stress : Only a little   Housing Stability: Low Risk  (9/23/2022)    Housing Stability Vital Sign     Unable to Pay for Housing in the Last Year: No     Number of Places Lived in the Last Year: 1     Unstable Housing in the Last Year: No     Past Medical History:   Diagnosis Date    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 Patrick Sanz is a 52 y.o. male patient of Dr. Woodward status post robot-assisted radical prostatectomy with partial nerve sparing on 10/11/21 for what proved to be a 8cc, pGS4+3 (70% high grade) [id number] (0/15) prostate cancer excised with a focal positive 11mm margin and a positive benign margin. His RADHA-S score is 7. His post op PSA was low detectable and his most recent is now 0.177. H    Sleep apnea        Objective:      Physical Exam  Constitutional:       General: He is not in acute distress.     Appearance: He is well-developed. He is not diaphoretic.   HENT:      Head: Normocephalic and atraumatic.      Right Ear: External ear normal.      Left Ear: External ear normal.      Nose: Nose normal.   Eyes:      General:         Right eye: No discharge.         Left eye: No discharge.   Pulmonary:      Effort: Pulmonary effort is normal. No respiratory distress.      Breath sounds: No stridor.   Musculoskeletal:         General: Normal range of motion.      Cervical back: Normal range of motion.   Skin:     Coloration: Skin is not pale.   Neurological:      Mental Status: He is alert and oriented to person, place, and time.      Motor: No abnormal muscle tone.      Coordination: Coordination  normal.   Psychiatric:         Behavior: Behavior normal.         Thought Content: Thought content normal.         Judgment: Judgment normal.           Assessment / Plan:     Uncontrolled type 2 diabetes mellitus with hyperglycemia    Hyperlipidemia associated with type 2 diabetes mellitus    Obesity (BMI 30-39.9)        Additional Plan Details:    - POCT Glucose  - Encouraged continuation of lifestyle changes including regular exercise and limiting carbohydrates to 30-45 grams per meal threes times daily and 15 grams per snack with a limit of two daily.   - Encouraged continued monitoring of blood glucose with maintenance of 4 times daily and Continuously with personal CGM Dexcom.   - Current DM Medication Regimen: Change Humalog 16-18 units before breakfast and 14-16 units before lunch/dinner and 6 units before snacks TID wm. Continue Basaglar 26 units daily. Continue Jardiance 25 mg daily. Continue Ozempic 2 mg weekly - may increase in the future but defer for now since nausea finally controlled.  - Zofran PRN nausea  - Discussion of pump vs. Patch options for management; Referral to CDE team for pre pump eval  - Fasting labs sched   - Health Maintenance standards addressed today: Foot Exam - deferred by patient today because Telemedicine or Telephone visit, Eye Exam - will be completed outside of [facility] and patient will schedule, Lipid panel to be scheduled today, Urine Microalbumin / Creatinine Ratio scheduled, A1c to be scheduled, Flu Shot - patient would like to complete outside of [facility], and COVID - 19 Vaccine - patient will schedule outside of [facility] .   - Nursing Visit: Patient is age 79 or younger with an A1c of 7.5 or greater and will not need nursing visit at this time .   - Follow up with me in 8 weeks with A1c prior.      CURRENT DM MEDICATIONS:   Basaglar 26 units at night  Humalog 16-18 units before breakfast based on meal size and 14-16 units before lunch/dinner based on meal size, 6 units  before snack   Jardiance 25 mg daily   Ozempic 2 mg weekly     Blakeney McKnight, PA-C [facility]

## 2024-10-21 NOTE — PATIENT INSTRUCTIONS
CURRENT DM MEDICATIONS:   Basaglar 26 units at night  Humalog 16-18 units before breakfast based on meal size and 14-16 units before lunch/dinner based on meal size, 6 units before snack   Jardiance 25 mg daily   Ozempic 2 mg weekly

## 2024-10-21 NOTE — Clinical Note
"8 wk virtual "Dex sharing"   Arcelia, could he do a 60 mins pump eval on thurs at 3p? Tryign to do late appt on Tues, Wed, thurs to avoid missing work "

## 2024-10-23 ENCOUNTER — TELEPHONE (OUTPATIENT)
Dept: DIABETES | Facility: CLINIC | Age: 54
End: 2024-10-23
Payer: COMMERCIAL

## 2024-10-26 ENCOUNTER — LAB VISIT (OUTPATIENT)
Dept: LAB | Facility: HOSPITAL | Age: 54
End: 2024-10-26
Attending: PHYSICIAN ASSISTANT
Payer: COMMERCIAL

## 2024-10-26 DIAGNOSIS — E11.65 UNCONTROLLED TYPE 2 DIABETES MELLITUS WITH HYPERGLYCEMIA: ICD-10-CM

## 2024-10-26 DIAGNOSIS — Z12.5 ENCOUNTER FOR SCREENING FOR MALIGNANT NEOPLASM OF PROSTATE: ICD-10-CM

## 2024-10-26 LAB
ALBUMIN SERPL BCP-MCNC: 4 G/DL (ref 3.5–5.2)
ALP SERPL-CCNC: 123 U/L (ref 40–150)
ALT SERPL W/O P-5'-P-CCNC: 34 U/L (ref 10–44)
ANION GAP SERPL CALC-SCNC: 12 MMOL/L (ref 8–16)
AST SERPL-CCNC: 25 U/L (ref 10–40)
BASOPHILS # BLD AUTO: 0.02 K/UL (ref 0–0.2)
BASOPHILS NFR BLD: 0.3 % (ref 0–1.9)
BILIRUB SERPL-MCNC: 0.5 MG/DL (ref 0.1–1)
BUN SERPL-MCNC: 14 MG/DL (ref 6–20)
C PEPTIDE SERPL-MCNC: 5.89 NG/ML (ref 0.78–5.19)
CALCIUM SERPL-MCNC: 9 MG/DL (ref 8.7–10.5)
CHLORIDE SERPL-SCNC: 109 MMOL/L (ref 95–110)
CHOLEST SERPL-MCNC: 167 MG/DL (ref 120–199)
CHOLEST/HDLC SERPL: 4.6 {RATIO} (ref 2–5)
CO2 SERPL-SCNC: 19 MMOL/L (ref 23–29)
COMPLEXED PSA SERPL-MCNC: <0.01 NG/ML (ref 0–4)
CREAT SERPL-MCNC: 1 MG/DL (ref 0.5–1.4)
DIFFERENTIAL METHOD BLD: ABNORMAL
EOSINOPHIL # BLD AUTO: 0.1 K/UL (ref 0–0.5)
EOSINOPHIL NFR BLD: 0.9 % (ref 0–8)
ERYTHROCYTE [DISTWIDTH] IN BLOOD BY AUTOMATED COUNT: 14.2 % (ref 11.5–14.5)
EST. GFR  (NO RACE VARIABLE): >60 ML/MIN/1.73 M^2
ESTIMATED AVG GLUCOSE: 146 MG/DL (ref 68–131)
GLUCOSE SERPL-MCNC: 185 MG/DL (ref 70–110)
HBA1C MFR BLD: 6.7 % (ref 4–5.6)
HCT VFR BLD AUTO: 47.6 % (ref 40–54)
HDLC SERPL-MCNC: 36 MG/DL (ref 40–75)
HDLC SERPL: 21.6 % (ref 20–50)
HGB BLD-MCNC: 15.3 G/DL (ref 14–18)
IMM GRANULOCYTES # BLD AUTO: 0.01 K/UL (ref 0–0.04)
IMM GRANULOCYTES NFR BLD AUTO: 0.2 % (ref 0–0.5)
LDLC SERPL CALC-MCNC: 93.8 MG/DL (ref 63–159)
LYMPHOCYTES # BLD AUTO: 1.1 K/UL (ref 1–4.8)
LYMPHOCYTES NFR BLD: 17.9 % (ref 18–48)
MCH RBC QN AUTO: 28.8 PG (ref 27–31)
MCHC RBC AUTO-ENTMCNC: 32.1 G/DL (ref 32–36)
MCV RBC AUTO: 90 FL (ref 82–98)
MONOCYTES # BLD AUTO: 0.6 K/UL (ref 0.3–1)
MONOCYTES NFR BLD: 9.5 % (ref 4–15)
NEUTROPHILS # BLD AUTO: 4.2 K/UL (ref 1.8–7.7)
NEUTROPHILS NFR BLD: 71.2 % (ref 38–73)
NONHDLC SERPL-MCNC: 131 MG/DL
NRBC BLD-RTO: 0 /100 WBC
PLATELET # BLD AUTO: 261 K/UL (ref 150–450)
PMV BLD AUTO: 9.4 FL (ref 9.2–12.9)
POTASSIUM SERPL-SCNC: 4.3 MMOL/L (ref 3.5–5.1)
PROT SERPL-MCNC: 7.2 G/DL (ref 6–8.4)
RBC # BLD AUTO: 5.32 M/UL (ref 4.6–6.2)
SODIUM SERPL-SCNC: 140 MMOL/L (ref 136–145)
TRIGL SERPL-MCNC: 186 MG/DL (ref 30–150)
TSH SERPL DL<=0.005 MIU/L-ACNC: 1.15 UIU/ML (ref 0.4–4)
WBC # BLD AUTO: 5.87 K/UL (ref 3.9–12.7)

## 2024-10-26 PROCEDURE — 36415 COLL VENOUS BLD VENIPUNCTURE: CPT | Performed by: PHYSICIAN ASSISTANT

## 2024-10-26 PROCEDURE — 80053 COMPREHEN METABOLIC PANEL: CPT | Performed by: PHYSICIAN ASSISTANT

## 2024-10-26 PROCEDURE — 84681 ASSAY OF C-PEPTIDE: CPT | Performed by: PHYSICIAN ASSISTANT

## 2024-10-26 PROCEDURE — 84443 ASSAY THYROID STIM HORMONE: CPT | Performed by: PHYSICIAN ASSISTANT

## 2024-10-26 PROCEDURE — 83036 HEMOGLOBIN GLYCOSYLATED A1C: CPT | Performed by: PHYSICIAN ASSISTANT

## 2024-10-26 PROCEDURE — 85025 COMPLETE CBC W/AUTO DIFF WBC: CPT | Performed by: PHYSICIAN ASSISTANT

## 2024-10-26 PROCEDURE — 80061 LIPID PANEL: CPT | Performed by: PHYSICIAN ASSISTANT

## 2024-10-26 PROCEDURE — 84153 ASSAY OF PSA TOTAL: CPT | Performed by: INTERNAL MEDICINE

## 2024-10-29 ENCOUNTER — PATIENT MESSAGE (OUTPATIENT)
Dept: DIABETES | Facility: CLINIC | Age: 54
End: 2024-10-29
Payer: COMMERCIAL

## 2024-11-05 DIAGNOSIS — F39 MOOD DISORDER: ICD-10-CM

## 2024-11-05 RX ORDER — LAMOTRIGINE 200 MG/1
TABLET ORAL
Qty: 135 TABLET | Refills: 0 | Status: SHIPPED | OUTPATIENT
Start: 2024-11-05

## 2024-11-19 DIAGNOSIS — E11.65 UNCONTROLLED TYPE 2 DIABETES MELLITUS WITH HYPERGLYCEMIA: ICD-10-CM

## 2024-11-20 RX ORDER — INSULIN GLARGINE 100 [IU]/ML
26 INJECTION, SOLUTION SUBCUTANEOUS DAILY
Qty: 7.8 ML | Refills: 11 | Status: SHIPPED | OUTPATIENT
Start: 2024-11-20 | End: 2025-11-20

## 2024-12-05 ENCOUNTER — TELEPHONE (OUTPATIENT)
Dept: OPHTHALMOLOGY | Facility: CLINIC | Age: 54
End: 2024-12-05
Payer: COMMERCIAL

## 2024-12-05 NOTE — TELEPHONE ENCOUNTER
"Spoke with patient he states his insurance is in network, mike states we take the "out of network" parts mike will call him to explain his benefits   "

## 2024-12-05 NOTE — TELEPHONE ENCOUNTER
----- Message from Gilma sent at 12/5/2024  3:44 PM CST -----  Pt was schedule for today and  lady informed him that we did not take his insurance patient contact insurance company and they informed him we do take his insurance and they had spoken with  early this  morning and informed staff of that so now pt want to be worked back in next week because he took off work early for this appt pt states he is willing to see Damien or Pasha pt was very confused why staff would tell him this at appt time

## 2024-12-24 RX ORDER — VENLAFAXINE HYDROCHLORIDE 150 MG/1
150 CAPSULE, EXTENDED RELEASE ORAL
Qty: 90 CAPSULE | Refills: 0 | Status: SHIPPED | OUTPATIENT
Start: 2024-12-24

## 2024-12-26 RX ORDER — QUETIAPINE FUMARATE 200 MG/1
TABLET, FILM COATED ORAL
Qty: 90 TABLET | Refills: 0 | Status: SHIPPED | OUTPATIENT
Start: 2024-12-26

## 2025-01-16 ENCOUNTER — PATIENT MESSAGE (OUTPATIENT)
Dept: INTERNAL MEDICINE | Facility: CLINIC | Age: 55
End: 2025-01-16
Payer: COMMERCIAL

## 2025-01-16 NOTE — TELEPHONE ENCOUNTER
There's a spot available tomorrow morning. If he can make it to that, we can see him then. If not, then 1/28 that's already scheduled is fine.

## 2025-01-26 DIAGNOSIS — F39 MOOD DISORDER: ICD-10-CM

## 2025-01-27 RX ORDER — LAMOTRIGINE 200 MG/1
TABLET ORAL
Qty: 135 TABLET | Refills: 0 | OUTPATIENT
Start: 2025-01-27

## 2025-01-28 ENCOUNTER — OFFICE VISIT (OUTPATIENT)
Dept: DERMATOLOGY | Facility: CLINIC | Age: 55
End: 2025-01-28
Payer: COMMERCIAL

## 2025-01-28 DIAGNOSIS — D48.5 NEOPLASM OF UNCERTAIN BEHAVIOR OF SKIN: Primary | ICD-10-CM

## 2025-01-28 PROCEDURE — 88305 TISSUE EXAM BY PATHOLOGIST: CPT | Performed by: DERMATOLOGY

## 2025-01-28 PROCEDURE — 1160F RVW MEDS BY RX/DR IN RCRD: CPT | Mod: CPTII,S$GLB,, | Performed by: STUDENT IN AN ORGANIZED HEALTH CARE EDUCATION/TRAINING PROGRAM

## 2025-01-28 PROCEDURE — 11102 TANGNTL BX SKIN SINGLE LES: CPT | Mod: S$GLB,,, | Performed by: STUDENT IN AN ORGANIZED HEALTH CARE EDUCATION/TRAINING PROGRAM

## 2025-01-28 PROCEDURE — 1159F MED LIST DOCD IN RCRD: CPT | Mod: CPTII,S$GLB,, | Performed by: STUDENT IN AN ORGANIZED HEALTH CARE EDUCATION/TRAINING PROGRAM

## 2025-01-28 PROCEDURE — 99999 PR PBB SHADOW E&M-EST. PATIENT-LVL III: CPT | Mod: PBBFAC,,, | Performed by: STUDENT IN AN ORGANIZED HEALTH CARE EDUCATION/TRAINING PROGRAM

## 2025-01-28 PROCEDURE — 99213 OFFICE O/P EST LOW 20 MIN: CPT | Mod: 25,S$GLB,, | Performed by: STUDENT IN AN ORGANIZED HEALTH CARE EDUCATION/TRAINING PROGRAM

## 2025-01-28 RX ORDER — LAMOTRIGINE 200 MG/1
TABLET ORAL
Qty: 135 TABLET | Refills: 0 | Status: SHIPPED | OUTPATIENT
Start: 2025-01-28

## 2025-01-28 NOTE — PROGRESS NOTES
Subjective:       Patient ID:  Patrick Sanz is a 54 y.o. male who presents for   Chief Complaint   Patient presents with    Skin Check     back     History of Present Illness: The patient presents with chief complaint of a concerning mole.  Location: mid back  Duration: noticed a painful mole on the mid back a few weeks to months ago  Signs/Symptoms: painful, red, swollen mole. Never had a lesion like this in the past  Prior treatments: none          Review of Systems   Constitutional:  Negative for fever and chills.   Skin:  Negative for itching, rash and dry skin.        Objective:    Physical Exam   Constitutional: He appears well-developed and well-nourished. No distress.   Neurological: He is alert and oriented to person, place, and time. He is not disoriented.   Psychiatric: He has a normal mood and affect.   Skin:   Areas Examined (abnormalities noted in diagram):   Head / Face Inspection Performed  Neck Inspection Performed  Chest / Axilla Inspection Performed  Abdomen Inspection Performed  Back Inspection Performed  RUE Inspected  LUE Inspection Performed              Diagram Legend     Erythematous scaling macule/papule c/w actinic keratosis       Vascular papule c/w angioma      Pigmented verrucoid papule/plaque c/w seborrheic keratosis      Yellow umbilicated papule c/w sebaceous hyperplasia      Irregularly shaped tan macule c/w lentigo     1-2 mm smooth white papules consistent with Milia      Movable subcutaneous cyst with punctum c/w epidermal inclusion cyst      Subcutaneous movable cyst c/w pilar cyst      Firm pink to brown papule c/w dermatofibroma      Pedunculated fleshy papule(s) c/w skin tag(s)      Evenly pigmented macule c/w junctional nevus     Mildly variegated pigmented, slightly irregular-bordered macule c/w mildly atypical nevus      Flesh colored to evenly pigmented papule c/w intradermal nevus       Pink pearly papule/plaque c/w basal cell carcinoma      Erythematous  hyperkeratotic cursted plaque c/w SCC      Surgical scar with no sign of skin cancer recurrence      Open and closed comedones      Inflammatory papules and pustules      Verrucoid papule consistent consistent with wart     Erythematous eczematous patches and plaques     Dystrophic onycholytic nail with subungual debris c/w onychomycosis     Umbilicated papule    Erythematous-base heme-crusted tan verrucoid plaque consistent with inflamed seborrheic keratosis     Erythematous Silvery Scaling Plaque c/w Psoriasis     See annotation      Assessment / Plan:      Pathology Orders:       Normal Orders This Visit    Specimen to Pathology, Dermatology     Questions:    Procedure Type: Dermatology and skin neoplasms    Number of Specimens: 1    ------------------------: -------------------------    Spec 1 Procedure: Biopsy    Spec 1 Clinical Impression: crusted, painful lesion on the back. r/o ISK vs NMSC vs other    Spec 1 Source: mid back    Release to patient:           Neoplasm of uncertain behavior of skin  -     Specimen to Pathology, Dermatology      Shave biopsy procedure note:    Shave biopsy performed after verbal consent including risk of infection, scar, recurrence, need for additional treatment of site. Area prepped with alcohol, anesthetized with approximately 1.0cc of 1% lidocaine with epinephrine. Lesional tissue shaved with razor blade. Hemostasis achieved with application of aluminum chloride followed by hyfrecation. No complications. Dressing applied. Wound care explained.    If biopsy positive, schedule procedure for definitive excision.              Follow up in about 1 year (around 1/28/2026).

## 2025-01-31 LAB
FINAL PATHOLOGIC DIAGNOSIS: NORMAL
GROSS: NORMAL
Lab: NORMAL
MICROSCOPIC EXAM: NORMAL

## 2025-02-05 ENCOUNTER — OFFICE VISIT (OUTPATIENT)
Dept: DIABETES | Facility: CLINIC | Age: 55
End: 2025-02-05
Payer: COMMERCIAL

## 2025-02-05 DIAGNOSIS — E66.9 OBESITY (BMI 30-39.9): ICD-10-CM

## 2025-02-05 DIAGNOSIS — E11.65 UNCONTROLLED TYPE 2 DIABETES MELLITUS WITH HYPERGLYCEMIA: Primary | ICD-10-CM

## 2025-02-05 DIAGNOSIS — E11.69 HYPERLIPIDEMIA ASSOCIATED WITH TYPE 2 DIABETES MELLITUS: ICD-10-CM

## 2025-02-05 DIAGNOSIS — G47.33 OSA ON CPAP: ICD-10-CM

## 2025-02-05 DIAGNOSIS — E78.5 HYPERLIPIDEMIA ASSOCIATED WITH TYPE 2 DIABETES MELLITUS: ICD-10-CM

## 2025-02-05 PROCEDURE — 95251 CONT GLUC MNTR ANALYSIS I&R: CPT | Mod: NDTC,,, | Performed by: PHYSICIAN ASSISTANT

## 2025-02-05 PROCEDURE — 98005 SYNCH AUDIO-VIDEO EST LOW 20: CPT | Mod: 95,,, | Performed by: PHYSICIAN ASSISTANT

## 2025-02-05 PROCEDURE — G2211 COMPLEX E/M VISIT ADD ON: HCPCS | Mod: 95,,, | Performed by: PHYSICIAN ASSISTANT

## 2025-02-05 NOTE — PROGRESS NOTES
PCP: Edilberto Figueroa MD    Subjective:     Chief Complaint: Diabetes     TELEMEDICINE VISIT:     The patient location is: Home  The chief complaint leading to consultation is: Diabetes Follow up  Visit type: Virtual visit with synchronous audio and video  Each patient to whom he or she provides medical services by telemedicine is:  (1) informed of the relationship between the physician and patient and the respective role of any other health care provider with respect to management of the patient; and (2) notified that he or she may decline to receive medical services by telemedicine and may withdraw from such care at any time.    Notes:     HISTORY OF PRESENT ILLNESS: 54 y.o.   male presenting for diabetes management.   The patient's last visit with me was on 10/21/2024.   Patient has had Type II diabetes since 2012.  Pertinent to decision making is the following comorbidities: Obesity by BMI and Bipolar Disorder  Patient has the following Diabetes complications: without complications  He is to be enrolled in diabetes education classes.     Patient's most recent A1c of 6.7% was completed 4 months ago.   Patient states since His last A1c His blood glucose levels have been high  after meals.   Patient monitors blood glucose 4 times per day with meter and Continuously with personal CGM Dexcom.   Patient blood glucose monitoring device will be uploaded into [employer] today.        Interpretation of CGM as following : baseline euglycemia and persistent postprandial hyperglycemia with taking less humalog than previous discussion and snacking without humalog dose.    Patient endorses the following diabetes related symptoms: Nausea. Improving with ozempic use. Not having to use Zofran very much.   Patient is due today for the following diabetes-related health maintenance standards: Foot Exam , Eye Exam, Influenza Vaccine, and COVID-19 Vaccine . Sched eye exam with Dr. Quezada.    He denies recent [facility]  admissions or emergency room visits.   He denies having hypoglycemia.   Patient's concerns today include glycemic control. Of note, previously sched for pump eval and did not attend - would like to reschedule.   Patient medication regimen is as below.     CURRENT DM MEDICATIONS:   Basaglar 26 units at night  Humalog 12-14 units before breakfast based on meal size and 12-14 units before lunch/dinner based on meal size, 6 units before snack - dosing outside of previous discussion; not compliant with snack dose  Jardiance 25 mg daily   Ozempic 2 mg weekly     Patient has failed the following Diabetes medications:   Trulicity   Basaglar / Lantus  Metformin        Labs Reviewed.       Lab Results   Component Value Date    CPEPTIDE 5.89 (H) 10/26/2024     Lab Results   Component Value Date    GLUTAMICACID 0.00 03/07/2023          //   , There is no height or weight on file to calculate BMI.  His blood sugar in clinic today is:         Review of Systems   Constitutional:  Negative for activity change, appetite change, chills and fever.   HENT:  Negative for dental problem, mouth sores, nosebleeds, sore throat and trouble swallowing.    Eyes:  Negative for pain and discharge.   Respiratory:  Negative for shortness of breath, wheezing and stridor.    Cardiovascular:  Negative for chest pain, palpitations and leg swelling.   Gastrointestinal:  Negative for abdominal pain, diarrhea, nausea and vomiting.   Endocrine: Negative for polydipsia, polyphagia and polyuria.   Genitourinary:  Negative for dysuria, frequency and urgency.   Musculoskeletal:  Negative for joint swelling and myalgias.   Skin:  Negative for rash and wound.   Neurological:  Negative for dizziness, syncope, weakness and headaches.   Psychiatric/Behavioral:  Negative for behavioral problems and dysphoric mood.          Diabetes Management Status  Statin: Taking  ACE/ARB: Not taking    Screening or Prevention Patient's value Goal Complete/Controlled?   HgA1C  Testing and Control   Lab Results   Component Value Date    HGBA1C 6.7 (H) 10/26/2024      Annually/Less than 8% No   Lipid profile : 10/26/2024 Annually Yes   LDL control Lab Results   Component Value Date    LDLCALC 93.8 10/26/2024    Annually/Less than 100 mg/dl  Yes   Nephropathy screening Lab Results   Component Value Date    MICALBCREAT Unable to calculate 10/26/2024     Lab Results   Component Value Date    PROTEINUA Trace (A) 2021    Annually Yes   Blood pressure BP Readings from Last 1 Encounters:   24 122/80    Less than 140/90 Yes   Dilated retinal exam : 2023 Annually No    Foot exam   : 2023 Annually No     Social History     Socioeconomic History    Marital status: Significant Other   Tobacco Use    Smoking status: Former     Current packs/day: 0.00     Average packs/day: 0.3 packs/day for 15.0 years (3.8 ttl pk-yrs)     Types: Cigarettes     Start date: 1998     Quit date: 2013     Years since quittin.1    Smokeless tobacco: Former    Tobacco comments:     Was a part time smoker.  1 pack could last 2 or more weeks   Substance and Sexual Activity    Alcohol use: Not Currently     Comment: May have 1 or 2 drinks at social events or restaurants    Drug use: Never    Sexual activity: Yes     Partners: Female     Birth control/protection: Partner-Vasectomy, Post-menopausal     Social Drivers of Health     Financial Resource Strain: Medium Risk (2022)    Overall Financial Resource Strain (CARDIA)     Difficulty of Paying Living Expenses: Somewhat hard   Food Insecurity: No Food Insecurity (2022)    Hunger Vital Sign     Worried About Running Out of Food in the Last Year: Never true     Ran Out of Food in the Last Year: Never true   Transportation Needs: No Transportation Needs (2022)    PRAPARE - Transportation     Lack of Transportation (Medical): No     Lack of Transportation (Non-Medical): No   Physical Activity: Inactive (2022)    Exercise Vital  Sign     Days of Exercise per Week: 0 days     Minutes of Exercise per Session: 0 min   Stress: No Stress Concern Present (9/23/2022)    [employer] - Occupational Stress Questionnaire     Feeling of Stress : Only a little   Housing Stability: Low Risk  (9/23/2022)    Housing Stability Vital Sign     Unable to Pay for Housing in the Last Year: No     Number of Places Lived in the Last Year: 1     Unstable Housing in the Last Year: No     Past Medical History:   Diagnosis Date    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 Patrick Sanz is a 52 y.o. male patient of Dr. Woodward status post robot-assisted radical prostatectomy with partial nerve sparing on 10/11/21 for what proved to be a 8cc, pGS4+3 (70% high grade) [id number] (0/15) prostate cancer excised with a focal positive 11mm margin and a positive benign margin. His RADHA-S score is 7. His post op PSA was low detectable and his most recent is now 0.177. H    Sleep apnea        Objective:      Physical Exam  Constitutional:       General: He is not in acute distress.     Appearance: He is well-developed. He is not diaphoretic.   HENT:      Head: Normocephalic and atraumatic.      Right Ear: External ear normal.      Left Ear: External ear normal.      Nose: Nose normal.   Eyes:      General:         Right eye: No discharge.         Left eye: No discharge.   Pulmonary:      Effort: Pulmonary effort is normal. No respiratory distress.      Breath sounds: No stridor.   Musculoskeletal:         General: Normal range of motion.      Cervical back: Normal range of motion.   Skin:     Coloration: Skin is not pale.   Neurological:      Mental Status: He is alert and oriented to person, place, and time.      Motor: No abnormal muscle tone.      Coordination:  Coordination normal.   Psychiatric:         Behavior: Behavior normal.         Thought Content: Thought content normal.         Judgment: Judgment normal.           Assessment / Plan:     Uncontrolled type 2 diabetes mellitus with hyperglycemia    Hyperlipidemia associated with type 2 diabetes mellitus    BRAYAN on CPAP (severe)    Obesity (BMI 30-39.9)        Additional Plan Details:    - POCT Glucose  - Encouraged continuation of lifestyle changes including regular exercise and limiting carbohydrates to 30-45 grams per meal threes times daily and 15 grams per snack with a limit of two daily.   - Encouraged continued monitoring of blood glucose with maintenance of 4 times daily and Continuously with personal CGM Dexcom.   - Current DM Medication Regimen: Change Humalog 14-16 units before breakfast and 14-16 units before lunch/dinner and 5-6 units before snacks TID wm. Continue Basaglar 26 units daily. Continue Jardiance 25 mg daily. Continue Ozempic 2 mg weekly - monitor for worsening nausea.  - Zofran PRN nausea  - Discussion of pump vs. Patch options for management; Referral to CDE team for pre pump eval  - Health Maintenance standards addressed today: Foot Exam - deferred by patient today because Telemedicine or Telephone visit, Eye Exam - will be completed outside of [facility] and patient will schedule, Flu Shot - patient would like to complete outside of [facility], and COVID - 19 Vaccine - patient will schedule outside of [facility] .   - Nursing Visit: Patient is age 79 or younger with an A1c of 7.5 or greater and will not need nursing visit at this time .   - Follow up with me in 6 weeks with A1c prior.      CURRENT DM MEDICATIONS:   Basaglar 26 units at night  Humalog 14-16 units before breakfast based on meal size and 14-16 units before lunch/dinner based on meal size, 5-6 units before snack   Jardiance 25 mg daily   Ozempic 2 mg weekly     Blakeney McKnight, PA-C [facility]

## 2025-02-05 NOTE — Clinical Note
"6 wk virtual "Dex sharing"  Pre pump eval booked with Anahy cortez  Ghazal in person visit 2p "Dex sharing" "

## 2025-02-05 NOTE — PATIENT INSTRUCTIONS
CURRENT DM MEDICATIONS:   Basaglar 26 units at night  Humalog 14-16 units before breakfast based on meal size and 14-16 units before lunch/dinner based on meal size, 5-6 units before snack   Jardiance 25 mg daily   Ozempic 2 mg weekly

## 2025-03-04 ENCOUNTER — OFFICE VISIT (OUTPATIENT)
Dept: PSYCHIATRY | Facility: CLINIC | Age: 55
End: 2025-03-04

## 2025-03-04 DIAGNOSIS — F41.9 ANXIETY: Primary | ICD-10-CM

## 2025-03-04 DIAGNOSIS — F39 MOOD DISORDER: ICD-10-CM

## 2025-03-04 RX ORDER — CLONAZEPAM 0.5 MG/1
TABLET ORAL
Qty: 45 TABLET | Refills: 3 | Status: SHIPPED | OUTPATIENT
Start: 2025-03-04

## 2025-03-04 RX ORDER — LAMOTRIGINE 200 MG/1
TABLET ORAL
Qty: 135 TABLET | Refills: 1 | Status: SHIPPED | OUTPATIENT
Start: 2025-03-04

## 2025-03-04 RX ORDER — VENLAFAXINE HYDROCHLORIDE 150 MG/1
150 CAPSULE, EXTENDED RELEASE ORAL DAILY
Qty: 90 CAPSULE | Refills: 1 | Status: SHIPPED | OUTPATIENT
Start: 2025-03-04

## 2025-03-04 RX ORDER — QUETIAPINE FUMARATE 200 MG/1
200 TABLET, FILM COATED ORAL NIGHTLY
Qty: 90 TABLET | Refills: 1 | Status: SHIPPED | OUTPATIENT
Start: 2025-03-04
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
## 2025-03-08 ENCOUNTER — PATIENT MESSAGE (OUTPATIENT)
Dept: PSYCHIATRY | Facility: CLINIC | Age: 55
End: 2025-03-08
Payer: COMMERCIAL

## 2025-04-09 ENCOUNTER — PATIENT MESSAGE (OUTPATIENT)
Dept: INTERNAL MEDICINE | Facility: CLINIC | Age: 55
End: 2025-04-09
Payer: COMMERCIAL

## 2025-04-10 ENCOUNTER — HOSPITAL ENCOUNTER (OUTPATIENT)
Dept: RADIOLOGY | Facility: HOSPITAL | Age: 55
Discharge: HOME | End: 2025-04-10
Attending: PHYSICIAN ASSISTANT
Payer: COMMERCIAL

## 2025-04-10 ENCOUNTER — OFFICE VISIT (OUTPATIENT)
Dept: INTERNAL MEDICINE | Facility: CLINIC | Age: 55
End: 2025-04-10
Payer: COMMERCIAL

## 2025-04-10 VITALS
BODY MASS INDEX: 34.35 KG/M2 | SYSTOLIC BLOOD PRESSURE: 122 MMHG | OXYGEN SATURATION: 97 % | HEART RATE: 105 BPM | DIASTOLIC BLOOD PRESSURE: 80 MMHG | HEIGHT: 68 IN | WEIGHT: 226.63 LBS | TEMPERATURE: 100 F

## 2025-04-10 DIAGNOSIS — J11.1 INFLUENZA: ICD-10-CM

## 2025-04-10 DIAGNOSIS — R05.9 COUGH, UNSPECIFIED TYPE: ICD-10-CM

## 2025-04-10 DIAGNOSIS — R50.9 FEVER, UNSPECIFIED FEVER CAUSE: Primary | ICD-10-CM

## 2025-04-10 DIAGNOSIS — R50.9 FEVER, UNSPECIFIED FEVER CAUSE: ICD-10-CM

## 2025-04-10 LAB
CTP QC/QA: YES
POC MOLECULAR INFLUENZA A AGN: POSITIVE
POC MOLECULAR INFLUENZA B AGN: NEGATIVE

## 2025-04-10 PROCEDURE — 71046 X-RAY EXAM CHEST 2 VIEWS: CPT | Mod: 26,,, | Performed by: RADIOLOGY

## 2025-04-10 PROCEDURE — 99999 PR PBB SHADOW E&M-EST. PATIENT-LVL V: CPT | Mod: PBBFAC,,, | Performed by: PHYSICIAN ASSISTANT

## 2025-04-10 PROCEDURE — 71046 X-RAY EXAM CHEST 2 VIEWS: CPT | Mod: TC

## 2025-04-10 RX ORDER — BUDESONIDE AND FORMOTEROL FUMARATE DIHYDRATE 160; 4.5 UG/1; UG/1
2 AEROSOL RESPIRATORY (INHALATION) EVERY 12 HOURS
Qty: 6 G | Refills: 0 | Status: SHIPPED | OUTPATIENT
Start: 2025-04-10 | End: 2026-04-10

## 2025-04-10 RX ORDER — IBUPROFEN 800 MG/1
800 TABLET ORAL 3 TIMES DAILY
Qty: 15 TABLET | Refills: 0 | Status: SHIPPED | OUTPATIENT
Start: 2025-04-10 | End: 2025-04-15

## 2025-04-10 RX ORDER — ALBUTEROL SULFATE 90 UG/1
2 INHALANT RESPIRATORY (INHALATION) EVERY 6 HOURS PRN
Qty: 18 G | Refills: 0 | Status: SHIPPED | OUTPATIENT
Start: 2025-04-10 | End: 2026-04-10

## 2025-04-10 RX ORDER — OSELTAMIVIR PHOSPHATE 75 MG/1
75 CAPSULE ORAL 2 TIMES DAILY
Qty: 10 CAPSULE | Refills: 0 | Status: SHIPPED | OUTPATIENT
Start: 2025-04-10 | End: 2025-04-15

## 2025-04-10 RX ORDER — BENZONATATE 200 MG/1
200 CAPSULE ORAL 3 TIMES DAILY PRN
Qty: 30 CAPSULE | Refills: 0 | Status: SHIPPED | OUTPATIENT
Start: 2025-04-10 | End: 2025-04-20

## 2025-04-10 NOTE — PROGRESS NOTES
Subjective:      Patient ID: Patrick Sanz is a 55 y.o. male.    Chief Complaint: Fever    Fever   This is a new problem. The current episode started 2 days ago. The problem occurs constantly. The problem has been rapidly worsening. The maximum temperature noted was 102 to 102.9 F. Associated symptoms include congestion, coughing, ear pain, headaches, muscle aches, sleepiness, a sore throat and wheezing. Pertinent negatives include no abdominal pain, chest pain, diarrhea, nausea, rash, urinary pain or vomiting. Associated symptoms comments: Shortness of breath. He has tried acetaminophen and NSAIDs for the symptoms. The treatment provided mild relief.       Problem List[1]    Current Medications[2]    Review of Systems   Constitutional:  Positive for chills, diaphoresis, fatigue and fever. Negative for activity change, appetite change and unexpected weight change.   HENT:  Positive for congestion, ear pain, postnasal drip, rhinorrhea, sinus pressure, sinus pain, sneezing and sore throat. Negative for hearing loss, trouble swallowing and voice change.    Eyes:  Positive for redness and itching. Negative for visual disturbance.   Respiratory:  Positive for cough, chest tightness, shortness of breath and wheezing. Negative for choking.    Cardiovascular:  Negative for chest pain, palpitations and leg swelling.   Gastrointestinal:  Negative for abdominal distention, abdominal pain, blood in stool, constipation, diarrhea, nausea and vomiting.   Endocrine: Negative for cold intolerance, heat intolerance, polydipsia and polyuria.   Genitourinary: Negative.  Negative for difficulty urinating and frequency.   Musculoskeletal:  Positive for arthralgias and myalgias. Negative for back pain, gait problem and joint swelling.   Skin:  Negative for color change, pallor, rash and wound.   Neurological:  Positive for headaches. Negative for dizziness, tremors, weakness, light-headedness and numbness.   Hematological:   "Negative for adenopathy.   Psychiatric/Behavioral:  Negative for behavioral problems, confusion, self-injury, sleep disturbance and suicidal ideas. The patient is not nervous/anxious.      Objective:   /80 (BP Location: Right arm, Patient Position: Sitting)   Pulse 105   Temp 100.3 °F (37.9 °C) (Tympanic)   Ht 5' 8" (1.727 m)   Wt 102.8 kg (226 lb 10.1 oz)   SpO2 97%   BMI 34.46 kg/m²     Physical Exam  Vitals and nursing note reviewed.   Constitutional:       General: He is not in acute distress.     Appearance: Normal appearance. He is well-developed. He is not ill-appearing, toxic-appearing or diaphoretic.   HENT:      Head: Normocephalic and atraumatic.      Right Ear: Tympanic membrane, ear canal and external ear normal. There is no impacted cerumen.      Left Ear: Tympanic membrane, ear canal and external ear normal. There is no impacted cerumen.      Nose: Nose normal. No congestion or rhinorrhea.   Cardiovascular:      Rate and Rhythm: Normal rate and regular rhythm.      Heart sounds: Normal heart sounds. No murmur heard.     No friction rub. No gallop.   Pulmonary:      Effort: Pulmonary effort is normal. No respiratory distress.      Breath sounds: Examination of the left-lower field reveals decreased breath sounds. Decreased breath sounds, wheezing, rhonchi and rales present.   Chest:      Chest wall: Tenderness present.   Skin:     General: Skin is warm.      Findings: No rash.   Neurological:      Mental Status: He is alert and oriented to person, place, and time.   Psychiatric:         Mood and Affect: Mood normal.         Behavior: Behavior normal.         Thought Content: Thought content normal.         Judgment: Judgment normal.       Office Visit on 04/10/2025   Component Date Value Ref Range Status    POC Molecular Influenza A Ag 04/10/2025 Positive (A)  Negative Final    POC Molecular Influenza B Ag 04/10/2025 Negative  Negative Final     Acceptable 04/10/2025 Yes   " Final       Assessment:     1. Fever, unspecified fever cause    2. Cough, unspecified type    3. Influenza      Plan:   Fever, unspecified fever cause  -     POCT Influenza A/B Molecular  -     X-Ray Chest PA And Lateral; Future; Expected date: 04/10/2025  -     ibuprofen (ADVIL,MOTRIN) 800 MG tablet; Take 1 tablet (800 mg total) by mouth 3 (three) times daily. for 5 days  Dispense: 15 tablet; Refill: 0    Cough, unspecified type  -     X-Ray Chest PA And Lateral; Future; Expected date: 04/10/2025  -     benzonatate (TESSALON) 200 MG capsule; Take 1 capsule (200 mg total) by mouth 3 (three) times daily as needed for Cough.  Dispense: 30 capsule; Refill: 0  -abnormal lung sounds on exam today. CXR to rule out pneumonia    Influenza  -     budesonide-formoterol 160-4.5 mcg (SYMBICORT) 160-4.5 mcg/actuation HFAA; Inhale 2 puffs into the lungs every 12 (twelve) hours. Wash mouth out after use  Dispense: 6 g; Refill: 0  -     albuterol (PROVENTIL HFA) 90 mcg/actuation inhaler; Inhale 2 puffs into the lungs every 6 (six) hours as needed for Wheezing. Rescue  Dispense: 18 g; Refill: 0  -     oseltamivir (TAMIFLU) 75 MG capsule; Take 1 capsule (75 mg total) by mouth 2 (two) times daily. for 5 days  Dispense: 10 capsule; Refill: 0      Zyrtec D (get from pharmacist)  Mucinex DM (twice a day with a large glass of water)  Ibuprofen 800mg TID for 5 days. Take with food    Wash mouth out after using the symbicort inhaler    Follow up if symptoms worsen or fail to improve.           [1]   Patient Active Problem List  Diagnosis    Uncontrolled type 2 diabetes mellitus with hyperglycemia    Bipolar disorder    Kidney stone    Malignant neoplasm of prostate    Severe obesity (BMI 35.0-39.9) with comorbidity    Adenocarcinoma of rectosigmoid junction    Status post prostatectomy    Hyperlipidemia associated with type 2 diabetes mellitus    BRAYAN on CPAP (severe)   [2]   Current Outpatient Medications:     atorvastatin (LIPITOR) 80 MG  tablet, Take 1 tablet by mouth once daily, Disp: 90 tablet, Rfl: 0    blood-glucose sensor (DEXCOM G7 SENSOR) Miriam, 1 each by Misc.(Non-Drug; Combo Route) route every 10 days., Disp: 3 each, Rfl: 11    clonazePAM (KLONOPIN) 0.5 MG tablet, Take twice daily as needed for anxiety, Disp: 45 tablet, Rfl: 3    empagliflozin (JARDIANCE) 25 mg tablet, Take 1 tablet (25 mg total) by mouth once daily., Disp: 30 tablet, Rfl: 11    insulin glargine U-100, Lantus, (BASAGLAR KWIKPEN U-100 INSULIN) 100 unit/mL (3 mL) InPn pen, Inject 26 Units into the skin once daily., Disp: 7.8 mL, Rfl: 11    insulin lispro (HUMALOG KWIKPEN INSULIN) 100 unit/mL pen, Inject 12 Units into the skin 3 (three) times daily with meals., Disp: 12 mL, Rfl: 11    lamoTRIgine (LAMICTAL) 200 MG tablet, TAKE 1 & 1/2 (ONE & ONE-HALF) TABLETS BY MOUTH ONCE DAILY, Disp: 135 tablet, Rfl: 1    ondansetron (ZOFRAN) 4 MG tablet, Take 1 tablet (4 mg total) by mouth every 6 (six) hours as needed for Nausea., Disp: 60 tablet, Rfl: 1    pep injection, Inject 0.15 ml as directed   For compounding pharmacy use:  Add PAPAVERINE 30 mg Add PHENTOLAMINE 1 mg Add ALPROSTADIL 20 mcg, Disp: 1 vial, Rfl: 5    QUEtiapine (SEROQUEL) 200 MG Tab, Take 1 tablet (200 mg total) by mouth every evening., Disp: 90 tablet, Rfl: 1    semaglutide (OZEMPIC) 2 mg/dose (8 mg/3 mL) PnIj, Inject 2 mg into the skin every 7 days., Disp: 3 mL, Rfl: 11    tadalafiL (CIALIS) 20 MG Tab, , Disp: , Rfl:     venlafaxine (EFFEXOR-XR) 150 MG Cp24, Take 1 capsule (150 mg total) by mouth once daily., Disp: 90 capsule, Rfl: 1    albuterol (PROVENTIL HFA) 90 mcg/actuation inhaler, Inhale 2 puffs into the lungs every 6 (six) hours as needed for Wheezing. Rescue, Disp: 18 g, Rfl: 0    benzonatate (TESSALON) 200 MG capsule, Take 1 capsule (200 mg total) by mouth 3 (three) times daily as needed for Cough., Disp: 30 capsule, Rfl: 0    budesonide-formoterol 160-4.5 mcg (SYMBICORT) 160-4.5 mcg/actuation HFAA, Inhale 2  puffs into the lungs every 12 (twelve) hours. Wash mouth out after use, Disp: 6 g, Rfl: 0    ibuprofen (ADVIL,MOTRIN) 800 MG tablet, Take 1 tablet (800 mg total) by mouth 3 (three) times daily. for 5 days, Disp: 15 tablet, Rfl: 0    oseltamivir (TAMIFLU) 75 MG capsule, Take 1 capsule (75 mg total) by mouth 2 (two) times daily. for 5 days, Disp: 10 capsule, Rfl: 0

## 2025-04-10 NOTE — PATIENT INSTRUCTIONS
Zyrtec D (get from pharmacist)  Mucinex DM (twice a day with a large glass of water)  Ibuprofen 800mg TID for 5 days. Take with food    Wash mouth out after using the symbicort inhaler

## 2025-04-14 ENCOUNTER — RESULTS FOLLOW-UP (OUTPATIENT)
Dept: INTERNAL MEDICINE | Facility: CLINIC | Age: 55
End: 2025-04-14

## 2025-04-15 ENCOUNTER — OFFICE VISIT (OUTPATIENT)
Dept: SLEEP MEDICINE | Facility: CLINIC | Age: 55
End: 2025-04-15
Payer: COMMERCIAL

## 2025-04-15 VITALS
OXYGEN SATURATION: 96 % | WEIGHT: 230.63 LBS | HEIGHT: 68 IN | RESPIRATION RATE: 17 BRPM | DIASTOLIC BLOOD PRESSURE: 80 MMHG | HEART RATE: 100 BPM | BODY MASS INDEX: 34.95 KG/M2 | SYSTOLIC BLOOD PRESSURE: 130 MMHG

## 2025-04-15 DIAGNOSIS — J10.1 INFLUENZA A: ICD-10-CM

## 2025-04-15 DIAGNOSIS — G47.33 OSA ON CPAP: Primary | ICD-10-CM

## 2025-04-15 DIAGNOSIS — E66.01 SEVERE OBESITY (BMI 35.0-39.9) WITH COMORBIDITY: ICD-10-CM

## 2025-04-15 PROCEDURE — 3075F SYST BP GE 130 - 139MM HG: CPT | Mod: CPTII,S$GLB,, | Performed by: NURSE PRACTITIONER

## 2025-04-15 PROCEDURE — 3008F BODY MASS INDEX DOCD: CPT | Mod: CPTII,S$GLB,, | Performed by: NURSE PRACTITIONER

## 2025-04-15 PROCEDURE — 3079F DIAST BP 80-89 MM HG: CPT | Mod: CPTII,S$GLB,, | Performed by: NURSE PRACTITIONER

## 2025-04-15 PROCEDURE — 99999 PR PBB SHADOW E&M-EST. PATIENT-LVL V: CPT | Mod: PBBFAC,,, | Performed by: NURSE PRACTITIONER

## 2025-04-15 PROCEDURE — 99214 OFFICE O/P EST MOD 30 MIN: CPT | Mod: S$GLB,,, | Performed by: NURSE PRACTITIONER

## 2025-04-15 PROCEDURE — 1159F MED LIST DOCD IN RCRD: CPT | Mod: CPTII,S$GLB,, | Performed by: NURSE PRACTITIONER

## 2025-04-15 PROCEDURE — 1160F RVW MEDS BY RX/DR IN RCRD: CPT | Mod: CPTII,S$GLB,, | Performed by: NURSE PRACTITIONER

## 2025-04-15 NOTE — PROGRESS NOTES
"Subjective:      Patient ID: Patrick Sanz is a 55 y.o. male.    Chief Complaint: Sleep Apnea (/)    HPI  Presents for sleep apnea on AutoPAP therapy. Recent replacement. Patient states improved symptoms with use of AutoPAP. Sleeping more soundly. Waking up feeling more refreshed. Improved daytime sleepiness. Patient states he is benefiting from use of the AutoPAP.   DX with Flu 5 days ago. Still coughing and wheezing. Feeling better on therapy. Taking symbicort, albuterol      Problem List[1]  /80   Pulse 100   Resp 17   Ht 5' 8" (1.727 m)   Wt 104.6 kg (230 lb 9.6 oz)   SpO2 96%   BMI 35.06 kg/m²   Body mass index is 35.06 kg/m².    Review of Systems   Constitutional: Negative.    HENT: Negative.     Respiratory:  Positive for cough and wheezing.    Cardiovascular: Negative.    Musculoskeletal: Negative.    Gastrointestinal: Negative.    Neurological: Negative.    Psychiatric/Behavioral: Negative.       Objective:      Physical Exam  Constitutional:       Appearance: He is obese.   HENT:      Head: Normocephalic and atraumatic.      Nose: Nose normal.   Cardiovascular:      Rate and Rhythm: Normal rate and regular rhythm.   Pulmonary:      Effort: Pulmonary effort is normal.      Breath sounds: Wheezing present.   Abdominal:      Palpations: Abdomen is soft.      Tenderness: There is no abdominal tenderness.   Musculoskeletal:         General: Normal range of motion.      Cervical back: Normal range of motion and neck supple.   Skin:     General: Skin is warm and dry.   Neurological:      General: No focal deficit present.      Mental Status: He is alert and oriented to person, place, and time.   Psychiatric:         Mood and Affect: Mood normal.         Behavior: Behavior normal.       Personal Diagnostic Review      4/15/2025     2:58 PM   EPWORTH SLEEPINESS SCALE   Sitting and reading 0   Watching TV 0   Sitting, inactive in a public place (e.g. a theatre or a meeting) 0   As a passenger in " a car for an hour without a break 0   Lying down to rest in the afternoon when circumstances permit 1   Sitting and talking to someone 0   Sitting quietly after a lunch without alcohol 0   In a car, while stopped for a few minutes in traffic 0   Total score 1            Results for orders placed during the hospital encounter of 04/10/25    X-Ray Chest PA And Lateral    Narrative  EXAM:  XR CHEST PA AND LATERAL    CLINICAL HISTORY: Fever, unspecified, cough    COMPARISON: None available.    TECHNIQUE: PA and lateral views of the chest    FINDINGS: No confluent airspace opacity or consolidation.  There is no pleural effusion or pneumothorax.  The cardiomediastinal silhouette is within normal size limits.  The hilar and mediastinal structures are within normal limits.  No acute osseous abnormality is evident.    Impression  No acute radiographic abnormality in the chest.    Finalized on: 4/10/2025 5:17 PM By:  Paul Plummer MD  [facility]# [id number]      2025-04-10 17:19:[id number]     [facility]        Assessment:       1. BRAYAN on CPAP (severe)    2. Influenza A    3. Severe obesity (BMI 35.0-39.9) with comorbidity        Encounter Medications[2]  Orders Placed This Encounter   Procedures    CPAP/BIPAP SUPPLIES     Length of need (1-99 months)::   99     Choose ONE mask type and its corresponding cushions and/or pillows::    Nasal Mask, 1 per 90 days:  Nasal Cushions, (6 per 90 days):  Nasal Pillows, (6 per 90 days)     Choose EITHER Heated or Non-Heated Tubjing:    Non-Heated Tubing, 1 per 90 days     All other supplies as needed as listed below::    Headgear, 1 per 180 days     All other supplies as needed as listed below::    Chin Strap, 1 per 180 days     All other supplies as needed as listed below::    Disposable Filter, 6 per 90 days     All other supplies as needed as listed below::    Non-Disposable Filter, 1 per 180 days     All other supplies as needed as listed below::     Humidifier Chamber, 1 per 180 days     DME Agency::   [facility]     Plan:     Compliant with PAP and benefits from use. Follow up annually in the sleep clinic.    1. BRAYAN on CPAP (severe)  -     CPAP/BIPAP SUPPLIES    2. Influenza A    3. Severe obesity (BMI 35.0-39.9) with comorbidity        Positive wheezing. Feeling better on symbicort. Continue full month supply. Let me know if not continued improvement.  Weight loss and exercise to improve overall health.                     Elizabeth LeJeune, ACNP, ANP       [1]   Patient Active Problem List  Diagnosis    Uncontrolled type 2 diabetes mellitus with hyperglycemia    Bipolar disorder    Kidney stone    Malignant neoplasm of prostate    Severe obesity (BMI 35.0-39.9) with comorbidity    Adenocarcinoma of rectosigmoid junction    Status post prostatectomy    Hyperlipidemia associated with type 2 diabetes mellitus    BRAYAN on CPAP (severe)   [2]   Outpatient Encounter Medications as of 4/15/2025   Medication Sig Dispense Refill    albuterol (PROVENTIL HFA) 90 mcg/actuation inhaler Inhale 2 puffs into the lungs every 6 (six) hours as needed for Wheezing. Rescue 18 g 0    atorvastatin (LIPITOR) 80 MG tablet Take 1 tablet by mouth once daily 90 tablet 0    benzonatate (TESSALON) 200 MG capsule Take 1 capsule (200 mg total) by mouth 3 (three) times daily as needed for Cough. 30 capsule 0    blood-glucose sensor (DEXCOM G7 SENSOR) Miriam 1 each by Misc.(Non-Drug; Combo Route) route every 10 days. 3 each 11    budesonide-formoterol 160-4.5 mcg (SYMBICORT) 160-4.5 mcg/actuation HFAA Inhale 2 puffs into the lungs every 12 (twelve) hours. Wash mouth out after use 6 g 0    clonazePAM (KLONOPIN) 0.5 MG tablet Take twice daily as needed for anxiety 45 tablet 3    empagliflozin (JARDIANCE) 25 mg tablet Take 1 tablet (25 mg total) by mouth once daily. 30 tablet 11    ibuprofen (ADVIL,MOTRIN) 800 MG tablet Take 1 tablet (800 mg total) by mouth 3 (three) times daily.  for 5 days 15 tablet 0    insulin glargine U-100, Lantus, (BASAGLAR KWIKPEN U-100 INSULIN) 100 unit/mL (3 mL) InPn pen Inject 26 Units into the skin once daily. 7.8 mL 11    insulin lispro (HUMALOG KWIKPEN INSULIN) 100 unit/mL pen Inject 12 Units into the skin 3 (three) times daily with meals. 12 mL 11    lamoTRIgine (LAMICTAL) 200 MG tablet TAKE 1 & 1/2 (ONE & ONE-HALF) TABLETS BY MOUTH ONCE DAILY 135 tablet 1    ondansetron (ZOFRAN) 4 MG tablet Take 1 tablet (4 mg total) by mouth every 6 (six) hours as needed for Nausea. 60 tablet 1    oseltamivir (TAMIFLU) 75 MG capsule Take 1 capsule (75 mg total) by mouth 2 (two) times daily. for 5 days 10 capsule 0    pep injection Inject 0.15 ml as directed     For compounding pharmacy use:   Add PAPAVERINE 30 mg  Add PHENTOLAMINE 1 mg  Add ALPROSTADIL 20 mcg 1 vial 5    QUEtiapine (SEROQUEL) 200 MG Tab Take 1 tablet (200 mg total) by mouth every evening. 90 tablet 1    semaglutide (OZEMPIC) 2 mg/dose (8 mg/3 mL) PnIj Inject 2 mg into the skin every 7 days. 3 mL 11    tadalafiL (CIALIS) 20 MG Tab       venlafaxine (EFFEXOR-XR) 150 MG Cp24 Take 1 capsule (150 mg total) by mouth once daily. 90 capsule 1     No facility-administered encounter medications on file as of 4/15/2025.

## 2025-04-16 ENCOUNTER — OFFICE VISIT (OUTPATIENT)
Dept: DIABETES | Facility: CLINIC | Age: 55
End: 2025-04-16
Payer: COMMERCIAL

## 2025-04-16 ENCOUNTER — PATIENT MESSAGE (OUTPATIENT)
Dept: DIABETES | Facility: CLINIC | Age: 55
End: 2025-04-16

## 2025-04-16 DIAGNOSIS — E66.9 OBESITY (BMI 30-39.9): ICD-10-CM

## 2025-04-16 DIAGNOSIS — E78.5 HYPERLIPIDEMIA ASSOCIATED WITH TYPE 2 DIABETES MELLITUS: ICD-10-CM

## 2025-04-16 DIAGNOSIS — G47.33 OSA ON CPAP: ICD-10-CM

## 2025-04-16 DIAGNOSIS — E11.69 HYPERLIPIDEMIA ASSOCIATED WITH TYPE 2 DIABETES MELLITUS: ICD-10-CM

## 2025-04-16 DIAGNOSIS — E11.65 UNCONTROLLED TYPE 2 DIABETES MELLITUS WITH HYPERGLYCEMIA: Primary | ICD-10-CM

## 2025-04-16 NOTE — PATIENT INSTRUCTIONS
CURRENT DM MEDICATIONS:   Basaglar 30 units at night  Humalog 16 units before breakfast based on meal size and 16 units before lunch/dinner based on meal size, 5-6 units before snack   Jardiance 25 mg daily   Ozempic 1 mg weekly - 36 clicks on 2 mg pen

## 2025-04-16 NOTE — PROGRESS NOTES
PCP: Edilberto Figueroa MD    Subjective:     Chief Complaint: Diabetes     TELEMEDICINE VISIT:     The patient location is: Home  The chief complaint leading to consultation is: Diabetes Follow up  Visit type: Virtual visit with synchronous audio and video  Each patient to whom he or she provides medical services by telemedicine is:  (1) informed of the relationship between the physician and patient and the respective role of any other health care provider with respect to management of the patient; and (2) notified that he or she may decline to receive medical services by telemedicine and may withdraw from such care at any time.    Notes:     HISTORY OF PRESENT ILLNESS: 55 y.o.   male presenting for diabetes management.   The patient's last visit with me was on 2/5/2025.   Patient has had Type II diabetes since 2012.  Pertinent to decision making is the following comorbidities: Obesity by BMI and Bipolar Disorder  Patient has the following Diabetes complications: without complications  He is to be enrolled in diabetes education classes.     Patient's most recent A1c of 6.7% was completed 6 months ago.   Patient states since His last A1c His blood glucose levels have been high  after meals.   Patient monitors blood glucose 4 times per day with meter and Continuously with personal CGM Dexcom.   Patient blood glucose monitoring device will be uploaded into Media Section today.        Interpretation of CGM as following : baseline mild hyperglycemia and postprandial hyperglycemia. Pt currently has flu.    Patient endorses the following diabetes related symptoms: Nausea. Still present despite Zofran.   Patient is due today for the following diabetes-related health maintenance standards: Foot Exam , Eye Exam, A1c, Influenza Vaccine, and COVID-19 Vaccine . Sched eye exam with Dr. Quezada.    He denies recent hospital admissions or emergency room visits.   He denies having hypoglycemia.   Patient's concerns today  include glycemic control. Of note, previously sched for pump eval and did not attend - would like to reschedule. Patient has currently has flu.   Patient medication regimen is as below.     CURRENT DM MEDICATIONS:   Basaglar 26 units at night  Humalog 12 units before breakfast based on meal size and 12 units before lunch/dinner based on meal size, 5-6 units before snack   Jardiance 25 mg daily   Ozempic 2 mg weekly     Patient has failed the following Diabetes medications:   Trulicity   Basaglar / Lantus  Metformin        Labs Reviewed.       Lab Results   Component Value Date    CPEPTIDE 5.89 (H) 10/26/2024     Lab Results   Component Value Date    GLUTAMICACID 0.00 03/07/2023          //   , There is no height or weight on file to calculate BMI.  His blood sugar in clinic today is:         Review of Systems   Constitutional:  Negative for activity change, appetite change, chills and fever.   HENT:  Negative for dental problem, mouth sores, nosebleeds, sore throat and trouble swallowing.    Eyes:  Negative for pain and discharge.   Respiratory:  Negative for shortness of breath, wheezing and stridor.    Cardiovascular:  Negative for chest pain, palpitations and leg swelling.   Gastrointestinal:  Negative for abdominal pain, diarrhea, nausea and vomiting.   Endocrine: Negative for polydipsia, polyphagia and polyuria.   Genitourinary:  Negative for dysuria, frequency and urgency.   Musculoskeletal:  Negative for joint swelling and myalgias.   Skin:  Negative for rash and wound.   Neurological:  Negative for dizziness, syncope, weakness and headaches.   Psychiatric/Behavioral:  Negative for behavioral problems and dysphoric mood.          Diabetes Management Status  Statin: Taking  ACE/ARB: Not taking    Screening or Prevention Patient's value Goal Complete/Controlled?   HgA1C Testing and Control   Lab Results   Component Value Date    HGBA1C 6.7 (H) 10/26/2024      Annually/Less than 8% No   Lipid profile :  10/26/2024 Annually Yes   LDL control Lab Results   Component Value Date    LDLCALC 93.8 10/26/2024    Annually/Less than 100 mg/dl  Yes   Nephropathy screening Lab Results   Component Value Date    MICALBCREAT Unable to calculate 10/26/2024     Lab Results   Component Value Date    PROTEINUA Trace (A) 2021    Annually Yes   Blood pressure BP Readings from Last 1 Encounters:   04/15/25 130/80    Less than 140/90 Yes   Dilated retinal exam : 2023 Annually No    Foot exam   : 2023 Annually No     Social History     Socioeconomic History    Marital status: Significant Other   Tobacco Use    Smoking status: Former     Current packs/day: 0.00     Average packs/day: 0.3 packs/day for 15.0 years (3.8 ttl pk-yrs)     Types: Cigarettes     Start date: 1998     Quit date: 2013     Years since quittin.2    Smokeless tobacco: Former    Tobacco comments:     Was a part time smoker.  1 pack could last 2 or more weeks   Substance and Sexual Activity    Alcohol use: Not Currently     Comment: May have 1 or 2 drinks at social events or restaurants    Drug use: Never    Sexual activity: Yes     Partners: Female     Birth control/protection: Partner-Vasectomy, Post-menopausal     Social Drivers of Health     Financial Resource Strain: Medium Risk (4/10/2025)    Overall Financial Resource Strain (CARDIA)     Difficulty of Paying Living Expenses: Somewhat hard   Food Insecurity: No Food Insecurity (4/10/2025)    Hunger Vital Sign     Worried About Running Out of Food in the Last Year: Never true     Ran Out of Food in the Last Year: Never true   Transportation Needs: No Transportation Needs (4/10/2025)    PRAPARE - Transportation     Lack of Transportation (Medical): No     Lack of Transportation (Non-Medical): No   Physical Activity: Insufficiently Active (4/10/2025)    Exercise Vital Sign     Days of Exercise per Week: 7 days     Minutes of Exercise per Session: 10 min   Stress: No Stress Concern Present  (4/10/2025)    [place] [employer] - Occupational Stress Questionnaire     Feeling of Stress : Only a little   Housing Stability: Low Risk  (4/10/2025)    Housing Stability Vital Sign     Unable to Pay for Housing in the Last Year: No     Number of Times Moved in the Last Year: 0     Homeless in the Last Year: No     Past Medical History:   Diagnosis Date    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 Patrick Sanz is a 52 y.o. male patient of Dr. Woodward status post robot-assisted radical prostatectomy with partial nerve sparing on 10/11/21 for what proved to be a 8cc, pGS4+3 (70% high grade) [id number] (0/15) prostate cancer excised with a focal positive 11mm margin and a positive benign margin. His RADHA-S score is 7. His post op PSA was low detectable and his most recent is now 0.177. H    Sleep apnea        Objective:      Physical Exam  Constitutional:       General: He is not in acute distress.     Appearance: He is well-developed. He is not diaphoretic.   HENT:      Head: Normocephalic and atraumatic.      Right Ear: External ear normal.      Left Ear: External ear normal.      Nose: Nose normal.   Eyes:      General:         Right eye: No discharge.         Left eye: No discharge.   Pulmonary:      Effort: Pulmonary effort is normal. No respiratory distress.      Breath sounds: No stridor.   Musculoskeletal:         General: Normal range of motion.      Cervical back: Normal range of motion.   Skin:     Coloration: Skin is not pale.   Neurological:      Mental Status: He is alert and oriented to person, place, and time.      Motor: No abnormal muscle tone.      Coordination: Coordination normal.   Psychiatric:         Behavior: Behavior normal.         Thought Content: Thought content normal.         Judgment:  Judgment normal.           Assessment / Plan:     Uncontrolled type 2 diabetes mellitus with hyperglycemia  -     Hemoglobin A1C; Future; Expected date: 04/16/2025  -     Lipid Panel; Future; Expected date: 04/16/2025  -     Hemoglobin A1C; Future; Expected date: 04/16/2025    Hyperlipidemia associated with type 2 diabetes mellitus    BRAYAN on CPAP (severe)    Obesity (BMI 30-39.9)        Additional Plan Details:    - POCT Glucose  - Encouraged continuation of lifestyle changes including regular exercise and limiting carbohydrates to 30-45 grams per meal threes times daily and 15 grams per snack with a limit of two daily.   - Encouraged continued monitoring of blood glucose with maintenance of 4 times daily and Continuously with personal CGM Dexcom.   - Current DM Medication Regimen: Change Humalog 16 units before breakfast and 16 units before lunch/dinner and 5-6 units before snacks TID wm. Change Basaglar 30 units daily. Continue Jardiance 25 mg daily. Change Ozempic 1 mg weekly - monitor for worsening nausea.  - Zofran PRN nausea  - Discussion of pump vs. Patch options for management; Referral to CDE team for pre pump eval  - Fasting labs sched   - Health Maintenance standards addressed today: Foot Exam - deferred by patient today because Telemedicine or Telephone visit, Eye Exam - will be completed outside of [facility] and patient will schedule, A1c to be scheduled, Flu Shot - patient would like to complete outside of [facility], and COVID - 19 Vaccine - patient will schedule outside of [facility] .   - Nursing Visit: Patient is age 79 or younger with an A1c of 7.5 or greater and will not need nursing visit at this time .   - Follow up with me in 2 weeks with A1c prior.      CURRENT DM MEDICATIONS:   Basaglar 30 units at night  Humalog 16 units before breakfast based on meal size and 16 units before lunch/dinner based on meal size, 5-6 units before snack   Jardiance 25 mg daily   Ozempic 1 mg weekly - 36 clicks on 2 mg  pen Blakeney McKnight, PA-C [facility]

## 2025-04-16 NOTE — Clinical Note
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 Pulling this sat. Let me know if you want anything else.   Jeffrey Aguilar PA-C, BC-ADM [facility]    90 mins education 5/30 at 100p with Arcelia "Pre Pump eval"  4/28 730a virtual "Dex sharing"  Fasting lab (A1c and lipid) this sat am"

## 2025-04-26 ENCOUNTER — LAB VISIT (OUTPATIENT)
Dept: LAB | Facility: HOSPITAL | Age: 55
End: 2025-04-26
Attending: PHYSICIAN ASSISTANT
Payer: COMMERCIAL

## 2025-04-26 DIAGNOSIS — E11.65 UNCONTROLLED TYPE 2 DIABETES MELLITUS WITH HYPERGLYCEMIA: ICD-10-CM

## 2025-04-26 LAB
CHOLEST SERPL-MCNC: 288 MG/DL (ref 120–199)
CHOLEST/HDLC SERPL: 6.9 {RATIO} (ref 2–5)
EAG (OHS): 166 MG/DL (ref 68–131)
HBA1C MFR BLD: 7.4 % (ref 4–5.6)
HDLC SERPL-MCNC: 42 MG/DL (ref 40–75)
HDLC SERPL: 14.6 % (ref 20–50)
LDLC SERPL CALC-MCNC: 175.2 MG/DL (ref 63–159)
NONHDLC SERPL-MCNC: 246 MG/DL
TRIGL SERPL-MCNC: 354 MG/DL (ref 30–150)

## 2025-04-26 PROCEDURE — 83036 HEMOGLOBIN GLYCOSYLATED A1C: CPT

## 2025-04-26 PROCEDURE — 36415 COLL VENOUS BLD VENIPUNCTURE: CPT

## 2025-04-26 PROCEDURE — 80061 LIPID PANEL: CPT

## 2025-04-28 ENCOUNTER — OFFICE VISIT (OUTPATIENT)
Dept: DIABETES | Facility: CLINIC | Age: 55
End: 2025-04-28
Payer: COMMERCIAL

## 2025-04-28 DIAGNOSIS — G47.33 OSA ON CPAP: ICD-10-CM

## 2025-04-28 DIAGNOSIS — E11.65 UNCONTROLLED TYPE 2 DIABETES MELLITUS WITH HYPERGLYCEMIA: ICD-10-CM

## 2025-04-28 DIAGNOSIS — E78.5 HYPERLIPIDEMIA ASSOCIATED WITH TYPE 2 DIABETES MELLITUS: ICD-10-CM

## 2025-04-28 DIAGNOSIS — E11.69 HYPERLIPIDEMIA ASSOCIATED WITH TYPE 2 DIABETES MELLITUS: ICD-10-CM

## 2025-04-28 DIAGNOSIS — E66.9 OBESITY (BMI 30-39.9): ICD-10-CM

## 2025-04-28 DIAGNOSIS — Z53.21 PATIENT LEFT WITHOUT BEING SEEN: Primary | ICD-10-CM

## 2025-04-28 PROCEDURE — 99499 UNLISTED E&M SERVICE: CPT | Mod: 93,,, | Performed by: PHYSICIAN ASSISTANT

## 2025-04-28 NOTE — PROGRESS NOTES
FRANCISCO. Unable to reach pt - LVM to return call.     Jeffrey Aguilar PA-C, BC-ADM  [facility]

## 2025-04-29 ENCOUNTER — TELEPHONE (OUTPATIENT)
Dept: DIABETES | Facility: CLINIC | Age: 55
End: 2025-04-29
Payer: COMMERCIAL

## 2025-04-29 ENCOUNTER — RESULTS FOLLOW-UP (OUTPATIENT)
Dept: DIABETES | Facility: CLINIC | Age: 55
End: 2025-04-29

## 2025-04-29 DIAGNOSIS — E78.5 HYPERLIPIDEMIA ASSOCIATED WITH TYPE 2 DIABETES MELLITUS: ICD-10-CM

## 2025-04-29 DIAGNOSIS — E11.69 HYPERLIPIDEMIA ASSOCIATED WITH TYPE 2 DIABETES MELLITUS: ICD-10-CM

## 2025-04-29 NOTE — TELEPHONE ENCOUNTER
No care due was identified.  [facility] Embedded Care Due Messages. Reference number: [id number].   4/29/2025 4:56:51 PM CDT

## 2025-04-29 NOTE — TELEPHONE ENCOUNTER
I attempted to call to inform  of his lab results per provider request but received no answer. I left a detailed voicemail with my name and the [facility] callback number to allow  to return the call to be informed of his lab results.

## 2025-04-29 NOTE — TELEPHONE ENCOUNTER
----- Message from Jeffrey Aguilar PA-C sent at 4/29/2025 12:58 PM CDT -----  The following labs have been reviewed and interpreted as following:      Lipid: Regression in Lipid control; see if taking Lipitor     A1c: regression, discuss at follow up   ----- Message -----  From: Lab, Background User  Sent: 4/26/2025   5:31 PM CDT  To: Jeffrey Aguilar PA-C

## 2025-04-29 NOTE — TELEPHONE ENCOUNTER
No care due was identified.  [facility] Embedded Care Due Messages. Reference number: [id number].   4/29/2025 4:58:16 PM CDT

## 2025-04-30 RX ORDER — ATORVASTATIN CALCIUM 80 MG/1
80 TABLET, FILM COATED ORAL
Qty: 90 TABLET | Refills: 0 | Status: SHIPPED | OUTPATIENT
Start: 2025-04-30

## 2025-05-01 DIAGNOSIS — E11.65 UNCONTROLLED TYPE 2 DIABETES MELLITUS WITH HYPERGLYCEMIA: ICD-10-CM

## 2025-05-01 RX ORDER — ATORVASTATIN CALCIUM 80 MG/1
80 TABLET, FILM COATED ORAL NIGHTLY
Qty: 90 TABLET | Refills: 0 | Status: SHIPPED | OUTPATIENT
Start: 2025-05-01

## 2025-05-01 RX ORDER — SEMAGLUTIDE 2.68 MG/ML
2 INJECTION, SOLUTION SUBCUTANEOUS
Qty: 3 ML | Refills: 11 | Status: SHIPPED | OUTPATIENT
Start: 2025-05-01

## 2025-05-15 ENCOUNTER — TELEPHONE (OUTPATIENT)
Dept: INTERNAL MEDICINE | Facility: CLINIC | Age: 55
End: 2025-05-15
Payer: COMMERCIAL

## 2025-05-19 ENCOUNTER — TELEPHONE (OUTPATIENT)
Dept: INTERNAL MEDICINE | Facility: CLINIC | Age: 55
End: 2025-05-19
Payer: COMMERCIAL

## 2025-05-19 ENCOUNTER — PATIENT MESSAGE (OUTPATIENT)
Dept: INTERNAL MEDICINE | Facility: CLINIC | Age: 55
End: 2025-05-19
Payer: COMMERCIAL

## 2025-05-19 NOTE — TELEPHONE ENCOUNTER
----- Message from Yue sent at 5/19/2025 10:29 AM CDT -----  Contact: Patrick  .Type:  Patient Returning CallWho Called:Phong Pichardo Message for Patient:Princess Medel LPNDoes the patient know what this is regarding?:missed call he received Would the patient rather a call back or a response via [facility]? Call [facility] Call Back Number:.[phone] Additional Information:

## 2025-05-27 ENCOUNTER — PATIENT MESSAGE (OUTPATIENT)
Dept: INTERNAL MEDICINE | Facility: CLINIC | Age: 55
End: 2025-05-27
Payer: COMMERCIAL

## 2025-05-28 ENCOUNTER — OFFICE VISIT (OUTPATIENT)
Dept: DIABETES | Facility: CLINIC | Age: 55
End: 2025-05-28
Payer: COMMERCIAL

## 2025-05-28 DIAGNOSIS — E11.65 UNCONTROLLED TYPE 2 DIABETES MELLITUS WITH HYPERGLYCEMIA: Primary | ICD-10-CM

## 2025-05-28 DIAGNOSIS — E78.5 HYPERLIPIDEMIA ASSOCIATED WITH TYPE 2 DIABETES MELLITUS: ICD-10-CM

## 2025-05-28 DIAGNOSIS — E11.69 HYPERLIPIDEMIA ASSOCIATED WITH TYPE 2 DIABETES MELLITUS: ICD-10-CM

## 2025-05-28 DIAGNOSIS — E66.9 OBESITY (BMI 30-39.9): ICD-10-CM

## 2025-05-28 DIAGNOSIS — G47.33 OSA ON CPAP: ICD-10-CM

## 2025-05-28 NOTE — PATIENT INSTRUCTIONS
CURRENT DM MEDICATIONS:   Basaglar 24 units at night  Humalog 14 units before breakfast based on meal size and 14 units before lunch/dinner based on meal size, 5-6 units before snack   Jardiance 25 mg daily   Ozempic 2 mg weekly

## 2025-05-28 NOTE — PROGRESS NOTES
PCP: Edilberto Figueroa MD    Subjective:     Chief Complaint: Diabetes     TELEMEDICINE VISIT:     The patient location is: Home  The chief complaint leading to consultation is: Diabetes Follow up  Visit type: Virtual visit with synchronous audio and video  Each patient to whom he or she provides medical services by telemedicine is:  (1) informed of the relationship between the physician and patient and the respective role of any other health care provider with respect to management of the patient; and (2) notified that he or she may decline to receive medical services by telemedicine and may withdraw from such care at any time.    Notes:     HISTORY OF PRESENT ILLNESS: 55 y.o.   male presenting for diabetes management.   The patient's last visit with me was on 4/28/2025.   Patient has had Type II diabetes since 2012.  Pertinent to decision making is the following comorbidities: Obesity by BMI and Bipolar Disorder  Patient has the following Diabetes complications: without complications  He is to be enrolled in diabetes education classes.     Patient's most recent A1c of 7.4% was completed 1 months ago.   Patient states since His last A1c His blood glucose levels have been high  after meals.   Patient monitors blood glucose 4 times per day with meter and Continuously with personal CGM Dexcom.   Patient blood glucose monitoring device will be uploaded into Media Section today.        Interpretation of CGM as following : baseline euglycemia  and postprandial hyperglycemia throughout the day related in part to dose and in part to taking humalog right before meal. Would like to increase ozempic to 2 mg.   Patient endorses the following diabetes related symptoms: Nausea. Improved.   Patient is due today for the following diabetes-related health maintenance standards: Foot Exam , Eye Exam, and COVID-19 Vaccine . Sched eye exam with Dr. Quezada.    He denies recent hospital admissions or emergency room visits.    He denies having hypoglycemia.   Patient's concerns today include glycemic control.   Patient medication regimen is as below.     CURRENT DM MEDICATIONS:   Basaglar 26 units at night  Humalog 12 -14 units before breakfast based on meal size and 12-14 units before lunch/dinner based on meal size, 5-6 units before snack   Jardiance 25 mg daily   Ozempic 1 mg weekly - 36 clicks on 2 mg pen     Patient has failed the following Diabetes medications:   Trulicity   Basaglar / Lantus  Metformin        Labs Reviewed.       Lab Results   Component Value Date    CPEPTIDE 5.89 (H) 10/26/2024     Lab Results   Component Value Date    GLUTAMICACID 0.00 03/07/2023          //   , There is no height or weight on file to calculate BMI.  His blood sugar in clinic today is:         Review of Systems   Constitutional:  Negative for activity change, appetite change, chills and fever.   HENT:  Negative for dental problem, mouth sores, nosebleeds, sore throat and trouble swallowing.    Eyes:  Negative for pain and discharge.   Respiratory:  Negative for shortness of breath, wheezing and stridor.    Cardiovascular:  Negative for chest pain, palpitations and leg swelling.   Gastrointestinal:  Negative for abdominal pain, diarrhea, nausea and vomiting.   Endocrine: Negative for polydipsia, polyphagia and polyuria.   Genitourinary:  Negative for dysuria, frequency and urgency.   Musculoskeletal:  Negative for joint swelling and myalgias.   Skin:  Negative for rash and wound.   Neurological:  Negative for dizziness, syncope, weakness and headaches.   Psychiatric/Behavioral:  Negative for behavioral problems and dysphoric mood.          Diabetes Management Status  Statin: Taking  ACE/ARB: Not taking    Screening or Prevention Patient's value Goal Complete/Controlled?   HgA1C Testing and Control   Lab Results   Component Value Date    HGBA1C 7.4 (H) 04/26/2025      Annually/Less than 8% No   Lipid profile : 04/26/2025 Annually Yes   LDL  control Lab Results   Component Value Date    LDLCALC 175.2 (H) 2025    Annually/Less than 100 mg/dl  Yes   Nephropathy screening Lab Results   Component Value Date    MICALBCREAT Unable to calculate 10/26/2024     Lab Results   Component Value Date    PROTEINUA Trace (A) 2021    Annually Yes   Blood pressure BP Readings from Last 1 Encounters:   04/15/25 130/80    Less than 140/90 Yes   Dilated retinal exam : 2023 Annually No    Foot exam   : 2023 Annually No     Social History     Socioeconomic History    Marital status: Significant Other   Tobacco Use    Smoking status: Former     Current packs/day: 0.00     Average packs/day: 0.3 packs/day for 15.0 years (3.8 ttl pk-yrs)     Types: Cigarettes     Start date: 1998     Quit date: 2013     Years since quittin.4    Smokeless tobacco: Former    Tobacco comments:     Was a part time smoker.  1 pack could last 2 or more weeks   Substance and Sexual Activity    Alcohol use: Not Currently     Comment: May have 1 or 2 drinks at social events or restaurants    Drug use: Never    Sexual activity: Yes     Partners: Female     Birth control/protection: Partner-Vasectomy, Post-menopausal     Social Drivers of Health     Financial Resource Strain: Medium Risk (4/10/2025)    Overall Financial Resource Strain (CARDIA)     Difficulty of Paying Living Expenses: Somewhat hard   Food Insecurity: No Food Insecurity (4/10/2025)    Hunger Vital Sign     Worried About Running Out of Food in the Last Year: Never true     Ran Out of Food in the Last Year: Never true   Transportation Needs: No Transportation Needs (4/10/2025)    PRAPARE - Transportation     Lack of Transportation (Medical): No     Lack of Transportation (Non-Medical): No   Physical Activity: Insufficiently Active (4/10/2025)    Exercise Vital Sign     Days of Exercise per Week: 7 days     Minutes of Exercise per Session: 10 min   Stress: No Stress Concern Present (4/10/2025)    Citizen of [place]  [place] [employer] - Occupational Stress Questionnaire     Feeling of Stress : Only a little   Housing Stability: Low Risk  (4/10/2025)    Housing Stability Vital Sign     Unable to Pay for Housing in the Last Year: No     Number of Times Moved in the Last Year: 0     Homeless in the Last Year: No     Past Medical History:   Diagnosis Date    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 Patrick Sanz is a 52 y.o. male patient of Dr. Woodward status post robot-assisted radical prostatectomy with partial nerve sparing on 10/11/21 for what proved to be a 8cc, pGS4+3 (70% high grade) [id number] (0/15) prostate cancer excised with a focal positive 11mm margin and a positive benign margin. His RADHA-S score is 7. His post op PSA was low detectable and his most recent is now 0.177. H    Sleep apnea        Objective:      Physical Exam  Constitutional:       General: He is not in acute distress.     Appearance: He is well-developed. He is not diaphoretic.   HENT:      Head: Normocephalic and atraumatic.      Right Ear: External ear normal.      Left Ear: External ear normal.      Nose: Nose normal.   Eyes:      General:         Right eye: No discharge.         Left eye: No discharge.   Pulmonary:      Effort: Pulmonary effort is normal. No respiratory distress.      Breath sounds: No stridor.   Musculoskeletal:         General: Normal range of motion.      Cervical back: Normal range of motion.   Skin:     Coloration: Skin is not pale.   Neurological:      Mental Status: He is alert and oriented to person, place, and time.      Motor: No abnormal muscle tone.      Coordination: Coordination normal.   Psychiatric:         Behavior: Behavior normal.         Thought Content: Thought content normal.         Judgment: Judgment normal.            Assessment / Plan:     Uncontrolled type 2 diabetes mellitus with hyperglycemia    Hyperlipidemia associated with type 2 diabetes mellitus    BRAYAN on CPAP (severe)    Obesity (BMI 30-39.9)        Additional Plan Details:    - POCT Glucose  - Encouraged continuation of lifestyle changes including regular exercise and limiting carbohydrates to 30-45 grams per meal threes times daily and 15 grams per snack with a limit of two daily.   - Encouraged continued monitoring of blood glucose with maintenance of 4 times daily and Continuously with personal CGM Dexcom.   - Current DM Medication Regimen: Change Humalog 14 units before breakfast and 14 units before lunch/dinner and 5-6 units before snacks TID wm. Change Basaglar 24 units daily. Continue Jardiance 25 mg daily. Change Ozempic 2 mg weekly - monitor for worsening nausea.  - Zofran PRN nausea  - Discussion of pump vs. Patch options for management; Referral to CDE team for pre pump eval  - Health Maintenance standards addressed today: Foot Exam - deferred by patient today because Telemedicine or Telephone visit, Eye Exam - will be completed outside of [facility] and patient will schedule, and COVID - 19 Vaccine - patient will schedule outside of [facility] .   - Nursing Visit: Patient is age 79 or younger with an A1c of 7.5 or greater and will not need nursing visit at this time .   - Follow up with me in 4 weeks with A1c prior.      CURRENT DM MEDICATIONS:   Basaglar 24 units at night  Humalog 14 units before breakfast based on meal size and 14 units before lunch/dinner based on meal size, 5-6 units before snack   Jardiance 25 mg daily   Ozempic 2 mg weekly     Jeffrey Aguilar PA-C  [facility]

## 2025-05-29 DIAGNOSIS — E11.65 UNCONTROLLED TYPE 2 DIABETES MELLITUS WITH HYPERGLYCEMIA: Primary | ICD-10-CM

## 2025-05-30 ENCOUNTER — CLINICAL SUPPORT (OUTPATIENT)
Dept: DIABETES | Facility: CLINIC | Age: 55
End: 2025-05-30
Payer: COMMERCIAL

## 2025-05-30 DIAGNOSIS — E11.65 UNCONTROLLED TYPE 2 DIABETES MELLITUS WITH HYPERGLYCEMIA: ICD-10-CM

## 2025-05-30 PROCEDURE — 99999 PR PBB SHADOW E&M-EST. PATIENT-LVL III: CPT | Mod: PBBFAC,,,

## 2025-05-30 NOTE — PROGRESS NOTES
Diabetes Care Specialist Progress Note  Author: Arcelia Mart RN  Date: 5/30/2025    Intake    Program Intake  Reason for Diabetes Program Visit:: Initial Diabetes Assessment  Current diabetes risk level:: moderate  In the last month, have you used the ER or been admitted to the hospital: No  Permission to speak with others about care:: no    Current Diabetes Treatment: Oral Medications, DM Injectables, Insulin  Oral Medication Type/Dose: Jardiance 25 mg daily.  DM Injectables Type/Dose: Ozempic 2 mg weekly.  Method of insulin delivery?: Injections  Injection Type: Pens  Pen Type/Dose: Humalog 14 units TID AC. 5-6 units AC snacks. Basaglar 24 units daily.    Continuous Glucose Monitoring  Patient has CGM: Yes  Personal CGM type:: Dexcom G7.  GMI Date: 05/30/25  GMI Value: 8.2 %        Lab Results   Component Value Date    HGBA1C 7.4 (H) 04/26/2025     Lifestyle Coping Support & Clinical    Lifestyle/Coping/Support  Compared to other people your age, how would you rate your health?: Poor  Does anyone in your family have diabetes or does anyone in your family support you in your diabetes care?: Family hx of DM (mom, uncle). Has a good support system at home.  List anything about Diabetes that causes you stress?: None.  How do you deal with stress/distress?: N/A.  Learning Barriers:: Visual (Wears glasses.)  Culture or [employer] beliefs that may impact ability to access healthcare: No  Psychosocial/Coping Skills Assessment Completed: : Yes  Assessment indicates:: Adequate understanding  Area of need?: No    Problem Review  Active Comorbidities: Hyperlipidemia/Dyslipidemia    Diabetes Self-Management Skills Assessment    Medication Skills Assessment  Patient is able to identify current diabetes medications, dosages, and appropriate timing of medications.: yes  Patient reports problems or concerns with current medication regimen.: yes  Medication regimen problems/concerns:: other (see comments) (Interested in pump  therapy.)  Patient is  aware that some diabetes medications can cause low blood sugar?: Yes  Medication Skills Assessment Completed:: Yes  Assessment indicates:: Knowledge deficit, Instruction Needed  Area of need?: Yes    Diabetes Disease Process/Treatment Options  Diabetes Type?: Type II  When were you diagnosed?: 2012.  If previous diabetes education, when/where:: Yes, [facility].  What are your goals for this education session?: Pre-pump eval.  Diabetes Disease Process/Treatment Options: Skills Assessment Completed: Yes  Assessment indicates:: Adequate understanding  Area of need?: No    Nutrition/Healthy Eating  Meal Plan 24 Hour Recall - Breakfast: Banana; Coffee.  Meal Plan 24 Hour Recall - Lunch: Roast beef sandwich; SF 7Up.  Meal Plan 24 Hour Recall - Dinner: Lamb & beef gyro; SF Root Beer.  Meal Plan 24 Hour Recall - Snack: Frozen custard.  Meal Plan 24 Hour Recall - Beverage: SF drinks.  Who shops/cooks?: Wife.  Patient can identify foods that impact blood sugar.: yes  Nutrition/Healthy Eating Skills Assessment Completed:: Yes  Assessment indicates:: Knowledge deficit, Instruction Needed  Area of need?: Yes    Physical Activity/Exercise  Physical Activity/Exercise Skills Assessment Completed: : No  Deffered due to:: Time  Area of need?: Deferred    Home Blood Glucose Monitoring  Patient states that blood sugar is checked at home daily.: yes  Monitoring Method:: personal continuous glucose monitor  Personal CGM type:: Dexcom G7.   What is your current Time in Range?: 33%  What is your A1c Target?: <7%  Home Blood Glucose Monitoring Skills Assessment Completed: : Yes  Assessment indicates:: Knowledge deficit, Instruction Needed  Area of need?: Yes    Acute Complications  Acute Complications Skills Assessment Completed: : No  Deffered due to:: Time  Area of need?: Deferred    Chronic Complications  Chronic Complications Skills Assessment Completed: : No  Deferred due to:: Time  Area of need?:  Deferred      Assessment Summary and Plan    Based on today's diabetes care assessment, the following areas of need were identified:      Identified Areas of Need      Medication/Current Diabetes Treatment: Yes-See care plan.      Lifestyle Coping/Support: No.      Diabetes Disease Process/Treatment Options: No.     Nutrition/Healthy Eating: Yes-Briefly educated on carb counting. Provided with handout.      Physical Activity/Exercise: Deferred.     Home Blood Glucose Monitoring: Yes-Educated on blood sugar goals and how pump therapy will help.     Acute Complications: Deferred.     Chronic Complications: Deferred.         Today's interventions were provided through individual discussion, instruction, and written materials were provided.      Patient verbalized understanding of instruction and written materials.  Pt was able to return back demonstration of instructions today. Patient understood key points, needs reinforcement and further instruction.     Diabetes Self-Management Care Plan:    Today's Diabetes Self-Management Care Plan was developed with Patrick's input. Patrick has agreed to work toward the following goal(s) to improve his/her overall diabetes control.      Care Plan: Diabetes Management   Updates made since 5/30/2024 12:00 AM        Problem: Medications         Goal: Patient agrees to take diabetes medications as prescribed until decision made on pump therapy.    Start Date: 5/30/2025   Expected End Date: 5/29/2026   Note:    Discussed pre-pump topics:   Pump options w/ compatible CGM: Medtronic 780G using Guardian 4; OmniPod 5, Tandem t:slim x2 and Mobi using Dexcom G6/G7; Twiist, and iLet.   Common terms: basal/bolus insulin, IC, ISF, TBS, IOB  Pros/cons pump therapy, supplies needed, frequency of changing infusion sets and cartridges/pods, and backup pump plan   Allowed pt to see demo pumps and example infusion set    Provided carb counting training using food lists, food label. Pt able  demonstrate understanding using examples in clinic.            Follow Up Plan     Follow up if pump supplies are received.    Today's care plan and follow up schedule was discussed with patient.  Patrick verbalized understanding of the care plan, goals, and agrees to follow up plan.        The patient was encouraged to communicate with his/her health care provider/physician and care team regarding his/her condition(s) and treatment.  I provided the patient with my contact information today and encouraged to contact me via phone or [facility] Patient Portal as needed.     Length of Visit   Total Time: 60 Minutes

## 2025-06-03 ENCOUNTER — OFFICE VISIT (OUTPATIENT)
Dept: INTERNAL MEDICINE | Facility: CLINIC | Age: 55
End: 2025-06-03
Payer: COMMERCIAL

## 2025-06-03 VITALS
SYSTOLIC BLOOD PRESSURE: 130 MMHG | WEIGHT: 231.25 LBS | TEMPERATURE: 98 F | BODY MASS INDEX: 35.05 KG/M2 | OXYGEN SATURATION: 98 % | HEIGHT: 68 IN | HEART RATE: 93 BPM | DIASTOLIC BLOOD PRESSURE: 90 MMHG

## 2025-06-03 DIAGNOSIS — E11.65 UNCONTROLLED TYPE 2 DIABETES MELLITUS WITH HYPERGLYCEMIA: ICD-10-CM

## 2025-06-03 DIAGNOSIS — F31.9 BIPOLAR AFFECTIVE DISORDER, REMISSION STATUS UNSPECIFIED: ICD-10-CM

## 2025-06-03 DIAGNOSIS — E78.5 HYPERLIPIDEMIA ASSOCIATED WITH TYPE 2 DIABETES MELLITUS: ICD-10-CM

## 2025-06-03 DIAGNOSIS — E11.69 HYPERLIPIDEMIA ASSOCIATED WITH TYPE 2 DIABETES MELLITUS: ICD-10-CM

## 2025-06-03 DIAGNOSIS — Z00.00 ROUTINE GENERAL MEDICAL EXAMINATION AT A HEALTH CARE FACILITY: Primary | ICD-10-CM

## 2025-06-03 DIAGNOSIS — C19 ADENOCARCINOMA OF RECTOSIGMOID JUNCTION: ICD-10-CM

## 2025-06-03 DIAGNOSIS — C61 MALIGNANT NEOPLASM OF PROSTATE: ICD-10-CM

## 2025-06-03 PROCEDURE — 99396 PREV VISIT EST AGE 40-64: CPT | Mod: S$GLB,,, | Performed by: INTERNAL MEDICINE

## 2025-06-03 PROCEDURE — 99999 PR PBB SHADOW E&M-EST. PATIENT-LVL V: CPT | Mod: PBBFAC,,, | Performed by: INTERNAL MEDICINE

## 2025-06-03 PROCEDURE — 3075F SYST BP GE 130 - 139MM HG: CPT | Mod: CPTII,S$GLB,, | Performed by: INTERNAL MEDICINE

## 2025-06-03 PROCEDURE — 1159F MED LIST DOCD IN RCRD: CPT | Mod: CPTII,S$GLB,, | Performed by: INTERNAL MEDICINE

## 2025-06-03 PROCEDURE — 3051F HG A1C>EQUAL 7.0%<8.0%: CPT | Mod: CPTII,S$GLB,, | Performed by: INTERNAL MEDICINE

## 2025-06-03 PROCEDURE — 3008F BODY MASS INDEX DOCD: CPT | Mod: CPTII,S$GLB,, | Performed by: INTERNAL MEDICINE

## 2025-06-03 PROCEDURE — 3080F DIAST BP >= 90 MM HG: CPT | Mod: CPTII,S$GLB,, | Performed by: INTERNAL MEDICINE

## 2025-06-03 RX ORDER — ATORVASTATIN CALCIUM 80 MG/1
80 TABLET, FILM COATED ORAL NIGHTLY
Qty: 90 TABLET | Refills: 3 | Status: SHIPPED | OUTPATIENT
Start: 2025-06-03

## 2025-06-03 NOTE — PROGRESS NOTES
Subjective:      Patient ID: Patrick Sanz is a 55 y.o. male.    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 Patrick Sanz is a 52 y.o. male patient of Dr. Woodward status post robot-assisted radical prostatectomy with partial nerve sparing on 10/11/21 for what proved to be a 8cc, pGS4+3 (70% high grade) [id number] (0/15) prostate cancer excised with a focal positive 11mm margin and a positive benign margin. His RADHA-S score is 7. His post op PSA was low detectable and his most recent is now 0.177. H    Sleep apnea      Here today for annual prev exam.  Compliant with meds without significant side effects. Energy and appetite are good.     Past Surgical History:   Procedure Laterality Date    COLON SURGERY  10.2022    COLONOSCOPY N/A 08/04/2022    Procedure: COLONOSCOPY;  Surgeon: Michelle Flores MD;  Location: [facility];  Service: Endoscopy;  Laterality: N/A;    COLONOSCOPY N/A 8/25/2023    Procedure: COLONOSCOPY;  Surgeon: Cj Quinn MD;  Location: [facility];  Service: General;  Laterality: N/A;    HERNIA REPAIR      INJECTION OF ANESTHETIC AGENT INTO TISSUE PLANE DEFINED BY TRANSVERSUS ABDOMINIS MUSCLE N/A 09/22/2022    Procedure: BLOCK, TRANSVERSUS ABDOMINIS PLANE;  Surgeon: Cj Quinn MD;  Location: [facility];  Service: General;  Laterality: N/A;    MOBILIZATION OF SPLENIC FLEXURE N/A 09/22/2022    Procedure: MOBILIZATION, SPLENIC FLEXURE;  Surgeon: Cj Quinn MD;  Location: [facility];  Service: General;  Laterality: N/A;    PROSTATE SURGERY  09.2021    ROBOT-ASSISTED LOW ANTERIOR RESECTION OF COLON N/A 09/22/2022    Procedure: XI ROBOTIC RESECTION, COLON, LOW  "ANTERIOR;  Surgeon: Cj Quinn MD;  Location: [facility];  Service: General;  Laterality: N/A;  CONVERTED TO OPEN     Social History[2]  family history includes Cancer in his maternal grandfather, maternal grandmother, and mother; Diabetes in his maternal aunt, maternal aunt, maternal uncle, maternal uncle, maternal uncle, and mother; Hearing loss in his daughter and mother; Heart disease in his father and paternal grandfather; Hypertension in his father.        Review of Systems   Constitutional:  Negative for chills and fever.   HENT:  Negative for ear pain and sore throat.    Respiratory:  Negative for cough.    Cardiovascular:  Negative for chest pain.   Gastrointestinal:  Negative for abdominal pain and blood in stool.   Genitourinary:  Negative for dysuria and hematuria.   Neurological:  Negative for seizures and syncope.     Objective:   BP (!) 130/90 (BP Location: Right arm, Patient Position: Sitting)   Pulse 93   Temp 97.9 °F (36.6 °C)   Ht 5' 8" (1.727 m)   Wt 104.9 kg (231 lb 4.2 oz)   SpO2 98%   BMI 35.16 kg/m²     Physical Exam  Constitutional:       General: He is not in acute distress.     Appearance: He is well-developed.   HENT:      Head: Normocephalic and atraumatic.   Eyes:      Extraocular Movements: Extraocular movements intact.   Neck:      Thyroid: No thyromegaly.   Cardiovascular:      Rate and Rhythm: Normal rate and regular rhythm.   Pulmonary:      Breath sounds: Normal breath sounds. No wheezing or rales.   Abdominal:      General: Bowel sounds are normal.      Palpations: Abdomen is soft.      Tenderness: There is no abdominal tenderness.   Musculoskeletal:         General: No swelling.      Cervical back: Neck supple. No rigidity.   Lymphadenopathy:      Cervical: No cervical adenopathy.   Skin:     General: Skin is warm and dry.   Neurological:      Mental Status: He is alert and oriented to person, place, and time.   Psychiatric:         Behavior: Behavior normal. "         Lab Results   Component Value Date    WBC 5.87 10/26/2024    HGB 15.3 10/26/2024    HGB 15.5 11/11/2022    HGB 14.8 10/11/2022    HCT 47.6 10/26/2024    MCV 90 10/26/2024    MCV 86 11/11/2022    MCV 85 10/11/2022     10/26/2024    CHOL 288 (H) 04/26/2025    TRIG 354 (H) 04/26/2025    HDL 42 04/26/2025    LDLCALC 175.2 (H) 04/26/2025    LDLCALC 93.8 10/26/2024    LDLCALC 147.4 03/07/2023    ALT 34 10/26/2024    AST 25 10/26/2024     10/26/2024    K 4.3 10/26/2024    CALCIUM 9.0 10/26/2024     10/26/2024    CO2 19 (L) 10/26/2024    BUN 14 10/26/2024    CREATININE 1.0 10/26/2024    CREATININE 0.9 03/07/2023    CREATININE 1.0 11/11/2022    EGFRNORACEVR >60.0 10/26/2024    EGFRNORACEVR >60.0 03/07/2023    EGFRNORACEVR >60 11/11/2022    TSH 1.152 10/26/2024    TSH 0.943 10/03/2020    TSH 1.66 02/07/2020    PSA <0.01 10/26/2024    PSA 17.6 (H) 03/15/2021    PSA 15.7 (H) 10/07/2020    PSADIAG <0.01 05/30/2023    PSADIAG <0.01 05/01/2023    PSADIAG 0.23 11/11/2022    PSADIAG 0.23 11/11/2022     (H) 10/26/2024    HGBA1C 7.4 (H) 04/26/2025    HGBA1C 6.7 (H) 10/26/2024    HGBA1C 6.7 (H) 05/29/2024          The 10-year ASCVD risk score (Rebecca ALFORD, et al., 2019) is: 16.3%    Values used to calculate the score:      Age: 55 years      Sex: Male      Is Non- : No      Diabetic: Yes      Tobacco smoker: No      Systolic Blood Pressure: 130 mmHg      Is BP treated: No      HDL Cholesterol: 42 mg/dL      Total Cholesterol: 288 mg/dL     Assessment:     1. Routine general medical examination at a health care facility    2. Hyperlipidemia associated with type 2 diabetes mellitus    3. Uncontrolled type 2 diabetes mellitus with hyperglycemia    4. Bipolar affective disorder, remission status unspecified    5. Malignant neoplasm of prostate    6. Adenocarcinoma of rectosigmoid junction      Plan:   1. Routine general medical examination at a health care facility  Overview:  Heart  healthy diet and regular exercise.  Health maintenance reviewed    Orders:  -     Cancel: Lipid Panel; Future; Expected date: 09/01/2025  -     Cancel: Lipid Panel; Future; Expected date: 11/30/2025  -     Cancel: Hemoglobin A1C; Future; Expected date: 11/30/2025  -     Cancel: Comprehensive Metabolic Panel; Future; Expected date: 11/30/2025  -     Cancel: CBC Auto Differential; Future; Expected date: 11/30/2025  -     PSA, Screening; Future; Expected date: 11/30/2025  -     TSH; Future; Expected date: 11/30/2025  -     CBC Auto Differential; Future; Expected date: 06/03/2026  -     Comprehensive Metabolic Panel; Future; Expected date: 06/03/2026  -     Hemoglobin A1C; Future; Expected date: 06/03/2026  -     Lipid Panel; Future; Expected date: 06/03/2026  -     Lipid Panel; Future; Expected date: 09/03/2025    2. Hyperlipidemia associated with type 2 diabetes mellitus  -     atorvastatin (LIPITOR) 80 MG tablet; Take 1 tablet (80 mg total) by mouth every evening.  Dispense: 90 tablet; Refill: 3    3. Uncontrolled type 2 diabetes mellitus with hyperglycemia  -     Ambulatory referral/consult to Optometry; Future; Expected date: 06/10/2025    4. Bipolar affective disorder, remission status unspecified  Overview:  Stable. Cont recs and f/u as per psyc      5. Malignant neoplasm of prostate  Overview:  Mr. Patrick Sanz is a 52 y.o. male patient of Dr. Woodward status post robot-assisted radical prostatectomy with partial nerve sparing on 10/11/21 for what proved to be a 8cc, pGS4+3 (70% high grade) [id number] (0/15) prostate cancer excised with a focal positive 11mm margin and a positive benign margin. His RADHA-S score is 7. His post op PSA was low detectable and his most recent is now 0.177.  He returns today for PSMA PET follow up.      Biopsy 5/6/21: 4+3 in 5/12 cores, PSA 17.6        6. Adenocarcinoma of rectosigmoid junction        There are no Patient Instructions on file for this visit.    Future Appointments    Date Time Provider Department Center   2025  2:00 PM Jeffrey Aguilar PA-C [facility] DIABETE [facility]   2025  2:30 PM NURSE, [facility] INTERNAL MEDICINE [facility] IM [facility]   2025  4:30 PM Pedro Amin MD [facility] PSYCH [facility]   10/13/2025  2:10 PM Jimbo Pak OD [facility] OPHTHAL [facility]   2025  8:20 AM LABORATORY, [facility] LAB [facility]   2026  8:00 AM LABORATORY, [facility] LAB [facility]       Lab Frequency Next Occurrence   Hemoglobin A1C Once 2024   Microalbumin/creatinine urine ratio Once 05/15/2024   Hemoglobin A1C Once 2025   Hemoglobin A1C Every 12 Weeks    Hemoglobin A1C Every 12 Weeks        Follow up in about 1 year (around 6/3/2026), or if symptoms worsen or fail to improve.                  [1]   Patient Active Problem List  Diagnosis    Uncontrolled type 2 diabetes mellitus with hyperglycemia    Bipolar disorder    Kidney stone    Malignant neoplasm of prostate    Severe obesity (BMI 35.0-39.9) with comorbidity    Adenocarcinoma of rectosigmoid junction    Status post prostatectomy    Routine general medical examination at a health care facility    Hyperlipidemia associated with type 2 diabetes mellitus    BRAYAN on CPAP (severe)   [2]   Social History  Socioeconomic History    Marital status: Significant Other   Tobacco Use    Smoking status: Former     Current packs/day: 0.00     Average packs/day: 0.3 packs/day for 15.0 years (3.8 ttl pk-yrs)     Types: Cigarettes     Start date: 1998     Quit date: 2013     Years since quittin.4    Smokeless tobacco: Former    Tobacco comments:     Was a part time smoker.  1 pack could last 2 or more weeks   Substance and Sexual Activity    Alcohol use: Not Currently     Comment: May have 1 or 2 drinks at social events or restaurants    Drug use: Never    Sexual activity: Yes     Partners: Female     Birth control/protection: Partner-Vasectomy, Post-menopausal     Social Drivers of Health      Financial Resource Strain: Medium Risk (4/10/2025)    Overall Financial Resource Strain (CARDIA)     Difficulty of Paying Living Expenses: Somewhat hard   Food Insecurity: No Food Insecurity (4/10/2025)    Hunger Vital Sign     Worried About Running Out of Food in the Last Year: Never true     Ran Out of Food in the Last Year: Never true   Transportation Needs: No Transportation Needs (4/10/2025)    PRAPARE - Transportation     Lack of Transportation (Medical): No     Lack of Transportation (Non-Medical): No   Physical Activity: Insufficiently Active (4/10/2025)    Exercise Vital Sign     Days of Exercise per Week: 7 days     Minutes of Exercise per Session: 10 min   Stress: No Stress Concern Present (4/10/2025)    [employer] - Occupational Stress Questionnaire     Feeling of Stress : Only a little   Housing Stability: Low Risk  (4/10/2025)    Housing Stability Vital Sign     Unable to Pay for Housing in the Last Year: No     Number of Times Moved in the Last Year: 0     Homeless in the Last Year: No

## 2025-06-12 ENCOUNTER — PATIENT MESSAGE (OUTPATIENT)
Dept: DIABETES | Facility: CLINIC | Age: 55
End: 2025-06-12
Payer: COMMERCIAL

## 2025-06-13 DIAGNOSIS — E11.65 UNCONTROLLED TYPE 2 DIABETES MELLITUS WITH HYPERGLYCEMIA: ICD-10-CM

## 2025-06-13 RX ORDER — EMPAGLIFLOZIN 25 MG/1
25 TABLET, FILM COATED ORAL
Qty: 30 TABLET | Refills: 11 | Status: SHIPPED | OUTPATIENT
Start: 2025-06-13

## (undated) DEVICE — SUT MCRYL PLUS 4-0 PS2 27IN

## (undated) DEVICE — COVER TIP CURVED SCISSORS XI

## (undated) DEVICE — PAD PINK TRENDELENBURG POS XL

## (undated) DEVICE — NDL SAFETY 22G X 1.5 ECLIPSE

## (undated) DEVICE — DRAPE ARM DAVINCI XI

## (undated) DEVICE — CONTAINER SPECIMEN OR STER 4OZ

## (undated) DEVICE — ELECTRODE REM PLYHSV RETURN 9

## (undated) DEVICE — DRAPE STERI LONG

## (undated) DEVICE — TOWEL OR XRAY WHITE 17X26IN

## (undated) DEVICE — SEALER VESSEL EXTEND

## (undated) DEVICE — SUT 1 48IN PDS II VIO MONO

## (undated) DEVICE — DRAPE COLUMN DAVINCI XI

## (undated) DEVICE — DEVICE ENSEAL X1 LARGE JAW

## (undated) DEVICE — SUT PROLENE 2-0 30 SH

## (undated) DEVICE — COVER LIGHT HANDLE 80/CA

## (undated) DEVICE — IRRIGATOR ENDOSCOPY DISP.

## (undated) DEVICE — STAPLER SKIN PROXIMATE WIDE

## (undated) DEVICE — SYR 30CC LUER LOCK

## (undated) DEVICE — PORT ACCESS 5MM W/120MM

## (undated) DEVICE — SYR 3CC LUER LOC

## (undated) DEVICE — SCISSOR 5MMX35CM DIRECT DRIVE

## (undated) DEVICE — SPONGE LAP 18X18 PREWASHED

## (undated) DEVICE — NDL PNEUMO INSUFFLATI 120MM

## (undated) DEVICE — SUT VICRYL PLUS 3-0 SH 18IN

## (undated) DEVICE — CUTTER PROXIMATE BLUE 75MM

## (undated) DEVICE — STAPLER INT PROX TX 60X3.5MM

## (undated) DEVICE — SUT SILK 0 SUTUPAK SA86H

## (undated) DEVICE — SYR ONLY LUER LOCK 20CC

## (undated) DEVICE — SUT CTD VICRYL 0 UND BR SUT

## (undated) DEVICE — SUT PDS II 96 1 VIO

## (undated) DEVICE — TRAY CATH FOL SIL URIMTR 16FR

## (undated) DEVICE — PACK BASIC SETUP SC BR

## (undated) DEVICE — TUBING MEDI-VAC 20FT .25IN

## (undated) DEVICE — GAUZE SPONGE 4X4 12PLY

## (undated) DEVICE — DRAPE ABDOMINAL TIBURON 14X11

## (undated) DEVICE — SOL NS 1000CC

## (undated) DEVICE — GOWN POLY REINF BRTH SLV XL

## (undated) DEVICE — SUT VICRYL CTD 2-0 GI 27 SH

## (undated) DEVICE — DRESSING TELFA STRL 4X3 LF

## (undated) DEVICE — STAPLER ECHELON PWR CIR 29MM

## (undated) DEVICE — APPLICATOR CHLORAPREP ORN 26ML

## (undated) DEVICE — COVER PROXIMA MAYO STAND

## (undated) DEVICE — SOL ELECTROLUBE ANTI-STIC

## (undated) DEVICE — KIT ANTIFOG W/SPONG & FLUID

## (undated) DEVICE — SET TRI-LUMEN FILTERED TUBE

## (undated) DEVICE — PACK DRAPE PERI/GYN TIBURON

## (undated) DEVICE — TOWEL OR DISP STRL BLUE 4/PK

## (undated) DEVICE — SUT VICRYL 0 27 CT-2

## (undated) DEVICE — DEVICE CLOSURE DISP 14G

## (undated) DEVICE — MANIFOLD 4 PORT

## (undated) DEVICE — SUT VICRYL 3-0 27 SH

## (undated) DEVICE — TIP GRASPER FENESTRATED DISP

## (undated) DEVICE — UNDERGLOVES BIOGEL PI SIZE 7.5

## (undated) DEVICE — SUT 1 36IN PDS II VIO MONO

## (undated) DEVICE — KIT ENDO UNIV IRR SYSTEM

## (undated) DEVICE — Device

## (undated) DEVICE — SEAL UNIVERSAL 5MM-8MM XI

## (undated) DEVICE — CLIP HEMO-LOK MLX LARGE LF

## (undated) DEVICE — GLOVE SURG BIOGEL LATEX SZ 7.5

## (undated) DEVICE — DRAPE CORETEMP FLD WRM 56X62IN

## (undated) DEVICE — SYR 10CC LUER LOCK